# Patient Record
Sex: FEMALE | URBAN - METROPOLITAN AREA
[De-identification: names, ages, dates, MRNs, and addresses within clinical notes are randomized per-mention and may not be internally consistent; named-entity substitution may affect disease eponyms.]

---

## 2022-11-29 ENCOUNTER — OFFICE (OUTPATIENT)
Dept: URBAN - METROPOLITAN AREA CLINIC 94 | Facility: CLINIC | Age: 62
Setting detail: OPHTHALMOLOGY
End: 2022-11-29
Payer: COMMERCIAL

## 2022-11-29 DIAGNOSIS — E11.3293: ICD-10-CM

## 2022-11-29 DIAGNOSIS — H35.412: ICD-10-CM

## 2022-11-29 DIAGNOSIS — H35.033: ICD-10-CM

## 2022-11-29 DIAGNOSIS — H43.393: ICD-10-CM

## 2022-11-29 PROCEDURE — 92014 COMPRE OPH EXAM EST PT 1/>: CPT | Performed by: OPHTHALMOLOGY

## 2022-11-29 PROCEDURE — 92235 FLUORESCEIN ANGRPH MLTIFRAME: CPT | Performed by: OPHTHALMOLOGY

## 2022-11-29 PROCEDURE — 92134 CPTRZ OPH DX IMG PST SGM RTA: CPT | Performed by: OPHTHALMOLOGY

## 2022-11-29 ASSESSMENT — REFRACTION_MANIFEST
OD_CYLINDER: -0.25
OD_SPHERE: -1.25
OD_VA1: 20/20
OS_ADD: +1.50
OS_AXIS: 155
OD_AXIS: 176
OD_ADD: +1.50
OS_SPHERE: -1.75
OS_VA1: 20/20
OS_CYLINDER: -1.00

## 2022-11-29 ASSESSMENT — REFRACTION_AUTOREFRACTION
OS_SPHERE: -1.75
OS_CYLINDER: -1.00
OD_AXIS: 176
OD_CYLINDER: -0.25
OD_SPHERE: -1.75
OS_AXIS: 166

## 2022-11-29 ASSESSMENT — LID EXAM ASSESSMENTS
OD_BLEPHARITIS: RLL 1+
OS_BLEPHARITIS: LLL 1+

## 2022-11-29 ASSESSMENT — REFRACTION_CURRENTRX
OS_CYLINDER: -1.25
OS_VPRISM_DIRECTION: PROGS
OD_OVR_VA: 20/
OD_VPRISM_DIRECTION: PROGS
OS_ADD: +1.50
OS_OVR_VA: 20/
OD_CYLINDER: -1.25
OD_AXIS: 090
OS_AXIS: 104
OD_SPHERE: -1.00
OD_ADD: +1.50
OS_SPHERE: -0.75

## 2022-11-29 ASSESSMENT — SUPERFICIAL PUNCTATE KERATITIS (SPK)
OD_SPK: T
OS_SPK: T

## 2022-11-29 ASSESSMENT — SPHEQUIV_DERIVED
OS_SPHEQUIV: -2.25
OD_SPHEQUIV: -1.375
OS_SPHEQUIV: -2.25
OD_SPHEQUIV: -1.875

## 2022-11-29 ASSESSMENT — KERATOMETRY
OD_AXISANGLE_DEGREES: 177
OS_K1POWER_DIOPTERS: 45.75
METHOD_AUTO_MANUAL: AUTO
OD_K2POWER_DIOPTERS: 46.00
OS_AXISANGLE_DEGREES: 178
OD_K1POWER_DIOPTERS: 45.75
OS_K2POWER_DIOPTERS: 46.50

## 2022-11-29 ASSESSMENT — TONOMETRY
OS_IOP_MMHG: 17
OD_IOP_MMHG: 19

## 2022-11-29 ASSESSMENT — AXIALLENGTH_DERIVED
OD_AL: 23.26
OS_AL: 23.5047
OD_AL: 23.4509
OS_AL: 23.5047

## 2022-11-29 ASSESSMENT — VISUAL ACUITY
OD_BCVA: 20/20-1
OS_BCVA: 20/25-1

## 2022-11-29 ASSESSMENT — CONFRONTATIONAL VISUAL FIELD TEST (CVF)
OD_FINDINGS: FULL
OS_FINDINGS: FULL

## 2023-10-11 ENCOUNTER — OFFICE (OUTPATIENT)
Dept: URBAN - METROPOLITAN AREA CLINIC 94 | Facility: CLINIC | Age: 63
Setting detail: OPHTHALMOLOGY
End: 2023-10-11
Payer: COMMERCIAL

## 2023-10-11 DIAGNOSIS — H01.005: ICD-10-CM

## 2023-10-11 DIAGNOSIS — H01.002: ICD-10-CM

## 2023-10-11 DIAGNOSIS — H35.033: ICD-10-CM

## 2023-10-11 DIAGNOSIS — H04.123: ICD-10-CM

## 2023-10-11 PROCEDURE — 99213 OFFICE O/P EST LOW 20 MIN: CPT | Performed by: OPHTHALMOLOGY

## 2023-10-11 ASSESSMENT — SUPERFICIAL PUNCTATE KERATITIS (SPK)
OS_SPK: T
OD_SPK: T

## 2023-10-11 ASSESSMENT — REFRACTION_MANIFEST
OS_SPHERE: -1.75
OD_VA1: 20/20
OS_ADD: +1.50
OD_AXIS: 176
OD_CYLINDER: -0.25
OD_ADD: +1.50
OS_AXIS: 155
OS_CYLINDER: -1.00
OS_VA1: 20/20
OD_SPHERE: -1.25

## 2023-10-11 ASSESSMENT — REFRACTION_AUTOREFRACTION
OS_AXIS: 095
OD_CYLINDER: -0.75
OD_SPHERE: -0.50
OS_SPHERE: +0.25
OD_AXIS: 088
OS_CYLINDER: -1.50

## 2023-10-11 ASSESSMENT — LID EXAM ASSESSMENTS
OD_COMMENTS: INSPISSATED GLANDS
OD_BLEPHARITIS: RLL 1+
OS_BLEPHARITIS: LLL 1+
OS_COMMENTS: INSPISSATED GLANDS

## 2023-10-11 ASSESSMENT — REFRACTION_CURRENTRX
OS_ADD: +1.50
OD_VPRISM_DIRECTION: PROGS
OD_CYLINDER: -1.25
OD_AXIS: 090
OD_SPHERE: -1.00
OS_SPHERE: -0.75
OS_VPRISM_DIRECTION: PROGS
OD_ADD: +1.50
OD_OVR_VA: 20/
OS_CYLINDER: -1.25
OS_OVR_VA: 20/
OS_AXIS: 104

## 2023-10-11 ASSESSMENT — VISUAL ACUITY
OS_BCVA: 20/20-1
OD_BCVA: 20/20

## 2023-10-11 ASSESSMENT — KERATOMETRY
OD_K1POWER_DIOPTERS: 46.25
OS_AXISANGLE_DEGREES: 005
OD_AXISANGLE_DEGREES: 011
METHOD_AUTO_MANUAL: AUTO
OD_K2POWER_DIOPTERS: 46.75
OS_K2POWER_DIOPTERS: 46.50
OS_K1POWER_DIOPTERS: 46.25

## 2023-10-11 ASSESSMENT — CONFRONTATIONAL VISUAL FIELD TEST (CVF)
OD_FINDINGS: FULL
OS_FINDINGS: FULL

## 2023-10-11 ASSESSMENT — TONOMETRY
OD_IOP_MMHG: 20
OS_IOP_MMHG: 18

## 2023-10-11 ASSESSMENT — AXIALLENGTH_DERIVED
OD_AL: 23.039
OD_AL: 22.8547
OS_AL: 23.4139
OS_AL: 22.761

## 2023-10-11 ASSESSMENT — SPHEQUIV_DERIVED
OS_SPHEQUIV: -0.5
OD_SPHEQUIV: -0.875
OD_SPHEQUIV: -1.375
OS_SPHEQUIV: -2.25

## 2023-12-13 ENCOUNTER — OFFICE (OUTPATIENT)
Dept: URBAN - METROPOLITAN AREA CLINIC 94 | Facility: CLINIC | Age: 63
Setting detail: OPHTHALMOLOGY
End: 2023-12-13
Payer: COMMERCIAL

## 2023-12-13 DIAGNOSIS — H35.033: ICD-10-CM

## 2023-12-13 DIAGNOSIS — H04.123: ICD-10-CM

## 2023-12-13 DIAGNOSIS — H01.002: ICD-10-CM

## 2023-12-13 DIAGNOSIS — H01.005: ICD-10-CM

## 2023-12-13 PROCEDURE — 99213 OFFICE O/P EST LOW 20 MIN: CPT | Performed by: OPHTHALMOLOGY

## 2023-12-13 PROCEDURE — 92250 FUNDUS PHOTOGRAPHY W/I&R: CPT | Performed by: OPHTHALMOLOGY

## 2023-12-13 ASSESSMENT — REFRACTION_MANIFEST
OS_SPHERE: -1.75
OS_VA1: 20/20
OS_ADD: +1.50
OD_AXIS: 176
OS_CYLINDER: -1.00
OD_CYLINDER: -0.25
OD_SPHERE: -1.25
OD_ADD: +1.50
OD_VA1: 20/20
OS_AXIS: 155

## 2023-12-13 ASSESSMENT — REFRACTION_AUTOREFRACTION
OD_AXIS: 159
OD_SPHERE: -1.50
OS_AXIS: 092
OD_CYLINDER: -0.25
OS_CYLINDER: -1.25
OS_SPHERE: 0.00

## 2023-12-13 ASSESSMENT — REFRACTION_CURRENTRX
OD_CYLINDER: -1.25
OS_SPHERE: -0.75
OD_OVR_VA: 20/
OD_VPRISM_DIRECTION: PROGS
OS_AXIS: 104
OS_OVR_VA: 20/
OS_CYLINDER: -1.25
OS_VPRISM_DIRECTION: PROGS
OD_AXIS: 090
OS_ADD: +1.50
OD_ADD: +1.50
OD_SPHERE: -1.00

## 2023-12-13 ASSESSMENT — SPHEQUIV_DERIVED
OS_SPHEQUIV: -0.625
OS_SPHEQUIV: -2.25
OD_SPHEQUIV: -1.625
OD_SPHEQUIV: -1.375

## 2023-12-13 ASSESSMENT — LID EXAM ASSESSMENTS
OS_BLEPHARITIS: LLL 1+
OD_BLEPHARITIS: RLL 1+
OD_COMMENTS: INSPISSATED GLANDS
OS_COMMENTS: INSPISSATED GLANDS

## 2023-12-13 ASSESSMENT — CONFRONTATIONAL VISUAL FIELD TEST (CVF)
OD_FINDINGS: FULL
OS_FINDINGS: FULL

## 2023-12-13 ASSESSMENT — SUPERFICIAL PUNCTATE KERATITIS (SPK)
OS_SPK: T
OD_SPK: T

## 2024-03-26 ENCOUNTER — OFFICE (OUTPATIENT)
Dept: URBAN - METROPOLITAN AREA CLINIC 94 | Facility: CLINIC | Age: 64
Setting detail: OPHTHALMOLOGY
End: 2024-03-26
Payer: COMMERCIAL

## 2024-03-26 DIAGNOSIS — E11.3293: ICD-10-CM

## 2024-03-26 DIAGNOSIS — H35.412: ICD-10-CM

## 2024-03-26 DIAGNOSIS — H43.393: ICD-10-CM

## 2024-03-26 PROCEDURE — 92134 CPTRZ OPH DX IMG PST SGM RTA: CPT | Performed by: OPHTHALMOLOGY

## 2024-03-26 PROCEDURE — 92012 INTRM OPH EXAM EST PATIENT: CPT | Performed by: OPHTHALMOLOGY

## 2024-03-26 ASSESSMENT — LID EXAM ASSESSMENTS
OD_COMMENTS: INSPISSATED GLANDS
OS_COMMENTS: INSPISSATED GLANDS
OD_BLEPHARITIS: RLL 1+
OS_BLEPHARITIS: LLL 1+

## 2024-08-26 ENCOUNTER — INPATIENT (INPATIENT)
Facility: HOSPITAL | Age: 64
LOS: 16 days | Discharge: HOME | DRG: 373 | End: 2024-09-12
Attending: HOSPITALIST | Admitting: STUDENT IN AN ORGANIZED HEALTH CARE EDUCATION/TRAINING PROGRAM
Payer: COMMERCIAL

## 2024-08-26 VITALS
OXYGEN SATURATION: 96 % | DIASTOLIC BLOOD PRESSURE: 115 MMHG | SYSTOLIC BLOOD PRESSURE: 207 MMHG | TEMPERATURE: 98 F | HEART RATE: 104 BPM | RESPIRATION RATE: 18 BRPM

## 2024-08-26 DIAGNOSIS — Z98.891 HISTORY OF UTERINE SCAR FROM PREVIOUS SURGERY: Chronic | ICD-10-CM

## 2024-08-26 PROCEDURE — 49083 ABD PARACENTESIS W/IMAGING: CPT

## 2024-08-26 PROCEDURE — 99285 EMERGENCY DEPT VISIT HI MDM: CPT | Mod: 25

## 2024-08-26 PROCEDURE — 93010 ELECTROCARDIOGRAM REPORT: CPT

## 2024-08-26 NOTE — ED ADULT TRIAGE NOTE - CHIEF COMPLAINT QUOTE
pt BIBA for abd pain x1 week pt uses PD dialysis at home 5x wk, states machine at home isn't working properly and fluid is backing up into belly and legs complains of constipation, pt noted hypertensive in triage denies chest pain sob MD Spring called to bedside for eval

## 2024-08-27 DIAGNOSIS — K65.2 SPONTANEOUS BACTERIAL PERITONITIS: ICD-10-CM

## 2024-08-27 LAB
ALBUMIN SERPL ELPH-MCNC: 2.1 G/DL — LOW (ref 3.3–5.2)
ALBUMIN SERPL ELPH-MCNC: 2.5 G/DL — LOW (ref 3.3–5.2)
ALP SERPL-CCNC: 60 U/L — SIGNIFICANT CHANGE UP (ref 40–120)
ALP SERPL-CCNC: 69 U/L — SIGNIFICANT CHANGE UP (ref 40–120)
ALT FLD-CCNC: 10 U/L — SIGNIFICANT CHANGE UP
ALT FLD-CCNC: 8 U/L — SIGNIFICANT CHANGE UP
ANION GAP SERPL CALC-SCNC: 16 MMOL/L — SIGNIFICANT CHANGE UP (ref 5–17)
ANION GAP SERPL CALC-SCNC: 16 MMOL/L — SIGNIFICANT CHANGE UP (ref 5–17)
ANION GAP SERPL CALC-SCNC: 17 MMOL/L — SIGNIFICANT CHANGE UP (ref 5–17)
ANION GAP SERPL CALC-SCNC: 18 MMOL/L — HIGH (ref 5–17)
ANISOCYTOSIS BLD QL: SLIGHT — SIGNIFICANT CHANGE UP
APTT BLD: 25.8 SEC — SIGNIFICANT CHANGE UP (ref 24.5–35.6)
AST SERPL-CCNC: 13 U/L — SIGNIFICANT CHANGE UP
AST SERPL-CCNC: 15 U/L — SIGNIFICANT CHANGE UP
B PERT IGG+IGM PNL SER: ABNORMAL
BASOPHILS # BLD AUTO: 0.05 K/UL — SIGNIFICANT CHANGE UP (ref 0–0.2)
BASOPHILS NFR BLD AUTO: 0.9 % — SIGNIFICANT CHANGE UP (ref 0–2)
BILIRUB SERPL-MCNC: 0.3 MG/DL — LOW (ref 0.4–2)
BILIRUB SERPL-MCNC: 0.3 MG/DL — LOW (ref 0.4–2)
BLD GP AB SCN SERPL QL: SIGNIFICANT CHANGE UP
BUN SERPL-MCNC: 100.4 MG/DL — HIGH (ref 8–20)
BUN SERPL-MCNC: 93.1 MG/DL — HIGH (ref 8–20)
BUN SERPL-MCNC: 94.2 MG/DL — HIGH (ref 8–20)
BUN SERPL-MCNC: 98.2 MG/DL — HIGH (ref 8–20)
BURR CELLS BLD QL SMEAR: PRESENT — SIGNIFICANT CHANGE UP
CALCIUM SERPL-MCNC: 7.8 MG/DL — LOW (ref 8.4–10.5)
CALCIUM SERPL-MCNC: 8.2 MG/DL — LOW (ref 8.4–10.5)
CALCIUM SERPL-MCNC: 8.6 MG/DL — SIGNIFICANT CHANGE UP (ref 8.4–10.5)
CALCIUM SERPL-MCNC: 8.9 MG/DL — SIGNIFICANT CHANGE UP (ref 8.4–10.5)
CHLORIDE SERPL-SCNC: 95 MMOL/L — LOW (ref 96–108)
CHLORIDE SERPL-SCNC: 98 MMOL/L — SIGNIFICANT CHANGE UP (ref 96–108)
CHLORIDE SERPL-SCNC: 98 MMOL/L — SIGNIFICANT CHANGE UP (ref 96–108)
CHLORIDE SERPL-SCNC: 99 MMOL/L — SIGNIFICANT CHANGE UP (ref 96–108)
CO2 SERPL-SCNC: 25 MMOL/L — SIGNIFICANT CHANGE UP (ref 22–29)
CO2 SERPL-SCNC: 25 MMOL/L — SIGNIFICANT CHANGE UP (ref 22–29)
CO2 SERPL-SCNC: 27 MMOL/L — SIGNIFICANT CHANGE UP (ref 22–29)
CO2 SERPL-SCNC: 27 MMOL/L — SIGNIFICANT CHANGE UP (ref 22–29)
COLOR FLD: ABNORMAL
CREAT SERPL-MCNC: 14.51 MG/DL — HIGH (ref 0.5–1.3)
CREAT SERPL-MCNC: 15.27 MG/DL — HIGH (ref 0.5–1.3)
CREAT SERPL-MCNC: 15.28 MG/DL — HIGH (ref 0.5–1.3)
CREAT SERPL-MCNC: 15.82 MG/DL — HIGH (ref 0.5–1.3)
EGFR: 2 ML/MIN/1.73M2 — LOW
EGFR: 3 ML/MIN/1.73M2 — LOW
ELLIPTOCYTES BLD QL SMEAR: SIGNIFICANT CHANGE UP
EOSINOPHIL # BLD AUTO: 0 K/UL — SIGNIFICANT CHANGE UP (ref 0–0.5)
EOSINOPHIL NFR BLD AUTO: 0 % — SIGNIFICANT CHANGE UP (ref 0–6)
FLUID INTAKE SUBSTANCE CLASS: SIGNIFICANT CHANGE UP
GIANT PLATELETS BLD QL SMEAR: PRESENT — SIGNIFICANT CHANGE UP
GLUCOSE BLDC GLUCOMTR-MCNC: 105 MG/DL — HIGH (ref 70–99)
GLUCOSE BLDC GLUCOMTR-MCNC: 110 MG/DL — HIGH (ref 70–99)
GLUCOSE BLDC GLUCOMTR-MCNC: 113 MG/DL — HIGH (ref 70–99)
GLUCOSE BLDC GLUCOMTR-MCNC: 159 MG/DL — HIGH (ref 70–99)
GLUCOSE SERPL-MCNC: 106 MG/DL — HIGH (ref 70–99)
GLUCOSE SERPL-MCNC: 149 MG/DL — HIGH (ref 70–99)
GLUCOSE SERPL-MCNC: 193 MG/DL — HIGH (ref 70–99)
GLUCOSE SERPL-MCNC: 210 MG/DL — HIGH (ref 70–99)
GRAM STN FLD: SIGNIFICANT CHANGE UP
GRAM STN FLD: SIGNIFICANT CHANGE UP
HCT VFR BLD CALC: 36.9 % — SIGNIFICANT CHANGE UP (ref 34.5–45)
HCT VFR BLD CALC: 38.6 % — SIGNIFICANT CHANGE UP (ref 34.5–45)
HGB BLD-MCNC: 11.5 G/DL — SIGNIFICANT CHANGE UP (ref 11.5–15.5)
HGB BLD-MCNC: 11.8 G/DL — SIGNIFICANT CHANGE UP (ref 11.5–15.5)
INR BLD: 0.94 RATIO — SIGNIFICANT CHANGE UP (ref 0.85–1.18)
LACTATE BLDV-MCNC: 1.3 MMOL/L — SIGNIFICANT CHANGE UP (ref 0.5–2)
LIDOCAIN IGE QN: 71 U/L — HIGH (ref 22–51)
LYMPHOCYTES # BLD AUTO: 0.2 K/UL — LOW (ref 1–3.3)
LYMPHOCYTES # BLD AUTO: 3.5 % — LOW (ref 13–44)
LYMPHOCYTES # FLD: 10 % — SIGNIFICANT CHANGE UP
MAGNESIUM SERPL-MCNC: 3.1 MG/DL — HIGH (ref 1.6–2.6)
MANUAL SMEAR VERIFICATION: SIGNIFICANT CHANGE UP
MCHC RBC-ENTMCNC: 23.6 PG — LOW (ref 27–34)
MCHC RBC-ENTMCNC: 24 PG — LOW (ref 27–34)
MCHC RBC-ENTMCNC: 30.6 GM/DL — LOW (ref 32–36)
MCHC RBC-ENTMCNC: 31.2 GM/DL — LOW (ref 32–36)
MCV RBC AUTO: 76.9 FL — LOW (ref 80–100)
MCV RBC AUTO: 77 FL — LOW (ref 80–100)
MONOCYTES # BLD AUTO: 0.1 K/UL — SIGNIFICANT CHANGE UP (ref 0–0.9)
MONOCYTES NFR BLD AUTO: 1.8 % — LOW (ref 2–14)
MONOS+MACROS # FLD: 2 % — SIGNIFICANT CHANGE UP
NEUTROPHILS # BLD AUTO: 5.25 K/UL — SIGNIFICANT CHANGE UP (ref 1.8–7.4)
NEUTROPHILS NFR BLD AUTO: 83.3 % — HIGH (ref 43–77)
NEUTROPHILS-BODY FLUID: 88 % — SIGNIFICANT CHANGE UP
NEUTS BAND # BLD: 9.6 % — HIGH (ref 0–8)
OVALOCYTES BLD QL SMEAR: SLIGHT — SIGNIFICANT CHANGE UP
PLAT MORPH BLD: NORMAL — SIGNIFICANT CHANGE UP
PLATELET # BLD AUTO: 343 K/UL — SIGNIFICANT CHANGE UP (ref 150–400)
PLATELET # BLD AUTO: 370 K/UL — SIGNIFICANT CHANGE UP (ref 150–400)
POIKILOCYTOSIS BLD QL AUTO: SIGNIFICANT CHANGE UP
POLYCHROMASIA BLD QL SMEAR: SIGNIFICANT CHANGE UP
POTASSIUM SERPL-MCNC: 5.2 MMOL/L — SIGNIFICANT CHANGE UP (ref 3.5–5.3)
POTASSIUM SERPL-MCNC: 5.3 MMOL/L — SIGNIFICANT CHANGE UP (ref 3.5–5.3)
POTASSIUM SERPL-MCNC: 5.4 MMOL/L — HIGH (ref 3.5–5.3)
POTASSIUM SERPL-MCNC: 5.5 MMOL/L — HIGH (ref 3.5–5.3)
POTASSIUM SERPL-SCNC: 5.2 MMOL/L — SIGNIFICANT CHANGE UP (ref 3.5–5.3)
POTASSIUM SERPL-SCNC: 5.3 MMOL/L — SIGNIFICANT CHANGE UP (ref 3.5–5.3)
POTASSIUM SERPL-SCNC: 5.4 MMOL/L — HIGH (ref 3.5–5.3)
POTASSIUM SERPL-SCNC: 5.5 MMOL/L — HIGH (ref 3.5–5.3)
PROMYELOCYTES # FLD: 0.9 % — HIGH (ref 0–0)
PROT SERPL-MCNC: 5.6 G/DL — LOW (ref 6.6–8.7)
PROT SERPL-MCNC: 6.3 G/DL — LOW (ref 6.6–8.7)
PROTHROM AB SERPL-ACNC: 10.4 SEC — SIGNIFICANT CHANGE UP (ref 9.5–13)
RBC # BLD: 4.8 M/UL — SIGNIFICANT CHANGE UP (ref 3.8–5.2)
RBC # BLD: 5.01 M/UL — SIGNIFICANT CHANGE UP (ref 3.8–5.2)
RBC # FLD: 15.9 % — HIGH (ref 10.3–14.5)
RBC # FLD: 16 % — HIGH (ref 10.3–14.5)
RBC BLD AUTO: ABNORMAL
RCV VOL RI: 2000 /UL — HIGH (ref 0–0)
SODIUM SERPL-SCNC: 138 MMOL/L — SIGNIFICANT CHANGE UP (ref 135–145)
SODIUM SERPL-SCNC: 139 MMOL/L — SIGNIFICANT CHANGE UP (ref 135–145)
SODIUM SERPL-SCNC: 140 MMOL/L — SIGNIFICANT CHANGE UP (ref 135–145)
SODIUM SERPL-SCNC: 142 MMOL/L — SIGNIFICANT CHANGE UP (ref 135–145)
SPECIMEN SOURCE: SIGNIFICANT CHANGE UP
TOTAL NUCLEATED CELL COUNT, BODY FLUID: SIGNIFICANT CHANGE UP /UL
TUBE TYPE: SIGNIFICANT CHANGE UP
WBC # BLD: 5.65 K/UL — SIGNIFICANT CHANGE UP (ref 3.8–10.5)
WBC # BLD: 8.86 K/UL — SIGNIFICANT CHANGE UP (ref 3.8–10.5)
WBC # FLD AUTO: 5.65 K/UL — SIGNIFICANT CHANGE UP (ref 3.8–10.5)
WBC # FLD AUTO: 8.86 K/UL — SIGNIFICANT CHANGE UP (ref 3.8–10.5)

## 2024-08-27 PROCEDURE — 93010 ELECTROCARDIOGRAM REPORT: CPT

## 2024-08-27 PROCEDURE — 74018 RADEX ABDOMEN 1 VIEW: CPT | Mod: 26

## 2024-08-27 PROCEDURE — 99223 1ST HOSP IP/OBS HIGH 75: CPT

## 2024-08-27 RX ORDER — DEXTROSE 15 G/33 G
15 GEL IN PACKET (GRAM) ORAL ONCE
Refills: 0 | Status: DISCONTINUED | OUTPATIENT
Start: 2024-08-27 | End: 2024-09-12

## 2024-08-27 RX ORDER — ACETAMINOPHEN 325 MG/1
650 TABLET ORAL EVERY 6 HOURS
Refills: 0 | Status: DISCONTINUED | OUTPATIENT
Start: 2024-08-27 | End: 2024-08-30

## 2024-08-27 RX ORDER — FLUDROCORTISONE ACETATE 0.1 MG/1
0.1 TABLET ORAL ONCE
Refills: 0 | Status: DISCONTINUED | OUTPATIENT
Start: 2024-08-27 | End: 2024-08-30

## 2024-08-27 RX ORDER — TORSEMIDE 20 MG/1
20 TABLET ORAL DAILY
Refills: 0 | Status: DISCONTINUED | OUTPATIENT
Start: 2024-08-27 | End: 2024-08-31

## 2024-08-27 RX ORDER — FUROSEMIDE 40 MG
60 TABLET ORAL ONCE
Refills: 0 | Status: COMPLETED | OUTPATIENT
Start: 2024-08-27 | End: 2024-08-27

## 2024-08-27 RX ORDER — MAGNESIUM, ALUMINUM HYDROXIDE 200-225/5
30 SUSPENSION, ORAL (FINAL DOSE FORM) ORAL EVERY 4 HOURS
Refills: 0 | Status: DISCONTINUED | OUTPATIENT
Start: 2024-08-27 | End: 2024-08-29

## 2024-08-27 RX ORDER — GLUCAGON INJECTION, SOLUTION 1 MG/.2ML
1 INJECTION, SOLUTION SUBCUTANEOUS ONCE
Refills: 0 | Status: DISCONTINUED | OUTPATIENT
Start: 2024-08-27 | End: 2024-08-30

## 2024-08-27 RX ORDER — ONDANSETRON 2 MG/ML
4 INJECTION, SOLUTION INTRAMUSCULAR; INTRAVENOUS EVERY 8 HOURS
Refills: 0 | Status: DISCONTINUED | OUTPATIENT
Start: 2024-08-27 | End: 2024-08-30

## 2024-08-27 RX ORDER — PIPERACILLIN SODIUM AND TAZOBACTAM SODIUM 3; .375 G/15ML; G/15ML
3.38 INJECTION, POWDER, FOR SOLUTION INTRAVENOUS ONCE
Refills: 0 | Status: COMPLETED | OUTPATIENT
Start: 2024-08-27 | End: 2024-08-27

## 2024-08-27 RX ORDER — DEXTROSE 15 G/33 G
12.5 GEL IN PACKET (GRAM) ORAL ONCE
Refills: 0 | Status: DISCONTINUED | OUTPATIENT
Start: 2024-08-27 | End: 2024-09-12

## 2024-08-27 RX ORDER — DEXTROSE 15 G/33 G
25 GEL IN PACKET (GRAM) ORAL ONCE
Refills: 0 | Status: DISCONTINUED | OUTPATIENT
Start: 2024-08-27 | End: 2024-09-12

## 2024-08-27 RX ORDER — CLONIDINE HCL 0.2 MG
0.1 TABLET ORAL
Refills: 0 | Status: DISCONTINUED | OUTPATIENT
Start: 2024-08-27 | End: 2024-08-31

## 2024-08-27 RX ORDER — VANCOMYCIN/0.9 % SOD CHLORIDE 1.75G/25
1000 PLASTIC BAG, INJECTION (ML) INTRAVENOUS ONCE
Refills: 0 | Status: COMPLETED | OUTPATIENT
Start: 2024-08-27 | End: 2024-08-27

## 2024-08-27 RX ORDER — DILTIAZEM HYDROCHLORIDE 5 MG/ML
240 INJECTION INTRAVENOUS DAILY
Refills: 0 | Status: DISCONTINUED | OUTPATIENT
Start: 2024-08-27 | End: 2024-08-31

## 2024-08-27 RX ORDER — OXYCODONE HYDROCHLORIDE 5 MG/1
10 TABLET ORAL EVERY 6 HOURS
Refills: 0 | Status: DISCONTINUED | OUTPATIENT
Start: 2024-08-27 | End: 2024-08-31

## 2024-08-27 RX ORDER — METOPROLOL TARTRATE 100 MG/1
100 TABLET ORAL DAILY
Refills: 0 | Status: DISCONTINUED | OUTPATIENT
Start: 2024-08-27 | End: 2024-08-31

## 2024-08-27 RX ORDER — HYDRALAZINE HCL 50 MG
100 TABLET ORAL
Refills: 0 | Status: DISCONTINUED | OUTPATIENT
Start: 2024-08-27 | End: 2024-08-31

## 2024-08-27 RX ORDER — CALCIUM ACETATE 667 MG/1
667 CAPSULE ORAL
Refills: 0 | Status: DISCONTINUED | OUTPATIENT
Start: 2024-08-27 | End: 2024-08-30

## 2024-08-27 RX ORDER — ROSUVASTATIN CALCIUM 10 MG/1
20 TABLET ORAL AT BEDTIME
Refills: 0 | Status: DISCONTINUED | OUTPATIENT
Start: 2024-08-27 | End: 2024-08-30

## 2024-08-27 RX ORDER — HYDRALAZINE HCL 50 MG
10 TABLET ORAL ONCE
Refills: 0 | Status: COMPLETED | OUTPATIENT
Start: 2024-08-27 | End: 2024-08-27

## 2024-08-27 RX ADMIN — Medication 0.1 MILLIGRAM(S): at 06:17

## 2024-08-27 RX ADMIN — CALCIUM ACETATE 667 MILLIGRAM(S): 667 CAPSULE ORAL at 09:59

## 2024-08-27 RX ADMIN — Medication 1: at 08:48

## 2024-08-27 RX ADMIN — ONDANSETRON 4 MILLIGRAM(S): 2 INJECTION, SOLUTION INTRAMUSCULAR; INTRAVENOUS at 20:08

## 2024-08-27 RX ADMIN — DILTIAZEM HYDROCHLORIDE 240 MILLIGRAM(S): 5 INJECTION INTRAVENOUS at 06:18

## 2024-08-27 RX ADMIN — Medication 2000 MILLIGRAM(S): at 05:10

## 2024-08-27 RX ADMIN — Medication 0.1 MILLIGRAM(S): at 17:59

## 2024-08-27 RX ADMIN — CALCIUM ACETATE 667 MILLIGRAM(S): 667 CAPSULE ORAL at 17:59

## 2024-08-27 RX ADMIN — Medication 10 MILLIGRAM(S): at 03:22

## 2024-08-27 RX ADMIN — PIPERACILLIN SODIUM AND TAZOBACTAM SODIUM 200 GRAM(S): 3; .375 INJECTION, POWDER, FOR SOLUTION INTRAVENOUS at 01:20

## 2024-08-27 RX ADMIN — Medication 60 MILLIGRAM(S): at 22:45

## 2024-08-27 RX ADMIN — Medication 250 MILLIGRAM(S): at 01:42

## 2024-08-27 RX ADMIN — Medication 100 MILLIGRAM(S): at 06:17

## 2024-08-27 RX ADMIN — OXYCODONE HYDROCHLORIDE 10 MILLIGRAM(S): 5 TABLET ORAL at 17:59

## 2024-08-27 RX ADMIN — ROSUVASTATIN CALCIUM 20 MILLIGRAM(S): 10 TABLET ORAL at 22:26

## 2024-08-27 RX ADMIN — Medication 2 MILLIGRAM(S): at 05:08

## 2024-08-27 RX ADMIN — TORSEMIDE 20 MILLIGRAM(S): 20 TABLET ORAL at 06:17

## 2024-08-27 RX ADMIN — Medication 60 MILLIGRAM(S): at 05:12

## 2024-08-27 RX ADMIN — CALCIUM ACETATE 667 MILLIGRAM(S): 667 CAPSULE ORAL at 13:21

## 2024-08-27 RX ADMIN — METOPROLOL TARTRATE 100 MILLIGRAM(S): 100 TABLET ORAL at 06:18

## 2024-08-27 RX ADMIN — Medication 100 MILLIGRAM(S): at 17:59

## 2024-08-27 RX ADMIN — Medication 1 TABLET(S): at 13:20

## 2024-08-27 NOTE — H&P ADULT - NSICDXPASTMEDICALHX_GEN_ALL_CORE_FT
PAST MEDICAL HISTORY:  DM (diabetes mellitus)     HLD (hyperlipidemia)     HTN (hypertension)     Overactive thyroid gland

## 2024-08-27 NOTE — ED PROVIDER NOTE - ATTENDING CONTRIBUTION TO CARE
I performed a face to face bedside interview with patient regarding history of present illness, review of symptoms and past medical history. I completed an independent physical exam.  I have discussed patient's plan of care with resident.   I agree with note as stated above including HISTORY OF PRESENT ILLNESS, HIV, PAST MEDICAL/SURGICAL/FAMILY/SOCIAL HISTORY, ALLERGIES AND HOME MEDICATIONS, REVIEW OF SYSTEMS, PHYSICAL EXAM, MEDICAL DECISION MAKING and any PROGRESS NOTES during the time I functioned as the attending physician for this patient unless otherwise noted. My brief assessment is as follows:   Gen: in NAD  Head: NC/AT  Neck: trachea midline  Card: regular rate and rhythm  Resp:  CTAB  Abd: soft, +distension, vague diffuse ttp  Ext: no deformities  Neuro: A&O, appears nonfocal  Skin:  Warm and dry as visualized  Psych:  Normal affect and mood

## 2024-08-27 NOTE — CONSULT NOTE ADULT - TIME BILLING
Chart review, interpretation of laboratory and imaging results,  patient evaluation, medication review, documentation, coordination with medical team. Interpretation  and review of microbiologic data. Overseeing antibiotic therapy.

## 2024-08-27 NOTE — CHART NOTE - NSCHARTNOTEFT_GEN_A_CORE
Notified by RN that peritoneal dialysis catheter is not functioning appropriately. Fluids are taking very long to go in and not coming back out. Pt was admitted with peritonitis and peritoneal dialysis catheter dysfunction.    At bedside, pt denies dyspnea, orthopnea, pain, lower extremity swelling. Mentating well, no evidence of fluid overload on exam. Hypertensive on vitals.    GENERAL: No acute distress, comfortably in bed  HEENT: Atraumatic, normocephalic, non-icteric, no JVD  NEURO: A&Ox3, no focal deficits, moving all extremities spontaneously, no dysarthria, CN II-XII grossly intact  PSYCH: Normal affect, calm  LUNGS: CTAB, no wrr, non-labored breathing  HEART: RRR, no murmur appreciated  ABD: TENDER TO PALPATION  EXTREMITIES: Nontender, no clubbing, cyanosis, or edema     Vital Signs Last 24 Hrs  T(C): 36.9 (27 Aug 2024 22:23), Max: 37.2 (27 Aug 2024 16:56)  T(F): 98.4 (27 Aug 2024 22:23), Max: 98.9 (27 Aug 2024 16:56)  HR: 92 (27 Aug 2024 22:23) (89 - 104)  BP: 177/82 (27 Aug 2024 22:23) (146/77 - 207/115)  BP(mean): 133 (27 Aug 2024 00:03) (133 - 139)  RR: 18 (27 Aug 2024 22:23) (18 - 20)  SpO2: 94% (27 Aug 2024 22:23) (91% - 97%)    Parameters below as of 27 Aug 2024 22:23  Patient On (Oxygen Delivery Method): room air    Plan:  -Pt admitted w/ dysfunction of dialysis catheter  -Labs reviewed, mild hyperkalemia noted throughout admission  -Already ordered for albuterol  -Will give STAT dose Lasix  -STAT BMP  -EKG  -No current indication for temporary access for emergent dialysis  -Need to establish access in AM  -Discussed above plan with Dr. Sweet and Dr. Smith (nephrology)

## 2024-08-27 NOTE — ED ADULT NURSE NOTE - NSFALLHARMRISKINTERV_ED_ALL_ED

## 2024-08-27 NOTE — PROVIDER CONTACT NOTE (OTHER) - ACTION/TREATMENT ORDERED:
MD Schmitt made aware. EKG and BMP w Mag MD Schmitt made aware. EKG and BMP w Mag performed.  Vascular team notifed as per MD and will f/u in AM

## 2024-08-27 NOTE — CONSULT NOTE ADULT - ASSESSMENT
62yo F pt w/ pmhx of ESRD on peritoneal dialysis 5x per week, HTN, HLD, DM presented to ED c/o worsening diffused abdominal pain with associated distention over the past week. Pt reports dialysis cath has not been draining for the last week. She attempts peritoneal dialysis but her unable to get any fluid return. She denies fever, chills, N/V/D, urinary sx, chest pain, SOB, or any other complaints.  (27 Aug 2024 03:04)  Has been constipated in the past    Has high WBC count in PD fluid but gm stain -ve  Likely has Peritonitis Needs -C/S pd FLUID  Already Recd V/Ctx iiv   Needs 2L 1.5% in and out and then 4 exch of CAPD /day  Abx can be given IP  Needs to be admitted on 5th floor    Axr pending;  CT Abd as needed    Shall follow

## 2024-08-27 NOTE — CONSULT NOTE ADULT - SUBJECTIVE AND OBJECTIVE BOX
[facility]  INFECTIOUS DISEASES at [place] and [place]  =====================================================       George Tobias MD                                                        Diplomates American Board of Internal Medicine & Infectious Diseases                * [place] Office - Appt - Tel  [phone] Fax [phone]                * [place] Office - Appt - Tel [phone] Fax [phone]                                  Hospital Consult line:  [phone]  =====================================================      [facility]-[id number]  ADALID CASE        CC: Patient is a 63y old  Female who presents with a chief complaint of ESRD needing HD (27 Aug 2024 14:33)      63y  Female     HPI:  62yo F pt w/ pmhx of ESRD on peritoneal dialysis 5x per week, HTN, HLD, DM presented to ED c/o worsening diffused abdominal pain with associated distention over the past week. Pt reports dialysis cath has not been draining for the last week. She attempts peritoneal dialysis but her unable to get any fluid return. She denies fever, chills, N/V/D, urinary sx, chest pain, SOB, or any other complaints.  (27 Aug 2024 03:04)    ______________________________________________________  PAST MEDICAL & SURGICAL HISTORY:  HTN (hypertension)      DM (diabetes mellitus)      HLD (hyperlipidemia)      Overactive thyroid gland      S/P  section          Social History:    Occupation:  Travel:  Pets:  Drugs:  Alcohol:     FAMILY HISTORY:  Family history of breast cancer in female (Aunt)    Family history of prostate cancer (Uncle)    Family history of hypertension (Father, Mother)        ______________________________________________________  Allergies    No Known Allergies    Intolerances        ______________________________________________________  MEDICATIONS:  Antibiotics:  cefTRIAXone Injectable. 2000 milliGRAM(s) IV Push every 24 hours    Other medications:  albuterol    0.083%. 2.5 milliGRAM(s) Nebulizer once  calcium acetate 667 milliGRAM(s) Oral three times a day with meals  cloNIDine 0.1 milliGRAM(s) Oral two times a day  dextrose 5%. 1000 milliLiter(s) IV Continuous <Continuous>  dextrose 5%. 1000 milliLiter(s) IV Continuous <Continuous>  dextrose 50% Injectable 25 Gram(s) IV Push once  dextrose 50% Injectable 25 Gram(s) IV Push once  dextrose 50% Injectable 12.5 Gram(s) IV Push once  diltiazem    milliGRAM(s) Oral daily  fludroCORTISONE 0.1 milliGRAM(s) Oral once  glucagon  Injectable 1 milliGRAM(s) IntraMuscular once  hydrALAZINE 100 milliGRAM(s) Oral two times a day  insulin lispro (ADMELOG) corrective regimen sliding scale   SubCutaneous three times a day before meals  metoprolol tartrate 100 milliGRAM(s) Oral daily  Nephro-yasmany 1 Tablet(s) Oral daily  rosuvastatin 20 milliGRAM(s) Oral at bedtime  torsemide 20 milliGRAM(s) Oral daily    ______________________________________________________  REVIEW OF SYSTEMS:  CONSTITUTIONAL:  No fever or chills  HEENT:  No diplopia or blurred vision.  No earache, sore throat or runny nose.  CARDIOVASCULAR:  No chest pain  RESPIRATORY:  No cough, shortness of breath  GASTROINTESTINAL:  No nausea, vomiting, abdominal pain or diarrhea.  GENITOURINARY:  No dysuria, frequency or urgency. No blood in urine  MUSCULOSKELETAL:  no joint aches, no muscle pain  SKIN:  No change in skin, hair or nails.  NEUROLOGIC:  No headaches, seizures  PSYCHIATRIC:  No disorder of thought or mood.  ENDOCRINE:  No heat or cold intolerance  HEMATOLOGICAL:  No easy bruising or bleeding.     _____________________________________________________  PHYSICAL EXAM:  GEN: AAOx4. Lying comfortable in bed, in no acute distress   HEENT: normocephalic and atraumatic. EOMI. PERRL.  Anicteric sclerae. Moist mucous membranes. No mucosal lesions. No nasal discharge.   NECK: Supple. No palpable neck masses or LN  LUNGS: eupneic, CTA B/L, no adventitious sounds  HEART: RRR, no m/r/g  ABDOMEN: Soft, NT, ND, no hepatosplenomegally, no palpable masses.  +BS.    : No CVA tenderness, no Paredes catheter  EXTREMITIES: well perfused, without  edema.  MSK: No joint deformity or swelling  LYMPH: no palpable cervical, supraclavicular, axillary or inguinal lymph nodes  NEUROLOGIC: Grossly no motor focal deficits   PSYCHIATRIC: Appropriate affect and mood.  SKIN: No rash, wounds or jaundice  LINES: PIV, no phlebitis     ______________________________________________________      Vitals:  T(F): 98.4 (27 Aug 2024 15:16), Max: 98.8 (27 Aug 2024 04:39)  HR: 92 (27 Aug 2024 15:16)  BP: 164/87 (27 Aug 2024 15:16)  RR: 18 (27 Aug 2024 15:16)  SpO2: 92% (27 Aug 2024 15:16) (92% - 97%)  temp max in last 48H T(F): , Max: 98.8 (24 @ 04:39)    Current Antibiotics:  cefTRIAXone Injectable. 2000 milliGRAM(s) IV Push every 24 hours    Other medications:  albuterol    0.083%. 2.5 milliGRAM(s) Nebulizer once  calcium acetate 667 milliGRAM(s) Oral three times a day with meals  cloNIDine 0.1 milliGRAM(s) Oral two times a day  dextrose 5%. 1000 milliLiter(s) IV Continuous <Continuous>  dextrose 5%. 1000 milliLiter(s) IV Continuous <Continuous>  dextrose 50% Injectable 12.5 Gram(s) IV Push once  dextrose 50% Injectable 25 Gram(s) IV Push once  dextrose 50% Injectable 25 Gram(s) IV Push once  diltiazem    milliGRAM(s) Oral daily  fludroCORTISONE 0.1 milliGRAM(s) Oral once  glucagon  Injectable 1 milliGRAM(s) IntraMuscular once  hydrALAZINE 100 milliGRAM(s) Oral two times a day  insulin lispro (ADMELOG) corrective regimen sliding scale   SubCutaneous three times a day before meals  metoprolol tartrate 100 milliGRAM(s) Oral daily  Nephro-yasmany 1 Tablet(s) Oral daily  rosuvastatin 20 milliGRAM(s) Oral at bedtime  torsemide 20 milliGRAM(s) Oral daily                            11.5   8.86  )-----------( 370      ( 27 Aug 2024 11:45 )             36.9         142  |  99  |  100.4<H>  ----------------------------<  106<H>  5.5<H>   |  27.0  |  14.51<H>    Ca    8.6      27 Aug 2024 11:45    TPro  5.6<L>  /  Alb  2.1<L>  /  TBili  0.3<L>  /  DBili  x   /  AST  13  /  ALT  8   /  AlkPhos  60      RECENT CULTURES:   @ 00:00 Peritoneal Peritoneal Fluid       No polymorphonuclear leukocytes  No organisms seen  by cytocentrifuge          WBC Count: 8.86 K/uL (24 @ 11:45)  WBC Count: 5.65 K/uL (24 @ 00:24)    Creatinine: 14.51 mg/dL (24 @ 11:45)  Creatinine: 15.27 mg/dL (24 @ 01:30)  Creatinine: 15.82 mg/dL (24 @ 00:24)                 ______________________________________________________  CARDIOLOGY    ______________________________________________________  RADIOLOGY [facility]  INFECTIOUS DISEASES at [place] and [place]  =====================================================       George Tobias MD                                                        Diplomates American Board of Internal Medicine & Infectious Diseases                * [place] Office - Appt - Tel  [phone] Fax [phone]                * [place] Office - Appt - Tel [phone] Fax [phone]                                  Hospital Consult line:  [phone]  =====================================================      N-[id number]  ADALID CASE        CC: Patient is a 63y old  Female who presents with a chief complaint of ESRD needing HD (27 Aug 2024 14:33)      HPI: 64yo F pt w/ pmhx of ESRD on peritoneal dialysis 5x per week, HTN, HLD, DM presented to ED c/o worsening diffused abdominal pain with associated distention over the past week. Pt reports dialysis cath has not been draining for the last week. She attempts peritoneal dialysis but her unable to get any fluid return. She denies fever, chills, N/V/D, urinary sx, chest pain, SOB, or any other complaints.  (27 Aug 2024 03:04)    On my exam -   Feeling better - abdomen less painful and distended  feverish, weak  has cats at home but they are kept away from her dialysis setting   never had peritonitis before   PD catheter - no redness or purulence from site.   ______________________________________________________  PAST MEDICAL & SURGICAL HISTORY:  HTN (hypertension)    DM (diabetes mellitus)    HLD (hyperlipidemia)    Overactive thyroid gland    S/P  section    ESRD on PD     Social History:  no tobacco, alcohol or drugs     FAMILY HISTORY:  Family history of breast cancer in female (Aunt)    Family history of prostate cancer (Uncle)    Family history of hypertension (Father, Mother)        ______________________________________________________  Allergies    No Known Allergies    Intolerances      ______________________________________________________  REVIEW OF SYSTEMS:  CONSTITUTIONAL: as per HPI   HEENT:  No diplopia or blurred vision.  No earache, sore throat or runny nose.  CARDIOVASCULAR:  No chest pain  RESPIRATORY:  No cough, shortness of breath  GASTROINTESTINAL:  as per HPI   GENITOURINARY:  No dysuria, frequency or urgency. No blood in urine  MUSCULOSKELETAL:  no joint aches, no muscle pain  SKIN:  No change in skin, hair or nails.  NEUROLOGIC:  No headaches, seizures  PSYCHIATRIC:  No disorder of thought or mood.  ENDOCRINE:  No heat or cold intolerance  HEMATOLOGICAL:  No easy bruising or bleeding.   OTHER: as per HPI - PD malfunction     _____________________________________________________  PHYSICAL EXAM:  GEN: AAOx4. Chronically ill appearing, in NAD. Thin  HEENT: normocephalic and atraumatic.  Anicteric sclerae. Moist mucous membranes. No mucosal lesions.   NECK: Supple.   LUNGS: eupneic, CTA B/L, no adventitious sounds  HEART: RRR, no m/r/g  ABDOMEN: distended, diffusely TTP, PD catheter  - site clean, no purulence    :  no Paredes catheter  EXTREMITIES: without  edema.  NEUROLOGIC: weak but grossly no motor focal deficits   PSYCHIATRIC: Appropriate affect and mood.  SKIN: No rash, wounds or jaundice  LINES: PIV    ______________________________________________________      Vitals:  T(F): 98.4 (27 Aug 2024 15:16), Max: 98.8 (27 Aug 2024 04:39)  HR: 92 (27 Aug 2024 15:16)  BP: 164/87 (27 Aug 2024 15:16)  RR: 18 (27 Aug 2024 15:16)  SpO2: 92% (27 Aug 2024 15:16) (92% - 97%)  temp max in last 48H T(F): , Max: 98.8 (24 @ 04:39)    Current Antibiotics:  cefTRIAXone Injectable. 2000 milliGRAM(s) IV Push every 24 hours    Other medications:  albuterol    0.083%. 2.5 milliGRAM(s) Nebulizer once  calcium acetate 667 milliGRAM(s) Oral three times a day with meals  cloNIDine 0.1 milliGRAM(s) Oral two times a day  dextrose 5%. 1000 milliLiter(s) IV Continuous <Continuous>  dextrose 5%. 1000 milliLiter(s) IV Continuous <Continuous>  dextrose 50% Injectable 12.5 Gram(s) IV Push once  dextrose 50% Injectable 25 Gram(s) IV Push once  dextrose 50% Injectable 25 Gram(s) IV Push once  diltiazem    milliGRAM(s) Oral daily  fludroCORTISONE 0.1 milliGRAM(s) Oral once  glucagon  Injectable 1 milliGRAM(s) IntraMuscular once  hydrALAZINE 100 milliGRAM(s) Oral two times a day  insulin lispro (ADMELOG) corrective regimen sliding scale   SubCutaneous three times a day before meals  metoprolol tartrate 100 milliGRAM(s) Oral daily  Nephro-yasmany 1 Tablet(s) Oral daily  rosuvastatin 20 milliGRAM(s) Oral at bedtime  torsemide 20 milliGRAM(s) Oral daily                            11.5   8.86  )-----------( 370      ( 27 Aug 2024 11:45 )             36.9         142  |  99  |  100.4<H>  ----------------------------<  106<H>  5.5<H>   |  27.0  |  14.51<H>    Ca    8.6      27 Aug 2024 11:45    TPro  5.6<L>  /  Alb  2.1<L>  /  TBili  0.3<L>  /  DBili  x   /  AST  13  /  ALT  8   /  AlkPhos  60      RECENT CULTURES:   @ 00:00 Peritoneal Peritoneal Fluid       No polymorphonuclear leukocytes  No organisms seen  by cytocentrifuge      WBC Count: 8.86 K/uL (24 @ 11:45)  WBC Count: 5.65 K/uL (24 @ 00:24)    Creatinine: 14.51 mg/dL (24 @ 11:45)  Creatinine: 15.27 mg/dL (24 @ 01:30)  Creatinine: 15.82 mg/dL (24 @ 00:24)    ______________________________________________________  CARDIOLOGY  no imaging   ______________________________________________________  RADIOLOGY  no imaging

## 2024-08-27 NOTE — PROVIDER CONTACT NOTE (OTHER) - ASSESSMENT
pt asymptomatic just has stomach discomfort pt asymptomatic just has stomach discomfort, vomited and Zofran was given. pt resting comfortably now.

## 2024-08-27 NOTE — ED PROVIDER NOTE - PHYSICAL EXAMINATION
Gen: Well appearing in NAD  Head: NC/AT  Neck: trachea midline  Card: regular rate and rhythm  Resp:  CTAB  Abd: soft, +distension, vague diffuse ttp  Ext: no deformities  Neuro: A&O, appears nonfocal  Skin:  Warm and dry as visualized  Psych:  Normal affect and mood Gen: in NAD  Head: NC/AT  Neck: trachea midline  Card: regular rate and rhythm  Resp:  CTAB  Abd: soft, +distension, vague diffuse ttp  Ext: no deformities  Neuro: A&O, appears nonfocal  Skin:  Warm and dry as visualized  Psych:  Normal affect and mood

## 2024-08-27 NOTE — H&P ADULT - HISTORY OF PRESENT ILLNESS
64yo F pt w/ pmhx of ESRD on peritoneal dialysis 5x per week, HTN, HLD, DM  presenting for abd pain worsening for the last week. Her dialysis cath has not been draining for the last week. She attempts peritoneal dialysis but her machine alarms because it does not get fluid return. Increasing abdominal girth for the last week. She deneis fever, chills, N/V/D, urinary sx, chest pain, SOB, or any other complaints. 62yo F pt w/ pmhx of ESRD on peritoneal dialysis 5x per week, HTN, HLD, DM presented to ED c/o worsening diffused abdominal pain with associated distention over the past week. Pt reports dialysis cath has not been draining for the last week. She attempts peritoneal dialysis but her unable to get any fluid return. She denies fever, chills, N/V/D, urinary sx, chest pain, SOB, or any other complaints.

## 2024-08-27 NOTE — H&P ADULT - NSICDXFAMILYHX_GEN_ALL_CORE_FT
FAMILY HISTORY:  Father  Still living? Unknown  Family history of hypertension, Age at diagnosis: Age Unknown    Mother  Still living? Unknown  Family history of hypertension, Age at diagnosis: Age Unknown    Aunt  Still living? Unknown  Family history of breast cancer in female, Age at diagnosis: Age Unknown    Uncle  Still living? Unknown  Family history of prostate cancer, Age at diagnosis: Age Unknown

## 2024-08-27 NOTE — ED ADULT NURSE NOTE - NSFALLRISKASMTTYPE_ED_ALL_ED
Pt is [facility] he did not bring any information with him regarding his C patient is fully duty no restrictions Initial (On Arrival)

## 2024-08-27 NOTE — CONSULT NOTE ADULT - SUBJECTIVE AND OBJECTIVE BOX
Patient is a 63y old  Female who presents with a chief complaint of abd pain for 3-4 days   Has ESRD on PD since January at [facility], now noting poor drainage at [facility] (27 Aug 2024 03:04)      HPI:  62yo F pt w/ pmhx of ESRD on peritoneal dialysis 5x per week, HTN, HLD, DM presented to ED c/o worsening diffused abdominal pain with associated distention over the past week. Pt reports dialysis cath has not been draining for the last week. She attempts peritoneal dialysis but her unable to get any fluid return. She denies fever, chills, N/V/D, urinary sx, chest pain, SOB, or any other complaints.  (27 Aug 2024 03:04)  Has been constipated in the past      PAST MEDICAL & SURGICAL HISTORY:  HTN (hypertension)      DM (diabetes mellitus)      HLD (hyperlipidemia)      Overactive thyroid gland      S/P  section          FAMILY HISTORY:  Family history of breast cancer in female (Aunt)    Family history of prostate cancer (Uncle)    Family history of hypertension (Father, Mother)        REVIEW OF SYSTEMS:  CONSTITUTIONAL: No fever, weight loss, or fatigue  EYES: No eye pain, No visual disturbances,   RESPIRATORY: No cough, hemoptysis; - SOB  CARDIOVASCULAR: No chest pain, No palpitations,   -leg swelling  GASTROINTESTINAL: Mod diffuse abdominal ,  -NVD or GIB but ate poorly  GENITOURINARY: No UTI sx/hematuria  NEUROLOGICAL: No HA/wk/numbness  MUSCULOSKELETAL: No joint pain, No swelling      Vital Signs Last 24 Hrs  T(C): 36.7 (27 Aug 2024 12:50), Max: 37.1 (27 Aug 2024 04:39)  T(F): 98.1 (27 Aug 2024 12:50), Max: 98.8 (27 Aug 2024 04:39)  HR: 89 (27 Aug 2024 12:50) (89 - 104)  BP: 146/77 (27 Aug 2024 12:50) (146/77 - 207/115)  BP(mean): 133 (27 Aug 2024 00:03) (133 - 139)  RR: 18 (27 Aug 2024 12:50) (18 - 20)  SpO2: 94% (27 Aug 2024 12:50) (93% - 97%)    Parameters below as of 27 Aug 2024 12:50  Patient On (Oxygen Delivery Method): room air        PHYSICAL EXAM:    GENERAL: NAD,   EYES:  conjunctiva and sclera clear  NECK: Supple, No JVD/Carotid Bruit -ve   NERVOUS SYSTEM:  A/O x3,   Lungs : No rales, No rhonchi,   HEART:  No murmur,  No rub, No gallops  ABDOMEN: Soft, NT/ND BS+  EXTREMITIES:  + Peripheral Pulses, - edema  SKIN: No rashes or lesions      LABS:                        11.5   8.86  )-----------( 370      ( 27 Aug 2024 11:45 )             36.9         142  |  99  |  100.4<H>  ----------------------------<  106<H>  5.5<H>   |  27.0  |  14.51<H>    Ca    8.6      27 Aug 2024 11:45    TPro  5.6<L>  /  Alb  2.1<L>  /  TBili  0.3<L>  /  DBili  x   /  AST  13  /  ALT  8   /  AlkPhos  60      PT/INR - ( 27 Aug 2024 00:24 )   PT: 10.4 sec;   INR: 0.94 ratio         PTT - ( 27 Aug 2024 00:24 )  PTT:25.8 sec  Urinalysis Basic - ( 27 Aug 2024 11:45 )    Color: x / Appearance: x / SG: x / pH: x  Gluc: 106 mg/dL / Ketone: x  / Bili: x / Urobili: x   Blood: x / Protein: x / Nitrite: x   Leuk Esterase: x / RBC: x / WBC x   Sq Epi: x / Non Sq Epi: x / Bacteria: x    PD fluid - cloudy with 22330 polynuclears and 2000 RBC          RADIOLOGY & ADDITIONAL TESTS:    MEDICATIONS  (STANDING):  acetaminophen     Tablet .. PRN  aluminum hydroxide/magnesium hydroxide/simethicone Suspension PRN  calcium acetate  cefTRIAXone Injectable.  cloNIDine  dextrose 5%.  dextrose 5%.  dextrose 50% Injectable  dextrose 50% Injectable  dextrose 50% Injectable  dextrose Oral Gel PRN  diltiazem   CD  glucagon  Injectable  hydrALAZINE  insulin lispro (ADMELOG) corrective regimen sliding scale  melatonin PRN  metoprolol tartrate  Nephro-yasmany  ondansetron Injectable PRN  oxyCODONE    IR PRN  rosuvastatin  torsemide     Patient is a 63y old  Female who presents with a chief complaint of abd pain for 3-4 days   Has ESRD on PD since January at [facility], now noting poor drainage at [facility] (27 Aug 2024 03:04)      HPI:  64yo F pt w/ pmhx of ESRD on peritoneal dialysis 5x per week, HTN, HLD, DM presented to ED c/o worsening diffused abdominal pain with associated distention over the past week. Pt reports dialysis cath has not been draining for the last week. She attempts peritoneal dialysis but her unable to get any fluid return. She denies fever, chills, N/V/D, urinary sx, chest pain, SOB, or any other complaints.  (27 Aug 2024 03:04)  Has been constipated in the past      PAST MEDICAL & SURGICAL HISTORY:  HTN (hypertension)      DM (diabetes mellitus)      HLD (hyperlipidemia)      Overactive thyroid gland      S/P  section          FAMILY HISTORY:  Family history of breast cancer in female (Aunt)    Family history of prostate cancer (Uncle)    Family history of hypertension (Father, Mother)        REVIEW OF SYSTEMS:  CONSTITUTIONAL: No fever, weight loss, or fatigue  EYES: No eye pain, No visual disturbances,   RESPIRATORY: No cough, hemoptysis; - SOB  CARDIOVASCULAR: No chest pain, No palpitations,   -leg swelling  GASTROINTESTINAL: Mod diffuse abdominal ,  -NVD or GIB but ate poorly  GENITOURINARY: No UTI sx/hematuria  NEUROLOGICAL: No HA/wk/numbness  MUSCULOSKELETAL: No joint pain, No swelling      Vital Signs Last 24 Hrs  T(C): 36.7 (27 Aug 2024 12:50), Max: 37.1 (27 Aug 2024 04:39)  T(F): 98.1 (27 Aug 2024 12:50), Max: 98.8 (27 Aug 2024 04:39)  HR: 89 (27 Aug 2024 12:50) (89 - 104)  BP: 146/77 (27 Aug 2024 12:50) (146/77 - 207/115)  BP(mean): 133 (27 Aug 2024 00:03) (133 - 139)  RR: 18 (27 Aug 2024 12:50) (18 - 20)  SpO2: 94% (27 Aug 2024 12:50) (93% - 97%)    Parameters below as of 27 Aug 2024 12:50  Patient On (Oxygen Delivery Method): room air        PHYSICAL EXAM:    GENERAL: NAD,   EYES:  conjunctiva and sclera clear  NECK: Supple, No JVD/Carotid Bruit -ve   NERVOUS SYSTEM:  A/O x3,   Lungs : No rales, No rhonchi,   HEART:  No murmur,  No rub, No gallops  ABDOMEN: Soft, diffusely tender, distended, full; rebound +; PD cath area NT, BS+  EXTREMITIES:  + Peripheral Pulses, - edema  SKIN: No rashes or lesions      LABS:                        11.5   8.86  )-----------( 370      ( 27 Aug 2024 11:45 )             36.9         142  |  99  |  100.4<H>  ----------------------------<  106<H>  5.5<H>   |  27.0  |  14.51<H>    Ca    8.6      27 Aug 2024 11:45    TPro  5.6<L>  /  Alb  2.1<L>  /  TBili  0.3<L>  /  DBili  x   /  AST  13  /  ALT  8   /  AlkPhos  60      PT/INR - ( 27 Aug 2024 00:24 )   PT: 10.4 sec;   INR: 0.94 ratio         PTT - ( 27 Aug 2024 00:24 )  PTT:25.8 sec  Urinalysis Basic - ( 27 Aug 2024 11:45 )    Color: x / Appearance: x / SG: x / pH: x  Gluc: 106 mg/dL / Ketone: x  / Bili: x / Urobili: x   Blood: x / Protein: x / Nitrite: x   Leuk Esterase: x / RBC: x / WBC x   Sq Epi: x / Non Sq Epi: x / Bacteria: x    PD fluid - cloudy with 16580 polynuclears and 2000 RBC          RADIOLOGY & ADDITIONAL TESTS:    MEDICATIONS  (STANDING):  acetaminophen     Tablet .. PRN  aluminum hydroxide/magnesium hydroxide/simethicone Suspension PRN  calcium acetate  cefTRIAXone Injectable.  cloNIDine  dextrose 5%.  dextrose 5%.  dextrose 50% Injectable  dextrose 50% Injectable  dextrose 50% Injectable  dextrose Oral Gel PRN  diltiazem   CD  glucagon  Injectable  hydrALAZINE  insulin lispro (ADMELOG) corrective regimen sliding scale  melatonin PRN  metoprolol tartrate  Nephro-yasmany  ondansetron Injectable PRN  oxyCODONE    IR PRN  rosuvastatin  torsemide

## 2024-08-27 NOTE — ED PROVIDER NOTE - OBJECTIVE STATEMENT
64 y/o F pt w/ pmhx of ESRD on peritoneal dialysis 5x per week presenting for abd pain worsening for the last week. Her dialysis cath has not been draining for the last week. She attempts peritoneal dialysis but her machine alarms because it does not get fluid return. Increasing abdominal girth for the last week. She deneis fever, chills, N/V/D, urinary sx, chest pain, SOB, or any other complaints.

## 2024-08-27 NOTE — ED PROVIDER NOTE - CLINICAL SUMMARY MEDICAL DECISION MAKING FREE TEXT BOX
Patient with past medical history of end-stage renal disease on peritoneal dialysis pending to ED with abdominal pain and distention in the setting of presumably clogged presumably clogged peritoneal dialysis catheter.  Patient unable to withdraw fluid at home.  For last week she has been able unable to do dialysis.  Diagnostic paracentesis with cloudy fluid.  Exudate on initial labs.  Covered broadly for SBP.  Admitted to medicine.  Vascular consulted for dialysis catheter.  Nephrology consulted to arrange dialysis.

## 2024-08-27 NOTE — H&P ADULT - NSHPPHYSICALEXAM_GEN_ALL_CORE
T(C): 36.9 (08-27-24 @ 02:59), Max: 36.9 (08-27-24 @ 02:59)  HR: 98 (08-27-24 @ 02:59) (98 - 104)  BP: 197/104 (08-27-24 @ 02:59) (192/107 - 207/115)  RR: 18 (08-27-24 @ 02:59) (18 - 20)  SpO2: 95% (08-27-24 @ 02:59) (95% - 97%)    GENERAL: patient appears well, no acute distress, appropriate, pleasant  EYES: sclera clear, no exudates  ENMT: oropharynx clear without erythema, no exudates, moist mucous membranes  NECK: supple, soft, no thyromegaly noted  LUNGS: good air entry bilaterally, clear to auscultation, symmetric breath sounds, no wheezing or rhonchi appreciated  HEART: soft S1/S2, regular rate and rhythm, no murmurs noted, no lower extremity edema  GASTROINTESTINAL: abdomen is soft, nontender, nondistended, normoactive bowel sounds, no palpable masses  INTEGUMENT: good skin turgor, warm skin, appears well perfused  MUSCULOSKELETAL: no clubbing or cyanosis, no obvious deformity  NEUROLOGIC: awake, alert, oriented x3, good muscle tone in 4 extremities, no obvious sensory deficits  PSYCHIATRIC: mood is good, affect is congruent, linear and logical thought process  HEME/LYMPH: no palpable supraclavicular nodules, no obvious ecchymosis or petechiae

## 2024-08-27 NOTE — PATIENT PROFILE ADULT - NSPROHMDIABETMGMTSTRAT_GEN_A_NUR
83 y.o. F with h/o HLD, breast ca, s/p lumpectomy and Chemo, c/w right hip pain X 2 weeks, worsening over last 4 days. CT of pelvic negative for fx. Pain likely due to sciatica. R/O femoral head pathology such as occult fx or bone metastasis.  MRI hip w/ sub gluteal bursitis- pt relieved that she does not have mets- blood glucose testing

## 2024-08-27 NOTE — CONSULT NOTE ADULT - ASSESSMENT
Patient seen and examined    FULL CONSULT TO FOLLOW     d/w ASP/clinical pharmacist and Nephrology  63F with ESRD on PD (5 days/week), HTN, DM admitted for abdominal pain, distention, subjective fevers, and PD catheter malfunction.     ID consulted for peritonitis associated with PD catheter     - Abdomen distended and diffusely tender. Catheter site OK   - Peritoneal fluid with WBC 16,800 (88% neutrophils) c/w infectious process  - Peritoneal fluid culture pending. Gram w/o organisms  - Follow BCX  - No leukocytosis; +neutrophilia   - here afebrile and hemodynamically stable     - Received vancomycin + piperacillin-tazobactam x 1 overnight   - now on ceftriaxone 2g IV daily   - will continue IV antibiotics for now, but plan to transition to PD antibiotics as soon as able to coordinate logistics with pharmacy and nephrology   - follow cultures     d/w ASP/clinical pharmacist and Dr. Smith

## 2024-08-27 NOTE — ED ADULT NURSE NOTE - IN THE PAST 12 MONTHS HAVE YOU USED DRUGS OTHER THAN THOSE REQUIRED FOR MEDICAL REASON?
Heather is a very pleasant 41 year old female was seen today for f/u anxiety on sertraline and also heavy menses.    A while ago, I increased sertraline dose to 100 mg daily and she has found this helpful.  Is able to work through situations that might cause anxiety with more ease.    She had a tubal ligation back in April with removal of IUD; she has three young children and desired more permanent birth control.  However, since then, her periods have been extremely heavy.  Will saturate a tampon together with a pad in about 30-40 minutes. No real abdominal pain or cramping noted.  She denies bleeding elsewhere, such as blood in the stool or nosebleeds.      It's been awhile since she has had an in person visit so she came fasting to check basic labs.     On exam  /67 (BP Location: Right arm, Patient Position: Sitting, Cuff Size: Adult Regular)   Pulse 65   Wt 78.9 kg (174 lb)   SpO2 100%   BMI 27.25 kg/m     Abd +BS, S, NT, ND    A/P  41 year old female with significant menorrhagia.  Has an upcoming appt with Gyn but I'd like to order labs and pelvic U/S ahead of that time to expedite the workup.    Menorrhagia with regular cycle  -     TSH with free T4 reflex; Future  -     CBC with platelets differential; Future  -     US Pelvic w/ Transvaginal; Future    Screening for lipid disorders  -     Comprehensive metabolic panel; Future  -     Lipid Profile FASTING; Future    Encounter for vitamin deficiency screening  -     Vitamin D Deficiency; Future    I spent 20 minutes with Heather today, and another 10 minutes on chart review and documentation, same day    Brandy Lui MD      
No

## 2024-08-27 NOTE — ED PROCEDURE NOTE - ATTENDING CONTRIBUTION TO CARE
I performed a face to face bedside interview with patient regarding history of present illness, review of symptoms and past medical history. I completed an independent physical exam.  I have discussed patient's plan of care with resident.   I agree with note as stated above including HISTORY OF PRESENT ILLNESS, HIV, PAST MEDICAL/SURGICAL/FAMILY/SOCIAL HISTORY, ALLERGIES AND HOME MEDICATIONS, REVIEW OF SYSTEMS, PHYSICAL EXAM, MEDICAL DECISION MAKING and any PROGRESS NOTES during the time I functioned as the attending physician for this patient unless otherwise noted. My brief assessment is as follows:   agree with procedure as documented

## 2024-08-27 NOTE — ED PROVIDER NOTE - IV ALTEPLASE DOOR HIDDEN
Abdomen, soft, nontender, nondistended, no guarding or rigidity, no masses palpable, normal bowel sounds, Liver and Spleen, no hepatomegaly present, no hepatosplenomegaly, liver nontender, spleen not palpable
show

## 2024-08-27 NOTE — H&P ADULT - ASSESSMENT
62yo F pt w/ pmhx of ESRD on peritoneal dialysis 5x per week, HTN, HLD, DM presented to ED c/o worsening diffused abdominal pain with associated distention over the past week.     ESRD needing HD/Hypertensive urgency   -Due to missed peritoneal dialysis/cathter malfunction   -no significant volume overload needing urgent HD at this time   -Electrolytes wnl   -Cr/BUN elevated   -Hypertensive /115   -given stat hydralazine 10mg IVP, Lasix 60mg IV x 1, pt reports to making some urine  -cont torsemide 20mg, clonidine 0.1mg BID, Cardizem 240mg daily   -Hydralazine 100mg BID, Metoprolol 100mg daily     -Nephrology consulted by ED   -Vascular called by ED     Cather-related  peritonitis  -Ascitic fluid analysis noted   -received zosyn and vanco in the ED   -cont Ceftriaxone 2g IVP daily   -f/u cultures   -oxycodone prn for pain     HLD  -Rosuvastatin 20mg daily   -ASA 81mg daily     DM  -hold home po meds   -ISS, hypoglycemic protocol   -check A1c     DVT ppx  -SCDs   Dispo: clinically active, pending HD       Time-based billing (NON-critical care).     80 minutes spent on total encounter. The necessity of the time spent during the encounter on this date of service was due to:   chart review, patient evaluation, independent history taking, documentation, coordination of care and discussion with patient, nurse, and patient's family at bedside.

## 2024-08-27 NOTE — PATIENT PROFILE ADULT - FALL HARM RISK - RISK INTERVENTIONS

## 2024-08-28 LAB
A1C WITH ESTIMATED AVERAGE GLUCOSE RESULT: 7 % — HIGH (ref 4–5.6)
ALBUMIN SERPL ELPH-MCNC: 2 G/DL — LOW (ref 3.3–5.2)
ALP SERPL-CCNC: 58 U/L — SIGNIFICANT CHANGE UP (ref 40–120)
ALT FLD-CCNC: 6 U/L — SIGNIFICANT CHANGE UP
ANION GAP SERPL CALC-SCNC: 18 MMOL/L — HIGH (ref 5–17)
APTT BLD: 33 SEC — SIGNIFICANT CHANGE UP (ref 24.5–35.6)
AST SERPL-CCNC: 7 U/L — SIGNIFICANT CHANGE UP
BILIRUB SERPL-MCNC: <0.2 MG/DL — LOW (ref 0.4–2)
BLD GP AB SCN SERPL QL: SIGNIFICANT CHANGE UP
BUN SERPL-MCNC: 98.4 MG/DL — HIGH (ref 8–20)
CALCIUM SERPL-MCNC: 8.6 MG/DL — SIGNIFICANT CHANGE UP (ref 8.4–10.5)
CHLORIDE SERPL-SCNC: 95 MMOL/L — LOW (ref 96–108)
CO2 SERPL-SCNC: 25 MMOL/L — SIGNIFICANT CHANGE UP (ref 22–29)
CREAT SERPL-MCNC: 15.42 MG/DL — HIGH (ref 0.5–1.3)
EGFR: 2 ML/MIN/1.73M2 — LOW
ESTIMATED AVERAGE GLUCOSE: 154 MG/DL — HIGH (ref 68–114)
GLUCOSE BLDC GLUCOMTR-MCNC: 125 MG/DL — HIGH (ref 70–99)
GLUCOSE BLDC GLUCOMTR-MCNC: 136 MG/DL — HIGH (ref 70–99)
GLUCOSE BLDC GLUCOMTR-MCNC: 145 MG/DL — HIGH (ref 70–99)
GLUCOSE BLDC GLUCOMTR-MCNC: 157 MG/DL — HIGH (ref 70–99)
GLUCOSE SERPL-MCNC: 124 MG/DL — HIGH (ref 70–99)
HCT VFR BLD CALC: 30.5 % — LOW (ref 34.5–45)
HGB BLD-MCNC: 9.3 G/DL — LOW (ref 11.5–15.5)
INR BLD: 1.06 RATIO — SIGNIFICANT CHANGE UP (ref 0.85–1.18)
LACTATE SERPL-SCNC: 0.8 MMOL/L — SIGNIFICANT CHANGE UP (ref 0.5–2)
MAGNESIUM SERPL-MCNC: 3.1 MG/DL — HIGH (ref 1.8–2.6)
MCHC RBC-ENTMCNC: 23.9 PG — LOW (ref 27–34)
MCHC RBC-ENTMCNC: 30.5 GM/DL — LOW (ref 32–36)
MCV RBC AUTO: 78.4 FL — LOW (ref 80–100)
PHOSPHATE SERPL-MCNC: 9.9 MG/DL — HIGH (ref 2.4–4.7)
PLATELET # BLD AUTO: 337 K/UL — SIGNIFICANT CHANGE UP (ref 150–400)
POTASSIUM SERPL-MCNC: 5.5 MMOL/L — HIGH (ref 3.5–5.3)
POTASSIUM SERPL-SCNC: 5.5 MMOL/L — HIGH (ref 3.5–5.3)
PROT SERPL-MCNC: 5.5 G/DL — LOW (ref 6.6–8.7)
PROTHROM AB SERPL-ACNC: 11.7 SEC — SIGNIFICANT CHANGE UP (ref 9.5–13)
RBC # BLD: 3.89 M/UL — SIGNIFICANT CHANGE UP (ref 3.8–5.2)
RBC # FLD: 16.1 % — HIGH (ref 10.3–14.5)
SODIUM SERPL-SCNC: 138 MMOL/L — SIGNIFICANT CHANGE UP (ref 135–145)
VANCOMYCIN TROUGH SERPL-MCNC: 14.7 UG/ML — SIGNIFICANT CHANGE UP (ref 10–20)
WBC # BLD: 8.36 K/UL — SIGNIFICANT CHANGE UP (ref 3.8–10.5)
WBC # FLD AUTO: 8.36 K/UL — SIGNIFICANT CHANGE UP (ref 3.8–10.5)

## 2024-08-28 PROCEDURE — 71045 X-RAY EXAM CHEST 1 VIEW: CPT | Mod: 26,76

## 2024-08-28 PROCEDURE — 99232 SBSQ HOSP IP/OBS MODERATE 35: CPT

## 2024-08-28 PROCEDURE — 99233 SBSQ HOSP IP/OBS HIGH 50: CPT | Mod: GC

## 2024-08-28 PROCEDURE — 74176 CT ABD & PELVIS W/O CONTRAST: CPT | Mod: 26

## 2024-08-28 PROCEDURE — 36556 INSERT NON-TUNNEL CV CATH: CPT | Mod: RT

## 2024-08-28 RX ORDER — CHLORHEXIDINE GLUCONATE 40 MG/ML
1 SOLUTION TOPICAL
Refills: 0 | Status: DISCONTINUED | OUTPATIENT
Start: 2024-08-28 | End: 2024-08-30

## 2024-08-28 RX ORDER — SODIUM CHLORIDE 9 MG/ML
10 INJECTION INTRAMUSCULAR; INTRAVENOUS; SUBCUTANEOUS
Refills: 0 | Status: DISCONTINUED | OUTPATIENT
Start: 2024-08-28 | End: 2024-09-12

## 2024-08-28 RX ORDER — CALCIUM ACETATE 667 MG/1
667 CAPSULE ORAL
Refills: 0 | DISCHARGE

## 2024-08-28 RX ORDER — POLYETHYLENE GLYCOL 3350 17 G/17G
17 POWDER, FOR SOLUTION ORAL DAILY
Refills: 0 | Status: DISCONTINUED | OUTPATIENT
Start: 2024-08-28 | End: 2024-08-28

## 2024-08-28 RX ORDER — SODIUM ZIRCONIUM CYCLOSILICATE 5 G/5G
10 POWDER, FOR SUSPENSION ORAL
Refills: 0 | Status: DISCONTINUED | OUTPATIENT
Start: 2024-08-28 | End: 2024-08-30

## 2024-08-28 RX ORDER — ONDANSETRON 2 MG/ML
4 INJECTION, SOLUTION INTRAMUSCULAR; INTRAVENOUS EVERY 6 HOURS
Refills: 0 | Status: DISCONTINUED | OUTPATIENT
Start: 2024-08-28 | End: 2024-09-12

## 2024-08-28 RX ORDER — SITAGLIPTIN 100 MG/1
1 TABLET, FILM COATED ORAL
Refills: 0 | DISCHARGE

## 2024-08-28 RX ORDER — ROSUVASTATIN CALCIUM 10 MG/1
1 TABLET ORAL
Refills: 0 | DISCHARGE

## 2024-08-28 RX ORDER — TORSEMIDE 20 MG/1
1 TABLET ORAL
Refills: 0 | DISCHARGE

## 2024-08-28 RX ADMIN — POLYETHYLENE GLYCOL 3350 17 GRAM(S): 17 POWDER, FOR SOLUTION ORAL at 17:40

## 2024-08-28 RX ADMIN — Medication 2000 MILLIGRAM(S): at 05:21

## 2024-08-28 RX ADMIN — Medication 100 MILLIGRAM(S): at 17:40

## 2024-08-28 RX ADMIN — Medication 0.1 MILLIGRAM(S): at 17:40

## 2024-08-28 RX ADMIN — CALCIUM ACETATE 667 MILLIGRAM(S): 667 CAPSULE ORAL at 11:17

## 2024-08-28 RX ADMIN — CALCIUM ACETATE 667 MILLIGRAM(S): 667 CAPSULE ORAL at 17:50

## 2024-08-28 RX ADMIN — SODIUM ZIRCONIUM CYCLOSILICATE 10 GRAM(S): 5 POWDER, FOR SUSPENSION ORAL at 17:40

## 2024-08-28 RX ADMIN — OXYCODONE HYDROCHLORIDE 10 MILLIGRAM(S): 5 TABLET ORAL at 17:37

## 2024-08-28 RX ADMIN — CHLORHEXIDINE GLUCONATE 1 APPLICATION(S): 40 SOLUTION TOPICAL at 17:41

## 2024-08-28 RX ADMIN — METOPROLOL TARTRATE 100 MILLIGRAM(S): 100 TABLET ORAL at 05:22

## 2024-08-28 RX ADMIN — TORSEMIDE 20 MILLIGRAM(S): 20 TABLET ORAL at 05:21

## 2024-08-28 RX ADMIN — DILTIAZEM HYDROCHLORIDE 240 MILLIGRAM(S): 5 INJECTION INTRAVENOUS at 05:21

## 2024-08-28 RX ADMIN — Medication 100 MILLIGRAM(S): at 05:21

## 2024-08-28 RX ADMIN — OXYCODONE HYDROCHLORIDE 10 MILLIGRAM(S): 5 TABLET ORAL at 16:37

## 2024-08-28 RX ADMIN — CALCIUM ACETATE 667 MILLIGRAM(S): 667 CAPSULE ORAL at 08:40

## 2024-08-28 RX ADMIN — Medication 1 TABLET(S): at 11:16

## 2024-08-28 RX ADMIN — Medication 1: at 17:41

## 2024-08-28 RX ADMIN — ONDANSETRON 4 MILLIGRAM(S): 2 INJECTION, SOLUTION INTRAMUSCULAR; INTRAVENOUS at 21:43

## 2024-08-28 RX ADMIN — Medication 0.1 MILLIGRAM(S): at 05:21

## 2024-08-28 NOTE — PROGRESS NOTE ADULT - ASSESSMENT
63y Female w/ PMH of - admitted for -. Now, -.    Plan  #    DVT PPx:  Dispo: 63y Female w/ PMH of  ESRD on peritoneal dialysis 5x per week, HTN, HLD, and DM admitted for peritonitis and peritoneal dialysis catheter dysfunction, needing HD.      Plan  #Peritonitis  - c/w IV ceftriaxone for 14 days    #ESRD  - consult IR to check dialysis catheter  - consult nephro to arrange dialysis (pt f/u with Dr. Melendez)  - monitor BUN and Cr    #DM  - c/w insulin lispro    #HLD  - c/w statin, metoprolol, and diltiazem  -  DASH diet    DVT PPx:  Dispo: 63y Female w/ PMH of  ESRD on peritoneal dialysis 5x per week, HTN, HLD, and DM admitted for peritonitis and peritoneal dialysis catheter dysfunction, needing HD.      Plan  #Peritonitis  - c/w IV ceftriaxone for 14 days    #ESRD  - consult IR to check dialysis catheter dysfunction  - monitor BUN and Cr    #DM  - c/w insulin lispro    #HLD  - c/w statin, metoprolol, and diltiazem  -  DASH diet    DVT PPx:  Dispo:

## 2024-08-28 NOTE — PROGRESS NOTE ADULT - ASSESSMENT
63F with ESRD on PD (5 days/week), HTN, DM admitted for abdominal pain, distention, subjective fevers, and PD catheter malfunction.     ID consulted for peritonitis associated with PD catheter     - Abdomen distended and diffusely tender. Catheter site OK   - Peritoneal fluid with WBC 16,800 (88% neutrophils) c/w infectious process  - Peritoneal fluid culture ngtd. Gram w/o organisms  - Agree with CT AP   - PD catheter not working. Plan to place [place]   - Follow BCX  - No leukocytosis; +neutrophilia   - here afebrile and hemodynamically stable     - Received vancomycin + piperacillin-tazobactam x 1 overnight   - continue ceftriaxone 2g IV daily; if no clinical improvement will switch to cefepime   - continue vancomycin by level     d/w ASP/clinical pharmacist

## 2024-08-28 NOTE — PROGRESS NOTE ADULT - SUBJECTIVE AND OBJECTIVE BOX
[facility]  INFECTIOUS DISEASES at [place] and [place]  ===============================================================                  George Tobias MD               Diplomates American Board of Internal Medicine & Infectious Diseases                * [place] Office - Appt - Tel  [phone] Fax [phone]                * [place] Office - Appt - Tel [phone] Fax [phone]                                  Hospital Consult line:  [phone]  ==============================================================    ADALID CASE [id number]    Follow up: peritonitis associated with PD     Seen in AM   PD catheter not working   Afebrile     I have personally reviewed the labs and data; pertinent labs and data are listed in this note; please see below.     _______________________________________________________________  REVIEW OF SYSTEMS  Feeling unwell. Abdominal pain unchanged. No fever or chills.   ________________________________________________________________  Allergies:  No Known Allergies        ________________________________________________________________  PHYSICAL EXAM  GEN: chronically ill appearing but in NAD, lying in bed.   HEENT: Anicteric sclerae. Moist mucous membranes.   LUNGS: eupneic. CTA B/L.  HEART: RRR, no m/r/g  ABDOMEN: mildly distended, TTP, PD catheter site OK.    : No Paredes catheter  EXTREMITIES: without  edema.  NEUROLOGIC: Grossly no motor focal deficits d  SKIN: No rash  LINES: PIV   ________________________________________________________________  Vitals:  T(F): 98.5 (28 Aug 2024 08:37), Max: 98.9 (27 Aug 2024 20:01)  HR: 84 (28 Aug 2024 08:37)  BP: 131/76 (28 Aug 2024 08:37)  RR: 19 (28 Aug 2024 08:37)  SpO2: 90% (28 Aug 2024 08:37) (90% - 94%)  temp max in last 48H T(F): , Max: 98.9 (08-27-24 @ 16:56)    Current Antibiotics:  cefTRIAXone Injectable. 2000 milliGRAM(s) IV Push every 24 hours    Other medications:  albuterol    0.083%. 2.5 milliGRAM(s) Nebulizer once  calcium acetate 667 milliGRAM(s) Oral three times a day with meals  cefepime 1000 milliGRAM(s) 1000 milliGRAM(s) Irrigation daily  chlorhexidine 2% Cloths 1 Application(s) Topical <User Schedule>  cloNIDine 0.1 milliGRAM(s) Oral two times a day  dextrose 5%. 1000 milliLiter(s) IV Continuous <Continuous>  dextrose 5%. 1000 milliLiter(s) IV Continuous <Continuous>  dextrose 50% Injectable 25 Gram(s) IV Push once  dextrose 50% Injectable 25 Gram(s) IV Push once  dextrose 50% Injectable 12.5 Gram(s) IV Push once  diltiazem    milliGRAM(s) Oral daily  fludroCORTISONE 0.1 milliGRAM(s) Oral once  glucagon  Injectable 1 milliGRAM(s) IntraMuscular once  hydrALAZINE 100 milliGRAM(s) Oral two times a day  insulin lispro (ADMELOG) corrective regimen sliding scale   SubCutaneous three times a day before meals  metoprolol tartrate 100 milliGRAM(s) Oral daily  Nephro-yasmany 1 Tablet(s) Oral daily  polyethylene glycol 3350 17 Gram(s) Oral daily  rosuvastatin 20 milliGRAM(s) Oral at bedtime  sodium zirconium cyclosilicate 10 Gram(s) Oral two times a day  torsemide 20 milliGRAM(s) Oral daily                            9.3    8.36  )-----------( 337      ( 28 Aug 2024 04:38 )             30.5     08-28    138  |  95<L>  |  98.4<H>  ----------------------------<  124<H>  5.5<H>   |  25.0  |  15.42<H>    Ca    8.6      28 Aug 2024 04:38  Phos  9.9     08-28  Mg     3.1     08-28    TPro  5.5<L>  /  Alb  2.0<L>  /  TBili  <0.2<L>  /  DBili  x   /  AST  7   /  ALT  6   /  AlkPhos  58  08-28    RECENT CULTURES:  08-27 @ 00:00 Peritoneal Peritoneal Fluid     No growth to date.    No polymorphonuclear leukocytes  No organisms seen  by cytocentrifuge    WBC Count: 8.36 K/uL (08-28-24 @ 04:38)  WBC Count: 8.86 K/uL (08-27-24 @ 11:45)  WBC Count: 5.65 K/uL (08-27-24 @ 00:24)    Creatinine: 15.42 mg/dL (08-28-24 @ 04:38)  Creatinine: 15.28 mg/dL (08-27-24 @ 22:44)  Creatinine: 14.51 mg/dL (08-27-24 @ 11:45)  Creatinine: 15.27 mg/dL (08-27-24 @ 01:30)  Creatinine: 15.82 mg/dL (08-27-24 @ 00:24)    Vancomycin Level, Trough: 14.7 ug/mL (08-28-24 @ 04:38)    ________________________________________________________________  CARDIOLOGY   ________________________________________________________________  RADIOLOGY

## 2024-08-28 NOTE — CONSULT NOTE ADULT - ASSESSMENT
63F, SBP, Constipation and non-functioning PD catheter, ?Infected PD catheter    Recommendations:   CT Abdomen and Pelvis to rule out other cause of catheter dysfunction  May need urgent HD, consider consult to vascular surgery if needs urgent HD and a temporary access  Agree with IV abx   Bowel regimen, catheter function may improve once constipation relieved     D/w Dr. Covarrubias

## 2024-08-28 NOTE — CONSULT NOTE ADULT - SUBJECTIVE AND OBJECTIVE BOX
SURGERY CONSULT    HPI:  62yo F pt w/ pmhx of ESRD on peritoneal dialysis 5x per week, HTN, HLD, DM presented to ED c/o worsening diffused abdominal pain with associated distention over the past week. Pt reports dialysis cath has not been draining for the last week. She has also been constipated for a week.  She attempts peritoneal dialysis but her unable to get any fluid return. She denies fever, chills, N/V/D, urinary sx, chest pain, SOB, or any other complaints.  Surgery was contacted due to c/f a infected PD catheter.        PAST MEDICAL HISTORY:  HTN (hypertension)    DM (diabetes mellitus)    HLD (hyperlipidemia)    Overactive thyroid gland        PAST SURGICAL HISTORY:  S/P  section        ALLERGIES:  No Known Allergies      FAMILY HISTORY: Noncontributory    SOCIAL HISTORY: Denies tobacco, EtOH, illicit substance use.     HOME MEDICATIONS:    MEDICATIONS  (STANDING):  albuterol    0.083%. 2.5 milliGRAM(s) Nebulizer once  calcium acetate 667 milliGRAM(s) Oral three times a day with meals  cefepime 1000 milliGRAM(s) 1000 milliGRAM(s) Irrigation daily  cefTRIAXone Injectable. 2000 milliGRAM(s) IV Push every 24 hours  cloNIDine 0.1 milliGRAM(s) Oral two times a day  dextrose 5%. 1000 milliLiter(s) (100 mL/Hr) IV Continuous <Continuous>  dextrose 5%. 1000 milliLiter(s) (50 mL/Hr) IV Continuous <Continuous>  dextrose 50% Injectable 25 Gram(s) IV Push once  dextrose 50% Injectable 25 Gram(s) IV Push once  dextrose 50% Injectable 12.5 Gram(s) IV Push once  diltiazem    milliGRAM(s) Oral daily  fludroCORTISONE 0.1 milliGRAM(s) Oral once  glucagon  Injectable 1 milliGRAM(s) IntraMuscular once  hydrALAZINE 100 milliGRAM(s) Oral two times a day  insulin lispro (ADMELOG) corrective regimen sliding scale   SubCutaneous three times a day before meals  metoprolol tartrate 100 milliGRAM(s) Oral daily  Nephro-yasmany 1 Tablet(s) Oral daily  rosuvastatin 20 milliGRAM(s) Oral at bedtime  torsemide 20 milliGRAM(s) Oral daily    MEDICATIONS  (PRN):  acetaminophen     Tablet .. 650 milliGRAM(s) Oral every 6 hours PRN Temp greater or equal to 38C (100.4F), Mild Pain (1 - 3)  aluminum hydroxide/magnesium hydroxide/simethicone Suspension 30 milliLiter(s) Oral every 4 hours PRN Dyspepsia  dextrose Oral Gel 15 Gram(s) Oral once PRN Blood Glucose LESS THAN 70 milliGRAM(s)/deciliter  melatonin 3 milliGRAM(s) Oral at bedtime PRN Insomnia  ondansetron Injectable 4 milliGRAM(s) IV Push every 8 hours PRN Nausea and/or Vomiting  oxyCODONE    IR 10 milliGRAM(s) Oral every 6 hours PRN Severe Pain (7 - 10)      VITALS & I/Os:  Vital Signs Last 24 Hrs  T(C): 36.9 (28 Aug 2024 08:37), Max: 37.2 (27 Aug 2024 16:56)  T(F): 98.5 (28 Aug 2024 08:37), Max: 98.9 (27 Aug 2024 16:56)  HR: 84 (28 Aug 2024 08:37) (84 - 98)  BP: 131/76 (28 Aug 2024 08:37) (131/76 - 178/88)  BP(mean): --  RR: 19 (28 Aug 2024 08:37) (18 - 19)  SpO2: 90% (28 Aug 2024 08:37) (90% - 94%)    Parameters below as of 28 Aug 2024 08:37  Patient On (Oxygen Delivery Method): room air      CAPILLARY BLOOD GLUCOSE      POCT Blood Glucose.: 136 mg/dL (28 Aug 2024 11:16)  POCT Blood Glucose.: 125 mg/dL (28 Aug 2024 08:39)  POCT Blood Glucose.: 110 mg/dL (27 Aug 2024 21:38)  POCT Blood Glucose.: 113 mg/dL (27 Aug 2024 17:24)      I&O's Summary      GENERAL: Alert.  RESPIRATORY: Nonlabored  CARDIOVASCULAR: Normotensive   GASTROINTESTINAL: Abdomen soft, tender diffusely, no rebound, voluntary guarding, PD catheter without evidence of purulent drainage    LABS:                 9.3    8.36  )-----------( 337      ( 28 Aug 2024 04:38 )             30.5     08-28    138  |  95<L>  |  98.4<H>  ----------------------------<  124<H>  5.5<H>   |  25.0  |  15.42<H>    Ca    8.6      28 Aug 2024 04:38  Phos  9.9       Mg     3.1         TPro  5.5<L>  /  Alb  2.0<L>  /  TBili  <0.2<L>  /  DBili  x   /  AST  7   /  ALT  6   /  AlkPhos  58      Lactate: acetaminophen     Tablet .. 650 milliGRAM(s) Oral every 6 hours PRN  albuterol    0.083%. 2.5 milliGRAM(s) Nebulizer once  aluminum hydroxide/magnesium hydroxide/simethicone Suspension 30 milliLiter(s) Oral every 4 hours PRN  calcium acetate 667 milliGRAM(s) Oral three times a day with meals  cefepime 1000 milliGRAM(s) 1000 milliGRAM(s) Irrigation daily  cefTRIAXone Injectable. 2000 milliGRAM(s) IV Push every 24 hours  cloNIDine 0.1 milliGRAM(s) Oral two times a day  dextrose 5%. 1000 milliLiter(s) IV Continuous <Continuous>  dextrose 5%. 1000 milliLiter(s) IV Continuous <Continuous>  dextrose 50% Injectable 12.5 Gram(s) IV Push once  dextrose 50% Injectable 25 Gram(s) IV Push once  dextrose 50% Injectable 25 Gram(s) IV Push once  dextrose Oral Gel 15 Gram(s) Oral once PRN  diltiazem    milliGRAM(s) Oral daily  fludroCORTISONE 0.1 milliGRAM(s) Oral once  glucagon  Injectable 1 milliGRAM(s) IntraMuscular once  hydrALAZINE 100 milliGRAM(s) Oral two times a day  insulin lispro (ADMELOG) corrective regimen sliding scale   SubCutaneous three times a day before meals  melatonin 3 milliGRAM(s) Oral at bedtime PRN  metoprolol tartrate 100 milliGRAM(s) Oral daily  Nephro-yasmany 1 Tablet(s) Oral daily  ondansetron Injectable 4 milliGRAM(s) IV Push every 8 hours PRN  oxyCODONE    IR 10 milliGRAM(s) Oral every 6 hours PRN  rosuvastatin 20 milliGRAM(s) Oral at bedtime  torsemide 20 milliGRAM(s) Oral daily     PT/INR - ( 27 Aug 2024 00:24 )   PT: 10.4 sec;   INR: 0.94 ratio         PTT - ( 27 Aug 2024 00:24 )  PTT:25.8 sec          Urinalysis Basic - ( 28 Aug 2024 04:38 )    Color: x / Appearance: x / SG: x / pH: x  Gluc: 124 mg/dL / Ketone: x  / Bili: x / Urobili: x   Blood: x / Protein: x / Nitrite: x   Leuk Esterase: x / RBC: x / WBC x   Sq Epi: x / Non Sq Epi: x / Bacteria: x        IMAGING:

## 2024-08-28 NOTE — PROGRESS NOTE ADULT - SUBJECTIVE AND OBJECTIVE BOX
SUBJECTIVE:    Chief Complaint: Patient is a 63y old  Female who presents with a chief complaint of ESRD needing HD (27 Aug 2024 16:10)      BRIEF HOSPITAL COURSE: 64yo F pt w/ pmhx of ESRD on peritoneal dialysis 5x per week, HTN, HLD, DM presented to ED c/o worsening diffused abdominal pain with associated distention over the past week. Pt reports dialysis cath has not been draining for the last week. She attempts peritoneal dialysis but her unable to get any fluid return. She denies fever, chills, N/V/D, urinary sx, chest pain, SOB, or any other complaints.       INTERVAL HPI/LAST 24HRS EVENTS: Patient seen and examined at bedside at AM. No clinically acute event overnight. Denies any chest pain, palpitations, SOB, PND, orthopnea, leg edema, N/V/D, or any other complaints.       TODAY: Patient lying in bed. Reports decreased appetite and abdominal discomfort.  Able to pass flatus. Has not had BM since prior to admission.    MEDICATIONS  (STANDING):  albuterol    0.083%. 2.5 milliGRAM(s) Nebulizer once  calcium acetate 667 milliGRAM(s) Oral three times a day with meals  cefepime 1000 milliGRAM(s) 1000 milliGRAM(s) Irrigation daily  cefTRIAXone Injectable. 2000 milliGRAM(s) IV Push every 24 hours  cloNIDine 0.1 milliGRAM(s) Oral two times a day  dextrose 5%. 1000 milliLiter(s) (100 mL/Hr) IV Continuous <Continuous>  dextrose 5%. 1000 milliLiter(s) (50 mL/Hr) IV Continuous <Continuous>  dextrose 50% Injectable 25 Gram(s) IV Push once  dextrose 50% Injectable 25 Gram(s) IV Push once  dextrose 50% Injectable 12.5 Gram(s) IV Push once  diltiazem    milliGRAM(s) Oral daily  fludroCORTISONE 0.1 milliGRAM(s) Oral once  glucagon  Injectable 1 milliGRAM(s) IntraMuscular once  hydrALAZINE 100 milliGRAM(s) Oral two times a day  insulin lispro (ADMELOG) corrective regimen sliding scale   SubCutaneous three times a day before meals  metoprolol tartrate 100 milliGRAM(s) Oral daily  Nephro-yasmany 1 Tablet(s) Oral daily  rosuvastatin 20 milliGRAM(s) Oral at bedtime  torsemide 20 milliGRAM(s) Oral daily    MEDICATIONS  (PRN):  acetaminophen     Tablet .. 650 milliGRAM(s) Oral every 6 hours PRN Temp greater or equal to 38C (100.4F), Mild Pain (1 - 3)  aluminum hydroxide/magnesium hydroxide/simethicone Suspension 30 milliLiter(s) Oral every 4 hours PRN Dyspepsia  dextrose Oral Gel 15 Gram(s) Oral once PRN Blood Glucose LESS THAN 70 milliGRAM(s)/deciliter  melatonin 3 milliGRAM(s) Oral at bedtime PRN Insomnia  ondansetron Injectable 4 milliGRAM(s) IV Push every 8 hours PRN Nausea and/or Vomiting  oxyCODONE    IR 10 milliGRAM(s) Oral every 6 hours PRN Severe Pain (7 - 10)      Allergies    No Known Allergies    Intolerances        OBJECTIVE:    Vital Signs Last 24 Hrs  T(C): 36.9 (28 Aug 2024 08:37), Max: 37.2 (27 Aug 2024 16:56)  T(F): 98.5 (28 Aug 2024 08:37), Max: 98.9 (27 Aug 2024 16:56)  HR: 84 (28 Aug 2024 08:37) (84 - 98)  BP: 131/76 (28 Aug 2024 08:37) (131/76 - 178/88)  BP(mean): --  RR: 19 (28 Aug 2024 08:37) (18 - 19)  SpO2: 90% (28 Aug 2024 08:37) (90% - 94%)    Parameters below as of 28 Aug 2024 08:37  Patient On (Oxygen Delivery Method): room air      ~~~~~~~~~~  PHYSICAL EXAM:  Constitutional: Alert, interactive, comfortable, NAD  Head and Face: Atraumatic, head and face were normal in appearance  Eyes: Sclera and conjunctiva were normal, pupils were equal in size, round, eyelids normal  ENT: Ears and nose were normal in appearance  Neck: Appearance was normal, neck was supple, No JVD  Pulmonary: Clear to auscultation bilaterally, no rales/crackles, or wheezing  Heart: Regular rate and rhythm, no murmurs, gallops, or pericardial rubs  Abdominal: Soft, nontender, nondistended. No appreciable hepatosplenomegaly. Bowel sounds normal  Skin: Normal color and intact without appreciable rash or abnormal skin lesion  Extremities: Warm without edema. No clubbing.  Pulse: 2+ radial pulse  Neuro: Oriented to person, place, and time    Lab/ Imaging:    LABS:                        9.3    8.36  )-----------( 337      ( 28 Aug 2024 04:38 )             30.5     08-28    138  |  95<L>  |  98.4<H>  ----------------------------<  124<H>  5.5<H>   |  25.0  |  15.42<H>    Ca    8.6      28 Aug 2024 04:38  Phos  9.9     08-28  Mg     3.1     08-28    TPro  5.5<L>  /  Alb  2.0<L>  /  TBili  <0.2<L>  /  DBili  x   /  AST  7   /  ALT  6   /  AlkPhos  58  08-28    PT/INR - ( 27 Aug 2024 00:24 )   PT: 10.4 sec;   INR: 0.94 ratio         PTT - ( 27 Aug 2024 00:24 )  PTT:25.8 sec  Urinalysis Basic - ( 28 Aug 2024 04:38 )    Color: x / Appearance: x / SG: x / pH: x  Gluc: 124 mg/dL / Ketone: x  / Bili: x / Urobili: x   Blood: x / Protein: x / Nitrite: x   Leuk Esterase: x / RBC: x / WBC x   Sq Epi: x / Non Sq Epi: x / Bacteria: x      CAPILLARY BLOOD GLUCOSE      POCT Blood Glucose.: 125 mg/dL (28 Aug 2024 08:39)  POCT Blood Glucose.: 110 mg/dL (27 Aug 2024 21:38)  POCT Blood Glucose.: 113 mg/dL (27 Aug 2024 17:24)  POCT Blood Glucose.: 105 mg/dL (27 Aug 2024 12:01)          Imaging Personally Reviewed:  [ ] YES  [ ] NO    Consultant(s) Notes Reviewed:  [ ] YES  [ ] NO    Care Discussed with Consultants/Other Providers [ ] YES  [ ] NO    Plan of Care discussed with Housestaff [ ]YES [ ] NO SUBJECTIVE:    Chief Complaint: Patient is a 63y old  Female who presents with a chief complaint of ESRD needing HD (27 Aug 2024 16:10)      BRIEF HOSPITAL COURSE: 62yo F pt w/ pmhx of ESRD on peritoneal dialysis 5x per week, HTN, HLD, DM presented to ED c/o worsening diffused abdominal pain with associated distention over the past week. Pt reports dialysis cath has not been draining for the last week. She attempts peritoneal dialysis but her unable to get any fluid return. She denies fever, chills, N/V/D, urinary sx, chest pain, SOB, or any other complaints.       INTERVAL HPI/LAST 24HRS EVENTS: Patient seen and examined at bedside at AM. No clinically acute event overnight. Denies any chest pain, palpitations, SOB, PND, orthopnea, leg edema, N/V/D, or any other complaints.       TODAY: Patient lying in bed. Reports decreased appetite, decreased urine output, and abdominal discomfort.  Able to pass flatus. Has not had BM since prior to admission.    MEDICATIONS  (STANDING):  albuterol    0.083%. 2.5 milliGRAM(s) Nebulizer once  calcium acetate 667 milliGRAM(s) Oral three times a day with meals  cefepime 1000 milliGRAM(s) 1000 milliGRAM(s) Irrigation daily  cefTRIAXone Injectable. 2000 milliGRAM(s) IV Push every 24 hours  cloNIDine 0.1 milliGRAM(s) Oral two times a day  dextrose 5%. 1000 milliLiter(s) (100 mL/Hr) IV Continuous <Continuous>  dextrose 5%. 1000 milliLiter(s) (50 mL/Hr) IV Continuous <Continuous>  dextrose 50% Injectable 25 Gram(s) IV Push once  dextrose 50% Injectable 25 Gram(s) IV Push once  dextrose 50% Injectable 12.5 Gram(s) IV Push once  diltiazem    milliGRAM(s) Oral daily  fludroCORTISONE 0.1 milliGRAM(s) Oral once  glucagon  Injectable 1 milliGRAM(s) IntraMuscular once  hydrALAZINE 100 milliGRAM(s) Oral two times a day  insulin lispro (ADMELOG) corrective regimen sliding scale   SubCutaneous three times a day before meals  metoprolol tartrate 100 milliGRAM(s) Oral daily  Nephro-yasmany 1 Tablet(s) Oral daily  rosuvastatin 20 milliGRAM(s) Oral at bedtime  torsemide 20 milliGRAM(s) Oral daily    MEDICATIONS  (PRN):  acetaminophen     Tablet .. 650 milliGRAM(s) Oral every 6 hours PRN Temp greater or equal to 38C (100.4F), Mild Pain (1 - 3)  aluminum hydroxide/magnesium hydroxide/simethicone Suspension 30 milliLiter(s) Oral every 4 hours PRN Dyspepsia  dextrose Oral Gel 15 Gram(s) Oral once PRN Blood Glucose LESS THAN 70 milliGRAM(s)/deciliter  melatonin 3 milliGRAM(s) Oral at bedtime PRN Insomnia  ondansetron Injectable 4 milliGRAM(s) IV Push every 8 hours PRN Nausea and/or Vomiting  oxyCODONE    IR 10 milliGRAM(s) Oral every 6 hours PRN Severe Pain (7 - 10)      Allergies    No Known Allergies    Intolerances        OBJECTIVE:    Vital Signs Last 24 Hrs  T(C): 36.9 (28 Aug 2024 08:37), Max: 37.2 (27 Aug 2024 16:56)  T(F): 98.5 (28 Aug 2024 08:37), Max: 98.9 (27 Aug 2024 16:56)  HR: 84 (28 Aug 2024 08:37) (84 - 98)  BP: 131/76 (28 Aug 2024 08:37) (131/76 - 178/88)  BP(mean): --  RR: 19 (28 Aug 2024 08:37) (18 - 19)  SpO2: 90% (28 Aug 2024 08:37) (90% - 94%)    Parameters below as of 28 Aug 2024 08:37  Patient On (Oxygen Delivery Method): room air      PHYSICAL EXAM:  Constitutional: Alert, awake, lying in bed. NAD.  Head and Face: Atraumatic, head and face were normal in appearance  Eyes: PERRLA  ENT: Ears and nose were normal in appearance  Neck: Supple, No JVD  Pulmonary: CTAB  Heart: RRR. S1/S2. No MRG  Abdominal: Diffuse tenderness to light palpation in all quadrants. Distention present, hypoactive BS. No rigidity, rebound, or guarding  Skin: Normal color and intact without appreciable rash or abnormal skin lesion  Extremities: Warm without edema. No clubbing.  Pulse: 2+ radial pulse  Neuro: AAOx3, no gross focal deficits    Lab/ Imaging:    LABS:                        9.3    8.36  )-----------( 337      ( 28 Aug 2024 04:38 )             30.5     08-28    138  |  95<L>  |  98.4<H>  ----------------------------<  124<H>  5.5<H>   |  25.0  |  15.42<H>    Ca    8.6      28 Aug 2024 04:38  Phos  9.9     08-28  Mg     3.1     08-28    TPro  5.5<L>  /  Alb  2.0<L>  /  TBili  <0.2<L>  /  DBili  x   /  AST  7   /  ALT  6   /  AlkPhos  58  08-28    PT/INR - ( 27 Aug 2024 00:24 )   PT: 10.4 sec;   INR: 0.94 ratio         PTT - ( 27 Aug 2024 00:24 )  PTT:25.8 sec  Urinalysis Basic - ( 28 Aug 2024 04:38 )    Color: x / Appearance: x / SG: x / pH: x  Gluc: 124 mg/dL / Ketone: x  / Bili: x / Urobili: x   Blood: x / Protein: x / Nitrite: x   Leuk Esterase: x / RBC: x / WBC x   Sq Epi: x / Non Sq Epi: x / Bacteria: x      CAPILLARY BLOOD GLUCOSE      POCT Blood Glucose.: 125 mg/dL (28 Aug 2024 08:39)  POCT Blood Glucose.: 110 mg/dL (27 Aug 2024 21:38)  POCT Blood Glucose.: 113 mg/dL (27 Aug 2024 17:24)  POCT Blood Glucose.: 105 mg/dL (27 Aug 2024 12:01)          Imaging Personally Reviewed:  [ ] YES  [ ] NO    Consultant(s) Notes Reviewed:  [ ] YES  [ ] NO    Care Discussed with Consultants/Other Providers [ ] YES  [ ] NO    Plan of Care discussed with Housestaff [ ]YES [ ] NO

## 2024-08-28 NOTE — PROCEDURE NOTE - NSICDXPROCEDURE_GEN_ALL_CORE_FT
PROCEDURES:  Insertion, catheter, hemodialysis, non-tunneled, with US guidance 28-Aug-2024 16:16:32  Vasyl Salvador

## 2024-08-28 NOTE — PROGRESS NOTE ADULT - ASSESSMENT
62yo F pt w/ pmhx of ESRD on peritoneal dialysis 5x per week, HTN, HLD, DM presented to ED c/o worsening diffused abdominal pain with associated distention over the past week. Pt reports dialysis cath has not been draining for the last week. She attempts peritoneal dialysis but her unable to get any fluid return. She denies fever, chills, N/V/D, urinary sx, chest pain, SOB, or any other complaints.  (27 Aug 2024 03:04)  Has been constipated in the past    ESRD needs dialysis is a PD pt  Now w PD catheter complication  Possibly form constipation awaiting CT scan abd    + Peritonitis on abx Ceftriaxone   + Abd pain cloudy peritoneal fluid  Has high WBC count in PD fluid but gm stain -ve and no growth on Cx  Likely has Peritonitis  Would cont IV abx on Ceftriaxone  PD catheter now not filling or draining, Surgery called Dr Covarrubias may need PD catheter removed    Will call Vascular to arrange temp dialysis via shiley catheter dialysis tomorrow    Shall follow   62yo F pt w/ pmhx of ESRD on peritoneal dialysis 5x per week, HTN, HLD, DM presented to ED c/o worsening diffused abdominal pain with associated distention over the past week. Pt reports dialysis cath has not been draining for the last week. She attempts peritoneal dialysis but her unable to get any fluid return. She denies fever, chills, N/V/D, urinary sx, chest pain, SOB, or any other complaints.  (27 Aug 2024 03:04)  Has been constipated in the past    ESRD needs dialysis is a PD pt  Now w PD catheter complication  Possibly form constipation awaiting CT scan abd    + Peritonitis on abx Ceftriaxone   + Abd pain cloudy peritoneal fluid  Has high Neutrophils 88% in PD fluid - should be treated as peritonitis   Noted Gm stain -ve and no growth on Cx  Likely has Peritonitis  Would cont IV abx on Ceftriaxone  PD catheter now not filling or draining, Surgery called Dr Covarrubias may need PD catheter removed   will r/o other causes ie constipation pt on bowel regimen    Call Vascular to arrange temp dialysis via shiley catheter - dialysis tomorrow    HyperKalemia treated w [facility]  Hyperphosphatemia cont Phoslo w meals diet adjusted to renal    Monitor labs will follow

## 2024-08-28 NOTE — PROGRESS NOTE ADULT - SUBJECTIVE AND OBJECTIVE BOX
NEPHROLOGY INTERVAL HPI/OVERNIGHT EVENTS:    Examined earlier  c/o abdominal discomfort  + constipation  Denies HA CP no SOB    MEDICATIONS  (STANDING):  albuterol    0.083%. 2.5 milliGRAM(s) Nebulizer once  calcium acetate 667 milliGRAM(s) Oral three times a day with meals  cefepime 1000 milliGRAM(s) 1000 milliGRAM(s) Irrigation daily  cefTRIAXone Injectable. 2000 milliGRAM(s) IV Push every 24 hours  cloNIDine 0.1 milliGRAM(s) Oral two times a day  dextrose 5%. 1000 milliLiter(s) (100 mL/Hr) IV Continuous <Continuous>  dextrose 5%. 1000 milliLiter(s) (50 mL/Hr) IV Continuous <Continuous>  dextrose 50% Injectable 25 Gram(s) IV Push once  dextrose 50% Injectable 25 Gram(s) IV Push once  dextrose 50% Injectable 12.5 Gram(s) IV Push once  diltiazem    milliGRAM(s) Oral daily  fludroCORTISONE 0.1 milliGRAM(s) Oral once  glucagon  Injectable 1 milliGRAM(s) IntraMuscular once  hydrALAZINE 100 milliGRAM(s) Oral two times a day  insulin lispro (ADMELOG) corrective regimen sliding scale   SubCutaneous three times a day before meals  metoprolol tartrate 100 milliGRAM(s) Oral daily  Nephro-yasmany 1 Tablet(s) Oral daily  polyethylene glycol 3350 17 Gram(s) Oral daily  rosuvastatin 20 milliGRAM(s) Oral at bedtime  sodium zirconium cyclosilicate 10 Gram(s) Oral two times a day  torsemide 20 milliGRAM(s) Oral daily    MEDICATIONS  (PRN):  acetaminophen     Tablet .. 650 milliGRAM(s) Oral every 6 hours PRN Temp greater or equal to 38C (100.4F), Mild Pain (1 - 3)  aluminum hydroxide/magnesium hydroxide/simethicone Suspension 30 milliLiter(s) Oral every 4 hours PRN Dyspepsia  dextrose Oral Gel 15 Gram(s) Oral once PRN Blood Glucose LESS THAN 70 milliGRAM(s)/deciliter  melatonin 3 milliGRAM(s) Oral at bedtime PRN Insomnia  ondansetron Injectable 4 milliGRAM(s) IV Push every 8 hours PRN Nausea and/or Vomiting  oxyCODONE    IR 10 milliGRAM(s) Oral every 6 hours PRN Severe Pain (7 - 10)      Allergies    No Known Allergies    Intolerances        Vital Signs Last 24 Hrs  T(C): 36.9 (28 Aug 2024 08:37), Max: 37.2 (27 Aug 2024 16:56)  T(F): 98.5 (28 Aug 2024 08:37), Max: 98.9 (27 Aug 2024 16:56)  HR: 84 (28 Aug 2024 08:37) (84 - 98)  BP: 131/76 (28 Aug 2024 08:37) (131/76 - 178/88)  BP(mean): --  RR: 19 (28 Aug 2024 08:37) (18 - 19)  SpO2: 90% (28 Aug 2024 08:37) (90% - 94%)    Parameters below as of 28 Aug 2024 08:37  Patient On (Oxygen Delivery Method): room air      PHYSICAL EXAM:  GENERAL: NAD  EYES: EOMI  NECK: Supple, No JVD  NERVOUS SYSTEM:  A/O x3  Lungs : No rales, No rhonchi,   HEART:  No murmur,  No rub, No gallops  ABDOMEN: Soft, diffusely tender, distended, full; rebound +; PD cath area NT, BS+  EXTREMITIES:  + Peripheral Pulses, - edema    LABS:                        9.3    8.36  )-----------( 337      ( 28 Aug 2024 04:38 )             30.5     08-28    138  |  95<L>  |  98.4<H>  ----------------------------<  124<H>  5.5<H>   |  25.0  |  15.42<H>    Ca    8.6      28 Aug 2024 04:38  Phos  9.9     08-28  Mg     3.1     08-28    TPro  5.5<L>  /  Alb  2.0<L>  /  TBili  <0.2<L>  /  DBili  x   /  AST  7   /  ALT  6   /  AlkPhos  58  08-28    PT/INR - ( 27 Aug 2024 00:24 )   PT: 10.4 sec;   INR: 0.94 ratio         PTT - ( 27 Aug 2024 00:24 )  PTT:25.8 sec  Urinalysis Basic - ( 28 Aug 2024 04:38 )    Color: x / Appearance: x / SG: x / pH: x  Gluc: 124 mg/dL / Ketone: x  / Bili: x / Urobili: x   Blood: x / Protein: x / Nitrite: x   Leuk Esterase: x / RBC: x / WBC x   Sq Epi: x / Non Sq Epi: x / Bacteria: x      Magnesium: 3.1 mg/dL (08-28 @ 04:38)  Phosphorus: 9.9 mg/dL (08-28 @ 04:38)  Magnesium: 3.1 mg/dL (08-27 @ 22:44)          RADIOLOGY & ADDITIONAL TESTS:

## 2024-08-28 NOTE — CONSULT NOTE ADULT - SUBJECTIVE AND OBJECTIVE BOX
Vascular Attending:  Alpa BRYAN     Vascular Surgery HPI:  Admission HPI reviewed below  ESRD, HD via Peritoneal Dialysis catheter  Catheter reportedly not functional   Patient with progressive abdominal pain and distention  Increased BUN/creatine   Request for temporary HD access       HPI:  62yo F pt w/ pmhx of ESRD on peritoneal dialysis 5x per week, HTN, HLD, DM presented to ED c/o worsening diffused abdominal pain with associated distention over the past week. Pt reports dialysis cath has not been draining for the last week. She attempts peritoneal dialysis but her unable to get any fluid return. She denies fever, chills, N/V/D, urinary sx, chest pain, SOB, or any other complaints.  (27 Aug 2024 03:04)      PAST MEDICAL & SURGICAL HISTORY:  HTN (hypertension)      DM (diabetes mellitus)      HLD (hyperlipidemia)      Overactive thyroid gland      S/P  section          REVIEW OF SYSTEMS      General:	    Skin/Breast:  	  Ophthalmologic:  	  ENMT:	    Respiratory and Thorax:  	  Cardiovascular:	    Gastrointestinal:	    Genitourinary:	    Musculoskeletal:	    Neurological:	    Psychiatric:	    Hematology/Lymphatics:	    Endocrine:	    Allergic/Immunologic:	    MEDICATIONS  (STANDING):  albuterol    0.083%. 2.5 milliGRAM(s) Nebulizer once  calcium acetate 667 milliGRAM(s) Oral three times a day with meals  cefepime 1000 milliGRAM(s) 1000 milliGRAM(s) Irrigation daily  cefTRIAXone Injectable. 2000 milliGRAM(s) IV Push every 24 hours  cloNIDine 0.1 milliGRAM(s) Oral two times a day  dextrose 5%. 1000 milliLiter(s) (100 mL/Hr) IV Continuous <Continuous>  dextrose 5%. 1000 milliLiter(s) (50 mL/Hr) IV Continuous <Continuous>  dextrose 50% Injectable 25 Gram(s) IV Push once  dextrose 50% Injectable 25 Gram(s) IV Push once  dextrose 50% Injectable 12.5 Gram(s) IV Push once  diltiazem    milliGRAM(s) Oral daily  fludroCORTISONE 0.1 milliGRAM(s) Oral once  glucagon  Injectable 1 milliGRAM(s) IntraMuscular once  hydrALAZINE 100 milliGRAM(s) Oral two times a day  insulin lispro (ADMELOG) corrective regimen sliding scale   SubCutaneous three times a day before meals  metoprolol tartrate 100 milliGRAM(s) Oral daily  Nephro-yasmany 1 Tablet(s) Oral daily  polyethylene glycol 3350 17 Gram(s) Oral daily  rosuvastatin 20 milliGRAM(s) Oral at bedtime  sodium zirconium cyclosilicate 10 Gram(s) Oral two times a day  torsemide 20 milliGRAM(s) Oral daily    MEDICATIONS  (PRN):  acetaminophen     Tablet .. 650 milliGRAM(s) Oral every 6 hours PRN Temp greater or equal to 38C (100.4F), Mild Pain (1 - 3)  aluminum hydroxide/magnesium hydroxide/simethicone Suspension 30 milliLiter(s) Oral every 4 hours PRN Dyspepsia  dextrose Oral Gel 15 Gram(s) Oral once PRN Blood Glucose LESS THAN 70 milliGRAM(s)/deciliter  melatonin 3 milliGRAM(s) Oral at bedtime PRN Insomnia  ondansetron Injectable 4 milliGRAM(s) IV Push every 8 hours PRN Nausea and/or Vomiting  oxyCODONE    IR 10 milliGRAM(s) Oral every 6 hours PRN Severe Pain (7 - 10)      Allergies    No Known Allergies    Intolerances        SOCIAL HISTORY: non contributory       Vital Signs Last 24 Hrs  T(C): 36.9 (28 Aug 2024 08:37), Max: 37.2 (27 Aug 2024 16:56)  T(F): 98.5 (28 Aug 2024 08:37), Max: 98.9 (27 Aug 2024 16:56)  HR: 84 (28 Aug 2024 08:37) (84 - 98)  BP: 131/76 (28 Aug 2024 08:37) (131/76 - 178/88)  BP(mean): --  RR: 19 (28 Aug 2024 08:37) (18 - 19)  SpO2: 90% (28 Aug 2024 08:37) (90% - 94%)    Parameters below as of 28 Aug 2024 08:37  Patient On (Oxygen Delivery Method): room air        PHYSICAL EXAM:      Constitutional:    Eyes:    ENMT:    Neck:    Breasts:    Back:    Respiratory:    Cardiovascular:    Gastrointestinal:    Genitourinary:    Rectal:    Extremities:    Vascular:    Neurological:    Skin:    Lymph Nodes:    Musculoskeletal:    Psychiatric:      Pulses:   Right:                                                                          Left:  FEM [ ]2+ [ ]1+ [ ]doppler                                             FEM [ ]2+ [ ]1+ [ ]doppler    POP [ ]2+ [ ]1+ [ ]doppler                                             POP [ ]2+ [ ]1+ [ ]doppler    DP [ ]2+ [ ]1+ [ ]doppler                                                DP [ ]2+ [ ]1+ [ ]doppler  PT[ ]2+ [ ]1+ [ ]doppler                                                  PT [ ]2+ [ ]1+ [ ]doppler      LABS:                        9.3    8.36  )-----------( 337      ( 28 Aug 2024 04:38 )             30.5         138  |  95<L>  |  98.4<H>  ----------------------------<  124<H>  5.5<H>   |  25.0  |  15.42<H>    Ca    8.6      28 Aug 2024 04:38  Phos  9.9       Mg     3.1         TPro  5.5<L>  /  Alb  2.0<L>  /  TBili  <0.2<L>  /  DBili  x   /  AST  7   /  ALT  6   /  AlkPhos  58      PT/INR - ( 27 Aug 2024 00:24 )   PT: 10.4 sec;   INR: 0.94 ratio         PTT - ( 27 Aug 2024 00:24 )  PTT:25.8 sec  Urinalysis Basic - ( 28 Aug 2024 04:38 )    Color: x / Appearance: x / SG: x / pH: x  Gluc: 124 mg/dL / Ketone: x  / Bili: x / Urobili: x   Blood: x / Protein: x / Nitrite: x   Leuk Esterase: x / RBC: x / WBC x   Sq Epi: x / Non Sq Epi: x / Bacteria: x        RADIOLOGY & ADDITIONAL STUDIES    Impression and Plan: Vascular Attending:  Alpa BRYAN     Vascular Surgery HPI:  Admission HPI reviewed below  ESRD, HD via Peritoneal Dialysis catheter  Catheter reportedly not functional   Patient with progressive abdominal pain and distention  Increased BUN/creatine   Request for temporary HD access   currently no sob, cp  Right hand dominant       HPI:  62yo F pt w/ pmhx of ESRD on peritoneal dialysis 5x per week, HTN, HLD, DM presented to ED c/o worsening diffused abdominal pain with associated distention over the past week. Pt reports dialysis cath has not been draining for the last week. She attempts peritoneal dialysis but her unable to get any fluid return. She denies fever, chills, N/V/D, urinary sx, chest pain, SOB, or any other complaints.  (27 Aug 2024 03:04)      PAST MEDICAL & SURGICAL HISTORY:  HTN (hypertension)      DM (diabetes mellitus)      HLD (hyperlipidemia)      Overactive thyroid gland      S/P  section          REVIEW OF SYSTEMS:  seee HPI       General:	    Skin/Breast:  	  Ophthalmologic:  	  ENMT:	    Respiratory and Thorax:  	  Cardiovascular:	    Gastrointestinal:	    Genitourinary:	    Musculoskeletal:	    Neurological:	    Psychiatric:	    Hematology/Lymphatics:	    Endocrine:	    Allergic/Immunologic:	    MEDICATIONS  (STANDING):  albuterol    0.083%. 2.5 milliGRAM(s) Nebulizer once  calcium acetate 667 milliGRAM(s) Oral three times a day with meals  cefepime 1000 milliGRAM(s) 1000 milliGRAM(s) Irrigation daily  cefTRIAXone Injectable. 2000 milliGRAM(s) IV Push every 24 hours  cloNIDine 0.1 milliGRAM(s) Oral two times a day  dextrose 5%. 1000 milliLiter(s) (100 mL/Hr) IV Continuous <Continuous>  dextrose 5%. 1000 milliLiter(s) (50 mL/Hr) IV Continuous <Continuous>  dextrose 50% Injectable 25 Gram(s) IV Push once  dextrose 50% Injectable 25 Gram(s) IV Push once  dextrose 50% Injectable 12.5 Gram(s) IV Push once  diltiazem    milliGRAM(s) Oral daily  fludroCORTISONE 0.1 milliGRAM(s) Oral once  glucagon  Injectable 1 milliGRAM(s) IntraMuscular once  hydrALAZINE 100 milliGRAM(s) Oral two times a day  insulin lispro (ADMELOG) corrective regimen sliding scale   SubCutaneous three times a day before meals  metoprolol tartrate 100 milliGRAM(s) Oral daily  Nephro-yasmany 1 Tablet(s) Oral daily  polyethylene glycol 3350 17 Gram(s) Oral daily  rosuvastatin 20 milliGRAM(s) Oral at bedtime  sodium zirconium cyclosilicate 10 Gram(s) Oral two times a day  torsemide 20 milliGRAM(s) Oral daily    MEDICATIONS  (PRN):  acetaminophen     Tablet .. 650 milliGRAM(s) Oral every 6 hours PRN Temp greater or equal to 38C (100.4F), Mild Pain (1 - 3)  aluminum hydroxide/magnesium hydroxide/simethicone Suspension 30 milliLiter(s) Oral every 4 hours PRN Dyspepsia  dextrose Oral Gel 15 Gram(s) Oral once PRN Blood Glucose LESS THAN 70 milliGRAM(s)/deciliter  melatonin 3 milliGRAM(s) Oral at bedtime PRN Insomnia  ondansetron Injectable 4 milliGRAM(s) IV Push every 8 hours PRN Nausea and/or Vomiting  oxyCODONE    IR 10 milliGRAM(s) Oral every 6 hours PRN Severe Pain (7 - 10)      Allergies    No Known Allergies    Intolerances        SOCIAL HISTORY: non contributory       Vital Signs Last 24 Hrs  T(C): 36.9 (28 Aug 2024 08:37), Max: 37.2 (27 Aug 2024 16:56)  T(F): 98.5 (28 Aug 2024 08:37), Max: 98.9 (27 Aug 2024 16:56)  HR: 84 (28 Aug 2024 08:37) (84 - 98)  BP: 131/76 (28 Aug 2024 08:37) (131/76 - 178/88)  BP(mean): --  RR: 19 (28 Aug 2024 08:37) (18 - 19)  SpO2: 90% (28 Aug 2024 08:37) (90% - 94%)    Parameters below as of 28 Aug 2024 08:37  Patient On (Oxygen Delivery Method): room air        PHYSICAL EXAM:      Constitutional: no distress, alert and oriented     Eyes: no scleral icterus     ENMT: moist membranes     Neck: Right IJ patent on U/S      Respiratory: non labored     Cardiovascular: no implantable devices     Gastrointestinal: distended , generalized tenderness, no guarding     Genitourinary: not examined     Rectal: not examined     Extremities: warm, 1+ edema lower ext     Vascular: patent right IJ     Neurological: non noted gross deficits     Psychiatric: appropriate affect             LABS:                        9.3    8.36  )-----------( 337      ( 28 Aug 2024 04:38 )             30.5         138  |  95<L>  |  98.4<H>  ----------------------------<  124<H>  5.5<H>   |  25.0  |  15.42<H>    Ca    8.6      28 Aug 2024 04:38  Phos  9.9       Mg     3.1         TPro  5.5<L>  /  Alb  2.0<L>  /  TBili  <0.2<L>  /  DBili  x   /  AST  7   /  ALT  6   /  AlkPhos  58      PT/INR - ( 27 Aug 2024 00:24 )   PT: 10.4 sec;   INR: 0.94 ratio         PTT - ( 27 Aug 2024 00:24 )  PTT:25.8 sec  Urinalysis Basic - ( 28 Aug 2024 04:38 )    Color: x / Appearance: x / SG: x / pH: x  Gluc: 124 mg/dL / Ketone: x  / Bili: x / Urobili: x   Blood: x / Protein: x / Nitrite: x   Leuk Esterase: x / RBC: x / WBC x   Sq Epi: x / Non Sq Epi: x / Bacteria: x        RADIOLOGY & ADDITIONAL STUDIES    Impression and Plan:    ESRD   Non functional PD catheter  abdominal distention   Need for immediate HD      ----- Right IJ shiley placed  -- f/u cxr  - preserve the left uppe ext for possible avf

## 2024-08-28 NOTE — PROGRESS NOTE ADULT - ATTENDING COMMENTS
Agree with above   Patient seen and examined together with medical residents. c/o very tender abdomen. Still makes urine, but not much. Denies any SOB or LE swelling   Vital signs,  labs and imaging  reviewed   Assessment and plan discussed with Dr. Santamaria -nephro.   Dr. Santamaria spoke to surgery team, they will evaluate the catheter.   No HD for now, to continue with diuretics and Lokelma for hyperkalemia   Seen by surgery team, recommending CT abdomen and pelvis, ordered   Also, ordered Miralax for constipation Agree with above   Patient seen and examined together with medical residents. c/o very tender abdomen. Still makes urine, but not much. Denies any SOB or LE swelling   Vital signs,  labs and imaging  reviewed. Noted to have metabolic acidosis   Assessment and plan discussed with Dr. Santamaria -nephro.   Seen by surgery team, recommending CT abdomen and pelvis, ordered. Spoke with Dr. Fletcher. Per Dr. Fletcher for catheter removal would have to go to OR. Needs to be optimized prior to OR.   Dr. Santamaria will set HD for tomorrow morning. Central line will be placed today   c/w Ceftriaxone for now   Miralax for constipation   Lokelma for hyperkalemia

## 2024-08-29 LAB
ANION GAP SERPL CALC-SCNC: 17 MMOL/L — SIGNIFICANT CHANGE UP (ref 5–17)
BUN SERPL-MCNC: 102.8 MG/DL — HIGH (ref 8–20)
CALCIUM SERPL-MCNC: 8.8 MG/DL — SIGNIFICANT CHANGE UP (ref 8.4–10.5)
CHLORIDE SERPL-SCNC: 92 MMOL/L — LOW (ref 96–108)
CO2 SERPL-SCNC: 26 MMOL/L — SIGNIFICANT CHANGE UP (ref 22–29)
CREAT SERPL-MCNC: 17.19 MG/DL — HIGH (ref 0.5–1.3)
EGFR: 2 ML/MIN/1.73M2 — LOW
GLUCOSE BLDC GLUCOMTR-MCNC: 100 MG/DL — HIGH (ref 70–99)
GLUCOSE BLDC GLUCOMTR-MCNC: 108 MG/DL — HIGH (ref 70–99)
GLUCOSE BLDC GLUCOMTR-MCNC: 93 MG/DL — SIGNIFICANT CHANGE UP (ref 70–99)
GLUCOSE SERPL-MCNC: 128 MG/DL — HIGH (ref 70–99)
GRAM STN FLD: ABNORMAL
HCT VFR BLD CALC: 29.5 % — LOW (ref 34.5–45)
HGB BLD-MCNC: 8.8 G/DL — LOW (ref 11.5–15.5)
MAGNESIUM SERPL-MCNC: 3.2 MG/DL — HIGH (ref 1.6–2.6)
MCHC RBC-ENTMCNC: 23.5 PG — LOW (ref 27–34)
MCHC RBC-ENTMCNC: 29.8 GM/DL — LOW (ref 32–36)
MCV RBC AUTO: 78.7 FL — LOW (ref 80–100)
PHOSPHATE SERPL-MCNC: 11.2 MG/DL — HIGH (ref 2.4–4.7)
PLATELET # BLD AUTO: 386 K/UL — SIGNIFICANT CHANGE UP (ref 150–400)
POTASSIUM SERPL-MCNC: 5.6 MMOL/L — HIGH (ref 3.5–5.3)
POTASSIUM SERPL-SCNC: 5.6 MMOL/L — HIGH (ref 3.5–5.3)
RBC # BLD: 3.75 M/UL — LOW (ref 3.8–5.2)
RBC # FLD: 15.9 % — HIGH (ref 10.3–14.5)
SODIUM SERPL-SCNC: 135 MMOL/L — SIGNIFICANT CHANGE UP (ref 135–145)
VANCOMYCIN TROUGH SERPL-MCNC: 14.2 UG/ML — SIGNIFICANT CHANGE UP (ref 10–20)
WBC # BLD: 11.35 K/UL — HIGH (ref 3.8–10.5)
WBC # FLD AUTO: 11.35 K/UL — HIGH (ref 3.8–10.5)

## 2024-08-29 PROCEDURE — 99233 SBSQ HOSP IP/OBS HIGH 50: CPT

## 2024-08-29 PROCEDURE — 99222 1ST HOSP IP/OBS MODERATE 55: CPT

## 2024-08-29 PROCEDURE — 99233 SBSQ HOSP IP/OBS HIGH 50: CPT | Mod: GC

## 2024-08-29 RX ORDER — CEFEPIME 2 G/1
1000 INJECTION, POWDER, FOR SOLUTION INTRAVENOUS
Refills: 0 | Status: DISCONTINUED | OUTPATIENT
Start: 2024-08-29 | End: 2024-08-29

## 2024-08-29 RX ORDER — HYDROMORPHONE HYDROCHLORIDE 2 MG/1
1 TABLET ORAL EVERY 4 HOURS
Refills: 0 | Status: DISCONTINUED | OUTPATIENT
Start: 2024-08-29 | End: 2024-09-01

## 2024-08-29 RX ORDER — METOPROLOL TARTRATE 100 MG/1
5 TABLET ORAL EVERY 6 HOURS
Refills: 0 | Status: DISCONTINUED | OUTPATIENT
Start: 2024-08-29 | End: 2024-09-04

## 2024-08-29 RX ORDER — HYDRALAZINE HCL 50 MG
5 TABLET ORAL EVERY 6 HOURS
Refills: 0 | Status: DISCONTINUED | OUTPATIENT
Start: 2024-08-29 | End: 2024-08-29

## 2024-08-29 RX ORDER — FUROSEMIDE 40 MG
20 TABLET ORAL DAILY
Refills: 0 | Status: DISCONTINUED | OUTPATIENT
Start: 2024-08-29 | End: 2024-08-30

## 2024-08-29 RX ORDER — CEFEPIME 2 G/1
1000 INJECTION, POWDER, FOR SOLUTION INTRAVENOUS
Refills: 0 | Status: DISCONTINUED | OUTPATIENT
Start: 2024-08-29 | End: 2024-08-30

## 2024-08-29 RX ADMIN — Medication 10 MILLIGRAM(S): at 18:39

## 2024-08-29 RX ADMIN — CHLORHEXIDINE GLUCONATE 1 APPLICATION(S): 40 SOLUTION TOPICAL at 05:05

## 2024-08-29 RX ADMIN — Medication 2000 MILLIGRAM(S): at 05:04

## 2024-08-29 RX ADMIN — CEFEPIME 1000 MILLIGRAM(S): 2 INJECTION, POWDER, FOR SOLUTION INTRAVENOUS at 17:48

## 2024-08-29 RX ADMIN — METOPROLOL TARTRATE 5 MILLIGRAM(S): 100 TABLET ORAL at 14:00

## 2024-08-29 RX ADMIN — Medication 20 MILLIGRAM(S): at 05:04

## 2024-08-29 RX ADMIN — Medication 75 MILLILITER(S): at 17:48

## 2024-08-29 NOTE — PROGRESS NOTE ADULT - ATTENDING COMMENTS
Agree with above   Patient seen and examined together with medical residents.  at bedside. She is ill looking and nor providing any ROS. Per chart, had episodes of vomiting last night and NG tube was placed, which is draining   CT abdomen  done, showing SBO   Vital signs,  labs and imaging  reviewed   Assessment and plan discussed with the team   Will had HD now and Ex - lap after.   Keep NPO   Metoprolol switched to IV   Will add gentle IV hydration   Discussed with  in detail

## 2024-08-29 NOTE — CHART NOTE - NSCHARTNOTEFT_GEN_A_CORE
64 y/o F w/ hx of ESRD on PD, HTN, HLD, and DM2 who presented to the ED w/ abdominal pain and PD catheter dysfunction. Pt was admitted and diagnosed with SBP. Started on Rocephin. PD dysfunction persists, so patient had CVC catheter placed for temporary HD access. CT A/P done earlier today to evaluate cause other than SBP for PD catheter dysfunction, which revealed small bowel obstruction.     Plan:  -Pt seen by surgery, to place NG tube for decompression  -Surgery temporarily scheduled for tomorrow  -STRICT NPO -- any important PO medications that could be converted to IV were  -For BP control PRN IV hydralazine ordered to temporize blood pressure while patient is unable to take PO medication  -For pain, PO oxycodone suspended and IV Dilaudid ordered  -For diuresis and management of hyperkalemia, PO torsemide suspended and IV Lasix ordered  -Spoke w/ nephrology, patient to have HD first thing in the morning to optimize before surgery  -Consulted [facility] cardiology for pre-op evaluation per surgery request

## 2024-08-29 NOTE — PROGRESS NOTE ADULT - ASSESSMENT
63y Female w/ PMH of - admitted for -. Now, -.    Plan  #    DVT PPx:  Dispo: 63y Female w/ PMH of ESRD on peritoneal dialysis 5x per week, HTN, HLD, and DM admitted for peritonitis and peritoneal dialysis catheter dysfunction, needing HD. Now found to have SBO.    Plan  #ESRD  - HD today via RT DOLORES Ruiz    #Peritonitis  - c/w cefepime IV    #SBO  - NG tube in place  - OR today for ex-lap, possible removal of PD catheter  - Diet: NPO    #Constipation  - miralax    #DM  - c/w insulin lispro    #HLD  - c/w statin, metoprolol, and diltiazem    #Hyperkalemia   - c/w Lokemla  - monitor BMP daily    DVT PPx:  Dispo: remain on medicine service until p

## 2024-08-29 NOTE — PROGRESS NOTE ADULT - ASSESSMENT
Assessment: 63F, SBP, Constipation and non-functioning PD catheter, ?Infected PD catheter    Recommendations:   -Patient s/p RIJ shiley and NGT placement  -Plan for HD today and OR tomorrow for ex-lap, FAREED, possible SBR and PD cath removal  -Please preop accordingly   -Continue IV abx   -Remainder of care per primary team

## 2024-08-29 NOTE — PROGRESS NOTE ADULT - ASSESSMENT
62yo F pt w/ pmhx of ESRD on peritoneal dialysis 5x per week, HTN, HLD, DM presented to ED c/o worsening diffused abdominal pain with associated distention over the past week. Pt reports dialysis cath has not been draining for the last week. She attempts peritoneal dialysis but her unable to get any fluid return. She denies fever, chills, N/V/D, urinary sx, chest pain, SOB, or any other complaints.  (27 Aug 2024 03:04)  Has been constipated in the past    ESRD needs dialysis is a PD pt  Now w PD catheter complication  CT abd noted --> + SBO now has NG tube for decompression  Going top OR tomorrow -->  Ex-lap, FAREED, possible SBR and PD cath removal    + Peritonitis on abx Ceftriaxone   + Abd pain cloudy peritoneal fluid  Has high Neutrophils 88% in PD fluid - should be treated as peritonitis   Noted Gm stain -ve and no growth on peritoneal fluid Cx  Likely has Peritonitis  Would cont IV abx on Ceftriaxone  PD catheter now not filling or draining, Surgery called Dr Covarrubias --> needs PD catheter removed   will r/o other causes ie constipation pt on bowel regimen    Call Vascular to arrange temp dialysis via shiley catheter - dialysis today will repeat tomorrow     HyperKalemia HD on 2 K bath repeat jameel  Hyperphosphatemia restart Phoslo w meals once back on po diet    Monitor labs will follow

## 2024-08-29 NOTE — PROGRESS NOTE ADULT - ASSESSMENT
- 63F with ESRD on PD (5 days/week), HTN, DM admitted for abdominal pain, distention, subjective fevers, and PD catheter malfunction.     ID consulted for peritonitis associated with PD catheter     - Abdomen distended and diffusely tender. Catheter site OK   - Peritoneal fluid with WBC 16,800 (88% neutrophils) c/w infectious process  - Peritoneal fluid culture growing GNR - follow identification and susceptibilities   - BCX ngtd   - CT AP with findings c/w SBO   - Plan for ex-lap for SBO + PD catheter removal   - s/p Joseph on 8/27   - new leukocytosis   - mild tachy but BP stable     - d/c vancomycin   - d/c ceftriaxone  - start cefepime 1g IV daily     d/w ASP/clinical pharmacist and Dr. Hendricks

## 2024-08-29 NOTE — CHART NOTE - NSCHARTNOTEFT_GEN_A_CORE
called to see patient for cardiology consult  -pt follows with DR Sujey Ro heart covered by [facility]  - spoke with RN Resident/ PA notified to call [facility] for consult

## 2024-08-29 NOTE — PROGRESS NOTE ADULT - SUBJECTIVE AND OBJECTIVE BOX
Patient seen and examined at bedside. Continues to have abdominal pain that is stable. No other acute complaints.    Vital Signs Last 24 Hrs  T(C): 36.6 (29 Aug 2024 04:35), Max: 37.2 (28 Aug 2024 17:40)  T(F): 97.9 (29 Aug 2024 04:35), Max: 98.9 (28 Aug 2024 17:40)  HR: 103 (29 Aug 2024 04:35) (84 - 103)  BP: 161/78 (29 Aug 2024 04:35) (131/76 - 161/78)  BP(mean): --  RR: 16 (29 Aug 2024 04:35) (16 - 19)  SpO2: 92% (29 Aug 2024 04:35) (90% - 95%)    Parameters below as of 29 Aug 2024 04:35  Patient On (Oxygen Delivery Method): room air    Vital Signs Last 24 Hrs  T(C): 36.6 (08-29-24 @ 04:35), Max: 37.2 (08-28-24 @ 17:40)  T(F): 97.9 (08-29-24 @ 04:35), Max: 98.9 (08-28-24 @ 17:40)  HR: 103 (08-29-24 @ 04:35) (84 - 103)  BP: 161/78 (08-29-24 @ 04:35) (131/76 - 161/78)  BP(mean): --  RR: 16 (08-29-24 @ 04:35) (16 - 19)  SpO2: 92% (08-29-24 @ 04:35) (90% - 95%)                          8.8    11.35 )-----------( 386      ( 29 Aug 2024 04:07 )             29.5   08-29    135  |  92<L>  |  102.8<H>  ----------------------------<  128<H>  5.6<H>   |  26.0  |  17.19<H>    Ca    8.8      29 Aug 2024 04:07  Phos  11.2     08-29  Mg     3.2     08-29    TPro  5.5<L>  /  Alb  2.0<L>  /  TBili  <0.2<L>  /  DBili  x   /  AST  7   /  ALT  6   /  AlkPhos  58  08-28      Exam:  Gen: pt lying in bed, alert, in NAD  Resp: unlabored  CVS: RRR  Abd: soft, periumbilical ttp, PD cath in place without obvious evidence of infection  Ext: moving all extremities spontaneously, sensation intact, pulses 2+

## 2024-08-29 NOTE — PROGRESS NOTE ADULT - SUBJECTIVE AND OBJECTIVE BOX
[facility]  INFECTIOUS DISEASES at [place] and [place]  ===============================================================                  George Tobias MD               Diplomates American Board of Internal Medicine & Infectious Diseases                * [place] Office - Appt - Tel  [phone] Fax [phone]                * [place] Office - Appt - Tel [phone] Fax [phone]                                  Hospital Consult line:  [phone]  ==============================================================    ADALID CASE [id number]    Follow up: peritonitis assocated with PD catheter    CT AP with SOB   NGT placed   Shiley placed yesterday   for HD today   Afebrile     I have personally reviewed the labs and data; pertinent labs and data are listed in this note; please see below.     _______________________________________________________________  REVIEW OF SYSTEMS  ROS limited - feeling unwell. Diffuse severe abdominal pain   ________________________________________________________________  Allergies:  No Known Allergies      ________________________________________________________________  PHYSICAL EXAM  GEN: ill appearing    HEENT: NGT in place   LUNGS: unlabored breathing, poor inspiratory effort. CTA anteriorly   HEART: regular tachycardia   ABDOMEN: diffusely TTP, mild distention, PD catheter in place   : No Paredes catheter  EXTREMITIES: without  edema.  LINES: PIV, RIJ Shiley   ________________________________________________________________  Vitals:  T(F): 98.2 (29 Aug 2024 15:12), Max: 99 (29 Aug 2024 11:48)  HR: 112 (29 Aug 2024 15:12)  BP: 178/104 (29 Aug 2024 15:12)  RR: 18 (29 Aug 2024 15:12)  SpO2: 100% (29 Aug 2024 15:12) (91% - 100%)  temp max in last 48H T(F): , Max: 99 (08-29-24 @ 11:48)    Current Antibiotics:  cefepime  Injectable. 1000 milliGRAM(s) IV Push <User Schedule>    Other medications:  albuterol    0.083%. 2.5 milliGRAM(s) Nebulizer once  calcium acetate 667 milliGRAM(s) Oral three times a day with meals  chlorhexidine 2% Cloths 1 Application(s) Topical <User Schedule>  cloNIDine 0.1 milliGRAM(s) Oral two times a day  dextrose 5% + sodium chloride 0.9%. 1000 milliLiter(s) IV Continuous <Continuous>  dextrose 5%. 1000 milliLiter(s) IV Continuous <Continuous>  dextrose 5%. 1000 milliLiter(s) IV Continuous <Continuous>  dextrose 50% Injectable 25 Gram(s) IV Push once  dextrose 50% Injectable 25 Gram(s) IV Push once  dextrose 50% Injectable 12.5 Gram(s) IV Push once  diltiazem    milliGRAM(s) Oral daily  fludroCORTISONE 0.1 milliGRAM(s) Oral once  furosemide   Injectable 20 milliGRAM(s) IV Push daily  glucagon  Injectable 1 milliGRAM(s) IntraMuscular once  hydrALAZINE 100 milliGRAM(s) Oral two times a day  insulin lispro (ADMELOG) corrective regimen sliding scale   SubCutaneous three times a day before meals  metoprolol tartrate 100 milliGRAM(s) Oral daily  metoprolol tartrate Injectable 5 milliGRAM(s) IV Push every 6 hours  Nephro-yasmany 1 Tablet(s) Oral daily  rosuvastatin 20 milliGRAM(s) Oral at bedtime  sodium zirconium cyclosilicate 10 Gram(s) Oral two times a day  torsemide 20 milliGRAM(s) Oral daily                            8.8    11.35 )-----------( 386      ( 29 Aug 2024 04:07 )             29.5     08-29    135  |  92<L>  |  102.8<H>  ----------------------------<  128<H>  5.6<H>   |  26.0  |  17.19<H>    Ca    8.8      29 Aug 2024 04:07  Phos  11.2     08-29  Mg     3.2     08-29    TPro  5.5<L>  /  Alb  2.0<L>  /  TBili  <0.2<L>  /  DBili  x   /  AST  7   /  ALT  6   /  AlkPhos  58  08-28    RECENT CULTURES:  08-27 @ 11:52 .Blood Blood-Peripheral     No growth at 24 hours        08-27 @ 11:50 .Blood Blood-Peripheral     No growth at 24 hours        08-27 @ 00:00 Peritoneal Peritoneal Fluid     Few Gram Negative Rods    No polymorphonuclear leukocytes  No organisms seen  by cytocentrifuge        WBC Count: 11.35 K/uL (08-29-24 @ 04:07)  WBC Count: 8.36 K/uL (08-28-24 @ 04:38)  WBC Count: 8.86 K/uL (08-27-24 @ 11:45)  WBC Count: 5.65 K/uL (08-27-24 @ 00:24)    Creatinine: 17.19 mg/dL (08-29-24 @ 04:07)  Creatinine: 15.42 mg/dL (08-28-24 @ 04:38)  Creatinine: 15.28 mg/dL (08-27-24 @ 22:44)  Creatinine: 14.51 mg/dL (08-27-24 @ 11:45)  Creatinine: 15.27 mg/dL (08-27-24 @ 01:30)  Creatinine: 15.82 mg/dL (08-27-24 @ 00:24)        Vancomycin Level, Trough: 14.2 ug/mL (08-29-24 @ 04:07)  Vancomycin Level, Trough: 14.7 ug/mL (08-28-24 @ 04:38)      ________________________________________________________________  CARDIOLOGY   ________________________________________________________________  RADIOLOGY  < from: CT Abdomen and Pelvis No Cont (08.28.24 @ 17:19) >  FINDINGS:  LOWER CHEST: Moderate to severe cardiomegaly. Partially visualized   central venous catheter. Small-to-moderate pericardial effusion. Small   bilateral pleural effusions and bibasilar subsegmental atelectasis.    LIVER: Within normal limits.  BILE DUCTS: Normal caliber.  GALLBLADDER: Within normal limits.  SPLEEN: Within normal limits.  PANCREAS: Within normal limits.  ADRENALS: Within normal limits.  KIDNEYS/URETERS: Within normal limits.    BLADDER: Within normal limits.  REPRODUCTIVE ORGANS: Enlarged uterus.    BOWEL: Dilated stomach and small bowel with relatively collapsed colon   and distal ileum suggestive of small bowel obstruction with possible   transition in the mid pelvis (3: 1:15) PERITONEUM/RETROPERITONEUM: Large   amount intraperitoneal fluid. Dialysis catheter inserted via left flank   coiled in left pelvis. Several droplets free air which may have been   introduced from catheter.  VESSELS: Within normal limits.  LYMPH NODES: No lymphadenopathy.  ABDOMINAL WALL: Anasarca.  BONES: Degenerative change.    IMPRESSION:  Large volume intraperitoneal fluid with several droplets free air   possibly introduced from catheter.    Dilated stomach and small bowel with relatively collapsed colon and   distal small bowel consistent with small bowel obstruction    Anasarca, small bilateral pleural effusions, and small-to-moderate   pericardial effusion    < end of copied text >

## 2024-08-29 NOTE — PROGRESS NOTE ADULT - SUBJECTIVE AND OBJECTIVE BOX
NEPHROLOGY INTERVAL HPI/OVERNIGHT EVENTS:    Examined earlier on HD tachycardic adjusted UF goals    MEDICATIONS  (STANDING):  albuterol    0.083%. 2.5 milliGRAM(s) Nebulizer once  calcium acetate 667 milliGRAM(s) Oral three times a day with meals  cefepime  Injectable. 1000 milliGRAM(s) IV Push <User Schedule>  chlorhexidine 2% Cloths 1 Application(s) Topical <User Schedule>  cloNIDine 0.1 milliGRAM(s) Oral two times a day  dextrose 5% + sodium chloride 0.9%. 1000 milliLiter(s) (75 mL/Hr) IV Continuous <Continuous>  dextrose 5%. 1000 milliLiter(s) (100 mL/Hr) IV Continuous <Continuous>  dextrose 5%. 1000 milliLiter(s) (50 mL/Hr) IV Continuous <Continuous>  dextrose 50% Injectable 25 Gram(s) IV Push once  dextrose 50% Injectable 25 Gram(s) IV Push once  dextrose 50% Injectable 12.5 Gram(s) IV Push once  diltiazem    milliGRAM(s) Oral daily  fludroCORTISONE 0.1 milliGRAM(s) Oral once  furosemide   Injectable 20 milliGRAM(s) IV Push daily  glucagon  Injectable 1 milliGRAM(s) IntraMuscular once  hydrALAZINE 100 milliGRAM(s) Oral two times a day  insulin lispro (ADMELOG) corrective regimen sliding scale   SubCutaneous three times a day before meals  metoprolol tartrate 100 milliGRAM(s) Oral daily  metoprolol tartrate Injectable 5 milliGRAM(s) IV Push every 6 hours  Nephro-yasmany 1 Tablet(s) Oral daily  rosuvastatin 20 milliGRAM(s) Oral at bedtime  sodium zirconium cyclosilicate 10 Gram(s) Oral two times a day  torsemide 20 milliGRAM(s) Oral daily    MEDICATIONS  (PRN):  acetaminophen     Tablet .. 650 milliGRAM(s) Oral every 6 hours PRN Temp greater or equal to 38C (100.4F), Mild Pain (1 - 3)  dextrose Oral Gel 15 Gram(s) Oral once PRN Blood Glucose LESS THAN 70 milliGRAM(s)/deciliter  hydrALAZINE Injectable 5 milliGRAM(s) IV Push every 6 hours PRN SBP > 160  HYDROmorphone  Injectable 1 milliGRAM(s) IV Push every 4 hours PRN Severe Pain (7 - 10)  melatonin 3 milliGRAM(s) Oral at bedtime PRN Insomnia  ondansetron Injectable 4 milliGRAM(s) IV Push every 8 hours PRN Nausea and/or Vomiting  ondansetron Injectable 4 milliGRAM(s) IV Push every 6 hours PRN Nausea and/or Vomiting  oxyCODONE    IR 10 milliGRAM(s) Oral every 6 hours PRN Severe Pain (7 - 10)  sodium chloride 0.9% lock flush 10 milliLiter(s) IV Push every 1 hour PRN Pre/post blood products, medications, blood draw, and to maintain line patency      Allergies    No Known Allergies    Intolerances        Vital Signs Last 24 Hrs  T(C): 36.8 (29 Aug 2024 15:12), Max: 37.2 (28 Aug 2024 17:40)  T(F): 98.2 (29 Aug 2024 15:12), Max: 99 (29 Aug 2024 11:48)  HR: 112 (29 Aug 2024 15:12) (93 - 115)  BP: 178/104 (29 Aug 2024 15:12) (152/87 - 197/94)  BP(mean): --  RR: 18 (29 Aug 2024 15:12) (16 - 18)  SpO2: 100% (29 Aug 2024 15:12) (91% - 100%)    Parameters below as of 29 Aug 2024 11:48  Patient On (Oxygen Delivery Method): nasal cannula      Daily     Daily Weight in k.5 (29 Aug 2024 15:12)    PHYSICAL EXAM:  GENERAL: NAD  EYES: EOMI  NECK: Supple, No JVD  NERVOUS SYSTEM:  A/O x3  Lungs : No rales, No rhonchi,   HEART:  No murmur,  No rub, No gallops  ABDOMEN: Soft, diffusely tender, distended, full; rebound +; PD cath area NT, BS+  EXTREMITIES:  + Peripheral Pulses, - edema    LABS:                        8.8    11.35 )-----------( 386      ( 29 Aug 2024 04:07 )             29.5     08-    135  |  92<L>  |  102.8<H>  ----------------------------<  128<H>  5.6<H>   |  26.0  |  17.19<H>    Ca    8.8      29 Aug 2024 04:07  Phos  11.2       Mg     3.2         TPro  5.5<L>  /  Alb  2.0<L>  /  TBili  <0.2<L>  /  DBili  x   /  AST  7   /  ALT  6   /  AlkPhos  58      PT/INR - ( 28 Aug 2024 20:57 )   PT: 11.7 sec;   INR: 1.06 ratio         PTT - ( 28 Aug 2024 20:57 )  PTT:33.0 sec  Urinalysis Basic - ( 29 Aug 2024 04:07 )    Color: x / Appearance: x / SG: x / pH: x  Gluc: 128 mg/dL / Ketone: x  / Bili: x / Urobili: x   Blood: x / Protein: x / Nitrite: x   Leuk Esterase: x / RBC: x / WBC x   Sq Epi: x / Non Sq Epi: x / Bacteria: x      Magnesium: 3.2 mg/dL ( @ 04:07)  Phosphorus: 11.2 mg/dL ( @ 04:07)          RADIOLOGY & ADDITIONAL TESTS:

## 2024-08-29 NOTE — PROGRESS NOTE ADULT - SUBJECTIVE AND OBJECTIVE BOX
SUBJECTIVE:    Chief Complaint: Patient is a 63y old  Female who presents with a chief complaint of ESRD needing HD (29 Aug 2024 07:10)      BRIEF HOSPITAL COURSE:    INTERVAL HPI/LAST 24HRS EVENTS: Patient seen and examined at bedside at AM. No clinically acute event overnight.   Denies any chest pain, palpitations, SOB, PND, orthopnea, leg edema, N/V/D, or any other complaints.     MEDICATIONS  (STANDING):  albuterol    0.083%. 2.5 milliGRAM(s) Nebulizer once  calcium acetate 667 milliGRAM(s) Oral three times a day with meals  cefepime 1000 milliGRAM(s) 1000 milliGRAM(s) Irrigation daily  cefTRIAXone Injectable. 2000 milliGRAM(s) IV Push every 24 hours  chlorhexidine 2% Cloths 1 Application(s) Topical <User Schedule>  cloNIDine 0.1 milliGRAM(s) Oral two times a day  dextrose 5%. 1000 milliLiter(s) (100 mL/Hr) IV Continuous <Continuous>  dextrose 5%. 1000 milliLiter(s) (50 mL/Hr) IV Continuous <Continuous>  dextrose 50% Injectable 25 Gram(s) IV Push once  dextrose 50% Injectable 25 Gram(s) IV Push once  dextrose 50% Injectable 12.5 Gram(s) IV Push once  diltiazem    milliGRAM(s) Oral daily  fludroCORTISONE 0.1 milliGRAM(s) Oral once  furosemide   Injectable 20 milliGRAM(s) IV Push daily  glucagon  Injectable 1 milliGRAM(s) IntraMuscular once  hydrALAZINE 100 milliGRAM(s) Oral two times a day  insulin lispro (ADMELOG) corrective regimen sliding scale   SubCutaneous three times a day before meals  metoprolol tartrate 100 milliGRAM(s) Oral daily  Nephro-yasmany 1 Tablet(s) Oral daily  rosuvastatin 20 milliGRAM(s) Oral at bedtime  sodium zirconium cyclosilicate 10 Gram(s) Oral two times a day  torsemide 20 milliGRAM(s) Oral daily    MEDICATIONS  (PRN):  acetaminophen     Tablet .. 650 milliGRAM(s) Oral every 6 hours PRN Temp greater or equal to 38C (100.4F), Mild Pain (1 - 3)  dextrose Oral Gel 15 Gram(s) Oral once PRN Blood Glucose LESS THAN 70 milliGRAM(s)/deciliter  hydrALAZINE Injectable 5 milliGRAM(s) IV Push every 6 hours PRN SBP > 160  HYDROmorphone  Injectable 1 milliGRAM(s) IV Push every 4 hours PRN Severe Pain (7 - 10)  melatonin 3 milliGRAM(s) Oral at bedtime PRN Insomnia  ondansetron Injectable 4 milliGRAM(s) IV Push every 6 hours PRN Nausea and/or Vomiting  ondansetron Injectable 4 milliGRAM(s) IV Push every 8 hours PRN Nausea and/or Vomiting  oxyCODONE    IR 10 milliGRAM(s) Oral every 6 hours PRN Severe Pain (7 - 10)  sodium chloride 0.9% lock flush 10 milliLiter(s) IV Push every 1 hour PRN Pre/post blood products, medications, blood draw, and to maintain line patency      Allergies    No Known Allergies    Intolerances        REVIEW OF SYSTEMS:  CONSTITUTIONAL: No fever, weight loss, or fatigue  RESPIRATORY: No cough, wheezing, chills or hemoptysis; No shortness of breath  CARDIOVASCULAR: No chest pain, palpitations, dizziness, or leg swelling  GASTROINTESTINAL: No abdominal or epigastric pain. No nausea, vomiting, or hematemesis; No diarrhea or constipation. No melena or hematochezia.  NEUROLOGICAL: No headaches, loss of strength, numbness, or tremors  MUSCULOSKELETAL: No joint pain or swelling; No muscle, back, or extremity pain    OBJECTIVE:    Vital Signs Last 24 Hrs  T(C): 36.6 (29 Aug 2024 04:35), Max: 37.2 (28 Aug 2024 17:40)  T(F): 97.9 (29 Aug 2024 04:35), Max: 98.9 (28 Aug 2024 17:40)  HR: 103 (29 Aug 2024 04:35) (84 - 103)  BP: 161/78 (29 Aug 2024 04:35) (131/76 - 161/78)  BP(mean): --  RR: 16 (29 Aug 2024 04:35) (16 - 19)  SpO2: 92% (29 Aug 2024 04:35) (90% - 95%)    Parameters below as of 29 Aug 2024 04:35  Patient On (Oxygen Delivery Method): room air        PHYSICAL EXAM:  Constitutional: Alert, interactive, comfortable, NAD  Head and Face: Atraumatic, head and face were normal in appearance  Eyes: Sclera and conjunctiva were normal, pupils were equal in size, round, eyelids normal  ENT: Ears and nose were normal in appearance  Neck: Appearance was normal, neck was supple, No JVD  Pulmonary: Clear to auscultation bilaterally, no rales/crackles, or wheezing  Heart: Regular rate and rhythm, no murmurs, gallops, or pericardial rubs  Abdominal: Soft, nontender, nondistended. No appreciable hepatosplenomegaly. Bowel sounds normal  Skin: Normal color and intact without appreciable rash or abnormal skin lesion  Extremities: Warm without edema. No clubbing.  Pulse: 2+ radial pulse  Neuro: Oriented to person, place, and time    Lab/ Imaging:    LABS:                        8.8    11.35 )-----------( 386      ( 29 Aug 2024 04:07 )             29.5     08-29    135  |  92<L>  |  102.8<H>  ----------------------------<  128<H>  5.6<H>   |  26.0  |  17.19<H>    Ca    8.8      29 Aug 2024 04:07  Phos  11.2     08-29  Mg     3.2     08-29    TPro  5.5<L>  /  Alb  2.0<L>  /  TBili  <0.2<L>  /  DBili  x   /  AST  7   /  ALT  6   /  AlkPhos  58  08-28    PT/INR - ( 28 Aug 2024 20:57 )   PT: 11.7 sec;   INR: 1.06 ratio         PTT - ( 28 Aug 2024 20:57 )  PTT:33.0 sec  Urinalysis Basic - ( 29 Aug 2024 04:07 )    Color: x / Appearance: x / SG: x / pH: x  Gluc: 128 mg/dL / Ketone: x  / Bili: x / Urobili: x   Blood: x / Protein: x / Nitrite: x   Leuk Esterase: x / RBC: x / WBC x   Sq Epi: x / Non Sq Epi: x / Bacteria: x      CAPILLARY BLOOD GLUCOSE      POCT Blood Glucose.: 145 mg/dL (28 Aug 2024 21:42)  POCT Blood Glucose.: 157 mg/dL (28 Aug 2024 17:39)  POCT Blood Glucose.: 136 mg/dL (28 Aug 2024 11:16)  POCT Blood Glucose.: 125 mg/dL (28 Aug 2024 08:39)          Imaging Personally Reviewed:  [ ] YES  [ ] NO    Consultant(s) Notes Reviewed:  [ ] YES  [ ] NO    Care Discussed with Consultants/Other Providers [ ] YES  [ ] NO    Plan of Care discussed with Housestaff [ ]YES [ ] NO SUBJECTIVE:    Chief Complaint: Patient is a 63y old  Female who presents with a chief complaint of ESRD needing HD (29 Aug 2024 07:10)      BRIEF HOSPITAL COURSE: 62yo F pt w/ pmhx of ESRD on peritoneal dialysis 5x per week, HTN, HLD, DM presented to ED c/o worsening diffused abdominal pain with associated distention over the past week. Pt reports dialysis cath has not been draining for the last week. She attempts peritoneal dialysis but her unable to get any fluid return. RT IJ Shiley placed 8/28. Scheduled to receive HD this morning. CT A/P shows SBO. NG tube in place. Ex-lap scheduled for later today (8/29). Denies CP, fever, SOB, N/V/D, or any other complaints.    INTERVAL HPI/LAST 24HRS EVENTS: Patient seen and examined at bedside at AM. NG tube placed last night. Currently on strict NPO. RT IJ Joseph placed for HD scheduled for this morning. Endorses diffuse abdominal tenderness. Denies any chest pain, palpitations, SOB, PND, orthopnea, leg edema, N/V/D, or any other complaints.     MEDICATIONS  (STANDING):  albuterol    0.083%. 2.5 milliGRAM(s) Nebulizer once  calcium acetate 667 milliGRAM(s) Oral three times a day with meals  cefepime 1000 milliGRAM(s) 1000 milliGRAM(s) Irrigation daily  cefTRIAXone Injectable. 2000 milliGRAM(s) IV Push every 24 hours  chlorhexidine 2% Cloths 1 Application(s) Topical <User Schedule>  cloNIDine 0.1 milliGRAM(s) Oral two times a day  dextrose 5%. 1000 milliLiter(s) (100 mL/Hr) IV Continuous <Continuous>  dextrose 5%. 1000 milliLiter(s) (50 mL/Hr) IV Continuous <Continuous>  dextrose 50% Injectable 25 Gram(s) IV Push once  dextrose 50% Injectable 25 Gram(s) IV Push once  dextrose 50% Injectable 12.5 Gram(s) IV Push once  diltiazem    milliGRAM(s) Oral daily  fludroCORTISONE 0.1 milliGRAM(s) Oral once  furosemide   Injectable 20 milliGRAM(s) IV Push daily  glucagon  Injectable 1 milliGRAM(s) IntraMuscular once  hydrALAZINE 100 milliGRAM(s) Oral two times a day  insulin lispro (ADMELOG) corrective regimen sliding scale   SubCutaneous three times a day before meals  metoprolol tartrate 100 milliGRAM(s) Oral daily  Nephro-yasmany 1 Tablet(s) Oral daily  rosuvastatin 20 milliGRAM(s) Oral at bedtime  sodium zirconium cyclosilicate 10 Gram(s) Oral two times a day  torsemide 20 milliGRAM(s) Oral daily    MEDICATIONS  (PRN):  acetaminophen     Tablet .. 650 milliGRAM(s) Oral every 6 hours PRN Temp greater or equal to 38C (100.4F), Mild Pain (1 - 3)  dextrose Oral Gel 15 Gram(s) Oral once PRN Blood Glucose LESS THAN 70 milliGRAM(s)/deciliter  hydrALAZINE Injectable 5 milliGRAM(s) IV Push every 6 hours PRN SBP > 160  HYDROmorphone  Injectable 1 milliGRAM(s) IV Push every 4 hours PRN Severe Pain (7 - 10)  melatonin 3 milliGRAM(s) Oral at bedtime PRN Insomnia  ondansetron Injectable 4 milliGRAM(s) IV Push every 6 hours PRN Nausea and/or Vomiting  ondansetron Injectable 4 milliGRAM(s) IV Push every 8 hours PRN Nausea and/or Vomiting  oxyCODONE    IR 10 milliGRAM(s) Oral every 6 hours PRN Severe Pain (7 - 10)  sodium chloride 0.9% lock flush 10 milliLiter(s) IV Push every 1 hour PRN Pre/post blood products, medications, blood draw, and to maintain line patency      Allergies    No Known Allergies    Intolerances        OBJECTIVE:    Vital Signs Last 24 Hrs  T(C): 36.6 (29 Aug 2024 04:35), Max: 37.2 (28 Aug 2024 17:40)  T(F): 97.9 (29 Aug 2024 04:35), Max: 98.9 (28 Aug 2024 17:40)  HR: 103 (29 Aug 2024 04:35) (84 - 103)  BP: 161/78 (29 Aug 2024 04:35) (131/76 - 161/78)  BP(mean): --  RR: 16 (29 Aug 2024 04:35) (16 - 19)  SpO2: 92% (29 Aug 2024 04:35) (90% - 95%)    Parameters below as of 29 Aug 2024 04:35  Patient On (Oxygen Delivery Method): room air        PHYSICAL EXAM:  Constitutional: Lying in bed, NAD.  Head and Face: Atraumatic, head and face were normal in appearance  Eyes: Sclera and conjunctiva were normal, pupils were equal in size, round, eyelids normal  ENT: Ears and nose were normal in appearance  Neck: Appearance was normal, neck was supple, No JVD  Pulmonary: Clear to auscultation bilaterally, no rales/crackles, or wheezing  Heart: Regular rate and rhythm, no murmurs, gallops, or pericardial rubs  Abdominal: Diffuse tenderness to light palpation in all four quadrants. (+) Distention (+) hypoactive BS. No rigidity, rebound, or guarding  Skin: Normal color and intact without appreciable rash or abnormal skin lesion  Extremities: Warm without edema. No clubbing.  Pulse: 2+ radial pulse  Neuro: AAOx3, no gross focal deficits    Lab/ Imaging:    LABS:                        8.8    11.35 )-----------( 386      ( 29 Aug 2024 04:07 )             29.5     08-29    135  |  92<L>  |  102.8<H>  ----------------------------<  128<H>  5.6<H>   |  26.0  |  17.19<H>    Ca    8.8      29 Aug 2024 04:07  Phos  11.2     08-29  Mg     3.2     08-29    TPro  5.5<L>  /  Alb  2.0<L>  /  TBili  <0.2<L>  /  DBili  x   /  AST  7   /  ALT  6   /  AlkPhos  58  08-28    PT/INR - ( 28 Aug 2024 20:57 )   PT: 11.7 sec;   INR: 1.06 ratio         PTT - ( 28 Aug 2024 20:57 )  PTT:33.0 sec  Urinalysis Basic - ( 29 Aug 2024 04:07 )    Color: x / Appearance: x / SG: x / pH: x  Gluc: 128 mg/dL / Ketone: x  / Bili: x / Urobili: x   Blood: x / Protein: x / Nitrite: x   Leuk Esterase: x / RBC: x / WBC x   Sq Epi: x / Non Sq Epi: x / Bacteria: x      CAPILLARY BLOOD GLUCOSE      POCT Blood Glucose.: 145 mg/dL (28 Aug 2024 21:42)  POCT Blood Glucose.: 157 mg/dL (28 Aug 2024 17:39)  POCT Blood Glucose.: 136 mg/dL (28 Aug 2024 11:16)  POCT Blood Glucose.: 125 mg/dL (28 Aug 2024 08:39)          Imaging Personally Reviewed:  [ ] YES  [ ] NO    Consultant(s) Notes Reviewed:  [ ] YES  [ ] NO    Care Discussed with Consultants/Other Providers [ ] YES  [ ] NO    Plan of Care discussed with Housestaff [ ]YES [ ] NO SUBJECTIVE:    Chief Complaint: Patient is a 63y old  Female who presents with a chief complaint of ESRD needing HD (29 Aug 2024 07:10)      BRIEF HOSPITAL COURSE: 62yo F pt w/ pmhx of ESRD on peritoneal dialysis 5x per week, HTN, HLD, DM presented to ED c/o worsening diffused abdominal pain with associated distention over the past week. Pt reports dialysis cath has not been draining for the last week. She attempts peritoneal dialysis but her unable to get any fluid return. RT IJ Shimatti placed 8/28. Scheduled to receive HD this morning. CT A/P shows SBO. NG tube in place. Ex-lap scheduled for later today (8/29). Denies CP, fever, SOB, N/V/D, or any other complaints.    INTERVAL HPI/LAST 24HRS EVENTS: Patient seen and examined at bedside at AM. NG tube placed last night.  RT IJ Autumnmatti placed for HD scheduled for this morning. Endorses diffuse abdominal tenderness. Reports decreased urinary output and a few episodes of vomiting overnight. Unable to have a BM since prior to admission. Denies any chest pain, palpitations, SOB, PND, orthopnea, leg edema, diarrhea, or any other complaints.    TODAY: Patient NPO, scheduled to receive HD this morning and ex-lap afterwards.     MEDICATIONS  (STANDING):  albuterol    0.083%. 2.5 milliGRAM(s) Nebulizer once  calcium acetate 667 milliGRAM(s) Oral three times a day with meals  cefepime 1000 milliGRAM(s) 1000 milliGRAM(s) Irrigation daily  cefTRIAXone Injectable. 2000 milliGRAM(s) IV Push every 24 hours  chlorhexidine 2% Cloths 1 Application(s) Topical <User Schedule>  cloNIDine 0.1 milliGRAM(s) Oral two times a day  dextrose 5%. 1000 milliLiter(s) (100 mL/Hr) IV Continuous <Continuous>  dextrose 5%. 1000 milliLiter(s) (50 mL/Hr) IV Continuous <Continuous>  dextrose 50% Injectable 25 Gram(s) IV Push once  dextrose 50% Injectable 25 Gram(s) IV Push once  dextrose 50% Injectable 12.5 Gram(s) IV Push once  diltiazem    milliGRAM(s) Oral daily  fludroCORTISONE 0.1 milliGRAM(s) Oral once  furosemide   Injectable 20 milliGRAM(s) IV Push daily  glucagon  Injectable 1 milliGRAM(s) IntraMuscular once  hydrALAZINE 100 milliGRAM(s) Oral two times a day  insulin lispro (ADMELOG) corrective regimen sliding scale   SubCutaneous three times a day before meals  metoprolol tartrate 100 milliGRAM(s) Oral daily  Nephro-yasmany 1 Tablet(s) Oral daily  rosuvastatin 20 milliGRAM(s) Oral at bedtime  sodium zirconium cyclosilicate 10 Gram(s) Oral two times a day  torsemide 20 milliGRAM(s) Oral daily    MEDICATIONS  (PRN):  acetaminophen     Tablet .. 650 milliGRAM(s) Oral every 6 hours PRN Temp greater or equal to 38C (100.4F), Mild Pain (1 - 3)  dextrose Oral Gel 15 Gram(s) Oral once PRN Blood Glucose LESS THAN 70 milliGRAM(s)/deciliter  hydrALAZINE Injectable 5 milliGRAM(s) IV Push every 6 hours PRN SBP > 160  HYDROmorphone  Injectable 1 milliGRAM(s) IV Push every 4 hours PRN Severe Pain (7 - 10)  melatonin 3 milliGRAM(s) Oral at bedtime PRN Insomnia  ondansetron Injectable 4 milliGRAM(s) IV Push every 6 hours PRN Nausea and/or Vomiting  ondansetron Injectable 4 milliGRAM(s) IV Push every 8 hours PRN Nausea and/or Vomiting  oxyCODONE    IR 10 milliGRAM(s) Oral every 6 hours PRN Severe Pain (7 - 10)  sodium chloride 0.9% lock flush 10 milliLiter(s) IV Push every 1 hour PRN Pre/post blood products, medications, blood draw, and to maintain line patency      Allergies    No Known Allergies    Intolerances        OBJECTIVE:    Vital Signs Last 24 Hrs  T(C): 36.6 (29 Aug 2024 04:35), Max: 37.2 (28 Aug 2024 17:40)  T(F): 97.9 (29 Aug 2024 04:35), Max: 98.9 (28 Aug 2024 17:40)  HR: 103 (29 Aug 2024 04:35) (84 - 103)  BP: 161/78 (29 Aug 2024 04:35) (131/76 - 161/78)  BP(mean): --  RR: 16 (29 Aug 2024 04:35) (16 - 19)  SpO2: 92% (29 Aug 2024 04:35) (90% - 95%)    Parameters below as of 29 Aug 2024 04:35  Patient On (Oxygen Delivery Method): room air        PHYSICAL EXAM:  Constitutional: Ill appearing female, eyes closed. Lying in bed, NAD.  Head and Face: Atraumatic, normocephalic  ENT: NG tube in LT nostril  Neck: Supple, No JVD  Pulmonary: CTAB  Heart: RRR. S1/S2. No MRG  Abdominal: Diffuse tenderness to light palpation in all four quadrants. (+) Distention (+) hypoactive BS. No rigidity, rebound, or guarding  Extremities: Warm without edema. No clubbing.  Pulse: 2+ radial pulse  Neuro: AAOx3, no gross focal deficits    Lab/ Imaging:    LABS:                        8.8    11.35 )-----------( 386      ( 29 Aug 2024 04:07 )             29.5     08-29    135  |  92<L>  |  102.8<H>  ----------------------------<  128<H>  5.6<H>   |  26.0  |  17.19<H>    Ca    8.8      29 Aug 2024 04:07  Phos  11.2     08-29  Mg     3.2     08-29    TPro  5.5<L>  /  Alb  2.0<L>  /  TBili  <0.2<L>  /  DBili  x   /  AST  7   /  ALT  6   /  AlkPhos  58  08-28    PT/INR - ( 28 Aug 2024 20:57 )   PT: 11.7 sec;   INR: 1.06 ratio         PTT - ( 28 Aug 2024 20:57 )  PTT:33.0 sec  Urinalysis Basic - ( 29 Aug 2024 04:07 )    Color: x / Appearance: x / SG: x / pH: x  Gluc: 128 mg/dL / Ketone: x  / Bili: x / Urobili: x   Blood: x / Protein: x / Nitrite: x   Leuk Esterase: x / RBC: x / WBC x   Sq Epi: x / Non Sq Epi: x / Bacteria: x      CAPILLARY BLOOD GLUCOSE      POCT Blood Glucose.: 145 mg/dL (28 Aug 2024 21:42)  POCT Blood Glucose.: 157 mg/dL (28 Aug 2024 17:39)  POCT Blood Glucose.: 136 mg/dL (28 Aug 2024 11:16)  POCT Blood Glucose.: 125 mg/dL (28 Aug 2024 08:39)          Imaging Personally Reviewed:  [ ] YES  [ ] NO    Consultant(s) Notes Reviewed:  [ ] YES  [ ] NO    Care Discussed with Consultants/Other Providers [ ] YES  [ ] NO    Plan of Care discussed with Housestaff [ ]YES [ ] NO Jerica

## 2024-08-29 NOTE — PHARMACOTHERAPY INTERVENTION NOTE - COMMENTS
vancomycin level added to AM labs in order to dose by level pending hemodialysis today   will continue to follow and dose as appropriate  vancomycin level added to AM labs in order to dose by level pending hemodialysis today   will continue to follow and dose as appropriate     Per ID: Vanco no longer needed, culture growing gram- negative rods, will continue with cefepime

## 2024-08-29 NOTE — CONSULT NOTE ADULT - SUBJECTIVE AND OBJECTIVE BOX
CHIEF COMPLAINT: Abdominal pain     HPI: Patient is a 62yo F pt w/ pmhx of ESRD on peritoneal dialysis 5x per week, HTN, HLD, DM presented to ED c/o worsening diffused abdominal pain with associated distention over the past week. Pt reports dialysis cath has not been draining for the last week. Underwent insertion of RIJ Shiley and started HD here. Also with SBO and NGT placed. Planned for ex-lap / FAREED and possible SBR and PD catheter removal per surgery.       PAST MEDICAL & SURGICAL HISTORY:  HTN (hypertension)      DM (diabetes mellitus)      HLD (hyperlipidemia)      Overactive thyroid gland      S/P  section          MEDICATIONS:  MEDICATIONS  (STANDING):  albuterol    0.083%. 2.5 milliGRAM(s) Nebulizer once  calcium acetate 667 milliGRAM(s) Oral three times a day with meals  cefepime 1000 milliGRAM(s) 1000 milliGRAM(s) Irrigation daily  cefTRIAXone Injectable. 2000 milliGRAM(s) IV Push every 24 hours  chlorhexidine 2% Cloths 1 Application(s) Topical <User Schedule>  cloNIDine 0.1 milliGRAM(s) Oral two times a day  dextrose 5%. 1000 milliLiter(s) (100 mL/Hr) IV Continuous <Continuous>  dextrose 5%. 1000 milliLiter(s) (50 mL/Hr) IV Continuous <Continuous>  dextrose 50% Injectable 25 Gram(s) IV Push once  dextrose 50% Injectable 25 Gram(s) IV Push once  dextrose 50% Injectable 12.5 Gram(s) IV Push once  diltiazem    milliGRAM(s) Oral daily  fludroCORTISONE 0.1 milliGRAM(s) Oral once  furosemide   Injectable 20 milliGRAM(s) IV Push daily  glucagon  Injectable 1 milliGRAM(s) IntraMuscular once  hydrALAZINE 100 milliGRAM(s) Oral two times a day  insulin lispro (ADMELOG) corrective regimen sliding scale   SubCutaneous three times a day before meals  metoprolol tartrate 100 milliGRAM(s) Oral daily  Nephro-yasmany 1 Tablet(s) Oral daily  rosuvastatin 20 milliGRAM(s) Oral at bedtime  sodium zirconium cyclosilicate 10 Gram(s) Oral two times a day  torsemide 20 milliGRAM(s) Oral daily    MEDICATIONS  (PRN):  acetaminophen     Tablet .. 650 milliGRAM(s) Oral every 6 hours PRN Temp greater or equal to 38C (100.4F), Mild Pain (1 - 3)  dextrose Oral Gel 15 Gram(s) Oral once PRN Blood Glucose LESS THAN 70 milliGRAM(s)/deciliter  hydrALAZINE Injectable 5 milliGRAM(s) IV Push every 6 hours PRN SBP > 160  HYDROmorphone  Injectable 1 milliGRAM(s) IV Push every 4 hours PRN Severe Pain (7 - 10)  melatonin 3 milliGRAM(s) Oral at bedtime PRN Insomnia  ondansetron Injectable 4 milliGRAM(s) IV Push every 8 hours PRN Nausea and/or Vomiting  ondansetron Injectable 4 milliGRAM(s) IV Push every 6 hours PRN Nausea and/or Vomiting  oxyCODONE    IR 10 milliGRAM(s) Oral every 6 hours PRN Severe Pain (7 - 10)  sodium chloride 0.9% lock flush 10 milliLiter(s) IV Push every 1 hour PRN Pre/post blood products, medications, blood draw, and to maintain line patency    acetaminophen     Tablet .. 650 milliGRAM(s) Oral every 6 hours PRN  albuterol    0.083%. 2.5 milliGRAM(s) Nebulizer once  calcium acetate 667 milliGRAM(s) Oral three times a day with meals  cefepime 1000 milliGRAM(s) 1000 milliGRAM(s) Irrigation daily  cefTRIAXone Injectable. 2000 milliGRAM(s) IV Push every 24 hours  chlorhexidine 2% Cloths 1 Application(s) Topical <User Schedule>  cloNIDine 0.1 milliGRAM(s) Oral two times a day  dextrose 5%. 1000 milliLiter(s) IV Continuous <Continuous>  dextrose 5%. 1000 milliLiter(s) IV Continuous <Continuous>  dextrose 50% Injectable 25 Gram(s) IV Push once  dextrose 50% Injectable 25 Gram(s) IV Push once  dextrose 50% Injectable 12.5 Gram(s) IV Push once  dextrose Oral Gel 15 Gram(s) Oral once PRN  diltiazem    milliGRAM(s) Oral daily  fludroCORTISONE 0.1 milliGRAM(s) Oral once  furosemide   Injectable 20 milliGRAM(s) IV Push daily  glucagon  Injectable 1 milliGRAM(s) IntraMuscular once  hydrALAZINE 100 milliGRAM(s) Oral two times a day  hydrALAZINE Injectable 5 milliGRAM(s) IV Push every 6 hours PRN  HYDROmorphone  Injectable 1 milliGRAM(s) IV Push every 4 hours PRN  insulin lispro (ADMELOG) corrective regimen sliding scale   SubCutaneous three times a day before meals  melatonin 3 milliGRAM(s) Oral at bedtime PRN  metoprolol tartrate 100 milliGRAM(s) Oral daily  Nephro-yasmany 1 Tablet(s) Oral daily  ondansetron Injectable 4 milliGRAM(s) IV Push every 6 hours PRN  ondansetron Injectable 4 milliGRAM(s) IV Push every 8 hours PRN  oxyCODONE    IR 10 milliGRAM(s) Oral every 6 hours PRN  rosuvastatin 20 milliGRAM(s) Oral at bedtime  sodium chloride 0.9% lock flush 10 milliLiter(s) IV Push every 1 hour PRN  sodium zirconium cyclosilicate 10 Gram(s) Oral two times a day  torsemide 20 milliGRAM(s) Oral daily      FAMILY HISTORY:  FAMILY HISTORY:  Family history of breast cancer in female (Aunt)    Family history of prostate cancer (Uncle)    Family history of hypertension (Father, Mother)        SOCIAL HISTORY: no EtOH, drugs or tobacco    ROS: All others negative    PHYSCIAL EXAM:  Vital Signs Last 24 Hrs  T(C): 37 (29 Aug 2024 09:02), Max: 37.2 (28 Aug 2024 17:40)  T(F): 98.6 (29 Aug 2024 09:02), Max: 98.9 (28 Aug 2024 17:40)  HR: 106 (29 Aug 2024 09:02) (93 - 106)  BP: 165/90 (29 Aug 2024 09:02) (152/87 - 165/90)  BP(mean): --  RR: 18 (29 Aug 2024 09:02) (16 - 18)  SpO2: 91% (29 Aug 2024 09:02) (91% - 95%)    Parameters below as of 29 Aug 2024 09:02  Patient On (Oxygen Delivery Method): room air      I&O's Summary    GEN: NAD  HEENT: MMM, sclera anicteric  RESP: CTA bilaterally  CVS: S1S2, RRR, no JVD, no M/R/G  GI: Soft, NT, ND, BS+  EXT: no C/C/E  NEURO: AAOX3  PSYCH: Normal affect      LABS:                        8.8    11.35 )-----------( 386      ( 29 Aug 2024 04:07 )             29.5     08-    135  |  92<L>  |  102.8<H>  ----------------------------<  128<H>  5.6<H>   |  26.0  |  17.19<H>    Ca    8.8      29 Aug 2024 04:07  Phos  11.2       Mg     3.2         TPro  5.5<L>  /  Alb  2.0<L>  /  TBili  <0.2<L>  /  DBili  x   /  AST  7   /  ALT  6   /  AlkPhos  58          PT/INR - ( 28 Aug 2024 20:57 )   PT: 11.7 sec;   INR: 1.06 ratio         PTT - ( 28 Aug 2024 20:57 )  PTT:33.0 sec      ECHO 2024: Normal BIV function, EF 65-70%, mild MR/TR  PHARM NUCLEAR STRESS 10/2023: No ischemic/infarct, EF 77%    ECG 2024: SR, LAFB, PRWP, consider prior ASMI  ECG 2024: SR, LAFB, PRWP, consider prior ASMI     Assessment:    Patient is a 62yo F pt w/ pmhx of ESRD on peritoneal dialysis 5x per week, HTN, HLD, DM presented to ED c/o worsening diffused abdominal pain with associated distention over the past week. Pt reports dialysis cath has not been draining for the last week. Underwent insertion of RIJ Shiley and started HD here. Also with SBO and NGT placed. Planned for ex-lap / FAREED and possible SBR and PD catheter removal per surgery.   -Echo from earlier this year  with normal BiV function and no clinically significant valve diase  -Nuclear stress from 10/2023 without ischemia/infarct  -No active cardiac issues at this time    Plan: (TO BE SEEN)  1. CV stable at low CV risk for surgery  2. Care per primary team and surgery  3.       Ciaran Santana MD CHIEF COMPLAINT: Abdominal pain     HPI: Patient is a 62yo F pt w/ pmhx of ESRD on peritoneal dialysis 5x per week, HTN, HLD, DM presented to ED c/o worsening diffused abdominal pain with associated distention over the past week. Pt reports dialysis cath has not been draining for the last week. Underwent insertion of RIJ Shiley and started HD here. Also with SBO and NGT placed. Planned for ex-lap / FAREED and possible SBR and PD catheter removal per surgery.     Still in moderate discomfort. Prior to current illness states no CP/SOB, ablt to do stairs without cardiac symptoms. No PNR/orthopnea/palps/syncope/edema      PAST MEDICAL & SURGICAL HISTORY:  HTN (hypertension)      DM (diabetes mellitus)      HLD (hyperlipidemia)      Overactive thyroid gland      S/P  section          MEDICATIONS:  MEDICATIONS  (STANDING):  albuterol    0.083%. 2.5 milliGRAM(s) Nebulizer once  calcium acetate 667 milliGRAM(s) Oral three times a day with meals  cefepime 1000 milliGRAM(s) 1000 milliGRAM(s) Irrigation daily  cefTRIAXone Injectable. 2000 milliGRAM(s) IV Push every 24 hours  chlorhexidine 2% Cloths 1 Application(s) Topical <User Schedule>  cloNIDine 0.1 milliGRAM(s) Oral two times a day  dextrose 5%. 1000 milliLiter(s) (100 mL/Hr) IV Continuous <Continuous>  dextrose 5%. 1000 milliLiter(s) (50 mL/Hr) IV Continuous <Continuous>  dextrose 50% Injectable 25 Gram(s) IV Push once  dextrose 50% Injectable 25 Gram(s) IV Push once  dextrose 50% Injectable 12.5 Gram(s) IV Push once  diltiazem    milliGRAM(s) Oral daily  fludroCORTISONE 0.1 milliGRAM(s) Oral once  furosemide   Injectable 20 milliGRAM(s) IV Push daily  glucagon  Injectable 1 milliGRAM(s) IntraMuscular once  hydrALAZINE 100 milliGRAM(s) Oral two times a day  insulin lispro (ADMELOG) corrective regimen sliding scale   SubCutaneous three times a day before meals  metoprolol tartrate 100 milliGRAM(s) Oral daily  Nephro-yasmany 1 Tablet(s) Oral daily  rosuvastatin 20 milliGRAM(s) Oral at bedtime  sodium zirconium cyclosilicate 10 Gram(s) Oral two times a day  torsemide 20 milliGRAM(s) Oral daily    MEDICATIONS  (PRN):  acetaminophen     Tablet .. 650 milliGRAM(s) Oral every 6 hours PRN Temp greater or equal to 38C (100.4F), Mild Pain (1 - 3)  dextrose Oral Gel 15 Gram(s) Oral once PRN Blood Glucose LESS THAN 70 milliGRAM(s)/deciliter  hydrALAZINE Injectable 5 milliGRAM(s) IV Push every 6 hours PRN SBP > 160  HYDROmorphone  Injectable 1 milliGRAM(s) IV Push every 4 hours PRN Severe Pain (7 - 10)  melatonin 3 milliGRAM(s) Oral at bedtime PRN Insomnia  ondansetron Injectable 4 milliGRAM(s) IV Push every 8 hours PRN Nausea and/or Vomiting  ondansetron Injectable 4 milliGRAM(s) IV Push every 6 hours PRN Nausea and/or Vomiting  oxyCODONE    IR 10 milliGRAM(s) Oral every 6 hours PRN Severe Pain (7 - 10)  sodium chloride 0.9% lock flush 10 milliLiter(s) IV Push every 1 hour PRN Pre/post blood products, medications, blood draw, and to maintain line patency    acetaminophen     Tablet .. 650 milliGRAM(s) Oral every 6 hours PRN  albuterol    0.083%. 2.5 milliGRAM(s) Nebulizer once  calcium acetate 667 milliGRAM(s) Oral three times a day with meals  cefepime 1000 milliGRAM(s) 1000 milliGRAM(s) Irrigation daily  cefTRIAXone Injectable. 2000 milliGRAM(s) IV Push every 24 hours  chlorhexidine 2% Cloths 1 Application(s) Topical <User Schedule>  cloNIDine 0.1 milliGRAM(s) Oral two times a day  dextrose 5%. 1000 milliLiter(s) IV Continuous <Continuous>  dextrose 5%. 1000 milliLiter(s) IV Continuous <Continuous>  dextrose 50% Injectable 25 Gram(s) IV Push once  dextrose 50% Injectable 25 Gram(s) IV Push once  dextrose 50% Injectable 12.5 Gram(s) IV Push once  dextrose Oral Gel 15 Gram(s) Oral once PRN  diltiazem    milliGRAM(s) Oral daily  fludroCORTISONE 0.1 milliGRAM(s) Oral once  furosemide   Injectable 20 milliGRAM(s) IV Push daily  glucagon  Injectable 1 milliGRAM(s) IntraMuscular once  hydrALAZINE 100 milliGRAM(s) Oral two times a day  hydrALAZINE Injectable 5 milliGRAM(s) IV Push every 6 hours PRN  HYDROmorphone  Injectable 1 milliGRAM(s) IV Push every 4 hours PRN  insulin lispro (ADMELOG) corrective regimen sliding scale   SubCutaneous three times a day before meals  melatonin 3 milliGRAM(s) Oral at bedtime PRN  metoprolol tartrate 100 milliGRAM(s) Oral daily  Nephro-yasmany 1 Tablet(s) Oral daily  ondansetron Injectable 4 milliGRAM(s) IV Push every 6 hours PRN  ondansetron Injectable 4 milliGRAM(s) IV Push every 8 hours PRN  oxyCODONE    IR 10 milliGRAM(s) Oral every 6 hours PRN  rosuvastatin 20 milliGRAM(s) Oral at bedtime  sodium chloride 0.9% lock flush 10 milliLiter(s) IV Push every 1 hour PRN  sodium zirconium cyclosilicate 10 Gram(s) Oral two times a day  torsemide 20 milliGRAM(s) Oral daily      FAMILY HISTORY:  FAMILY HISTORY:  Family history of breast cancer in female (Aunt)    Family history of prostate cancer (Uncle)    Family history of hypertension (Father, Mother)        SOCIAL HISTORY: no EtOH, drugs or tobacco    ROS: All others negative    PHYSCIAL EXAM:  Vital Signs Last 24 Hrs  T(C): 37 (29 Aug 2024 09:02), Max: 37.2 (28 Aug 2024 17:40)  T(F): 98.6 (29 Aug 2024 09:02), Max: 98.9 (28 Aug 2024 17:40)  HR: 106 (29 Aug 2024 09:02) (93 - 106)  BP: 165/90 (29 Aug 2024 09:02) (152/87 - 165/90)  BP(mean): --  RR: 18 (29 Aug 2024 09:02) (16 - 18)  SpO2: 91% (29 Aug 2024 09:02) (91% - 95%)    Parameters below as of 29 Aug 2024 09:02  Patient On (Oxygen Delivery Method): room air      I&O's Summary    GEN: NAD, appears uncomfortable  HEENT: MMM, sclera anicteric, NGT in place  RESP: CTA bilaterally  CVS: S1S2, RRR, no JVD, no M/R/G  GI: Soft, NT, ND  EXT: no C/C/E  NEURO: AAOX3  PSYCH: flat affect      LABS:                        8.8    11.35 )-----------( 386      ( 29 Aug 2024 04:07 )             29.5     08-29    135  |  92<L>  |  102.8<H>  ----------------------------<  128<H>  5.6<H>   |  26.0  |  17.19<H>    Ca    8.8      29 Aug 2024 04:07  Phos  11.2     08-  Mg     3.2     -    TPro  5.5<L>  /  Alb  2.0<L>  /  TBili  <0.2<L>  /  DBili  x   /  AST  7   /  ALT  6   /  AlkPhos  58  08-        PT/INR - ( 28 Aug 2024 20:57 )   PT: 11.7 sec;   INR: 1.06 ratio         PTT - ( 28 Aug 2024 20:57 )  PTT:33.0 sec      ECHO 2024: Normal BIV function, EF 65-70%, mild MR/TR  PHARM NUCLEAR STRESS 10/2023: No ischemic/infarct, EF 77%    ECG 2024: SR, LAFB, PRWP, consider prior ASMI  ECG 2024: SR, LAFB, PRWP, consider prior ASMI     Assessment:    Patient is a 62yo F pt w/ pmhx of ESRD on peritoneal dialysis 5x per week, HTN, HLD, DM presented to ED c/o worsening diffused abdominal pain with associated distention over the past week. Pt reports dialysis cath has not been draining for the last week. Underwent insertion of RIJ Shiley and started HD here. Also with SBO and NGT placed. Planned for ex-lap / FAREED and possible SBR and PD catheter removal per surgery.   -Echo from earlier this year  with normal BiV function and no clinically significant valve diase  -Nuclear stress from 10/2023 without ischemia/infarct  -No active cardiac issues at this time, prior to current illness able to achieve 4 METS    Plan:   1. CV stable at low CV risk for surgery  2. Care per primary team and surgery  3. No further inpatient cardiac work up      Ciaran Santana MD

## 2024-08-30 LAB
-  AMIKACIN: SIGNIFICANT CHANGE UP
-  AZTREONAM: SIGNIFICANT CHANGE UP
-  CEFEPIME: SIGNIFICANT CHANGE UP
-  CEFTRIAXONE: SIGNIFICANT CHANGE UP
-  CIPROFLOXACIN: SIGNIFICANT CHANGE UP
-  GENTAMICIN: SIGNIFICANT CHANGE UP
-  LEVOFLOXACIN: SIGNIFICANT CHANGE UP
-  MEROPENEM: SIGNIFICANT CHANGE UP
-  PIPERACILLIN/TAZOBACTAM: SIGNIFICANT CHANGE UP
-  TOBRAMYCIN: SIGNIFICANT CHANGE UP
-  TRIMETHOPRIM/SULFAMETHOXAZOLE: SIGNIFICANT CHANGE UP
ANION GAP SERPL CALC-SCNC: 21 MMOL/L — HIGH (ref 5–17)
ANISOCYTOSIS BLD QL: SLIGHT — SIGNIFICANT CHANGE UP
BASOPHILS # BLD AUTO: 0 K/UL — SIGNIFICANT CHANGE UP (ref 0–0.2)
BASOPHILS NFR BLD AUTO: 0 % — SIGNIFICANT CHANGE UP (ref 0–2)
BLD GP AB SCN SERPL QL: SIGNIFICANT CHANGE UP
BUN SERPL-MCNC: 64.5 MG/DL — HIGH (ref 8–20)
CALCIUM SERPL-MCNC: 8 MG/DL — LOW (ref 8.4–10.5)
CHLORIDE SERPL-SCNC: 95 MMOL/L — LOW (ref 96–108)
CO2 SERPL-SCNC: 22 MMOL/L — SIGNIFICANT CHANGE UP (ref 22–29)
CREAT SERPL-MCNC: 11.51 MG/DL — HIGH (ref 0.5–1.3)
DACRYOCYTES BLD QL SMEAR: SLIGHT — SIGNIFICANT CHANGE UP
EGFR: 3 ML/MIN/1.73M2 — LOW
ELLIPTOCYTES BLD QL SMEAR: SLIGHT — SIGNIFICANT CHANGE UP
EOSINOPHIL # BLD AUTO: 0.08 K/UL — SIGNIFICANT CHANGE UP (ref 0–0.5)
EOSINOPHIL NFR BLD AUTO: 0.9 % — SIGNIFICANT CHANGE UP (ref 0–6)
GIANT PLATELETS BLD QL SMEAR: PRESENT — SIGNIFICANT CHANGE UP
GLUCOSE BLDC GLUCOMTR-MCNC: 107 MG/DL — HIGH (ref 70–99)
GLUCOSE BLDC GLUCOMTR-MCNC: 144 MG/DL — HIGH (ref 70–99)
GLUCOSE BLDC GLUCOMTR-MCNC: 81 MG/DL — SIGNIFICANT CHANGE UP (ref 70–99)
GLUCOSE BLDC GLUCOMTR-MCNC: 85 MG/DL — SIGNIFICANT CHANGE UP (ref 70–99)
GLUCOSE BLDC GLUCOMTR-MCNC: 90 MG/DL — SIGNIFICANT CHANGE UP (ref 70–99)
GLUCOSE BLDC GLUCOMTR-MCNC: 96 MG/DL — SIGNIFICANT CHANGE UP (ref 70–99)
GLUCOSE SERPL-MCNC: 105 MG/DL — HIGH (ref 70–99)
HBV CORE AB SER-ACNC: SIGNIFICANT CHANGE UP
HBV CORE IGM SER-ACNC: SIGNIFICANT CHANGE UP
HBV SURFACE AB SER-ACNC: 126.7 MIU/ML — SIGNIFICANT CHANGE UP
HBV SURFACE AG SER-ACNC: SIGNIFICANT CHANGE UP
HCT VFR BLD CALC: 29.8 % — LOW (ref 34.5–45)
HCV AB S/CO SERPL IA: 0.06 S/CO — SIGNIFICANT CHANGE UP (ref 0–0.99)
HCV AB SERPL-IMP: SIGNIFICANT CHANGE UP
HGB BLD-MCNC: 8.8 G/DL — LOW (ref 11.5–15.5)
LYMPHOCYTES # BLD AUTO: 0.23 K/UL — LOW (ref 1–3.3)
LYMPHOCYTES # BLD AUTO: 2.6 % — LOW (ref 13–44)
MAGNESIUM SERPL-MCNC: 2.8 MG/DL — HIGH (ref 1.8–2.6)
MANUAL SMEAR VERIFICATION: SIGNIFICANT CHANGE UP
MCHC RBC-ENTMCNC: 23.8 PG — LOW (ref 27–34)
MCHC RBC-ENTMCNC: 29.5 GM/DL — LOW (ref 32–36)
MCV RBC AUTO: 80.5 FL — SIGNIFICANT CHANGE UP (ref 80–100)
METHOD TYPE: SIGNIFICANT CHANGE UP
MICROCYTES BLD QL: SLIGHT — SIGNIFICANT CHANGE UP
MONOCYTES # BLD AUTO: 0.3 K/UL — SIGNIFICANT CHANGE UP (ref 0–0.9)
MONOCYTES NFR BLD AUTO: 3.5 % — SIGNIFICANT CHANGE UP (ref 2–14)
NEUTROPHILS # BLD AUTO: 7.77 K/UL — HIGH (ref 1.8–7.4)
NEUTROPHILS NFR BLD AUTO: 88.6 % — HIGH (ref 43–77)
NEUTS BAND # BLD: 0.9 % — SIGNIFICANT CHANGE UP (ref 0–8)
OVALOCYTES BLD QL SMEAR: SLIGHT — SIGNIFICANT CHANGE UP
PHOSPHATE SERPL-MCNC: 9.1 MG/DL — HIGH (ref 2.4–4.7)
PLAT MORPH BLD: NORMAL — SIGNIFICANT CHANGE UP
PLATELET # BLD AUTO: 268 K/UL — SIGNIFICANT CHANGE UP (ref 150–400)
POIKILOCYTOSIS BLD QL AUTO: SLIGHT — SIGNIFICANT CHANGE UP
POLYCHROMASIA BLD QL SMEAR: SLIGHT — SIGNIFICANT CHANGE UP
POTASSIUM SERPL-MCNC: 4.7 MMOL/L — SIGNIFICANT CHANGE UP (ref 3.5–5.3)
POTASSIUM SERPL-SCNC: 4.7 MMOL/L — SIGNIFICANT CHANGE UP (ref 3.5–5.3)
RBC # BLD: 3.7 M/UL — LOW (ref 3.8–5.2)
RBC # FLD: 15.7 % — HIGH (ref 10.3–14.5)
RBC BLD AUTO: ABNORMAL
SODIUM SERPL-SCNC: 138 MMOL/L — SIGNIFICANT CHANGE UP (ref 135–145)
VARIANT LYMPHS # BLD: 3.5 % — SIGNIFICANT CHANGE UP (ref 0–6)
WBC # BLD: 8.68 K/UL — SIGNIFICANT CHANGE UP (ref 3.8–10.5)
WBC # FLD AUTO: 8.68 K/UL — SIGNIFICANT CHANGE UP (ref 3.8–10.5)

## 2024-08-30 PROCEDURE — 88300 SURGICAL PATH GROSS: CPT | Mod: 26,59

## 2024-08-30 PROCEDURE — 99233 SBSQ HOSP IP/OBS HIGH 50: CPT | Mod: GC

## 2024-08-30 PROCEDURE — 99233 SBSQ HOSP IP/OBS HIGH 50: CPT

## 2024-08-30 PROCEDURE — 88307 TISSUE EXAM BY PATHOLOGIST: CPT | Mod: 26

## 2024-08-30 DEVICE — STAPLER COVIDIEN TA 60 BLUE: Type: IMPLANTABLE DEVICE | Status: FUNCTIONAL

## 2024-08-30 DEVICE — STAPLER COVIDIEN ENDO GIA 60-3.8MM BLUE: Type: IMPLANTABLE DEVICE | Status: FUNCTIONAL

## 2024-08-30 DEVICE — STAPLER COVIDIEN ENDO GIA 60-3.8MM BLUE RELOAD: Type: IMPLANTABLE DEVICE | Status: FUNCTIONAL

## 2024-08-30 RX ORDER — EPOETIN ALFA 3000 [IU]/ML
10000 SOLUTION INTRAVENOUS; SUBCUTANEOUS
Refills: 0 | Status: DISCONTINUED | OUTPATIENT
Start: 2024-08-30 | End: 2024-08-31

## 2024-08-30 RX ORDER — PIPERACILLIN SODIUM AND TAZOBACTAM SODIUM 3; .375 G/15ML; G/15ML
3.38 INJECTION, POWDER, FOR SOLUTION INTRAVENOUS ONCE
Refills: 0 | Status: COMPLETED | OUTPATIENT
Start: 2024-08-30 | End: 2024-08-30

## 2024-08-30 RX ORDER — HYDRALAZINE HCL 50 MG
10 TABLET ORAL ONCE
Refills: 0 | Status: COMPLETED | OUTPATIENT
Start: 2024-08-30 | End: 2024-08-30

## 2024-08-30 RX ORDER — FENTANYL CITRATE 50 UG/ML
30 INJECTION INTRAMUSCULAR; INTRAVENOUS
Refills: 0 | Status: DISCONTINUED | OUTPATIENT
Start: 2024-08-30 | End: 2024-09-01

## 2024-08-30 RX ORDER — FOLIC ACID 1 MG
1 TABLET ORAL DAILY
Refills: 0 | Status: DISCONTINUED | OUTPATIENT
Start: 2024-08-30 | End: 2024-09-12

## 2024-08-30 RX ORDER — ACETAMINOPHEN 325 MG/1
1000 TABLET ORAL EVERY 6 HOURS
Refills: 0 | Status: COMPLETED | OUTPATIENT
Start: 2024-08-30 | End: 2024-08-31

## 2024-08-30 RX ORDER — NALOXONE HCL 1 MG/ML
0.1 VIAL (ML) INJECTION
Refills: 0 | Status: DISCONTINUED | OUTPATIENT
Start: 2024-08-30 | End: 2024-09-12

## 2024-08-30 RX ORDER — NICARDIPINE HCL 20 MG
5 CAPSULE ORAL
Qty: 40 | Refills: 0 | Status: DISCONTINUED | OUTPATIENT
Start: 2024-08-30 | End: 2024-08-30

## 2024-08-30 RX ORDER — ONDANSETRON 2 MG/ML
4 INJECTION, SOLUTION INTRAMUSCULAR; INTRAVENOUS EVERY 6 HOURS
Refills: 0 | Status: DISCONTINUED | OUTPATIENT
Start: 2024-08-30 | End: 2024-09-11

## 2024-08-30 RX ORDER — CEFAZOLIN SODIUM 2 G/100ML
2000 INJECTION, SOLUTION INTRAVENOUS ONCE
Refills: 0 | Status: DISCONTINUED | OUTPATIENT
Start: 2024-08-30 | End: 2024-09-03

## 2024-08-30 RX ORDER — PIPERACILLIN SODIUM AND TAZOBACTAM SODIUM 3; .375 G/15ML; G/15ML
3.38 INJECTION, POWDER, FOR SOLUTION INTRAVENOUS EVERY 12 HOURS
Refills: 0 | Status: DISCONTINUED | OUTPATIENT
Start: 2024-08-31 | End: 2024-08-31

## 2024-08-30 RX ADMIN — Medication 20 MILLIGRAM(S): at 05:33

## 2024-08-30 RX ADMIN — METOPROLOL TARTRATE 5 MILLIGRAM(S): 100 TABLET ORAL at 05:33

## 2024-08-30 RX ADMIN — Medication 10 MILLIGRAM(S): at 21:39

## 2024-08-30 RX ADMIN — Medication 10 MILLIGRAM(S): at 17:24

## 2024-08-30 RX ADMIN — FENTANYL CITRATE 30 MILLILITER(S): 50 INJECTION INTRAMUSCULAR; INTRAVENOUS at 20:40

## 2024-08-30 RX ADMIN — HYDROMORPHONE HYDROCHLORIDE 1 MILLIGRAM(S): 2 TABLET ORAL at 06:15

## 2024-08-30 RX ADMIN — FENTANYL CITRATE 30 MILLILITER(S): 50 INJECTION INTRAMUSCULAR; INTRAVENOUS at 21:02

## 2024-08-30 RX ADMIN — CHLORHEXIDINE GLUCONATE 1 APPLICATION(S): 40 SOLUTION TOPICAL at 05:38

## 2024-08-30 RX ADMIN — ACETAMINOPHEN 1000 MILLIGRAM(S): 325 TABLET ORAL at 20:00

## 2024-08-30 RX ADMIN — METOPROLOL TARTRATE 5 MILLIGRAM(S): 100 TABLET ORAL at 19:27

## 2024-08-30 RX ADMIN — METOPROLOL TARTRATE 5 MILLIGRAM(S): 100 TABLET ORAL at 00:31

## 2024-08-30 RX ADMIN — Medication 75 MILLILITER(S): at 21:39

## 2024-08-30 RX ADMIN — HYDROMORPHONE HYDROCHLORIDE 1 MILLIGRAM(S): 2 TABLET ORAL at 05:33

## 2024-08-30 RX ADMIN — ACETAMINOPHEN 400 MILLIGRAM(S): 325 TABLET ORAL at 19:26

## 2024-08-30 RX ADMIN — Medication 5 MILLIGRAM(S): at 14:41

## 2024-08-30 RX ADMIN — METOPROLOL TARTRATE 5 MILLIGRAM(S): 100 TABLET ORAL at 11:38

## 2024-08-30 RX ADMIN — Medication 10 MILLIGRAM(S): at 05:33

## 2024-08-30 RX ADMIN — PIPERACILLIN SODIUM AND TAZOBACTAM SODIUM 25 GRAM(S): 3; .375 INJECTION, POWDER, FOR SOLUTION INTRAVENOUS at 19:28

## 2024-08-30 RX ADMIN — Medication 5 MILLIGRAM(S): at 14:21

## 2024-08-30 RX ADMIN — PIPERACILLIN SODIUM AND TAZOBACTAM SODIUM 200 GRAM(S): 3; .375 INJECTION, POWDER, FOR SOLUTION INTRAVENOUS at 11:38

## 2024-08-30 RX ADMIN — FENTANYL CITRATE 30 MILLILITER(S): 50 INJECTION INTRAMUSCULAR; INTRAVENOUS at 18:41

## 2024-08-30 NOTE — PROGRESS NOTE ADULT - SUBJECTIVE AND OBJECTIVE BOX
Patient seen and examined at bedside. NAEON, stable abdominal pain. NGT fell out yesterday during HD. No other acute complaints this morning.     MEDICATIONS  (STANDING):  albuterol    0.083%. 2.5 milliGRAM(s) Nebulizer once  calcium acetate 667 milliGRAM(s) Oral three times a day with meals  cefepime  Injectable. 1000 milliGRAM(s) IV Push <User Schedule>  chlorhexidine 2% Cloths 1 Application(s) Topical <User Schedule>  cloNIDine 0.1 milliGRAM(s) Oral two times a day  dextrose 5% + sodium chloride 0.9%. 1000 milliLiter(s) (75 mL/Hr) IV Continuous <Continuous>  dextrose 5%. 1000 milliLiter(s) (50 mL/Hr) IV Continuous <Continuous>  dextrose 5%. 1000 milliLiter(s) (100 mL/Hr) IV Continuous <Continuous>  dextrose 50% Injectable 12.5 Gram(s) IV Push once  dextrose 50% Injectable 25 Gram(s) IV Push once  dextrose 50% Injectable 25 Gram(s) IV Push once  diltiazem    milliGRAM(s) Oral daily  fludroCORTISONE 0.1 milliGRAM(s) Oral once  furosemide   Injectable 20 milliGRAM(s) IV Push daily  glucagon  Injectable 1 milliGRAM(s) IntraMuscular once  hydrALAZINE 100 milliGRAM(s) Oral two times a day  insulin lispro (ADMELOG) corrective regimen sliding scale   SubCutaneous three times a day before meals  labetalol Injectable 10 milliGRAM(s) IV Push every 8 hours  metoprolol tartrate 100 milliGRAM(s) Oral daily  metoprolol tartrate Injectable 5 milliGRAM(s) IV Push every 6 hours  Nephro-yasmany 1 Tablet(s) Oral daily  rosuvastatin 20 milliGRAM(s) Oral at bedtime  sodium zirconium cyclosilicate 10 Gram(s) Oral two times a day  torsemide 20 milliGRAM(s) Oral daily    MEDICATIONS  (PRN):  acetaminophen     Tablet .. 650 milliGRAM(s) Oral every 6 hours PRN Temp greater or equal to 38C (100.4F), Mild Pain (1 - 3)  dextrose Oral Gel 15 Gram(s) Oral once PRN Blood Glucose LESS THAN 70 milliGRAM(s)/deciliter  HYDROmorphone  Injectable 1 milliGRAM(s) IV Push every 4 hours PRN Severe Pain (7 - 10)  melatonin 3 milliGRAM(s) Oral at bedtime PRN Insomnia  ondansetron Injectable 4 milliGRAM(s) IV Push every 6 hours PRN Nausea and/or Vomiting  ondansetron Injectable 4 milliGRAM(s) IV Push every 8 hours PRN Nausea and/or Vomiting  oxyCODONE    IR 10 milliGRAM(s) Oral every 6 hours PRN Severe Pain (7 - 10)  sodium chloride 0.9% lock flush 10 milliLiter(s) IV Push every 1 hour PRN Pre/post blood products, medications, blood draw, and to maintain line patency    Vital Signs Last 24 Hrs  T(C): 37.4 (30 Aug 2024 04:47), Max: 37.4 (30 Aug 2024 04:47)  T(F): 99.4 (30 Aug 2024 04:47), Max: 99.4 (30 Aug 2024 04:47)  HR: 111 (30 Aug 2024 04:47) (106 - 118)  BP: 176/104 (30 Aug 2024 04:47) (165/90 - 197/94)  BP(mean): --  RR: 18 (30 Aug 2024 04:47) (18 - 18)  SpO2: 96% (30 Aug 2024 04:47) (91% - 100%)    Parameters below as of 30 Aug 2024 04:47  Patient On (Oxygen Delivery Method): room air                          8.8    11.35 )-----------( 386      ( 29 Aug 2024 04:07 )             29.5       135  |  92<L>  |  102.8<H>  ----------------------------<  128<H>  5.6<H>   |  26.0  |  17.19<H>    Ca    8.8      29 Aug 2024 04:07  Phos  11.2     08-29  Mg     3.2     08-29    Exam:  Gen: pt lying in bed, alert, in NAD  Resp: unlabored  CVS: RRR  Abd: soft, periumbilical ttp, nd and no rebound or guarding  Ext: moving all extremities spontaneously, sensation intact, pulses 2+

## 2024-08-30 NOTE — BRIEF OPERATIVE NOTE - OPERATION/FINDINGS
Exploratory laparotomy with lysis of adhesions. Fibrinous tissue seen in pelvic region where small bowel was densely adhered. Small bowel released with blunt dissection. Suspected area of small bowel perforation resected, approximately 15cm. Side-to-side small bowel anastomosis. PD catheter removed.

## 2024-08-30 NOTE — PROGRESS NOTE ADULT - ASSESSMENT
63y Female w/ PMH of - admitted for -. Now, -.    Plan  #    DVT PPx:  Dispo: 63y Female w/ PMH of ESRD on peritoneal dialysis 5x per week, HTN, HLD, and DM admitted for peritonitis and peritoneal dialysis catheter dysfunction, needing HD. Now found to have SBO, pending ex-lap    Plan  #ESRD  - Received HD yesterday (8/29) via [facility]  - Continue to monitor BMP daily    #Peritonitis  - c/w cefepime IV    #SBO  - OR today (8/30) for ex-lap, possible removal of PD catheter  - dilaudid and tylenol prn for pain  - Diet: NPO    #Constipation  - miralax    #DM  - c/w insulin lispro    #HLD  - c/w statin, metoprolol, and diltiazem    #Hyperkalemia   - c/w Lokemla  - monitor BMP daily    DVT ppx: SCDs  Dispo: clinically active, pending ex-lap and resolution of peritonitis   63y Female w/ PMH of ESRD on peritoneal dialysis 5x per week, HTN, HLD, and DM admitted for peritonitis and peritoneal dialysis catheter dysfunction, needing HD. Now found to have SBO, pending ex-lap    Plan    #SBO  - OR today (8/30) for ex-lap, possible removal of PD catheter  - dilaudid and tylenol prn for pain  - Diet: NPO    #ESRD  - Received HD yesterday (8/29) via [facility]  - Continue to monitor BMP daily    #Peritonitis  - c/w cefepime IV      #Constipation  - miralax    #DM  - c/w insulin lispro    #HLD  - c/w statin, metoprolol, and diltiazem    #Hyperkalemia   - c/w Lokemla  - monitor BMP daily    DVT ppx: SCDs  Dispo: clinically active, pending ex-lap and resolution of peritonitis   63y Female w/ PMH of ESRD on peritoneal dialysis 5x per week, HTN, HLD, and DM admitted for peritonitis and peritoneal dialysis catheter dysfunction, needing HD. Now found to have SBO, pending ex-lap    Plan    #SBO  - OR today (8/30) for ex-lap, possible removal of PD catheter  - dilaudid and tylenol prn for pain  - Diet: NPO    #ESRD  - Received HD yesterday (8/29) via [facility]  - Continue to monitor BMP daily    #Peritonitis  - Cefepime IV switched to Zosyn      #Constipation  - miralax    #DM  - c/w insulin lispro    #HLD  - c/w statin, metoprolol, and diltiazem    #Hyperkalemia   - c/w Lokemla  - monitor BMP daily    DVT ppx: SCDs  Dispo: clinically active, pending ex-lap and resolution of peritonitis

## 2024-08-30 NOTE — PROGRESS NOTE ADULT - SUBJECTIVE AND OBJECTIVE BOX
[facility]  INFECTIOUS DISEASES at [place] and [place]  ===============================================================                  George Tobias MD               Diplomates American Board of Internal Medicine & Infectious Diseases                * [place] Office - Appt - Tel  [phone] Fax [phone]                * [place] Office - Appt - Tel [phone] Fax [phone]                                  Hospital Consult line:  [phone]  ==============================================================    ADALID CASE [id number]    Follow up: peritonitis associated with PD catheter     for ex-lap today and PD catheter removal   NPO   afebrile  hemodynamically stable     I have personally reviewed the labs and data; pertinent labs and data are listed in this note; please see below.     _______________________________________________________________  REVIEW OF SYSTEMS  Lethargic, in pain. Abdomen mores distended and painful. No fever or chills.   ________________________________________________________________  Allergies:  No Known Allergies    ________________________________________________________________  PHYSICAL EXAM  GEN: ill appearing, lying in bed.   HEENT: Anicteric sclerae. Moist mucous membranes.   NECK: Supple.   LUNGS: unlabored breathing, poor inspiratory effort, CTA anteriorly   HEART: RRR, murmur  ABDOMEN: distended, diffusely TTP. PC catheter in place, site OK   : No CVA tenderness. No Paredes catheter  EXTREMITIES: well perfused, without  edema.  MSK: No joint swelling or deformity   NEUROLOGIC: lethargic, moving all extremities   SKIN: No rash, wounds or jaundice  LINES: PIV   ________________________________________________________________  Vitals:  T(F): 98.3 (30 Aug 2024 08:59), Max: 99.4 (30 Aug 2024 04:47)  HR: 101 (30 Aug 2024 08:59)  BP: 175/103 (30 Aug 2024 08:59)  RR: 18 (30 Aug 2024 08:59)  SpO2: 96% (30 Aug 2024 08:59) (95% - 100%)  temp max in last 48H T(F): , Max: 99.4 (08-30-24 @ 04:47)    Current Antibiotics:  cefepime  Injectable. 1000 milliGRAM(s) IV Push <User Schedule>    Other medications:  albuterol    0.083%. 2.5 milliGRAM(s) Nebulizer once  calcium acetate 667 milliGRAM(s) Oral three times a day with meals  chlorhexidine 2% Cloths 1 Application(s) Topical <User Schedule>  cloNIDine 0.1 milliGRAM(s) Oral two times a day  dextrose 5% + sodium chloride 0.9%. 1000 milliLiter(s) IV Continuous <Continuous>  dextrose 5%. 1000 milliLiter(s) IV Continuous <Continuous>  dextrose 5%. 1000 milliLiter(s) IV Continuous <Continuous>  dextrose 50% Injectable 12.5 Gram(s) IV Push once  dextrose 50% Injectable 25 Gram(s) IV Push once  dextrose 50% Injectable 25 Gram(s) IV Push once  diltiazem    milliGRAM(s) Oral daily  fludroCORTISONE 0.1 milliGRAM(s) Oral once  furosemide   Injectable 20 milliGRAM(s) IV Push daily  glucagon  Injectable 1 milliGRAM(s) IntraMuscular once  hydrALAZINE 100 milliGRAM(s) Oral two times a day  insulin lispro (ADMELOG) corrective regimen sliding scale   SubCutaneous three times a day before meals  labetalol Injectable 10 milliGRAM(s) IV Push every 8 hours  metoprolol tartrate 100 milliGRAM(s) Oral daily  metoprolol tartrate Injectable 5 milliGRAM(s) IV Push every 6 hours  Nephro-yasmany 1 Tablet(s) Oral daily  rosuvastatin 20 milliGRAM(s) Oral at bedtime  sodium zirconium cyclosilicate 10 Gram(s) Oral two times a day  torsemide 20 milliGRAM(s) Oral daily                            8.8    8.68  )-----------( 268      ( 30 Aug 2024 05:14 )             29.8     08-30    138  |  95<L>  |  64.5<H>  ----------------------------<  105<H>  4.7   |  22.0  |  11.51<H>    Ca    8.0<L>      30 Aug 2024 05:14  Phos  9.1     08-30  Mg     2.8     08-30      RECENT CULTURES:  08-27 @ 11:52 .Blood Blood-Peripheral     No growth at 48 Hours        08-27 @ 11:50 .Blood Blood-Peripheral     No growth at 48 Hours        08-27 @ 00:00 Peritoneal Peritoneal Fluid Achromobacter xylosoxidans    Few Gram Negative Rods  - Aztreonam: R >16  - Cefepime: R >16  - Ceftriaxone: R >32  - Trimethoprim/Sulfamethoxazole: S <=0.5/9.5  - Amikacin: R >32  - Ciprofloxacin: I 2  - Gentamicin: R >8  - Levofloxacin: S 2    No polymorphonuclear leukocytes  No organisms seen  by cytocentrifuge        WBC Count: 8.68 K/uL (08-30-24 @ 05:14)  WBC Count: 11.35 K/uL (08-29-24 @ 04:07)  WBC Count: 8.36 K/uL (08-28-24 @ 04:38)  WBC Count: 8.86 K/uL (08-27-24 @ 11:45)  WBC Count: 5.65 K/uL (08-27-24 @ 00:24)    Creatinine: 11.51 mg/dL (08-30-24 @ 05:14)  Creatinine: 17.19 mg/dL (08-29-24 @ 04:07)  Creatinine: 15.42 mg/dL (08-28-24 @ 04:38)  Creatinine: 15.28 mg/dL (08-27-24 @ 22:44)  Creatinine: 14.51 mg/dL (08-27-24 @ 11:45)  Creatinine: 15.27 mg/dL (08-27-24 @ 01:30)  Creatinine: 15.82 mg/dL (08-27-24 @ 00:24)      Vancomycin Level, Trough: 14.2 ug/mL (08-29-24 @ 04:07)  Vancomycin Level, Trough: 14.7 ug/mL (08-28-24 @ 04:38)      ________________________________________________________________  CARDIOLOGY   ________________________________________________________________  RADIOLOGY  < from: CT Abdomen and Pelvis No Cont (08.28.24 @ 17:19) >  FINDINGS:  LOWER CHEST: Moderate to severe cardiomegaly. Partially visualized   central venous catheter. Small-to-moderate pericardial effusion. Small   bilateral pleural effusions and bibasilar subsegmental atelectasis.    LIVER: Within normal limits.  BILE DUCTS: Normal caliber.  GALLBLADDER: Within normal limits.  SPLEEN: Within normal limits.  PANCREAS: Within normal limits.  ADRENALS: Within normal limits.  KIDNEYS/URETERS: Within normal limits.    BLADDER: Within normal limits.  REPRODUCTIVE ORGANS: Enlarged uterus.    BOWEL: Dilated stomach and small bowel with relatively collapsed colon   and distal ileum suggestive of small bowel obstruction with possible   transition in the mid pelvis (3: 1:15) PERITONEUM/RETROPERITONEUM: Large   amount intraperitoneal fluid. Dialysis catheter inserted via left flank   coiled in left pelvis. Several droplets free air which may have been   introduced from catheter.  VESSELS: Within normal limits.  LYMPH NODES: No lymphadenopathy.  ABDOMINAL WALL: Anasarca.  BONES: Degenerative change.    IMPRESSION:  Large volume intraperitoneal fluid with several droplets free air   possibly introduced from catheter.    Dilated stomach and small bowel with relatively collapsed colon and   distal small bowel consistent with small bowel obstruction    Anasarca, small bilateral pleural effusions, and small-to-moderate   pericardial effusion    < end of copied text >

## 2024-08-30 NOTE — PROGRESS NOTE ADULT - ASSESSMENT
62yo F pt w/ pmhx of ESRD on peritoneal dialysis 5x per week, HTN, HLD, DM presented to ED c/o worsening diffused abdominal pain with associated distention over the past week. Pt reports dialysis cath has not been draining for the last week. She attempts peritoneal dialysis but her unable to get any fluid return. She denies fever, chills, N/V/D, urinary sx, chest pain, SOB, or any other complaints.  (27 Aug 2024 03:04)  Has been constipated in the past    ESRD   Now w PD catheter complication - peritonitis   CT abd noted --> + SBO , refuses replacement of NG tube   Going top OR today -->  Ex-lap, FAREED, possible SBR and PD cath removal  Cleared from a renal perspective for OR  Will provide further HD later today   Abx as per ID     HyperKalemia   Better with initiation of HD   D/C Lokelma and IV Lasix    RO - Phoslo     HTN - Watch on meds        Monitor labs will follow   64yo F pt w/ pmhx of ESRD on peritoneal dialysis 5x per week, HTN, HLD, DM presented to ED c/o worsening diffused abdominal pain with associated distention over the past week. Pt reports dialysis cath has not been draining for the last week. She attempts peritoneal dialysis but her unable to get any fluid return. She denies fever, chills, N/V/D, urinary sx, chest pain, SOB, or any other complaints.  (27 Aug 2024 03:04)  Has been constipated in the past    ESRD   Now w PD catheter complication - peritonitis   CT abd noted --> + SBO , refuses replacement of NG tube   Going top OR today -->  Ex-lap, FAREED, possible SBR and PD cath removal  Cleared from a renal perspective for OR  Will provide further HD later today   Abx as per ID     HyperKalemia   Better with initiation of HD   D/C Lokelma and IV Lasix    RO - Phoslo     HTN - Watch on meds     Anemia - add Epogen with HD  ad folate  Check iron stores        Monitor labs will follow

## 2024-08-30 NOTE — PROGRESS NOTE ADULT - SUBJECTIVE AND OBJECTIVE BOX
NEPHROLOGY INTERVAL HPI/OVERNIGHT EVENTS:    Seen earlier  Plan by surgery for OR today (+) IVF   She removed NG tube and refused to have it replaced     MEDICATIONS  (STANDING):  albuterol    0.083%. 2.5 milliGRAM(s) Nebulizer once  calcium acetate 667 milliGRAM(s) Oral three times a day with meals  chlorhexidine 2% Cloths 1 Application(s) Topical <User Schedule>  cloNIDine 0.1 milliGRAM(s) Oral two times a day  dextrose 5% + sodium chloride 0.9%. 1000 milliLiter(s) (75 mL/Hr) IV Continuous <Continuous>  dextrose 5%. 1000 milliLiter(s) (50 mL/Hr) IV Continuous <Continuous>  dextrose 5%. 1000 milliLiter(s) (100 mL/Hr) IV Continuous <Continuous>  dextrose 50% Injectable 12.5 Gram(s) IV Push once  dextrose 50% Injectable 25 Gram(s) IV Push once  dextrose 50% Injectable 25 Gram(s) IV Push once  diltiazem    milliGRAM(s) Oral daily  fludroCORTISONE 0.1 milliGRAM(s) Oral once  furosemide   Injectable 20 milliGRAM(s) IV Push daily  glucagon  Injectable 1 milliGRAM(s) IntraMuscular once  hydrALAZINE 100 milliGRAM(s) Oral two times a day  insulin lispro (ADMELOG) corrective regimen sliding scale   SubCutaneous three times a day before meals  labetalol Injectable 10 milliGRAM(s) IV Push every 8 hours  metoprolol tartrate 100 milliGRAM(s) Oral daily  metoprolol tartrate Injectable 5 milliGRAM(s) IV Push every 6 hours  Nephro-yasmany 1 Tablet(s) Oral daily  piperacillin/tazobactam IVPB.- 3.375 Gram(s) IV Intermittent once  rosuvastatin 20 milliGRAM(s) Oral at bedtime  sodium zirconium cyclosilicate 10 Gram(s) Oral two times a day  torsemide 20 milliGRAM(s) Oral daily    MEDICATIONS  (PRN):  acetaminophen     Tablet .. 650 milliGRAM(s) Oral every 6 hours PRN Temp greater or equal to 38C (100.4F), Mild Pain (1 - 3)  dextrose Oral Gel 15 Gram(s) Oral once PRN Blood Glucose LESS THAN 70 milliGRAM(s)/deciliter  HYDROmorphone  Injectable 1 milliGRAM(s) IV Push every 4 hours PRN Severe Pain (7 - 10)  melatonin 3 milliGRAM(s) Oral at bedtime PRN Insomnia  ondansetron Injectable 4 milliGRAM(s) IV Push every 6 hours PRN Nausea and/or Vomiting  ondansetron Injectable 4 milliGRAM(s) IV Push every 8 hours PRN Nausea and/or Vomiting  oxyCODONE    IR 10 milliGRAM(s) Oral every 6 hours PRN Severe Pain (7 - 10)  sodium chloride 0.9% lock flush 10 milliLiter(s) IV Push every 1 hour PRN Pre/post blood products, medications, blood draw, and to maintain line patency      Allergies    No Known Allergies    Intolerances              Vital Signs Last 24 Hrs  T(C): 36.8 (30 Aug 2024 08:59), Max: 37.4 (30 Aug 2024 04:47)  T(F): 98.3 (30 Aug 2024 08:59), Max: 99.4 (30 Aug 2024 04:47)  HR: 101 (30 Aug 2024 08:59) (101 - 118)  BP: 175/103 (30 Aug 2024 08:59) (174/71 - 188/110)  BP(mean): --  RR: 18 (30 Aug 2024 08:59) (18 - 18)  SpO2: 96% (30 Aug 2024 08:59) (96% - 100%)    Parameters below as of 30 Aug 2024 08:59  Patient On (Oxygen Delivery Method): room air      Daily     Daily Weight in k.5 (30 Aug 2024 05:57)  I&O's Detail    29 Aug 2024 07:  -  30 Aug 2024 07:00  --------------------------------------------------------  IN:  Total IN: 0 mL    OUT:    Other (mL): 1000 mL  Total OUT: 1000 mL    Total NET: -1000 mL        I&O's Summary    29 Aug 2024 07:01  -  30 Aug 2024 07:00  --------------------------------------------------------  IN: 0 mL / OUT: 1000 mL / NET: -1000 mL        PHYSICAL EXAM:    PHYSICAL EXAM:  GENERAL: NAD  EYES: EOMI  NECK: Supple, No JVD + RIJ raul cath   NERVOUS SYSTEM:  A/O x3  Lungs : No rales, No rhonchi,   HEART:  No murmur,  No rub, No gallops  ABDOMEN: Soft, diffusely tender, distended, full; rebound +; PD cath area NT, BS+  EXTREMITIES:  + Peripheral Pulses, - edema  LABS:                        8.8    8.68  )-----------( 268      ( 30 Aug 2024 05:14 )             29.8         138  |  95<L>  |  64.5<H>  ----------------------------<  105<H>  4.7   |  22.0  |  11.51<H>    Ca    8.0<L>      30 Aug 2024 05:14  Phos  9.1       Mg     2.8           PT/INR - ( 28 Aug 2024 20:57 )   PT: 11.7 sec;   INR: 1.06 ratio         PTT - ( 28 Aug 2024 20:57 )  PTT:33.0 sec  Urinalysis Basic - ( 30 Aug 2024 05:14 )    Color: x / Appearance: x / SG: x / pH: x  Gluc: 105 mg/dL / Ketone: x  / Bili: x / Urobili: x   Blood: x / Protein: x / Nitrite: x   Leuk Esterase: x / RBC: x / WBC x   Sq Epi: x / Non Sq Epi: x / Bacteria: x      Magnesium: 2.8 mg/dL ( @ 05:14)  Phosphorus: 9.1 mg/dL ( @ 05:14)          RADIOLOGY & ADDITIONAL TESTS:

## 2024-08-30 NOTE — PROGRESS NOTE ADULT - ASSESSMENT
Assessment: 63F, SBP, Constipation and non-functioning PD catheter, ?Infected PD catheter    Recommendations:   -Patient s/p RIJ shiley and HD yesterday  -NGT fell out yesterday  -OR today for ex-lap, FAREED, possible SBR and PD cath removal  -Continue IV abx   -Remainder of care per primary team

## 2024-08-30 NOTE — PROGRESS NOTE ADULT - SUBJECTIVE AND OBJECTIVE BOX
SUBJECTIVE:    Chief Complaint: Patient is a 63y old  Female who presents with a chief complaint of ESRD needing HD (30 Aug 2024 06:28)      BRIEF HOSPITAL COURSE:    INTERVAL HPI/LAST 24HRS EVENTS: Patient seen and examined at bedside at AM. No clinically acute event overnight.   Denies any chest pain, palpitations, SOB, PND, orthopnea, leg edema, N/V/D, or any other complaints.     MEDICATIONS  (STANDING):  albuterol    0.083%. 2.5 milliGRAM(s) Nebulizer once  calcium acetate 667 milliGRAM(s) Oral three times a day with meals  cefepime  Injectable. 1000 milliGRAM(s) IV Push <User Schedule>  chlorhexidine 2% Cloths 1 Application(s) Topical <User Schedule>  cloNIDine 0.1 milliGRAM(s) Oral two times a day  dextrose 5% + sodium chloride 0.9%. 1000 milliLiter(s) (75 mL/Hr) IV Continuous <Continuous>  dextrose 5%. 1000 milliLiter(s) (50 mL/Hr) IV Continuous <Continuous>  dextrose 5%. 1000 milliLiter(s) (100 mL/Hr) IV Continuous <Continuous>  dextrose 50% Injectable 12.5 Gram(s) IV Push once  dextrose 50% Injectable 25 Gram(s) IV Push once  dextrose 50% Injectable 25 Gram(s) IV Push once  diltiazem    milliGRAM(s) Oral daily  fludroCORTISONE 0.1 milliGRAM(s) Oral once  furosemide   Injectable 20 milliGRAM(s) IV Push daily  glucagon  Injectable 1 milliGRAM(s) IntraMuscular once  hydrALAZINE 100 milliGRAM(s) Oral two times a day  insulin lispro (ADMELOG) corrective regimen sliding scale   SubCutaneous three times a day before meals  labetalol Injectable 10 milliGRAM(s) IV Push every 8 hours  metoprolol tartrate 100 milliGRAM(s) Oral daily  metoprolol tartrate Injectable 5 milliGRAM(s) IV Push every 6 hours  Nephro-yasmany 1 Tablet(s) Oral daily  rosuvastatin 20 milliGRAM(s) Oral at bedtime  sodium zirconium cyclosilicate 10 Gram(s) Oral two times a day  torsemide 20 milliGRAM(s) Oral daily    MEDICATIONS  (PRN):  acetaminophen     Tablet .. 650 milliGRAM(s) Oral every 6 hours PRN Temp greater or equal to 38C (100.4F), Mild Pain (1 - 3)  dextrose Oral Gel 15 Gram(s) Oral once PRN Blood Glucose LESS THAN 70 milliGRAM(s)/deciliter  HYDROmorphone  Injectable 1 milliGRAM(s) IV Push every 4 hours PRN Severe Pain (7 - 10)  melatonin 3 milliGRAM(s) Oral at bedtime PRN Insomnia  ondansetron Injectable 4 milliGRAM(s) IV Push every 8 hours PRN Nausea and/or Vomiting  ondansetron Injectable 4 milliGRAM(s) IV Push every 6 hours PRN Nausea and/or Vomiting  oxyCODONE    IR 10 milliGRAM(s) Oral every 6 hours PRN Severe Pain (7 - 10)  sodium chloride 0.9% lock flush 10 milliLiter(s) IV Push every 1 hour PRN Pre/post blood products, medications, blood draw, and to maintain line patency      Allergies    No Known Allergies    Intolerances        REVIEW OF SYSTEMS:  CONSTITUTIONAL: No fever, weight loss, or fatigue  RESPIRATORY: No cough, wheezing, chills or hemoptysis; No shortness of breath  CARDIOVASCULAR: No chest pain, palpitations, dizziness, or leg swelling  GASTROINTESTINAL: No abdominal or epigastric pain. No nausea, vomiting, or hematemesis; No diarrhea or constipation. No melena or hematochezia.  NEUROLOGICAL: No headaches, loss of strength, numbness, or tremors  MUSCULOSKELETAL: No joint pain or swelling; No muscle, back, or extremity pain    OBJECTIVE:    Vital Signs Last 24 Hrs  T(C): 37.4 (30 Aug 2024 04:47), Max: 37.4 (30 Aug 2024 04:47)  T(F): 99.4 (30 Aug 2024 04:47), Max: 99.4 (30 Aug 2024 04:47)  HR: 111 (30 Aug 2024 04:47) (106 - 118)  BP: 176/104 (30 Aug 2024 04:47) (165/90 - 197/94)  BP(mean): --  RR: 18 (30 Aug 2024 04:47) (18 - 18)  SpO2: 96% (30 Aug 2024 04:47) (91% - 100%)    Parameters below as of 30 Aug 2024 04:47  Patient On (Oxygen Delivery Method): room air        PHYSICAL EXAM:  Constitutional: Alert, interactive, comfortable, NAD  Head and Face: Atraumatic, head and face were normal in appearance  Eyes: Sclera and conjunctiva were normal, pupils were equal in size, round, eyelids normal  ENT: Ears and nose were normal in appearance  Neck: Appearance was normal, neck was supple, No JVD  Pulmonary: Clear to auscultation bilaterally, no rales/crackles, or wheezing  Heart: Regular rate and rhythm, no murmurs, gallops, or pericardial rubs  Abdominal: Soft, nontender, nondistended. No appreciable hepatosplenomegaly. Bowel sounds normal  Skin: Normal color and intact without appreciable rash or abnormal skin lesion  Extremities: Warm without edema. No clubbing.  Pulse: 2+ radial pulse  Neuro: Oriented to person, place, and time    Lab/ Imaging:    LABS:                        8.8    8.68  )-----------( 268      ( 30 Aug 2024 05:14 )             29.8     08-29    135  |  92<L>  |  102.8<H>  ----------------------------<  128<H>  5.6<H>   |  26.0  |  17.19<H>    Ca    8.8      29 Aug 2024 04:07  Phos  11.2     08-29  Mg     3.2     08-29      PT/INR - ( 28 Aug 2024 20:57 )   PT: 11.7 sec;   INR: 1.06 ratio         PTT - ( 28 Aug 2024 20:57 )  PTT:33.0 sec  Urinalysis Basic - ( 29 Aug 2024 04:07 )    Color: x / Appearance: x / SG: x / pH: x  Gluc: 128 mg/dL / Ketone: x  / Bili: x / Urobili: x   Blood: x / Protein: x / Nitrite: x   Leuk Esterase: x / RBC: x / WBC x   Sq Epi: x / Non Sq Epi: x / Bacteria: x      CAPILLARY BLOOD GLUCOSE      POCT Blood Glucose.: 107 mg/dL (30 Aug 2024 00:29)  POCT Blood Glucose.: 100 mg/dL (29 Aug 2024 18:43)  POCT Blood Glucose.: 93 mg/dL (29 Aug 2024 11:29)  POCT Blood Glucose.: 108 mg/dL (29 Aug 2024 08:20)          Imaging Personally Reviewed:  [ ] YES  [ ] NO    Consultant(s) Notes Reviewed:  [ ] YES  [ ] NO    Care Discussed with Consultants/Other Providers [ ] YES  [ ] NO    Plan of Care discussed with Housestaff [ ]YES [ ] NO SUBJECTIVE:    Chief Complaint: Patient is a 63y old  Female who presents with a chief complaint of ESRD needing HD (30 Aug 2024 06:28)      BRIEF HOSPITAL COURSE: 64yo F pt w/ pmhx of ESRD on peritoneal dialysis 5x per week, HTN, HLD, DM presented to ED c/o worsening diffused abdominal pain with associated distention over the past week. Pt reports dialysis cath has not been draining for the last week. She attempts peritoneal dialysis but her unable to get any fluid return. RT IJ Shiley placed 8/28. Scheduled to receive HD this morning. CT A/P shows SBO. NG tube fell out yesterday (8/29). Received HD 8/29. Scheduled for ex-lap 2/2 SBO today.    INTERVAL HPI/LAST 24HRS EVENTS: Patient seen and examined at bedside at AM. No clinically acute event overnight. Denies any chest pain, palpitations, SOB, PND, orthopnea, leg edema, N/V/D, or any other complaints.     TODAY: Patient lying in bed, reports increased abdominal pain. Has had no BM since prior to admission. Nausea and vomiting has resolved. No other acute complaints.    MEDICATIONS  (STANDING):  albuterol    0.083%. 2.5 milliGRAM(s) Nebulizer once  calcium acetate 667 milliGRAM(s) Oral three times a day with meals  cefepime  Injectable. 1000 milliGRAM(s) IV Push <User Schedule>  chlorhexidine 2% Cloths 1 Application(s) Topical <User Schedule>  cloNIDine 0.1 milliGRAM(s) Oral two times a day  dextrose 5% + sodium chloride 0.9%. 1000 milliLiter(s) (75 mL/Hr) IV Continuous <Continuous>  dextrose 5%. 1000 milliLiter(s) (50 mL/Hr) IV Continuous <Continuous>  dextrose 5%. 1000 milliLiter(s) (100 mL/Hr) IV Continuous <Continuous>  dextrose 50% Injectable 12.5 Gram(s) IV Push once  dextrose 50% Injectable 25 Gram(s) IV Push once  dextrose 50% Injectable 25 Gram(s) IV Push once  diltiazem    milliGRAM(s) Oral daily  fludroCORTISONE 0.1 milliGRAM(s) Oral once  furosemide   Injectable 20 milliGRAM(s) IV Push daily  glucagon  Injectable 1 milliGRAM(s) IntraMuscular once  hydrALAZINE 100 milliGRAM(s) Oral two times a day  insulin lispro (ADMELOG) corrective regimen sliding scale   SubCutaneous three times a day before meals  labetalol Injectable 10 milliGRAM(s) IV Push every 8 hours  metoprolol tartrate 100 milliGRAM(s) Oral daily  metoprolol tartrate Injectable 5 milliGRAM(s) IV Push every 6 hours  Nephro-yasmany 1 Tablet(s) Oral daily  rosuvastatin 20 milliGRAM(s) Oral at bedtime  sodium zirconium cyclosilicate 10 Gram(s) Oral two times a day  torsemide 20 milliGRAM(s) Oral daily    MEDICATIONS  (PRN):  acetaminophen     Tablet .. 650 milliGRAM(s) Oral every 6 hours PRN Temp greater or equal to 38C (100.4F), Mild Pain (1 - 3)  dextrose Oral Gel 15 Gram(s) Oral once PRN Blood Glucose LESS THAN 70 milliGRAM(s)/deciliter  HYDROmorphone  Injectable 1 milliGRAM(s) IV Push every 4 hours PRN Severe Pain (7 - 10)  melatonin 3 milliGRAM(s) Oral at bedtime PRN Insomnia  ondansetron Injectable 4 milliGRAM(s) IV Push every 8 hours PRN Nausea and/or Vomiting  ondansetron Injectable 4 milliGRAM(s) IV Push every 6 hours PRN Nausea and/or Vomiting  oxyCODONE    IR 10 milliGRAM(s) Oral every 6 hours PRN Severe Pain (7 - 10)  sodium chloride 0.9% lock flush 10 milliLiter(s) IV Push every 1 hour PRN Pre/post blood products, medications, blood draw, and to maintain line patency      Allergies    No Known Allergies    Intolerances        REVIEW OF SYSTEMS:  CONSTITUTIONAL: No fever, weight loss, or fatigue  RESPIRATORY: No cough, wheezing, chills or hemoptysis; No shortness of breath  CARDIOVASCULAR: No chest pain, palpitations, dizziness, or leg swelling  GASTROINTESTINAL: No abdominal or epigastric pain. No nausea, vomiting, or hematemesis; No diarrhea or constipation. No melena or hematochezia.  NEUROLOGICAL: No headaches, loss of strength, numbness, or tremors  MUSCULOSKELETAL: No joint pain or swelling; No muscle, back, or extremity pain    OBJECTIVE:    Vital Signs Last 24 Hrs  T(C): 37.4 (30 Aug 2024 04:47), Max: 37.4 (30 Aug 2024 04:47)  T(F): 99.4 (30 Aug 2024 04:47), Max: 99.4 (30 Aug 2024 04:47)  HR: 111 (30 Aug 2024 04:47) (106 - 118)  BP: 176/104 (30 Aug 2024 04:47) (165/90 - 197/94)  BP(mean): --  RR: 18 (30 Aug 2024 04:47) (18 - 18)  SpO2: 96% (30 Aug 2024 04:47) (91% - 100%)    Parameters below as of 30 Aug 2024 04:47  Patient On (Oxygen Delivery Method): room air        PHYSICAL EXAM:  Constitutional: Ill appearing female. Lying in bed, NAD. Normal respiratory efforts on NC  Head and Face: Atraumatic, normocephalic  Neck: Supple, No JVD  Pulmonary: CTAB  Heart: RRR. S1/S2. No MRG  Abdominal: Diffuse tenderness to light palpation in all four quadrants. (+) Distention (+) hypoactive BS. No rigidity, rebound, or guarding  Extremities: Warm without edema. No clubbing.  Pulse: 2+ radial pulse  Neuro: AAOx3, no gross focal deficits      Lab/ Imaging:    LABS:                        8.8    8.68  )-----------( 268      ( 30 Aug 2024 05:14 )             29.8     08-29    135  |  92<L>  |  102.8<H>  ----------------------------<  128<H>  5.6<H>   |  26.0  |  17.19<H>    Ca    8.8      29 Aug 2024 04:07  Phos  11.2     08-29  Mg     3.2     08-29      PT/INR - ( 28 Aug 2024 20:57 )   PT: 11.7 sec;   INR: 1.06 ratio         PTT - ( 28 Aug 2024 20:57 )  PTT:33.0 sec  Urinalysis Basic - ( 29 Aug 2024 04:07 )    Color: x / Appearance: x / SG: x / pH: x  Gluc: 128 mg/dL / Ketone: x  / Bili: x / Urobili: x   Blood: x / Protein: x / Nitrite: x   Leuk Esterase: x / RBC: x / WBC x   Sq Epi: x / Non Sq Epi: x / Bacteria: x      CAPILLARY BLOOD GLUCOSE      POCT Blood Glucose.: 107 mg/dL (30 Aug 2024 00:29)  POCT Blood Glucose.: 100 mg/dL (29 Aug 2024 18:43)  POCT Blood Glucose.: 93 mg/dL (29 Aug 2024 11:29)  POCT Blood Glucose.: 108 mg/dL (29 Aug 2024 08:20)          Imaging Personally Reviewed:  [ ] YES  [ ] NO    Consultant(s) Notes Reviewed:  [ ] YES  [ ] NO    Care Discussed with Consultants/Other Providers [ ] YES  [ ] NO    Plan of Care discussed with Housestaff [ ]YES [ ] NO SUBJECTIVE:    Chief Complaint: Patient is a 63y old  Female who presents with a chief complaint of ESRD needing HD (30 Aug 2024 06:28)      BRIEF HOSPITAL COURSE: 62yo F pt w/ pmhx of ESRD on peritoneal dialysis 5x per week, HTN, HLD, DM presented to ED c/o worsening diffused abdominal pain with associated distention over the past week. Pt reports dialysis cath has not been draining for the last week. She attempts peritoneal dialysis but her unable to get any fluid return. RT IJ Shiley placed 8/28. Scheduled to receive HD this morning. CT A/P shows SBO. pt removed NG tube yesterday (8/29). Received HD 8/29. Scheduled for ex-lap 2/2 SBO today.    INTERVAL HPI/LAST 24HRS EVENTS: Patient seen and examined at bedside at AM. No clinically acute event overnight. Denies any chest pain, palpitations, SOB, PND, orthopnea, leg edema, N/V/D, or any other complaints.     TODAY: Patient lying in bed, reports increased abdominal pain. Has had no BM since prior to admission. Nausea and vomiting has resolved. No other acute complaints.    MEDICATIONS  (STANDING):  albuterol    0.083%. 2.5 milliGRAM(s) Nebulizer once  calcium acetate 667 milliGRAM(s) Oral three times a day with meals  cefepime  Injectable. 1000 milliGRAM(s) IV Push <User Schedule>  chlorhexidine 2% Cloths 1 Application(s) Topical <User Schedule>  cloNIDine 0.1 milliGRAM(s) Oral two times a day  dextrose 5% + sodium chloride 0.9%. 1000 milliLiter(s) (75 mL/Hr) IV Continuous <Continuous>  dextrose 5%. 1000 milliLiter(s) (50 mL/Hr) IV Continuous <Continuous>  dextrose 5%. 1000 milliLiter(s) (100 mL/Hr) IV Continuous <Continuous>  dextrose 50% Injectable 12.5 Gram(s) IV Push once  dextrose 50% Injectable 25 Gram(s) IV Push once  dextrose 50% Injectable 25 Gram(s) IV Push once  diltiazem    milliGRAM(s) Oral daily  fludroCORTISONE 0.1 milliGRAM(s) Oral once  furosemide   Injectable 20 milliGRAM(s) IV Push daily  glucagon  Injectable 1 milliGRAM(s) IntraMuscular once  hydrALAZINE 100 milliGRAM(s) Oral two times a day  insulin lispro (ADMELOG) corrective regimen sliding scale   SubCutaneous three times a day before meals  labetalol Injectable 10 milliGRAM(s) IV Push every 8 hours  metoprolol tartrate 100 milliGRAM(s) Oral daily  metoprolol tartrate Injectable 5 milliGRAM(s) IV Push every 6 hours  Nephro-yasmany 1 Tablet(s) Oral daily  rosuvastatin 20 milliGRAM(s) Oral at bedtime  sodium zirconium cyclosilicate 10 Gram(s) Oral two times a day  torsemide 20 milliGRAM(s) Oral daily    MEDICATIONS  (PRN):  acetaminophen     Tablet .. 650 milliGRAM(s) Oral every 6 hours PRN Temp greater or equal to 38C (100.4F), Mild Pain (1 - 3)  dextrose Oral Gel 15 Gram(s) Oral once PRN Blood Glucose LESS THAN 70 milliGRAM(s)/deciliter  HYDROmorphone  Injectable 1 milliGRAM(s) IV Push every 4 hours PRN Severe Pain (7 - 10)  melatonin 3 milliGRAM(s) Oral at bedtime PRN Insomnia  ondansetron Injectable 4 milliGRAM(s) IV Push every 8 hours PRN Nausea and/or Vomiting  ondansetron Injectable 4 milliGRAM(s) IV Push every 6 hours PRN Nausea and/or Vomiting  oxyCODONE    IR 10 milliGRAM(s) Oral every 6 hours PRN Severe Pain (7 - 10)  sodium chloride 0.9% lock flush 10 milliLiter(s) IV Push every 1 hour PRN Pre/post blood products, medications, blood draw, and to maintain line patency      Allergies    No Known Allergies    Intolerances        REVIEW OF SYSTEMS:  CONSTITUTIONAL: No fever, weight loss, or fatigue  RESPIRATORY: No cough, wheezing, chills or hemoptysis; No shortness of breath  CARDIOVASCULAR: No chest pain, palpitations, dizziness, or leg swelling  GASTROINTESTINAL: No abdominal or epigastric pain. No nausea, vomiting, or hematemesis; No diarrhea or constipation. No melena or hematochezia.  NEUROLOGICAL: No headaches, loss of strength, numbness, or tremors  MUSCULOSKELETAL: No joint pain or swelling; No muscle, back, or extremity pain    OBJECTIVE:    Vital Signs Last 24 Hrs  T(C): 37.4 (30 Aug 2024 04:47), Max: 37.4 (30 Aug 2024 04:47)  T(F): 99.4 (30 Aug 2024 04:47), Max: 99.4 (30 Aug 2024 04:47)  HR: 111 (30 Aug 2024 04:47) (106 - 118)  BP: 176/104 (30 Aug 2024 04:47) (165/90 - 197/94)  BP(mean): --  RR: 18 (30 Aug 2024 04:47) (18 - 18)  SpO2: 96% (30 Aug 2024 04:47) (91% - 100%)    Parameters below as of 30 Aug 2024 04:47  Patient On (Oxygen Delivery Method): room air        PHYSICAL EXAM:  Constitutional: Ill appearing female. Lying in bed, NAD. Normal respiratory efforts on NC  Head and Face: Atraumatic, normocephalic  Neck: Supple, No JVD  Pulmonary: CTAB  Heart: RRR. S1/S2. No MRG  Abdominal: Diffuse tenderness to light palpation in all four quadrants. (+) Distention (+) hypoactive BS. No rigidity, rebound, or guarding  Extremities: Warm without edema. No clubbing.  Pulse: 2+ radial pulse  Neuro: AAOx3, no gross focal deficits      Lab/ Imaging:    LABS:                        8.8    8.68  )-----------( 268      ( 30 Aug 2024 05:14 )             29.8     08-29    135  |  92<L>  |  102.8<H>  ----------------------------<  128<H>  5.6<H>   |  26.0  |  17.19<H>    Ca    8.8      29 Aug 2024 04:07  Phos  11.2     08-29  Mg     3.2     08-29      PT/INR - ( 28 Aug 2024 20:57 )   PT: 11.7 sec;   INR: 1.06 ratio         PTT - ( 28 Aug 2024 20:57 )  PTT:33.0 sec  Urinalysis Basic - ( 29 Aug 2024 04:07 )    Color: x / Appearance: x / SG: x / pH: x  Gluc: 128 mg/dL / Ketone: x  / Bili: x / Urobili: x   Blood: x / Protein: x / Nitrite: x   Leuk Esterase: x / RBC: x / WBC x   Sq Epi: x / Non Sq Epi: x / Bacteria: x      CAPILLARY BLOOD GLUCOSE      POCT Blood Glucose.: 107 mg/dL (30 Aug 2024 00:29)  POCT Blood Glucose.: 100 mg/dL (29 Aug 2024 18:43)  POCT Blood Glucose.: 93 mg/dL (29 Aug 2024 11:29)  POCT Blood Glucose.: 108 mg/dL (29 Aug 2024 08:20)          Imaging Personally Reviewed:  [ ] YES  [ ] NO    Consultant(s) Notes Reviewed:  [ ] YES  [ ] NO    Care Discussed with Consultants/Other Providers [ ] YES  [ ] NO    Plan of Care discussed with Housestaff [ ]YES [ ] NO SUBJECTIVE:    Chief Complaint: Patient is a 63y old  Female who presents with a chief complaint of ESRD needing HD (30 Aug 2024 06:28)      BRIEF HOSPITAL COURSE: 62yo F pt w/ pmhx of ESRD on peritoneal dialysis 5x per week, HTN, HLD, DM presented to ED c/o worsening diffused abdominal pain with associated distention over the past week. Pt reports dialysis cath has not been draining for the last week. She attempts peritoneal dialysis but her unable to get any fluid return. RT IJ Shiley placed 8/28. Scheduled to receive HD this morning. CT A/P shows SBO. pt removed NG tube yesterday (8/29). Received HD 8/29. Scheduled for ex-lap 2/2 SBO today.    INTERVAL HPI/LAST 24HRS EVENTS: Patient seen and examined at bedside at AM. No clinically acute event overnight. Denies any chest pain, palpitations, SOB, PND, orthopnea, leg edema, N/V/D, or any other complaints.     TODAY: Patient lying in bed, reports increased abdominal pain. Has had no BM since prior to admission. Nausea and vomiting has resolved. No other acute complaints.    MEDICATIONS  (STANDING):  albuterol    0.083%. 2.5 milliGRAM(s) Nebulizer once  calcium acetate 667 milliGRAM(s) Oral three times a day with meals  cefepime  Injectable. 1000 milliGRAM(s) IV Push <User Schedule>  chlorhexidine 2% Cloths 1 Application(s) Topical <User Schedule>  cloNIDine 0.1 milliGRAM(s) Oral two times a day  dextrose 5% + sodium chloride 0.9%. 1000 milliLiter(s) (75 mL/Hr) IV Continuous <Continuous>  dextrose 5%. 1000 milliLiter(s) (50 mL/Hr) IV Continuous <Continuous>  dextrose 5%. 1000 milliLiter(s) (100 mL/Hr) IV Continuous <Continuous>  dextrose 50% Injectable 12.5 Gram(s) IV Push once  dextrose 50% Injectable 25 Gram(s) IV Push once  dextrose 50% Injectable 25 Gram(s) IV Push once  diltiazem    milliGRAM(s) Oral daily  fludroCORTISONE 0.1 milliGRAM(s) Oral once  furosemide   Injectable 20 milliGRAM(s) IV Push daily  glucagon  Injectable 1 milliGRAM(s) IntraMuscular once  hydrALAZINE 100 milliGRAM(s) Oral two times a day  insulin lispro (ADMELOG) corrective regimen sliding scale   SubCutaneous three times a day before meals  labetalol Injectable 10 milliGRAM(s) IV Push every 8 hours  metoprolol tartrate 100 milliGRAM(s) Oral daily  metoprolol tartrate Injectable 5 milliGRAM(s) IV Push every 6 hours  Nephro-yasmany 1 Tablet(s) Oral daily  rosuvastatin 20 milliGRAM(s) Oral at bedtime  sodium zirconium cyclosilicate 10 Gram(s) Oral two times a day  torsemide 20 milliGRAM(s) Oral daily    MEDICATIONS  (PRN):  acetaminophen     Tablet .. 650 milliGRAM(s) Oral every 6 hours PRN Temp greater or equal to 38C (100.4F), Mild Pain (1 - 3)  dextrose Oral Gel 15 Gram(s) Oral once PRN Blood Glucose LESS THAN 70 milliGRAM(s)/deciliter  HYDROmorphone  Injectable 1 milliGRAM(s) IV Push every 4 hours PRN Severe Pain (7 - 10)  melatonin 3 milliGRAM(s) Oral at bedtime PRN Insomnia  ondansetron Injectable 4 milliGRAM(s) IV Push every 8 hours PRN Nausea and/or Vomiting  ondansetron Injectable 4 milliGRAM(s) IV Push every 6 hours PRN Nausea and/or Vomiting  oxyCODONE    IR 10 milliGRAM(s) Oral every 6 hours PRN Severe Pain (7 - 10)  sodium chloride 0.9% lock flush 10 milliLiter(s) IV Push every 1 hour PRN Pre/post blood products, medications, blood draw, and to maintain line patency      Allergies    No Known Allergies    Intolerances          OBJECTIVE:    Vital Signs Last 24 Hrs  T(C): 37.4 (30 Aug 2024 04:47), Max: 37.4 (30 Aug 2024 04:47)  T(F): 99.4 (30 Aug 2024 04:47), Max: 99.4 (30 Aug 2024 04:47)  HR: 111 (30 Aug 2024 04:47) (106 - 118)  BP: 176/104 (30 Aug 2024 04:47) (165/90 - 197/94)  BP(mean): --  RR: 18 (30 Aug 2024 04:47) (18 - 18)  SpO2: 96% (30 Aug 2024 04:47) (91% - 100%)    Parameters below as of 30 Aug 2024 04:47  Patient On (Oxygen Delivery Method): room air        PHYSICAL EXAM:  Constitutional: Ill appearing female. Lying in bed, NAD. Normal respiratory efforts on NC  Head and Face: Atraumatic, normocephalic  Neck: Supple, No JVD. RT IJ Shiley in place  Pulmonary: CTAB  Heart: RRR. S1/S2. No MRG  Abdominal: Diffuse tenderness to light palpation in all four quadrants. (+) Distention (+) hypoactive BS. No rigidity, rebound, or guarding  Extremities: Warm without edema. No clubbing.  Pulse: 2+ radial pulse  Neuro: AAOx3, no gross focal deficits      Lab/ Imaging:    LABS:                        8.8    8.68  )-----------( 268      ( 30 Aug 2024 05:14 )             29.8     08-29    135  |  92<L>  |  102.8<H>  ----------------------------<  128<H>  5.6<H>   |  26.0  |  17.19<H>    Ca    8.8      29 Aug 2024 04:07  Phos  11.2     08-29  Mg     3.2     08-29      PT/INR - ( 28 Aug 2024 20:57 )   PT: 11.7 sec;   INR: 1.06 ratio         PTT - ( 28 Aug 2024 20:57 )  PTT:33.0 sec  Urinalysis Basic - ( 29 Aug 2024 04:07 )    Color: x / Appearance: x / SG: x / pH: x  Gluc: 128 mg/dL / Ketone: x  / Bili: x / Urobili: x   Blood: x / Protein: x / Nitrite: x   Leuk Esterase: x / RBC: x / WBC x   Sq Epi: x / Non Sq Epi: x / Bacteria: x      CAPILLARY BLOOD GLUCOSE      POCT Blood Glucose.: 107 mg/dL (30 Aug 2024 00:29)  POCT Blood Glucose.: 100 mg/dL (29 Aug 2024 18:43)  POCT Blood Glucose.: 93 mg/dL (29 Aug 2024 11:29)  POCT Blood Glucose.: 108 mg/dL (29 Aug 2024 08:20)          Imaging Personally Reviewed:  [ ] YES  [ ] NO    Consultant(s) Notes Reviewed:  [ ] YES  [ ] NO    Care Discussed with Consultants/Other Providers [ ] YES  [ ] NO    Plan of Care discussed with Housestaff [ ]YES [ ] NO

## 2024-08-30 NOTE — PROGRESS NOTE ADULT - SUBJECTIVE AND OBJECTIVE BOX
Pt admitted with PD [facility] with SPontaneos peritonitis  Pt treated with abx Developed SBO as well Pt to go for ex lap rtemoval of PD catheter and FAREED  Pt explained the procedure benfits riska and alternatoives as well

## 2024-08-30 NOTE — PROGRESS NOTE ADULT - ASSESSMENT
Assessment:  Yue Marin is a 63F with SBO, spontaneous bacterial peritonitis, and ?infected PD catheter. Booked for OR today 8/30 for ex-lap, FAREED, possible SBR, and PD catheter removal.     Plan:  -continue cefepime  -f/u cultures  -HD today before OR

## 2024-08-30 NOTE — PROGRESS NOTE ADULT - SUBJECTIVE AND OBJECTIVE BOX
SUBJECTIVE / OVERNIGHT EVENTS:  Patient examined at bedside this morning. Not in acute distress. No overnight events. Mildly tachycardic in 110s but BP stable in 170/100 range. Hemodialysis yesterday with 1L out. NGT came out during HD. Planned for another HD today before OR.  Pain well tolerated. Booked for OR today 8/30 for ex-lap FAREED, possible SBR and PD catheter removal. NPO since midnight.      ADDITIONAL REVIEW OF SYSTEMS:    MEDICATIONS  (STANDING):  albuterol    0.083%. 2.5 milliGRAM(s) Nebulizer once  calcium acetate 667 milliGRAM(s) Oral three times a day with meals  cefepime  Injectable. 1000 milliGRAM(s) IV Push <User Schedule>  chlorhexidine 2% Cloths 1 Application(s) Topical <User Schedule>  cloNIDine 0.1 milliGRAM(s) Oral two times a day  dextrose 5% + sodium chloride 0.9%. 1000 milliLiter(s) (75 mL/Hr) IV Continuous <Continuous>  dextrose 5%. 1000 milliLiter(s) (100 mL/Hr) IV Continuous <Continuous>  dextrose 5%. 1000 milliLiter(s) (50 mL/Hr) IV Continuous <Continuous>  dextrose 50% Injectable 25 Gram(s) IV Push once  dextrose 50% Injectable 25 Gram(s) IV Push once  dextrose 50% Injectable 12.5 Gram(s) IV Push once  diltiazem    milliGRAM(s) Oral daily  fludroCORTISONE 0.1 milliGRAM(s) Oral once  furosemide   Injectable 20 milliGRAM(s) IV Push daily  glucagon  Injectable 1 milliGRAM(s) IntraMuscular once  hydrALAZINE 100 milliGRAM(s) Oral two times a day  insulin lispro (ADMELOG) corrective regimen sliding scale   SubCutaneous three times a day before meals  labetalol Injectable 10 milliGRAM(s) IV Push every 8 hours  metoprolol tartrate 100 milliGRAM(s) Oral daily  metoprolol tartrate Injectable 5 milliGRAM(s) IV Push every 6 hours  Nephro-yasmany 1 Tablet(s) Oral daily  rosuvastatin 20 milliGRAM(s) Oral at bedtime  sodium zirconium cyclosilicate 10 Gram(s) Oral two times a day  torsemide 20 milliGRAM(s) Oral daily    MEDICATIONS  (PRN):  acetaminophen     Tablet .. 650 milliGRAM(s) Oral every 6 hours PRN Temp greater or equal to 38C (100.4F), Mild Pain (1 - 3)  dextrose Oral Gel 15 Gram(s) Oral once PRN Blood Glucose LESS THAN 70 milliGRAM(s)/deciliter  HYDROmorphone  Injectable 1 milliGRAM(s) IV Push every 4 hours PRN Severe Pain (7 - 10)  melatonin 3 milliGRAM(s) Oral at bedtime PRN Insomnia  ondansetron Injectable 4 milliGRAM(s) IV Push every 8 hours PRN Nausea and/or Vomiting  ondansetron Injectable 4 milliGRAM(s) IV Push every 6 hours PRN Nausea and/or Vomiting  oxyCODONE    IR 10 milliGRAM(s) Oral every 6 hours PRN Severe Pain (7 - 10)  sodium chloride 0.9% lock flush 10 milliLiter(s) IV Push every 1 hour PRN Pre/post blood products, medications, blood draw, and to maintain line patency    CAPILLARY BLOOD GLUCOSE      POCT Blood Glucose.: 107 mg/dL (30 Aug 2024 00:29)  POCT Blood Glucose.: 100 mg/dL (29 Aug 2024 18:43)  POCT Blood Glucose.: 93 mg/dL (29 Aug 2024 11:29)  POCT Blood Glucose.: 108 mg/dL (29 Aug 2024 08:20)    I&O's Summary    29 Aug 2024 07:01  -  30 Aug 2024 06:27  --------------------------------------------------------  IN: 0 mL / OUT: 1000 mL / NET: -1000 mL        PHYSICAL EXAM:  Vital Signs Last 24 Hrs  T(C): 37.4 (30 Aug 2024 04:47), Max: 37.4 (30 Aug 2024 04:47)  T(F): 99.4 (30 Aug 2024 04:47), Max: 99.4 (30 Aug 2024 04:47)  HR: 111 (30 Aug 2024 04:47) (106 - 118)  BP: 176/104 (30 Aug 2024 04:47) (165/90 - 197/94)  BP(mean): --  RR: 18 (30 Aug 2024 04:47) (18 - 18)  SpO2: 96% (30 Aug 2024 04:47) (91% - 100%)    Parameters below as of 30 Aug 2024 04:47  Patient On (Oxygen Delivery Method): room air      CONSTITUTIONAL: NAD, well-developed, well-groomed  RESPIRATORY: Normal respiratory effort; lungs are clear to auscultation bilaterally  CARDIOVASCULAR: Regular rate and rhythm  ABDOMEN: Nontender to palpation, normoactive bowel sounds, no rebound/guarding  PSYCH: A+O to person, place, and time; affect appropriate      LABS:                        8.8    11.35 )-----------( 386      ( 29 Aug 2024 04:07 )             29.5     08-29    135  |  92<L>  |  102.8<H>  ----------------------------<  128<H>  5.6<H>   |  26.0  |  17.19<H>    Ca    8.8      29 Aug 2024 04:07  Phos  11.2     08-29  Mg     3.2     08-29      PT/INR - ( 28 Aug 2024 20:57 )   PT: 11.7 sec;   INR: 1.06 ratio         PTT - ( 28 Aug 2024 20:57 )  PTT:33.0 sec      Urinalysis Basic - ( 29 Aug 2024 04:07 )    Color: x / Appearance: x / SG: x / pH: x  Gluc: 128 mg/dL / Ketone: x  / Bili: x / Urobili: x   Blood: x / Protein: x / Nitrite: x   Leuk Esterase: x / RBC: x / WBC x   Sq Epi: x / Non Sq Epi: x / Bacteria: x        Culture - Blood (collected 27 Aug 2024 11:52)  Source: .Blood Blood-Peripheral  Preliminary Report (29 Aug 2024 20:01):    No growth at 48 Hours    Culture - Blood (collected 27 Aug 2024 11:50)  Source: .Blood Blood-Peripheral  Preliminary Report (29 Aug 2024 20:01):    No growth at 48 Hours

## 2024-08-30 NOTE — PROGRESS NOTE ADULT - ATTENDING COMMENTS
Agree with above   Patient seen and examined together with medical residents. Reports abdominal pain, not passing any gas. Removed NG tube yesterday, and refused to put another one. No nausea or vomiting.   Had 1 session of HD yesterday.   Vital signs,  labs and imaging  reviewed   Assessment and plan discussed with patient, Dr. Lowe from nephrology, surgical team and residents     SBO - plan for OR  later on today   Keep NPO     SBP   Afebrile, WBC normal   Cultures results reviewed   Antibiotics changed to Zosyn today     ESRD on peritoneal HD    Concern for non functioning peritoneal catheter   Likely will be removed today   On HD now, next session after OR today     The rest as above

## 2024-08-30 NOTE — PHARMACOTHERAPY INTERVENTION NOTE - COMMENTS
Recommended discontinuing Lokelma 10g BID because patient has started hemodialysis and potassium binding agent no longer indicated   s/w Dr. Arpita Sales, intervention accepted

## 2024-08-30 NOTE — PROGRESS NOTE ADULT - ASSESSMENT
- 63F with ESRD on PD (5 days/week), HTN, DM admitted for abdominal pain, distention, subjective fevers, and PD catheter malfunction.     ID consulted for peritonitis associated with PD catheter     - Abdomen distended and diffusely tender. Catheter site OK   - Peritoneal fluid with WBC 16,800 (88% neutrophils) c/w infectious process  - Peritoneal fluid culture with Achromobacter xylosoxidans    - susceptibilities as above   - BCX ngtd   - CT AP with findings c/w SBO   - Plan for ex-lap for SBO + PD catheter removal   - s/p Joseph on 8/27   - new leukocytosis   - mild tachy but BP stable     - d/c cefepime       d/w ASP/clinical pharmacist and Dr. Hendricks  - 63F with ESRD on PD (5 days/week), HTN, DM admitted for abdominal pain, distention, subjective fevers, and PD catheter malfunction.     ID consulted for peritonitis associated with PD catheter     - Abdomen distended and diffusely tender. Catheter site OK   - Peritoneal fluid with WBC 16,800 (88% neutrophils) c/w infectious process  - Peritoneal fluid culture with Achromobacter xylosoxidans    - susceptibilities as above   - BCX ngtd   - CT AP with findings c/w SBO   - Plan for ex-lap for SBO + PD catheter removal today   - s/p Joseph on 8/27   - HD session yesterday   - mild tachycardia; hypertensive     - d/c cefepime   - start piperacillin-tazobactam (ordered)    d/w ASP/clinical pharmacist and Dr. Hendricks

## 2024-08-31 LAB
ANION GAP SERPL CALC-SCNC: 21 MMOL/L — HIGH (ref 5–17)
BUN SERPL-MCNC: 75.4 MG/DL — HIGH (ref 8–20)
CALCIUM SERPL-MCNC: 8.2 MG/DL — LOW (ref 8.4–10.5)
CHLORIDE SERPL-SCNC: 96 MMOL/L — SIGNIFICANT CHANGE UP (ref 96–108)
CO2 SERPL-SCNC: 21 MMOL/L — LOW (ref 22–29)
CREAT SERPL-MCNC: 13.15 MG/DL — HIGH (ref 0.5–1.3)
EGFR: 3 ML/MIN/1.73M2 — LOW
GLUCOSE BLDC GLUCOMTR-MCNC: 107 MG/DL — HIGH (ref 70–99)
GLUCOSE BLDC GLUCOMTR-MCNC: 113 MG/DL — HIGH (ref 70–99)
GLUCOSE BLDC GLUCOMTR-MCNC: 118 MG/DL — HIGH (ref 70–99)
GLUCOSE SERPL-MCNC: 118 MG/DL — HIGH (ref 70–99)
GRAM STN FLD: SIGNIFICANT CHANGE UP
HCT VFR BLD CALC: 29.9 % — LOW (ref 34.5–45)
HGB BLD-MCNC: 8.9 G/DL — LOW (ref 11.5–15.5)
IRON SATN MFR SERPL: 14 UG/DL — LOW (ref 37–145)
IRON SATN MFR SERPL: SIGNIFICANT CHANGE UP % (ref 14–50)
MAGNESIUM SERPL-MCNC: 2.9 MG/DL — HIGH (ref 1.6–2.6)
MCHC RBC-ENTMCNC: 24 PG — LOW (ref 27–34)
MCHC RBC-ENTMCNC: 29.8 GM/DL — LOW (ref 32–36)
MCV RBC AUTO: 80.6 FL — SIGNIFICANT CHANGE UP (ref 80–100)
PHOSPHATE SERPL-MCNC: 12.3 MG/DL — HIGH (ref 2.4–4.7)
PLATELET # BLD AUTO: 293 K/UL — SIGNIFICANT CHANGE UP (ref 150–400)
POTASSIUM SERPL-MCNC: 5.6 MMOL/L — HIGH (ref 3.5–5.3)
POTASSIUM SERPL-SCNC: 5.6 MMOL/L — HIGH (ref 3.5–5.3)
RBC # BLD: 3.71 M/UL — LOW (ref 3.8–5.2)
RBC # FLD: 15.7 % — HIGH (ref 10.3–14.5)
SODIUM SERPL-SCNC: 138 MMOL/L — SIGNIFICANT CHANGE UP (ref 135–145)
SPECIMEN SOURCE: SIGNIFICANT CHANGE UP
TIBC SERPL-MCNC: SIGNIFICANT CHANGE UP UG/DL (ref 220–430)
TRANSFERRIN SERPL-MCNC: <80 MG/DL — LOW (ref 192–382)
WBC # BLD: 10.25 K/UL — SIGNIFICANT CHANGE UP (ref 3.8–10.5)
WBC # FLD AUTO: 10.25 K/UL — SIGNIFICANT CHANGE UP (ref 3.8–10.5)

## 2024-08-31 PROCEDURE — 99233 SBSQ HOSP IP/OBS HIGH 50: CPT | Mod: GC

## 2024-08-31 RX ORDER — EPOETIN ALFA 3000 [IU]/ML
10000 SOLUTION INTRAVENOUS; SUBCUTANEOUS
Refills: 0 | Status: DISCONTINUED | OUTPATIENT
Start: 2024-08-31 | End: 2024-09-12

## 2024-08-31 RX ORDER — TUBERCULIN,PPD,MULTI-PUNCTURE
5 SKIN TEST INTRADERMAL ONCE
Refills: 0 | Status: COMPLETED | OUTPATIENT
Start: 2024-08-31 | End: 2024-09-04

## 2024-08-31 RX ORDER — HYDRALAZINE HCL 50 MG
20 TABLET ORAL ONCE
Refills: 0 | Status: COMPLETED | OUTPATIENT
Start: 2024-08-31 | End: 2024-08-31

## 2024-08-31 RX ORDER — CEFEPIME 2 G/1
1000 INJECTION, POWDER, FOR SOLUTION INTRAVENOUS DAILY
Refills: 0 | Status: DISCONTINUED | OUTPATIENT
Start: 2024-08-31 | End: 2024-08-31

## 2024-08-31 RX ORDER — CLONIDINE HCL 0.2 MG
1 TABLET ORAL ONCE
Refills: 0 | Status: COMPLETED | OUTPATIENT
Start: 2024-08-31 | End: 2024-08-31

## 2024-08-31 RX ORDER — METOCLOPRAMIDE HCL 5 MG
10 TABLET ORAL EVERY 12 HOURS
Refills: 0 | Status: DISCONTINUED | OUTPATIENT
Start: 2024-08-31 | End: 2024-09-12

## 2024-08-31 RX ORDER — DILTIAZEM HYDROCHLORIDE 5 MG/ML
20 INJECTION INTRAVENOUS EVERY 4 HOURS
Refills: 0 | Status: DISCONTINUED | OUTPATIENT
Start: 2024-08-31 | End: 2024-09-04

## 2024-08-31 RX ORDER — PIPERACILLIN SODIUM AND TAZOBACTAM SODIUM 3; .375 G/15ML; G/15ML
3.38 INJECTION, POWDER, FOR SOLUTION INTRAVENOUS EVERY 12 HOURS
Refills: 0 | Status: COMPLETED | OUTPATIENT
Start: 2024-08-31 | End: 2024-09-07

## 2024-08-31 RX ORDER — HYDRALAZINE HCL 50 MG
10 TABLET ORAL
Refills: 0 | Status: DISCONTINUED | OUTPATIENT
Start: 2024-08-31 | End: 2024-09-02

## 2024-08-31 RX ADMIN — ONDANSETRON 4 MILLIGRAM(S): 2 INJECTION, SOLUTION INTRAMUSCULAR; INTRAVENOUS at 03:21

## 2024-08-31 RX ADMIN — Medication 10 MILLIGRAM(S): at 15:06

## 2024-08-31 RX ADMIN — Medication 10 MILLIGRAM(S): at 18:54

## 2024-08-31 RX ADMIN — DILTIAZEM HYDROCHLORIDE 20 MILLIGRAM(S): 5 INJECTION INTRAVENOUS at 18:53

## 2024-08-31 RX ADMIN — Medication 75 MILLILITER(S): at 22:36

## 2024-08-31 RX ADMIN — Medication 10 MILLIGRAM(S): at 18:51

## 2024-08-31 RX ADMIN — METOPROLOL TARTRATE 5 MILLIGRAM(S): 100 TABLET ORAL at 13:34

## 2024-08-31 RX ADMIN — Medication 10 MILLIGRAM(S): at 16:57

## 2024-08-31 RX ADMIN — Medication 20 MILLIGRAM(S): at 09:05

## 2024-08-31 RX ADMIN — Medication 10 MILLIGRAM(S): at 15:04

## 2024-08-31 RX ADMIN — Medication 10 MILLIGRAM(S): at 18:50

## 2024-08-31 RX ADMIN — Medication 10 MILLIGRAM(S): at 05:11

## 2024-08-31 RX ADMIN — ACETAMINOPHEN 400 MILLIGRAM(S): 325 TABLET ORAL at 13:35

## 2024-08-31 RX ADMIN — Medication 10 MILLIGRAM(S): at 20:02

## 2024-08-31 RX ADMIN — DILTIAZEM HYDROCHLORIDE 20 MILLIGRAM(S): 5 INJECTION INTRAVENOUS at 22:04

## 2024-08-31 RX ADMIN — PIPERACILLIN SODIUM AND TAZOBACTAM SODIUM 25 GRAM(S): 3; .375 INJECTION, POWDER, FOR SOLUTION INTRAVENOUS at 22:09

## 2024-08-31 RX ADMIN — FENTANYL CITRATE 30 MILLILITER(S): 50 INJECTION INTRAMUSCULAR; INTRAVENOUS at 07:16

## 2024-08-31 RX ADMIN — Medication 10 MILLIGRAM(S): at 22:05

## 2024-08-31 RX ADMIN — METOPROLOL TARTRATE 5 MILLIGRAM(S): 100 TABLET ORAL at 05:11

## 2024-08-31 RX ADMIN — FENTANYL CITRATE 30 MILLILITER(S): 50 INJECTION INTRAMUSCULAR; INTRAVENOUS at 19:29

## 2024-08-31 RX ADMIN — DILTIAZEM HYDROCHLORIDE 20 MILLIGRAM(S): 5 INJECTION INTRAVENOUS at 15:05

## 2024-08-31 RX ADMIN — FENTANYL CITRATE 30 MILLILITER(S): 50 INJECTION INTRAMUSCULAR; INTRAVENOUS at 22:29

## 2024-08-31 RX ADMIN — METOPROLOL TARTRATE 5 MILLIGRAM(S): 100 TABLET ORAL at 01:03

## 2024-08-31 RX ADMIN — FENTANYL CITRATE 30 MILLILITER(S): 50 INJECTION INTRAMUSCULAR; INTRAVENOUS at 08:47

## 2024-08-31 RX ADMIN — METOPROLOL TARTRATE 5 MILLIGRAM(S): 100 TABLET ORAL at 18:55

## 2024-08-31 RX ADMIN — EPOETIN ALFA 10000 UNIT(S): 3000 SOLUTION INTRAVENOUS; SUBCUTANEOUS at 11:42

## 2024-08-31 RX ADMIN — Medication 10 MILLIGRAM(S): at 22:04

## 2024-08-31 RX ADMIN — Medication 1 PATCH: at 15:05

## 2024-08-31 RX ADMIN — CEFEPIME 1000 MILLIGRAM(S): 2 INJECTION, POWDER, FOR SOLUTION INTRAVENOUS at 15:06

## 2024-08-31 NOTE — PROGRESS NOTE ADULT - ASSESSMENT
63y Female w/ PMH of ESRD on peritoneal dialysis 5 x per week, HTN, HLD, and DM admitted for peritonitis and peritoneal dialysis catheter dysfunction, needing HD. Now found to have SBO, pending ex-lap    # PD catheter associated peritonitis  # PD catheter malfunction  # SBO POD #1  - 8/30/24- Exploratory laparotomy with lysis of adhesions. Suspected area of small bowel perforation resected, approximately 15cm. Side-to-side small bowel anastomosis. PD catheter removed.  - NPO, IVF  - PCA Dilaudid and Tylenol prn for pain  - Inc spirometry, Mobilize  - Peritoneal fluid culture with Achromobacter xylosoxidans  - On Zosyn.     # Accelerated HTN  - pt was on Clonidine that was abruptly stopped when NPO.  -possibly rebound HTN now  -changed meds to clonidine patch; hydralazine IVP, Labetalol IVP, Cardizem IVP  -assess for better control once clonidine patch effective. if not, then consider GGT    # ESRD s/p PD now on HD  - now started on HD (8/29) via Right IJ Shiley  - hep panel done. Ordered PPD  - Will likely need HD for some time    #Constipation  - Miralax when PO started    #DM2- controlled  - c/w insulin lispro  - Q 6 hr accuchecks when NPO    #HLD  - c/w statin, metoprolol, and diltiazem    #Hyperkalemia   - c/w Lokelma  - monitor BMP daily    DVT ppx: SCDs  Dispo: clinically active

## 2024-08-31 NOTE — PROGRESS NOTE ADULT - SUBJECTIVE AND OBJECTIVE BOX
HEALTH ISSUES - PROBLEM Dx:    CC- SBO s/p ex lap and resection with end to end anastomosis    BRIEF HOSPITAL COURSE: 64yo F pt w/ pmhx of ESRD on peritoneal dialysis 5x per week, HTN, HLD, DM presented to ED c/o worsening diffused abdominal pain with associated distention over the past week. Pt reports dialysis cath has not been draining for the last week. She attempts peritoneal dialysis but her unable to get any fluid return. RT IJ Shiley placed 8/28. Scheduled to receive HD this morning. CT A/P shows SBO. pt removed NG tube yesterday (8/29). Received HD 8/29. Ex-lap 2/2 SBO on 8/30    INTERVAL HPI/ OVERNIGHT EVENTS:    Since OR, pt is on PCA pump for pain control  Accelerated HTN as she is NPO and not optimized on IV BP meds  denies N, V, cannot recall passing gas  lying in bed and whispering and nodding head in responses  states she feels sore and feels like eating and has some hunger    REVIEW OF SYSTEMS:    as tolerated    Vital Signs Last 24 Hrs  T(C): 36.7 (31 Aug 2024 17:02), Max: 36.7 (31 Aug 2024 12:45)  T(F): 98 (31 Aug 2024 17:02), Max: 98.1 (31 Aug 2024 12:45)  HR: 119 (31 Aug 2024 17:02) (94 - 123)  BP: 198/71 (31 Aug 2024 17:02) (119/66 - 213/114)  BP(mean): 94 (30 Aug 2024 20:30) (80 - 94)  RR: 18 (31 Aug 2024 17:02) (15 - 19)  SpO2: 92% (31 Aug 2024 17:02) (92% - 100%)    Parameters below as of 31 Aug 2024 17:02  Patient On (Oxygen Delivery Method): nasal cannula  O2 Flow (L/min): 2    PHYSICAL EXAM-  GENERAL: frail looking lying in bed, on PCA pump, state her abd is sore, otherwise no new complaints  HEAD:  Atraumatic, Normocephalic  EYES: EOMI, PERRLA, conjunctiva and sclera clear  ENT: Moist mucous membranes, No lesions  NECK: Supple, No JVD, Normal thyroid  NERVOUS SYSTEM:  Alert & Oriented X 3, Moving all extremities well  CHEST/LUNG: Clear to auscultate bilaterally and basal breath sounds diminished; No rales, rhonchi, wheezing, or rubs  HEART: Regular rate and rhythm; No murmurs, rubs, or gallops  ABDOMEN: Soft, tender, distended; Bowel sounds present epigastric and RLQ  EXTREMITIES:  2+ Peripheral Pulses, No clubbing, cyanosis, or edema  SKIN: No rashes or lesions    MEDICATIONS  (STANDING):  albuterol    0.083%. 2.5 milliGRAM(s) Nebulizer once  ceFAZolin  Injectable. 2000 milliGRAM(s) IV Push once  cefepime  Injectable. 1000 milliGRAM(s) IV Push daily  dextrose 5% + sodium chloride 0.9%. 1000 milliLiter(s) (75 mL/Hr) IV Continuous <Continuous>  dextrose 5%. 1000 milliLiter(s) (100 mL/Hr) IV Continuous <Continuous>  dextrose 5%. 1000 milliLiter(s) (50 mL/Hr) IV Continuous <Continuous>  dextrose 50% Injectable 25 Gram(s) IV Push once  dextrose 50% Injectable 25 Gram(s) IV Push once  dextrose 50% Injectable 12.5 Gram(s) IV Push once  diltiazem Injectable 20 milliGRAM(s) IV Push every 4 hours  epoetin dustin-epbx (RETACRIT) Injectable 70334 Unit(s) IV Push <User Schedule>  fentaNYL PCA (50 MICROgram(s)/mL) 30 milliLiter(s) PCA Continuous PCA Continuous  folic acid 1 milliGRAM(s) Oral daily  hydrALAZINE Injectable 10 milliGRAM(s) IV Push every 2 hours  insulin lispro (ADMELOG) corrective regimen sliding scale   SubCutaneous three times a day before meals  labetalol Injectable 10 milliGRAM(s) IV Push every 4 hours  metoclopramide Injectable 10 milliGRAM(s) IV Push every 12 hours  metoprolol tartrate Injectable 5 milliGRAM(s) IV Push every 6 hours  PPD  5 Tuberculin Unit(s) Injectable 5 Unit(s) IntraDermal once    MEDICATIONS  (PRN):  dextrose Oral Gel 15 Gram(s) Oral once PRN Blood Glucose LESS THAN 70 milliGRAM(s)/deciliter  HYDROmorphone  Injectable 1 milliGRAM(s) IV Push every 4 hours PRN Severe Pain (7 - 10)  naloxone Injectable 0.1 milliGRAM(s) IV Push every 3 minutes PRN For ANY of the following changes in patient status:  A. RR LESS THAN 10 breaths per minute, B. Oxygen saturation LESS THAN 90%, C. Sedation score of 6  ondansetron Injectable 4 milliGRAM(s) IV Push every 6 hours PRN Nausea  ondansetron Injectable 4 milliGRAM(s) IV Push every 6 hours PRN Nausea and/or Vomiting  sodium chloride 0.9% lock flush 10 milliLiter(s) IV Push every 1 hour PRN Pre/post blood products, medications, blood draw, and to maintain line patency      LABS:                        8.9    10.25 )-----------( 293      ( 31 Aug 2024 05:55 )             29.9     08-31    138  |  96  |  75.4<H>  ----------------------------<  118<H>  5.6<H>   |  21.0<L>  |  13.15<H>    Ca    8.2<L>      31 Aug 2024 05:55  Phos  12.3     08-31  Mg     2.9     08-31        Urinalysis Basic - ( 31 Aug 2024 05:55 )    Color: x / Appearance: x / SG: x / pH: x  Gluc: 118 mg/dL / Ketone: x  / Bili: x / Urobili: x   Blood: x / Protein: x / Nitrite: x   Leuk Esterase: x / RBC: x / WBC x   Sq Epi: x / Non Sq Epi: x / Bacteria: x          Culture - Tissue with Gram Stain (collected 30 Aug 2024 15:19)  Source: Tissue Fibrinous Exudate for Culture  Gram Stain (31 Aug 2024 05:21):    Rare polymorphonuclear leukocytes seen per low power field    No organisms seen per oil power field    Culture - Blood (collected 27 Aug 2024 11:52)  Source: .Blood Blood-Peripheral  Preliminary Report (30 Aug 2024 20:00):    No growth at 72 Hours    Culture - Blood (collected 27 Aug 2024 11:50)  Source: .Blood Blood-Peripheral  Preliminary Report (30 Aug 2024 20:00):    No growth at 72 Hours    Culture - Body Fluid with Gram Stain (collected 27 Aug 2024 00:00)  Source: Peritoneal Peritoneal Fluid  Gram Stain (29 Aug 2024 09:40):    No polymorphonuclear leukocytes    No organisms seen    by cytocentrifuge  Preliminary Report (29 Aug 2024 09:40):    Few Gram Negative Rods  Organism: Achromobacter xylosoxidans (30 Aug 2024 08:35)  Organism: Achromobacter xylosoxidans (30 Aug 2024 08:35)      Method Type: KERRIE      -  Amikacin: R >32      -  Aztreonam: R >16      -  Cefepime: R >16      -  Ceftriaxone: R >32      -  Ciprofloxacin: I 2      -  Gentamicin: R >8      -  Levofloxacin: S 2      -  Meropenem: S <=1      -  Piperacillin/Tazobactam: S <=8      -  Tobramycin: R >8      -  Trimethoprim/Sulfamethoxazole: S <=0.5/9.5            Xray Chest 1 View-PORTABLE IMMEDIATE:   ACC: [id number] EXAM:  XR CHEST PORTABLE IMMED 1V   ORDERED BY: DEBBIE KYLE     PROCEDURE DATE:  08/28/2024          INTERPRETATION:  TECHNIQUE: Single portable view of the chest.    COMPARISON:  8/28/2024    CLINICAL HISTORY: NGT Placement    FINDINGS:    Single frontal view of the chest demonstrates left lower   lobe/retrocardiac opacity. Possible small bilateral pleural effusions. NG   tube tip courses below the hemidiaphragm in the region of the distal   stomach. Right-sided dialysis catheter is unchanged. The   cardiomediastinal silhouette is enlarged. No acute osseous abnormalities.   Overlying EKG leads and wires are noted    IMPRESSION: NG tube tip below the hemidiaphragm in the region of the   distal stomach.    --- End of Report---            AMARILYS FOSTER MD; Attending Radiologist  This document has been electronically signed. Aug 29 2024  9:27AM (08-28-24 @ 21:16)    CT scan  Radiology personally reviewed.

## 2024-08-31 NOTE — PROGRESS NOTE ADULT - ASSESSMENT
64yo F pt w/ pmhx of ESRD on peritoneal dialysis 5x per week, HTN, HLD, DM presented to ED c/o worsening diffused abdominal pain with associated distention over the past week. Pt reports dialysis cath has not been draining for the last week. She attempts peritoneal dialysis but her unable to get any fluid return. She denies fever, chills, N/V/D, urinary sx, chest pain, SOB, or any other complaints.  (27 Aug 2024 03:04)  Has been constipated in the past    ESRD   Now w PD catheter complication - peritonitis   CT abd noted --> + SBO , r   OOD # 1 -->  Ex-lap, FAREED, possible SBR and PD cath removal  Will provide HD today   Abx as per ID     RO - Phoslo     HTN - Watch on meds     Anemia - added Epogen with HD  added folate  Check iron stores   Transfuse as needed    64yo F pt w/ pmhx of ESRD on peritoneal dialysis 5x per week, HTN, HLD, DM presented to ED c/o worsening diffused abdominal pain with associated distention over the past week. Pt reports dialysis cath has not been draining for the last week. She attempts peritoneal dialysis but her unable to get any fluid return. She denies fever, chills, N/V/D, urinary sx, chest pain, SOB, or any other complaints.  (27 Aug 2024 03:04)  Has been constipated in the past    ESRD   Now w PD catheter complication - peritonitis   CT abd noted --> + SBO , r   OOD # 1 -->  Ex-lap, FAREED,  SBR and PD cath removal  Will provide HD today   Abx as per ID     ROBISNON - Mario     HTN - Watch on meds     Anemia - added Epogen with HD  added folate  Check iron stores   Transfuse as needed

## 2024-08-31 NOTE — PROGRESS NOTE ADULT - SUBJECTIVE AND OBJECTIVE BOX
NEPHROLOGY INTERVAL HPI/OVERNIGHT EVENTS:    S/P ex lap with FAREED and small bowel resection POD 1  PD cath removed  NGtube placed  Pain pump noted   Did not get HD last night         MEDICATIONS  (STANDING):  acetaminophen   IVPB .. 1000 milliGRAM(s) IV Intermittent every 6 hours  albuterol    0.083%. 2.5 milliGRAM(s) Nebulizer once  ceFAZolin  Injectable. 2000 milliGRAM(s) IV Push once  cloNIDine 0.1 milliGRAM(s) Oral two times a day  dextrose 5% + sodium chloride 0.9%. 1000 milliLiter(s) (75 mL/Hr) IV Continuous <Continuous>  dextrose 5%. 1000 milliLiter(s) (50 mL/Hr) IV Continuous <Continuous>  dextrose 5%. 1000 milliLiter(s) (100 mL/Hr) IV Continuous <Continuous>  dextrose 50% Injectable 12.5 Gram(s) IV Push once  dextrose 50% Injectable 25 Gram(s) IV Push once  dextrose 50% Injectable 25 Gram(s) IV Push once  diltiazem    milliGRAM(s) Oral daily  epoetin dustin-epbx (RETACRIT) Injectable 08318 Unit(s) IV Push <User Schedule>  fentaNYL PCA (50 MICROgram(s)/mL) 30 milliLiter(s) PCA Continuous PCA Continuous  folic acid 1 milliGRAM(s) Oral daily  hydrALAZINE 100 milliGRAM(s) Oral two times a day  insulin lispro (ADMELOG) corrective regimen sliding scale   SubCutaneous three times a day before meals  labetalol Injectable 10 milliGRAM(s) IV Push every 8 hours  metoprolol tartrate 100 milliGRAM(s) Oral daily  metoprolol tartrate Injectable 5 milliGRAM(s) IV Push every 6 hours  Nephro-yasmany 1 Tablet(s) Oral daily  piperacillin/tazobactam IVPB.. 3.375 Gram(s) IV Intermittent every 12 hours  torsemide 20 milliGRAM(s) Oral daily    MEDICATIONS  (PRN):  dextrose Oral Gel 15 Gram(s) Oral once PRN Blood Glucose LESS THAN 70 milliGRAM(s)/deciliter  HYDROmorphone  Injectable 1 milliGRAM(s) IV Push every 4 hours PRN Severe Pain (7 - 10)  naloxone Injectable 0.1 milliGRAM(s) IV Push every 3 minutes PRN For ANY of the following changes in patient status:  A. RR LESS THAN 10 breaths per minute, B. Oxygen saturation LESS THAN 90%, C. Sedation score of 6  ondansetron Injectable 4 milliGRAM(s) IV Push every 6 hours PRN Nausea  ondansetron Injectable 4 milliGRAM(s) IV Push every 6 hours PRN Nausea and/or Vomiting  oxyCODONE    IR 10 milliGRAM(s) Oral every 6 hours PRN Severe Pain (7 - 10)  sodium chloride 0.9% lock flush 10 milliLiter(s) IV Push every 1 hour PRN Pre/post blood products, medications, blood draw, and to maintain line patency      Allergies    No Known Allergies    Intolerances              Vital Signs Last 24 Hrs  T(C): 36.5 (31 Aug 2024 04:31), Max: 36.9 (30 Aug 2024 14:23)  T(F): 97.7 (31 Aug 2024 04:31), Max: 98.4 (30 Aug 2024 14:23)  HR: 111 (31 Aug 2024 04:31) (90 - 111)  BP: 184/106 (31 Aug 2024 04:31) (119/66 - 227/105)  BP(mean): 94 (30 Aug 2024 20:30) (80 - 137)  RR: 18 (31 Aug 2024 04:31) (1 - 19)  SpO2: 100% (31 Aug 2024 04:31) (95% - 100%)    Parameters below as of 31 Aug 2024 04:31  Patient On (Oxygen Delivery Method): nasal cannula  O2 Flow (L/min): 2    Daily Height in cm: 175.26 (30 Aug 2024 14:23)    Daily   I&O's Detail    30 Aug 2024 07:01  -  31 Aug 2024 07:00  --------------------------------------------------------  IN:    IV PiggyBack: 100 mL  Total IN: 100 mL    OUT:    Nasogastric/Oral tube (mL): 150 mL  Total OUT: 150 mL    Total NET: -50 mL        I&O's Summary    30 Aug 2024 07:01  -  31 Aug 2024 07:00  --------------------------------------------------------  IN: 100 mL / OUT: 150 mL / NET: -50 mL        PHYSICAL EXAM:  GENERAL: NAD  + NG tube , No JVD + RIJ raul cath   NERVOUS SYSTEM:  A/O x3  Lungs : No rales, No rhonchi,   HEART:  No murmur,  No rub, No gallops  ABDOMEN: dressed , dec BS , PD cath out   Ext dec edema   LABS:        LABS:                        8.9    10.25 )-----------( 293      ( 31 Aug 2024 05:55 )             29.9     08-31    138  |  96  |  75.4<H>  ----------------------------<  118<H>  5.6<H>   |  21.0<L>  |  13.15<H>    Ca    8.2<L>      31 Aug 2024 05:55  Phos  12.3     08-31  Mg     2.9     08-31        Urinalysis Basic - ( 31 Aug 2024 05:55 )    Color: x / Appearance: x / SG: x / pH: x  Gluc: 118 mg/dL / Ketone: x  / Bili: x / Urobili: x   Blood: x / Protein: x / Nitrite: x   Leuk Esterase: x / RBC: x / WBC x   Sq Epi: x / Non Sq Epi: x / Bacteria: x      Phosphorus: 12.3 mg/dL (08-31 @ 05:55)  Magnesium: 2.9 mg/dL (08-31 @ 05:55)          RADIOLOGY & ADDITIONAL TESTS:

## 2024-08-31 NOTE — PROGRESS NOTE ADULT - SUBJECTIVE AND OBJECTIVE BOX
Post-op Check    Subjective:  Pt reports some abdominal pain. PCA is at bedside, patient encouraged to use. Denies nausea, vomiting. Has not yet had BMs.     STATUS POST: Ex lap with lysis of adhesions and 15cm small bowel resection    POST OPERATIVE DAY #: 1    MEDICATIONS  (STANDING):  acetaminophen   IVPB .. 1000 milliGRAM(s) IV Intermittent every 6 hours  albuterol    0.083%. 2.5 milliGRAM(s) Nebulizer once  ceFAZolin  Injectable. 2000 milliGRAM(s) IV Push once  cloNIDine 0.1 milliGRAM(s) Oral two times a day  dextrose 5% + sodium chloride 0.9%. 1000 milliLiter(s) (75 mL/Hr) IV Continuous <Continuous>  dextrose 5%. 1000 milliLiter(s) (50 mL/Hr) IV Continuous <Continuous>  dextrose 5%. 1000 milliLiter(s) (100 mL/Hr) IV Continuous <Continuous>  dextrose 50% Injectable 12.5 Gram(s) IV Push once  dextrose 50% Injectable 25 Gram(s) IV Push once  dextrose 50% Injectable 25 Gram(s) IV Push once  diltiazem    milliGRAM(s) Oral daily  epoetin dustin-epbx (RETACRIT) Injectable 50688 Unit(s) IV Push <User Schedule>  fentaNYL PCA (50 MICROgram(s)/mL) 30 milliLiter(s) PCA Continuous PCA Continuous  folic acid 1 milliGRAM(s) Oral daily  hydrALAZINE 100 milliGRAM(s) Oral two times a day  insulin lispro (ADMELOG) corrective regimen sliding scale   SubCutaneous three times a day before meals  labetalol Injectable 10 milliGRAM(s) IV Push every 8 hours  metoprolol tartrate 100 milliGRAM(s) Oral daily  metoprolol tartrate Injectable 5 milliGRAM(s) IV Push every 6 hours  Nephro-yasmany 1 Tablet(s) Oral daily  piperacillin/tazobactam IVPB.. 3.375 Gram(s) IV Intermittent every 12 hours  torsemide 20 milliGRAM(s) Oral daily    MEDICATIONS  (PRN):  dextrose Oral Gel 15 Gram(s) Oral once PRN Blood Glucose LESS THAN 70 milliGRAM(s)/deciliter  HYDROmorphone  Injectable 1 milliGRAM(s) IV Push every 4 hours PRN Severe Pain (7 - 10)  naloxone Injectable 0.1 milliGRAM(s) IV Push every 3 minutes PRN For ANY of the following changes in patient status:  A. RR LESS THAN 10 breaths per minute, B. Oxygen saturation LESS THAN 90%, C. Sedation score of 6  ondansetron Injectable 4 milliGRAM(s) IV Push every 6 hours PRN Nausea  ondansetron Injectable 4 milliGRAM(s) IV Push every 6 hours PRN Nausea and/or Vomiting  oxyCODONE    IR 10 milliGRAM(s) Oral every 6 hours PRN Severe Pain (7 - 10)  sodium chloride 0.9% lock flush 10 milliLiter(s) IV Push every 1 hour PRN Pre/post blood products, medications, blood draw, and to maintain line patency      Vital Signs Last 24 Hrs  T(C): 36.4 (31 Aug 2024 00:03), Max: 37.4 (30 Aug 2024 04:47)  T(F): 97.6 (31 Aug 2024 00:03), Max: 99.4 (30 Aug 2024 04:47)  HR: 107 (31 Aug 2024 00:03) (90 - 111)  BP: 176/96 (31 Aug 2024 00:03) (119/66 - 227/105)  BP(mean): 94 (30 Aug 2024 20:30) (80 - 137)  RR: 18 (31 Aug 2024 00:03) (1 - 19)  SpO2: 96% (31 Aug 2024 00:03) (95% - 100%)    Parameters below as of 31 Aug 2024 00:03  Patient On (Oxygen Delivery Method): nasal cannula  O2 Flow (L/min): 2      Physical Exam:    General: Laying comfortably in bed, NAD  HEENT: PERRL, EOMI  Neck: No JVD, FROM without pain  Respiratory: no accessory muscle use, respirations non-labored  Abdomen: soft, mildly tender, nondistended with midline dressing clean dry intact, NGT in place with minimal output.  Neurological: A&O x 3; without gross deficit    A: Assessment: 63F, SBP, Constipation and non-functioning PD catheter now s/p ex lap with FAREED and small bowel resection POD 1    Recommendations:   -Patient s/p RIGOBERTO sheridan and HD   -monitor NG output  -ADAT, currently NPO  -pain control  -strict Is/Os  -continue home meds  -trend labs, replete electrolytes as needed  -encourage OOB  -incentive spirometry  -DVT ppx: SCDs, Lovenox 40 daily  -Continue IV abx   -Remainder of care per primary team

## 2024-09-01 LAB
ALBUMIN SERPL ELPH-MCNC: 1.9 G/DL — LOW (ref 3.3–5.2)
ALP SERPL-CCNC: 58 U/L — SIGNIFICANT CHANGE UP (ref 40–120)
ALT FLD-CCNC: <5 U/L — SIGNIFICANT CHANGE UP
ANION GAP SERPL CALC-SCNC: 15 MMOL/L — SIGNIFICANT CHANGE UP (ref 5–17)
AST SERPL-CCNC: 12 U/L — SIGNIFICANT CHANGE UP
BILIRUB SERPL-MCNC: <0.2 MG/DL — LOW (ref 0.4–2)
BUN SERPL-MCNC: 47.1 MG/DL — HIGH (ref 8–20)
CALCIUM SERPL-MCNC: 8.6 MG/DL — SIGNIFICANT CHANGE UP (ref 8.4–10.5)
CHLORIDE SERPL-SCNC: 96 MMOL/L — SIGNIFICANT CHANGE UP (ref 96–108)
CO2 SERPL-SCNC: 25 MMOL/L — SIGNIFICANT CHANGE UP (ref 22–29)
CREAT SERPL-MCNC: 8.46 MG/DL — HIGH (ref 0.5–1.3)
CULTURE RESULTS: ABNORMAL
CULTURE RESULTS: SIGNIFICANT CHANGE UP
CULTURE RESULTS: SIGNIFICANT CHANGE UP
EGFR: 5 ML/MIN/1.73M2 — LOW
GLUCOSE BLDC GLUCOMTR-MCNC: 143 MG/DL — HIGH (ref 70–99)
GLUCOSE BLDC GLUCOMTR-MCNC: 167 MG/DL — HIGH (ref 70–99)
GLUCOSE BLDC GLUCOMTR-MCNC: 181 MG/DL — HIGH (ref 70–99)
GLUCOSE SERPL-MCNC: 152 MG/DL — HIGH (ref 70–99)
HCT VFR BLD CALC: 27.5 % — LOW (ref 34.5–45)
HGB BLD-MCNC: 8.1 G/DL — LOW (ref 11.5–15.5)
MAGNESIUM SERPL-MCNC: 2.8 MG/DL — HIGH (ref 1.6–2.6)
MCHC RBC-ENTMCNC: 23.8 PG — LOW (ref 27–34)
MCHC RBC-ENTMCNC: 29.5 GM/DL — LOW (ref 32–36)
MCV RBC AUTO: 80.6 FL — SIGNIFICANT CHANGE UP (ref 80–100)
ORGANISM # SPEC MICROSCOPIC CNT: ABNORMAL
ORGANISM # SPEC MICROSCOPIC CNT: SIGNIFICANT CHANGE UP
PHOSPHATE SERPL-MCNC: 9.1 MG/DL — HIGH (ref 2.4–4.7)
PLATELET # BLD AUTO: 317 K/UL — SIGNIFICANT CHANGE UP (ref 150–400)
POTASSIUM SERPL-MCNC: 4.5 MMOL/L — SIGNIFICANT CHANGE UP (ref 3.5–5.3)
POTASSIUM SERPL-SCNC: 4.5 MMOL/L — SIGNIFICANT CHANGE UP (ref 3.5–5.3)
PROT SERPL-MCNC: 5.3 G/DL — LOW (ref 6.6–8.7)
RBC # BLD: 3.41 M/UL — LOW (ref 3.8–5.2)
RBC # FLD: 15.9 % — HIGH (ref 10.3–14.5)
SODIUM SERPL-SCNC: 135 MMOL/L — SIGNIFICANT CHANGE UP (ref 135–145)
SPECIMEN SOURCE: SIGNIFICANT CHANGE UP
WBC # BLD: 10.91 K/UL — HIGH (ref 3.8–10.5)
WBC # FLD AUTO: 10.91 K/UL — HIGH (ref 3.8–10.5)

## 2024-09-01 PROCEDURE — 99232 SBSQ HOSP IP/OBS MODERATE 35: CPT

## 2024-09-01 PROCEDURE — 99233 SBSQ HOSP IP/OBS HIGH 50: CPT

## 2024-09-01 RX ORDER — HYDROMORPHONE HYDROCHLORIDE 2 MG/1
0.5 TABLET ORAL EVERY 4 HOURS
Refills: 0 | Status: DISCONTINUED | OUTPATIENT
Start: 2024-09-01 | End: 2024-09-03

## 2024-09-01 RX ORDER — BENZOCAINE/MENTHOL 20 %-0.5 %
1 AEROSOL (GRAM) TOPICAL ONCE
Refills: 0 | Status: COMPLETED | OUTPATIENT
Start: 2024-09-01 | End: 2024-09-01

## 2024-09-01 RX ORDER — BENZOCAINE/MENTHOL 20 %-0.5 %
1 AEROSOL (GRAM) TOPICAL EVERY 4 HOURS
Refills: 0 | Status: DISCONTINUED | OUTPATIENT
Start: 2024-09-01 | End: 2024-09-02

## 2024-09-01 RX ORDER — HYDROMORPHONE HYDROCHLORIDE 2 MG/1
1 TABLET ORAL EVERY 4 HOURS
Refills: 0 | Status: DISCONTINUED | OUTPATIENT
Start: 2024-09-01 | End: 2024-09-03

## 2024-09-01 RX ORDER — BENZOCAINE/MENTHOL 20 %-0.5 %
AEROSOL (GRAM) TOPICAL
Refills: 0 | Status: DISCONTINUED | OUTPATIENT
Start: 2024-09-01 | End: 2024-09-02

## 2024-09-01 RX ADMIN — Medication 10 MILLIGRAM(S): at 10:18

## 2024-09-01 RX ADMIN — Medication 10 MILLIGRAM(S): at 20:28

## 2024-09-01 RX ADMIN — Medication 1: at 17:56

## 2024-09-01 RX ADMIN — Medication 1 LOZENGE: at 19:01

## 2024-09-01 RX ADMIN — Medication 10 MILLIGRAM(S): at 02:56

## 2024-09-01 RX ADMIN — DILTIAZEM HYDROCHLORIDE 20 MILLIGRAM(S): 5 INJECTION INTRAVENOUS at 10:19

## 2024-09-01 RX ADMIN — Medication 1: at 08:53

## 2024-09-01 RX ADMIN — METOPROLOL TARTRATE 5 MILLIGRAM(S): 100 TABLET ORAL at 00:04

## 2024-09-01 RX ADMIN — Medication 10 MILLIGRAM(S): at 08:52

## 2024-09-01 RX ADMIN — Medication 10 MILLIGRAM(S): at 17:55

## 2024-09-01 RX ADMIN — PIPERACILLIN SODIUM AND TAZOBACTAM SODIUM 25 GRAM(S): 3; .375 INJECTION, POWDER, FOR SOLUTION INTRAVENOUS at 06:11

## 2024-09-01 RX ADMIN — Medication 10 MILLIGRAM(S): at 17:56

## 2024-09-01 RX ADMIN — Medication 10 MILLIGRAM(S): at 22:23

## 2024-09-01 RX ADMIN — Medication 10 MILLIGRAM(S): at 22:25

## 2024-09-01 RX ADMIN — Medication 1 LOZENGE: at 22:25

## 2024-09-01 RX ADMIN — Medication 50 MILLILITER(S): at 16:11

## 2024-09-01 RX ADMIN — DILTIAZEM HYDROCHLORIDE 20 MILLIGRAM(S): 5 INJECTION INTRAVENOUS at 14:44

## 2024-09-01 RX ADMIN — METOPROLOL TARTRATE 5 MILLIGRAM(S): 100 TABLET ORAL at 13:03

## 2024-09-01 RX ADMIN — PIPERACILLIN SODIUM AND TAZOBACTAM SODIUM 25 GRAM(S): 3; .375 INJECTION, POWDER, FOR SOLUTION INTRAVENOUS at 17:57

## 2024-09-01 RX ADMIN — METOPROLOL TARTRATE 5 MILLIGRAM(S): 100 TABLET ORAL at 06:12

## 2024-09-01 RX ADMIN — HYDROMORPHONE HYDROCHLORIDE 1 MILLIGRAM(S): 2 TABLET ORAL at 21:19

## 2024-09-01 RX ADMIN — Medication 10 MILLIGRAM(S): at 16:11

## 2024-09-01 RX ADMIN — Medication 10 MILLIGRAM(S): at 02:58

## 2024-09-01 RX ADMIN — DILTIAZEM HYDROCHLORIDE 20 MILLIGRAM(S): 5 INJECTION INTRAVENOUS at 22:24

## 2024-09-01 RX ADMIN — DILTIAZEM HYDROCHLORIDE 20 MILLIGRAM(S): 5 INJECTION INTRAVENOUS at 02:57

## 2024-09-01 RX ADMIN — ONDANSETRON 4 MILLIGRAM(S): 2 INJECTION, SOLUTION INTRAMUSCULAR; INTRAVENOUS at 13:15

## 2024-09-01 RX ADMIN — FENTANYL CITRATE 30 MILLILITER(S): 50 INJECTION INTRAMUSCULAR; INTRAVENOUS at 07:22

## 2024-09-01 RX ADMIN — METOPROLOL TARTRATE 5 MILLIGRAM(S): 100 TABLET ORAL at 17:55

## 2024-09-01 RX ADMIN — FENTANYL CITRATE 30 MILLILITER(S): 50 INJECTION INTRAMUSCULAR; INTRAVENOUS at 04:23

## 2024-09-01 RX ADMIN — Medication 10 MILLIGRAM(S): at 13:04

## 2024-09-01 RX ADMIN — DILTIAZEM HYDROCHLORIDE 20 MILLIGRAM(S): 5 INJECTION INTRAVENOUS at 17:57

## 2024-09-01 RX ADMIN — Medication 1 PATCH: at 19:38

## 2024-09-01 RX ADMIN — Medication 10 MILLIGRAM(S): at 10:19

## 2024-09-01 RX ADMIN — ONDANSETRON 4 MILLIGRAM(S): 2 INJECTION, SOLUTION INTRAMUSCULAR; INTRAVENOUS at 22:24

## 2024-09-01 RX ADMIN — Medication 10 MILLIGRAM(S): at 06:16

## 2024-09-01 RX ADMIN — Medication 10 MILLIGRAM(S): at 04:25

## 2024-09-01 RX ADMIN — HYDROMORPHONE HYDROCHLORIDE 1 MILLIGRAM(S): 2 TABLET ORAL at 21:34

## 2024-09-01 RX ADMIN — Medication 50 MILLILITER(S): at 20:28

## 2024-09-01 RX ADMIN — Medication 10 MILLIGRAM(S): at 14:44

## 2024-09-01 RX ADMIN — Medication 1 PATCH: at 07:53

## 2024-09-01 RX ADMIN — Medication 10 MILLIGRAM(S): at 06:12

## 2024-09-01 RX ADMIN — Medication 10 MILLIGRAM(S): at 00:04

## 2024-09-01 RX ADMIN — DILTIAZEM HYDROCHLORIDE 20 MILLIGRAM(S): 5 INJECTION INTRAVENOUS at 06:15

## 2024-09-01 RX ADMIN — Medication 50 MILLILITER(S): at 10:18

## 2024-09-01 RX ADMIN — Medication 10 MILLIGRAM(S): at 06:15

## 2024-09-01 NOTE — PROGRESS NOTE ADULT - ASSESSMENT
- 63F with ESRD on PD (5 days/week), HTN, DM admitted for abdominal pain, distention, subjective fevers, and PD catheter malfunction.     ID consulted for peritonitis associated with PD catheter     - Abdomen distended and diffusely tender. Catheter site OK   - Peritoneal fluid with WBC 16,800 (88% neutrophils) c/w infectious process  - Peritoneal fluid culture with Achromobacter xylosoxidans    - susceptibilities as above   - BCX ngtd   - CT AP with findings c/w SBO   - s/p ex-lap for SBO + PD catheter removal today   - s/p Joseph on 8/27   - HD per nephrology d/w Dr Kearney   - mild tachycardia; hypertensive      - Continue piperacillin-tazobactam     d/w ASP/clinical pharmacist and Dr. Gonzalez

## 2024-09-01 NOTE — PROGRESS NOTE ADULT - ASSESSMENT
Assessment:    63y Female w/ PMH of ESRD on peritoneal dialysis 5 x per week, HTN, HLD, and DM admitted for peritonitis and peritoneal dialysis catheter dysfunction, needing HD. Now found to have SBO, pending ex-lap    # PD catheter associated peritonitis  # PD catheter malfunction  # SBO POD #2  - 8/30/24- Exploratory laparotomy with lysis of adhesions. Suspected area of small bowel perforation resected, approximately 15cm. Side-to-side small bowel anastomosis. PD catheter removed.  - NPO, IVF rate reduced to 50cc/hr   - Dilaudid IVP PRN pain scale  - Pain management consulted. PCA pump discontinued since pt was using out of propotion to pain. Appreciate recs from Dr Alejandrina Lazar  - IV tylenol 1g as needed for breakthrough, unable to place a standing q8h order   - Inc spirometry, Mobilize  - Peritoneal fluid culture with Achromobacter xylosoxidans  - On Zosyn.   -ID on board     # Accelerated HTN  - pt was on Clonidine that was abruptly stopped when NPO.  -possibly rebound HTN now  -changed meds to clonidine patch; hydralazine IVP, Labetalol IVP, Cardizem IVP  -assess for better control once clonidine patch effective. if not, then consider GGT    # ESRD s/p PD now on HD  - now started on HD (8/29) via Right IJ Shiley  - hep panel done. Ordered PPD  - Will likely need HD for some time    #Constipation  - Miralax when PO started    #DM2- controlled  - c/w insulin lispro  - Q 6 hr accuchecks when NPO    #HLD  - c/w statin, metoprolol, and diltiazem    #Hyperkalemia   - c/w Lokelma  - monitor BMP daily      DVT ppx: SCD      Disposition: Medically active. SBO s/p laprotomy. Now with perotinitis on IV abx.

## 2024-09-01 NOTE — PROGRESS NOTE ADULT - SUBJECTIVE AND OBJECTIVE BOX
[facility]  INFECTIOUS DISEASES at [place] / [place] / [place]  =======================================================                              George Sun MD                              Professor Emeritus:  Dr Steve Fowler MD            Diplomates American Board of Internal Medicine & Infectious Diseases                                   Tel  [phone] Fax [phone]                                  Hospital Consult line:  [phone]  =======================================================      ADALID CASE [id number]    Follow up: peritonitis associated with PD catheter       Allergies:  No Known Allergies      REVIEW OF SYSTEMS:  CONSTITUTIONAL:  No Fever or chills  HEENT:   No diplopia or blurred vision.  No earache, sore throat or runny nose.  CARDIOVASCULAR:  No Chest Pain  RESPIRATORY:  No cough, shortness of breath  GASTROINTESTINAL:  No nausea, vomiting or diarrhea.  GENITOURINARY:  No dysuria, frequency or urgency. No Blood in urine  MUSCULOSKELETAL:  no joint aches, no muscle pain  SKIN:  No change in skin, hair or nails.  NEUROLOGIC:  No Headaches      Physical Exam:  GEN: NAD  HEENT: normocephalic and atraumatic. EOMI. PERRL.    NECK: Supple.   LUNGS: CTA B/L.  HEART: RRR  ABDOMEN: Surgical bandage intact   : No CVA tenderness  EXTREMITIES: Without  edema.  MSK: No joint swelling  NEUROLOGIC: No Focal Deficits  SKIN: No rash      Vitals:  T(F): 98.6 (01 Sep 2024 07:42), Max: 98.9 (01 Sep 2024 00:28)  HR: 107 (01 Sep 2024 12:00)  BP: 176/79 (01 Sep 2024 12:00)  RR: 16 (01 Sep 2024 12:00)  SpO2: 97% (01 Sep 2024 12:00) (92% - 98%)  temp max in last 48H T(F): , Max: 98.9 (09-01-24 @ 00:28)      Current Antibiotics:  ceFAZolin  Injectable. 2000 milliGRAM(s) IV Push once  piperacillin/tazobactam IVPB.. 3.375 Gram(s) IV Intermittent every 12 hours    Other medications:  albuterol    0.083%. 2.5 milliGRAM(s) Nebulizer once  dextrose 5% + sodium chloride 0.9%. 1000 milliLiter(s) IV Continuous <Continuous>  dextrose 5%. 1000 milliLiter(s) IV Continuous <Continuous>  dextrose 5%. 1000 milliLiter(s) IV Continuous <Continuous>  dextrose 50% Injectable 25 Gram(s) IV Push once  dextrose 50% Injectable 25 Gram(s) IV Push once  dextrose 50% Injectable 12.5 Gram(s) IV Push once  diltiazem Injectable 20 milliGRAM(s) IV Push every 4 hours  epoetin dustin-epbx (RETACRIT) Injectable 23392 Unit(s) IV Push <User Schedule>  folic acid 1 milliGRAM(s) Oral daily  hydrALAZINE Injectable 10 milliGRAM(s) IV Push every 2 hours  insulin lispro (ADMELOG) corrective regimen sliding scale   SubCutaneous three times a day before meals  labetalol Injectable 10 milliGRAM(s) IV Push every 4 hours  metoclopramide Injectable 10 milliGRAM(s) IV Push every 12 hours  metoprolol tartrate Injectable 5 milliGRAM(s) IV Push every 6 hours  PPD  5 Tuberculin Unit(s) Injectable 5 Unit(s) IntraDermal once                 8.1    10.91 )-----------( 317      ( 01 Sep 2024 06:36 )             27.5     09-01    135  |  96  |  47.1<H>  ----------------------------<  152<H>  4.5   |  25.0  |  8.46<H>    Ca    8.6      01 Sep 2024 06:36  Phos  9.1     09-01  Mg     2.8     09-01    TPro  5.3<L>  /  Alb  1.9<L>  /  TBili  <0.2<L>  /  DBili  x   /  AST  12  /  ALT  <5  /  AlkPhos  58  09-01    RECENT CULTURES:  08-30 @ 15:19 Tissue Fibrinous Exudate for Culture     No growth to date  Rare polymorphonuclear leukocytes seen per low power field  No organisms seen per oil power field    08-30 @ 15:15 Surgical Swab Peritoneal Fluid     No growth to date    08-27 @ 11:52 .Blood Blood-Peripheral     No growth at 4 days    08-27 @ 11:50 .Blood Blood-Peripheral     No growth at 4 days    08-27 @ 00:00 Peritoneal Peritoneal Fluid Achromobacter xylosoxidans    Few Achromobacter xylosoxidans  No polymorphonuclear leukocytes  No organisms seen  by cytocentrifuge   -  Amikacin: R >32   -  Aztreonam: R >16   -  Cefepime: R >16   -  Ceftriaxone: R >32   -  Ciprofloxacin: I 2   -  Gentamicin: R >8   -  Levofloxacin: S 2   -  Meropenem: S <=1   -  Piperacillin/Tazobactam: S <=8   -  Tobramycin: R >8   -  Trimethoprim/Sulfamethoxazole: S <=0.5/9.5          WBC Count: 10.91 K/uL (09-01-24 @ 06:36)  WBC Count: 10.25 K/uL (08-31-24 @ 05:55)  WBC Count: 8.68 K/uL (08-30-24 @ 05:14)  WBC Count: 11.35 K/uL (08-29-24 @ 04:07)  WBC Count: 8.36 K/uL (08-28-24 @ 04:38)    Creatinine: 8.46 mg/dL (09-01-24 @ 06:36)  Creatinine: 13.15 mg/dL (08-31-24 @ 05:55)  Creatinine: 11.51 mg/dL (08-30-24 @ 05:14)  Creatinine: 17.19 mg/dL (08-29-24 @ 04:07)  Creatinine: 15.42 mg/dL (08-28-24 @ 04:38)  Creatinine: 15.28 mg/dL (08-27-24 @ 22:44)

## 2024-09-01 NOTE — PROGRESS NOTE ADULT - SUBJECTIVE AND OBJECTIVE BOX
feels omk some pain no flatus   afebrile VSS  abd slight globus INC c/d soft nontender    wbc 9 hct 27  surgically stable   supportive care

## 2024-09-01 NOTE — PROGRESS NOTE ADULT - ASSESSMENT
62yo F pt w/ pmhx of ESRD on peritoneal dialysis 5x per week, HTN, HLD, DM presented to ED c/o worsening diffused abdominal pain with associated distention over the past week. Pt reports dialysis cath has not been draining for the last week. She attempts peritoneal dialysis but her unable to get any fluid return. She denies fever, chills, N/V/D, urinary sx, chest pain, SOB, or any other complaints.  (27 Aug 2024 03:04)  Has been constipated in the past    ESRD   Now w PD catheter complication - peritonitis   CT abd noted --> + SBO   POD # 1 -->  Ex-lap, FAREED,  SBR and PD cath removal  No pressing need for acute HD today , will set up for tomorrow   Abx as per ID     RO - Phoslo     HTN - Watch on meds     Anemia - added Epogen with HD  added folate  Check iron stores   Transfuse as needed

## 2024-09-01 NOTE — PROGRESS NOTE ADULT - SUBJECTIVE AND OBJECTIVE BOX
HPI/Overnight Events:   63 year old female s/p FAREED, SBR and pd catheter removal . pod 2. pt is not yet passing bm or flatus. Pt is still complaining of pain. Pt is encouraged to use pca pump.      Vital Signs Last 24 Hrs  T(C): 37.2 (01 Sep 2024 00:28), Max: 37.2 (01 Sep 2024 00:28)  T(F): 98.9 (01 Sep 2024 00:28), Max: 98.9 (01 Sep 2024 00:28)  HR: 107 (01 Sep 2024 02:00) (102 - 123)  BP: 178/85 (01 Sep 2024 02:00) (161/83 - 219/91)  BP(mean): 109 (01 Sep 2024 02:00) (106 - 112)  RR: 17 (01 Sep 2024 02:00) (10 - 18)  SpO2: 98% (01 Sep 2024 02:00) (92% - 100%)    Parameters below as of 01 Sep 2024 02:00  Patient On (Oxygen Delivery Method): nasal cannula  O2 Flow (L/min): 2      I&O's Detail    30 Aug 2024 07:01  -  31 Aug 2024 07:00  --------------------------------------------------------  IN:    IV PiggyBack: 100 mL  Total IN: 100 mL    OUT:    Nasogastric/Oral tube (mL): 150 mL  Total OUT: 150 mL    Total NET: -50 mL      31 Aug 2024 07:01  -  01 Sep 2024 03:02  --------------------------------------------------------  IN:    dextrose 5% + sodium chloride 0.9%: 225 mL    IV PiggyBack: 30 mL    IV PiggyBack: 75 mL  Total IN: 330 mL    OUT:    Nasogastric/Oral tube (mL): 200 mL  Total OUT: 200 mL    Total NET: 130 mL          LABS:                        8.9    10.25 )-----------( 293      ( 31 Aug 2024 05:55 )             29.9     08-31    138  |  96  |  75.4<H>  ----------------------------<  118<H>  5.6<H>   |  21.0<L>  |  13.15<H>    Ca    8.2<L>      31 Aug 2024 05:55  Phos  12.3     08-31  Mg     2.9     08-31        Urinalysis Basic - ( 31 Aug 2024 05:55 )    Color: x / Appearance: x / SG: x / pH: x  Gluc: 118 mg/dL / Ketone: x  / Bili: x / Urobili: x   Blood: x / Protein: x / Nitrite: x   Leuk Esterase: x / RBC: x / WBC x   Sq Epi: x / Non Sq Epi: x / Bacteria: x          Physical Exam  General: Laying comfortably in bed, NAD NGT in place with minimal output.  HEENT: PERRL, EOMI  Neck: No JVD, FROM without pain  Respiratory: no accessory muscle use, respirations non-labored  Abdomen: soft, mildl midline soreness  , nondistended with midline dressing clean dry intact       MEDICATIONS  (STANDING):  albuterol    0.083%. 2.5 milliGRAM(s) Nebulizer once  ceFAZolin  Injectable. 2000 milliGRAM(s) IV Push once  dextrose 5% + sodium chloride 0.9%. 1000 milliLiter(s) (75 mL/Hr) IV Continuous <Continuous>  dextrose 5%. 1000 milliLiter(s) (100 mL/Hr) IV Continuous <Continuous>  dextrose 5%. 1000 milliLiter(s) (50 mL/Hr) IV Continuous <Continuous>  dextrose 50% Injectable 25 Gram(s) IV Push once  dextrose 50% Injectable 25 Gram(s) IV Push once  dextrose 50% Injectable 12.5 Gram(s) IV Push once  diltiazem Injectable 20 milliGRAM(s) IV Push every 4 hours  epoetin dustin-epbx (RETACRIT) Injectable 64923 Unit(s) IV Push <User Schedule>  fentaNYL PCA (50 MICROgram(s)/mL) 30 milliLiter(s) PCA Continuous PCA Continuous  folic acid 1 milliGRAM(s) Oral daily  hydrALAZINE Injectable 10 milliGRAM(s) IV Push every 2 hours  insulin lispro (ADMELOG) corrective regimen sliding scale   SubCutaneous three times a day before meals  labetalol Injectable 10 milliGRAM(s) IV Push every 4 hours  metoclopramide Injectable 10 milliGRAM(s) IV Push every 12 hours  metoprolol tartrate Injectable 5 milliGRAM(s) IV Push every 6 hours  piperacillin/tazobactam IVPB.. 3.375 Gram(s) IV Intermittent every 12 hours  PPD  5 Tuberculin Unit(s) Injectable 5 Unit(s) IntraDermal once    MEDICATIONS  (PRN):  dextrose Oral Gel 15 Gram(s) Oral once PRN Blood Glucose LESS THAN 70 milliGRAM(s)/deciliter  HYDROmorphone  Injectable 1 milliGRAM(s) IV Push every 4 hours PRN Severe Pain (7 - 10)  naloxone Injectable 0.1 milliGRAM(s) IV Push every 3 minutes PRN For ANY of the following changes in patient status:  A. RR LESS THAN 10 breaths per minute, B. Oxygen saturation LESS THAN 90%, C. Sedation score of 6  ondansetron Injectable 4 milliGRAM(s) IV Push every 6 hours PRN Nausea  ondansetron Injectable 4 milliGRAM(s) IV Push every 6 hours PRN Nausea and/or Vomiting  sodium chloride 0.9% lock flush 10 milliLiter(s) IV Push every 1 hour PRN Pre/post blood products, medications, blood draw, and to maintain line patency        STUDIES:   EKG, CXR, U/S, CT, MRI     MICRO:   Cultures

## 2024-09-01 NOTE — PROGRESS NOTE ADULT - SUBJECTIVE AND OBJECTIVE BOX
NEPHROLOGY INTERVAL HPI/OVERNIGHT EVENTS:    Interim noted   Surgery follow up appreciated   HD w/o fluid removal yesterday     MEDICATIONS  (STANDING):  albuterol    0.083%. 2.5 milliGRAM(s) Nebulizer once  ceFAZolin  Injectable. 2000 milliGRAM(s) IV Push once  dextrose 5% + sodium chloride 0.9%. 1000 milliLiter(s) (50 mL/Hr) IV Continuous <Continuous>  dextrose 5%. 1000 milliLiter(s) (50 mL/Hr) IV Continuous <Continuous>  dextrose 5%. 1000 milliLiter(s) (100 mL/Hr) IV Continuous <Continuous>  dextrose 50% Injectable 12.5 Gram(s) IV Push once  dextrose 50% Injectable 25 Gram(s) IV Push once  dextrose 50% Injectable 25 Gram(s) IV Push once  diltiazem Injectable 20 milliGRAM(s) IV Push every 4 hours  epoetin dustin-epbx (RETACRIT) Injectable 39387 Unit(s) IV Push <User Schedule>  folic acid 1 milliGRAM(s) Oral daily  hydrALAZINE Injectable 10 milliGRAM(s) IV Push every 2 hours  insulin lispro (ADMELOG) corrective regimen sliding scale   SubCutaneous three times a day before meals  labetalol Injectable 10 milliGRAM(s) IV Push every 4 hours  metoclopramide Injectable 10 milliGRAM(s) IV Push every 12 hours  metoprolol tartrate Injectable 5 milliGRAM(s) IV Push every 6 hours  piperacillin/tazobactam IVPB.. 3.375 Gram(s) IV Intermittent every 12 hours  PPD  5 Tuberculin Unit(s) Injectable 5 Unit(s) IntraDermal once    MEDICATIONS  (PRN):  dextrose Oral Gel 15 Gram(s) Oral once PRN Blood Glucose LESS THAN 70 milliGRAM(s)/deciliter  HYDROmorphone  Injectable 1 milliGRAM(s) IV Push every 4 hours PRN Severe Pain (7 - 10)  HYDROmorphone  Injectable 0.5 milliGRAM(s) IV Push every 4 hours PRN Moderate Pain (4 - 6)  naloxone Injectable 0.1 milliGRAM(s) IV Push every 3 minutes PRN For ANY of the following changes in patient status:  A. RR LESS THAN 10 breaths per minute, B. Oxygen saturation LESS THAN 90%, C. Sedation score of 6  ondansetron Injectable 4 milliGRAM(s) IV Push every 6 hours PRN Nausea  ondansetron Injectable 4 milliGRAM(s) IV Push every 6 hours PRN Nausea and/or Vomiting  sodium chloride 0.9% lock flush 10 milliLiter(s) IV Push every 1 hour PRN Pre/post blood products, medications, blood draw, and to maintain line patency      Allergies    No Known Allergies    Intolerances      Vital Signs Last 24 Hrs  T(C): 37 (01 Sep 2024 07:42), Max: 37.2 (01 Sep 2024 00:28)  T(F): 98.6 (01 Sep 2024 07:42), Max: 98.9 (01 Sep 2024 00:28)  HR: 107 (01 Sep 2024 12:00) (98 - 120)  BP: 176/79 (01 Sep 2024 12:00) (171/66 - 219/91)  BP(mean): 115 (01 Sep 2024 06:00) (106 - 115)  RR: 16 (01 Sep 2024 12:00) (10 - 20)  SpO2: 97% (01 Sep 2024 12:00) (92% - 98%)    Parameters below as of 01 Sep 2024 12:00  Patient On (Oxygen Delivery Method): nasal cannula  O2 Flow (L/min): 2    Daily     Daily Weight in k.2 (31 Aug 2024 17:24)  I&O's Detail    31 Aug 2024 07:01  -  01 Sep 2024 07:00  --------------------------------------------------------  IN:    dextrose 5% + sodium chloride 0.9%: 750 mL    IV PiggyBack: 100 mL    IV PiggyBack: 175 mL  Total IN: 1025 mL    OUT:    Nasogastric/Oral tube (mL): 500 mL  Total OUT: 500 mL    Total NET: 525 mL        I&O's Summary    31 Aug 2024 07:01  -  01 Sep 2024 07:00  --------------------------------------------------------  IN: 1025 mL / OUT: 500 mL / NET: 525 mL        PHYSICAL EXAM:      PHYSICAL EXAM:  GENERAL: NAD  + NG tube , No JVD + RIJ raul cath   NERVOUS SYSTEM:  A/O x3  Lungs : No rales, No rhonchi,   HEART:  No murmur,  No rub, No gallops  ABDOMEN: dressed , dec BS , PD cath out   Ext dec edema         LABS:                        8.1    10.91 )-----------( 317      ( 01 Sep 2024 06:36 )             27.5         135  |  96  |  47.1<H>  ----------------------------<  152<H>  4.5   |  25.0  |  8.46<H>    Ca    8.6      01 Sep 2024 06:36  Phos  9.1       Mg     2.8         TPro  5.3<L>  /  Alb  1.9<L>  /  TBili  <0.2<L>  /  DBili  x   /  AST  12  /  ALT  <5  /  AlkPhos  58        Urinalysis Basic - ( 01 Sep 2024 06:36 )    Color: x / Appearance: x / SG: x / pH: x  Gluc: 152 mg/dL / Ketone: x  / Bili: x / Urobili: x   Blood: x / Protein: x / Nitrite: x   Leuk Esterase: x / RBC: x / WBC x   Sq Epi: x / Non Sq Epi: x / Bacteria: x      Phosphorus: 9.1 mg/dL ( @ 06:36)  Magnesium: 2.8 mg/dL ( @ 06:36)          RADIOLOGY & ADDITIONAL TESTS:

## 2024-09-01 NOTE — PROGRESS NOTE ADULT - SUBJECTIVE AND OBJECTIVE BOX
Yanely Gonzalez MD (Available on GVISP 1)  [facility] Division of Hospital Medicine    Chief Complaint:  sbo    SUBJECTIVE / OVERNIGHT EVENTS:  Pt seen and examined at bedside. Endorses some nausea and abdominal pain 5/10 upon asking. Denies passing flatus or having bm.     Patient denies chest pain, SOB, abd pain, N/V, fever, chills, dysuria or any other complaints. All remainder ROS negative.     Interval Events:  -Pt hypertensive this Am, IVF rate reduced. She is on IV anti-htn meds, clonidine patch hoping some improvement in bp once clonidine starts working. Bp improved in 170s now.   -Pain management consulted for PCA pump, Spoke to Dr Alejandrina Lazar. Fentanyl PCA discontinued. Dilaudid IVP prn for moderate to severe pain scale   -On Zosyn for peritonitis.  -Peritoneal cx- Achromobacter xylosoxidans sensitive to zosyn    MEDICATIONS  (STANDING):  albuterol    0.083%. 2.5 milliGRAM(s) Nebulizer once  ceFAZolin  Injectable. 2000 milliGRAM(s) IV Push once  dextrose 5% + sodium chloride 0.9%. 1000 milliLiter(s) (50 mL/Hr) IV Continuous <Continuous>  dextrose 5%. 1000 milliLiter(s) (100 mL/Hr) IV Continuous <Continuous>  dextrose 5%. 1000 milliLiter(s) (50 mL/Hr) IV Continuous <Continuous>  dextrose 50% Injectable 25 Gram(s) IV Push once  dextrose 50% Injectable 25 Gram(s) IV Push once  dextrose 50% Injectable 12.5 Gram(s) IV Push once  diltiazem Injectable 20 milliGRAM(s) IV Push every 4 hours  epoetin dustin-epbx (RETACRIT) Injectable 12571 Unit(s) IV Push <User Schedule>  folic acid 1 milliGRAM(s) Oral daily  hydrALAZINE Injectable 10 milliGRAM(s) IV Push every 2 hours  insulin lispro (ADMELOG) corrective regimen sliding scale   SubCutaneous three times a day before meals  labetalol Injectable 10 milliGRAM(s) IV Push every 4 hours  metoclopramide Injectable 10 milliGRAM(s) IV Push every 12 hours  metoprolol tartrate Injectable 5 milliGRAM(s) IV Push every 6 hours  piperacillin/tazobactam IVPB.. 3.375 Gram(s) IV Intermittent every 12 hours  PPD  5 Tuberculin Unit(s) Injectable 5 Unit(s) IntraDermal once    MEDICATIONS  (PRN):  dextrose Oral Gel 15 Gram(s) Oral once PRN Blood Glucose LESS THAN 70 milliGRAM(s)/deciliter  HYDROmorphone  Injectable 1 milliGRAM(s) IV Push every 4 hours PRN Severe Pain (7 - 10)  HYDROmorphone  Injectable 0.5 milliGRAM(s) IV Push every 4 hours PRN Moderate Pain (4 - 6)  naloxone Injectable 0.1 milliGRAM(s) IV Push every 3 minutes PRN For ANY of the following changes in patient status:  A. RR LESS THAN 10 breaths per minute, B. Oxygen saturation LESS THAN 90%, C. Sedation score of 6  ondansetron Injectable 4 milliGRAM(s) IV Push every 6 hours PRN Nausea  ondansetron Injectable 4 milliGRAM(s) IV Push every 6 hours PRN Nausea and/or Vomiting  sodium chloride 0.9% lock flush 10 milliLiter(s) IV Push every 1 hour PRN Pre/post blood products, medications, blood draw, and to maintain line patency        I&O's Summary    31 Aug 2024 07:01  -  01 Sep 2024 07:00  --------------------------------------------------------  IN: 1025 mL / OUT: 500 mL / NET: 525 mL    01 Sep 2024 07:01  -  01 Sep 2024 14:56  --------------------------------------------------------  IN: 450 mL / OUT: 400 mL / NET: 50 mL        PHYSICAL EXAM:  Vital Signs Last 24 Hrs  T(C): 37.1 (01 Sep 2024 08:00), Max: 37.2 (01 Sep 2024 00:28)  T(F): 98.8 (01 Sep 2024 08:00), Max: 98.9 (01 Sep 2024 00:28)  HR: 108 (01 Sep 2024 14:00) (98 - 119)  BP: 174/73 (01 Sep 2024 14:00) (171/66 - 219/91)  BP(mean): 115 (01 Sep 2024 06:00) (106 - 115)  RR: 18 (01 Sep 2024 14:00) (10 - 20)  SpO2: 99% (01 Sep 2024 14:00) (92% - 99%)    Parameters below as of 01 Sep 2024 14:00  Patient On (Oxygen Delivery Method): nasal cannula  O2 Flow (L/min): 2          CONSTITUTIONAL: NAD  ENMT: Moist oral mucosa  RESPIRATORY: Normal respiratory effort; lungs are clear to auscultation bilaterally  CARDIOVASCULAR: Regular rate and rhythm, normal S1 and S2, no murmur/rub/gallop; No lower extremity edema; Peripheral pulses are 2+ bilaterally  ABDOMEN: Nontender to palpation, normoactive bowel sounds, no rebound/guarding; No hepatosplenomegaly  MUSCLOSKELETAL:  Normal gait; no clubbing or cyanosis of digits; no joint swelling or tenderness to palpation  PSYCH: A+O to person, place, and time; affect appropriate  NEUROLOGY: CN 2-12 are intact and symmetric; no gross sensory deficits;   SKIN: No rashes; no palpable lesions    LABS:                        8.1    10.91 )-----------( 317      ( 01 Sep 2024 06:36 )             27.5     09-01    135  |  96  |  47.1<H>  ----------------------------<  152<H>  4.5   |  25.0  |  8.46<H>    Ca    8.6      01 Sep 2024 06:36  Phos  9.1     09-01  Mg     2.8     09-01    TPro  5.3<L>  /  Alb  1.9<L>  /  TBili  <0.2<L>  /  DBili  x   /  AST  12  /  ALT  <5  /  AlkPhos  58  09-01          Urinalysis Basic - ( 01 Sep 2024 06:36 )    Color: x / Appearance: x / SG: x / pH: x  Gluc: 152 mg/dL / Ketone: x  / Bili: x / Urobili: x   Blood: x / Protein: x / Nitrite: x   Leuk Esterase: x / RBC: x / WBC x   Sq Epi: x / Non Sq Epi: x / Bacteria: x        Culture - Tissue with Gram Stain (collected 30 Aug 2024 15:19)  Source: Tissue Fibrinous Exudate for Culture  Gram Stain (31 Aug 2024 05:21):    Rare polymorphonuclear leukocytes seen per low power field    No organisms seen per oil power field  Preliminary Report (01 Sep 2024 08:55):    No growth to date    Culture - Surgical Swab (collected 30 Aug 2024 15:15)  Source: Surgical Swab Peritoneal Fluid  Preliminary Report (01 Sep 2024 07:12):    No growth to date      CAPILLARY BLOOD GLUCOSE      POCT Blood Glucose.: 143 mg/dL (01 Sep 2024 13:01)  POCT Blood Glucose.: 181 mg/dL (01 Sep 2024 08:45)  POCT Blood Glucose.: 113 mg/dL (31 Aug 2024 16:55)        RADIOLOGY & ADDITIONAL TESTS:  Results Reviewed:   Imaging Personally Reviewed:  Electrocardiogram Personally Reviewed:                                          \

## 2024-09-01 NOTE — PROGRESS NOTE ADULT - ASSESSMENT
A/P    Assessment:   63 year old female s/p FAREED, SBR and pd catheter removal, POD 2.     Plan:  - Reglan ordered 10mg BID   - Monitor NG output  - NPO/F;uids  - Serial abdominal exams  - Monitor bowel function  - Multimodal pain control  - DVT prophylaxis  - Encourage OOB to chair  - Continue incentive spirometer and pulmonary toilet  - local wound care  - Remainder of care per primary team       Mark Hooper MD  General Surgery PGY-1

## 2024-09-02 LAB
ALBUMIN SERPL ELPH-MCNC: 1.8 G/DL — LOW (ref 3.3–5.2)
ALP SERPL-CCNC: 66 U/L — SIGNIFICANT CHANGE UP (ref 40–120)
ALT FLD-CCNC: <5 U/L — SIGNIFICANT CHANGE UP
ANION GAP SERPL CALC-SCNC: 17 MMOL/L — SIGNIFICANT CHANGE UP (ref 5–17)
AST SERPL-CCNC: 14 U/L — SIGNIFICANT CHANGE UP
BILIRUB SERPL-MCNC: <0.2 MG/DL — LOW (ref 0.4–2)
BUN SERPL-MCNC: 57 MG/DL — HIGH (ref 8–20)
CALCIUM SERPL-MCNC: 8.1 MG/DL — LOW (ref 8.4–10.5)
CHLORIDE SERPL-SCNC: 96 MMOL/L — SIGNIFICANT CHANGE UP (ref 96–108)
CO2 SERPL-SCNC: 28 MMOL/L — SIGNIFICANT CHANGE UP (ref 22–29)
CREAT SERPL-MCNC: 10.58 MG/DL — HIGH (ref 0.5–1.3)
D DIMER BLD IA.RAPID-MCNC: HIGH NG/ML DDU
EGFR: 4 ML/MIN/1.73M2 — LOW
FERRITIN SERPL-MCNC: 1130 NG/ML — HIGH (ref 13–330)
GLUCOSE BLDC GLUCOMTR-MCNC: 126 MG/DL — HIGH (ref 70–99)
GLUCOSE BLDC GLUCOMTR-MCNC: 143 MG/DL — HIGH (ref 70–99)
GLUCOSE BLDC GLUCOMTR-MCNC: 160 MG/DL — HIGH (ref 70–99)
GLUCOSE BLDC GLUCOMTR-MCNC: 165 MG/DL — HIGH (ref 70–99)
GLUCOSE BLDC GLUCOMTR-MCNC: 180 MG/DL — HIGH (ref 70–99)
GLUCOSE SERPL-MCNC: 177 MG/DL — HIGH (ref 70–99)
GRAM STN FLD: ABNORMAL
HCT VFR BLD CALC: 26.9 % — LOW (ref 34.5–45)
HGB BLD-MCNC: 8.2 G/DL — LOW (ref 11.5–15.5)
IRON SATN MFR SERPL: 35 UG/DL — LOW (ref 37–145)
IRON SATN MFR SERPL: SIGNIFICANT CHANGE UP % (ref 14–50)
MCHC RBC-ENTMCNC: 24 PG — LOW (ref 27–34)
MCHC RBC-ENTMCNC: 30.5 GM/DL — LOW (ref 32–36)
MCV RBC AUTO: 78.7 FL — LOW (ref 80–100)
PHOSPHATE SERPL-MCNC: 9.3 MG/DL — HIGH (ref 2.4–4.7)
PLATELET # BLD AUTO: 303 K/UL — SIGNIFICANT CHANGE UP (ref 150–400)
POTASSIUM SERPL-MCNC: 3.7 MMOL/L — SIGNIFICANT CHANGE UP (ref 3.5–5.3)
POTASSIUM SERPL-SCNC: 3.7 MMOL/L — SIGNIFICANT CHANGE UP (ref 3.5–5.3)
PROT SERPL-MCNC: 5.5 G/DL — LOW (ref 6.6–8.7)
RBC # BLD: 3.42 M/UL — LOW (ref 3.8–5.2)
RBC # FLD: 16.2 % — HIGH (ref 10.3–14.5)
SODIUM SERPL-SCNC: 140 MMOL/L — SIGNIFICANT CHANGE UP (ref 135–145)
TIBC SERPL-MCNC: SIGNIFICANT CHANGE UP UG/DL (ref 220–430)
TRANSFERRIN SERPL-MCNC: <80 MG/DL — LOW (ref 192–382)
WBC # BLD: 12.66 K/UL — HIGH (ref 3.8–10.5)
WBC # FLD AUTO: 12.66 K/UL — HIGH (ref 3.8–10.5)

## 2024-09-02 PROCEDURE — 71275 CT ANGIOGRAPHY CHEST: CPT | Mod: 26

## 2024-09-02 PROCEDURE — 99232 SBSQ HOSP IP/OBS MODERATE 35: CPT

## 2024-09-02 PROCEDURE — 93010 ELECTROCARDIOGRAM REPORT: CPT

## 2024-09-02 PROCEDURE — 99233 SBSQ HOSP IP/OBS HIGH 50: CPT

## 2024-09-02 RX ORDER — BENZOCAINE/MENTHOL 20 %-0.5 %
1 AEROSOL (GRAM) TOPICAL EVERY 4 HOURS
Refills: 0 | Status: DISCONTINUED | OUTPATIENT
Start: 2024-09-02 | End: 2024-09-12

## 2024-09-02 RX ORDER — HYDRALAZINE HCL 50 MG
20 TABLET ORAL EVERY 6 HOURS
Refills: 0 | Status: DISCONTINUED | OUTPATIENT
Start: 2024-09-02 | End: 2024-09-08

## 2024-09-02 RX ORDER — HYDRALAZINE HCL 50 MG
10 TABLET ORAL EVERY 4 HOURS
Refills: 0 | Status: DISCONTINUED | OUTPATIENT
Start: 2024-09-02 | End: 2024-09-02

## 2024-09-02 RX ORDER — HYDRALAZINE HCL 50 MG
10 TABLET ORAL
Refills: 0 | Status: DISCONTINUED | OUTPATIENT
Start: 2024-09-02 | End: 2024-09-02

## 2024-09-02 RX ORDER — ALTEPLASE 50 MG
2 KIT INTRAVENOUS ONCE
Refills: 0 | Status: COMPLETED | OUTPATIENT
Start: 2024-09-02 | End: 2024-09-02

## 2024-09-02 RX ORDER — CLONIDINE HCL 0.2 MG
1 TABLET ORAL
Refills: 0 | Status: DISCONTINUED | OUTPATIENT
Start: 2024-09-02 | End: 2024-09-03

## 2024-09-02 RX ORDER — MENTHOL/CETYLPYRD CL 3 MG
1 LOZENGE MUCOUS MEMBRANE
Refills: 0 | Status: DISCONTINUED | OUTPATIENT
Start: 2024-09-02 | End: 2024-09-11

## 2024-09-02 RX ADMIN — ALTEPLASE 2 MILLIGRAM(S): KIT at 12:48

## 2024-09-02 RX ADMIN — ONDANSETRON 4 MILLIGRAM(S): 2 INJECTION, SOLUTION INTRAMUSCULAR; INTRAVENOUS at 11:30

## 2024-09-02 RX ADMIN — Medication 10 MILLIGRAM(S): at 11:20

## 2024-09-02 RX ADMIN — DILTIAZEM HYDROCHLORIDE 20 MILLIGRAM(S): 5 INJECTION INTRAVENOUS at 02:16

## 2024-09-02 RX ADMIN — Medication 10 MILLIGRAM(S): at 18:01

## 2024-09-02 RX ADMIN — Medication 10 MILLIGRAM(S): at 04:13

## 2024-09-02 RX ADMIN — Medication 50 MILLILITER(S): at 20:36

## 2024-09-02 RX ADMIN — Medication 1 PATCH: at 20:17

## 2024-09-02 RX ADMIN — DILTIAZEM HYDROCHLORIDE 20 MILLIGRAM(S): 5 INJECTION INTRAVENOUS at 22:15

## 2024-09-02 RX ADMIN — Medication 1: at 08:29

## 2024-09-02 RX ADMIN — Medication 1 SPRAY(S): at 18:16

## 2024-09-02 RX ADMIN — Medication 10 MILLIGRAM(S): at 02:16

## 2024-09-02 RX ADMIN — Medication 10 MILLIGRAM(S): at 06:07

## 2024-09-02 RX ADMIN — METOPROLOL TARTRATE 5 MILLIGRAM(S): 100 TABLET ORAL at 06:07

## 2024-09-02 RX ADMIN — DILTIAZEM HYDROCHLORIDE 20 MILLIGRAM(S): 5 INJECTION INTRAVENOUS at 06:06

## 2024-09-02 RX ADMIN — HYDROMORPHONE HYDROCHLORIDE 0.5 MILLIGRAM(S): 2 TABLET ORAL at 14:00

## 2024-09-02 RX ADMIN — HYDROMORPHONE HYDROCHLORIDE 1 MILLIGRAM(S): 2 TABLET ORAL at 22:31

## 2024-09-02 RX ADMIN — METOPROLOL TARTRATE 5 MILLIGRAM(S): 100 TABLET ORAL at 11:56

## 2024-09-02 RX ADMIN — PIPERACILLIN SODIUM AND TAZOBACTAM SODIUM 25 GRAM(S): 3; .375 INJECTION, POWDER, FOR SOLUTION INTRAVENOUS at 18:02

## 2024-09-02 RX ADMIN — Medication 1 SPRAY(S): at 05:36

## 2024-09-02 RX ADMIN — Medication 1 PATCH: at 07:46

## 2024-09-02 RX ADMIN — METOPROLOL TARTRATE 5 MILLIGRAM(S): 100 TABLET ORAL at 18:02

## 2024-09-02 RX ADMIN — DILTIAZEM HYDROCHLORIDE 20 MILLIGRAM(S): 5 INJECTION INTRAVENOUS at 09:51

## 2024-09-02 RX ADMIN — METOPROLOL TARTRATE 5 MILLIGRAM(S): 100 TABLET ORAL at 00:12

## 2024-09-02 RX ADMIN — DILTIAZEM HYDROCHLORIDE 20 MILLIGRAM(S): 5 INJECTION INTRAVENOUS at 13:43

## 2024-09-02 RX ADMIN — HYDROMORPHONE HYDROCHLORIDE 1 MILLIGRAM(S): 2 TABLET ORAL at 22:16

## 2024-09-02 RX ADMIN — Medication 10 MILLIGRAM(S): at 13:43

## 2024-09-02 RX ADMIN — Medication 20 MILLIGRAM(S): at 20:36

## 2024-09-02 RX ADMIN — HYDROMORPHONE HYDROCHLORIDE 0.5 MILLIGRAM(S): 2 TABLET ORAL at 13:43

## 2024-09-02 RX ADMIN — Medication 10 MILLIGRAM(S): at 00:12

## 2024-09-02 RX ADMIN — Medication 10 MILLIGRAM(S): at 17:15

## 2024-09-02 RX ADMIN — Medication 10 MILLIGRAM(S): at 18:02

## 2024-09-02 RX ADMIN — Medication 1 SPRAY(S): at 11:20

## 2024-09-02 RX ADMIN — Medication 10 MILLIGRAM(S): at 08:30

## 2024-09-02 RX ADMIN — PIPERACILLIN SODIUM AND TAZOBACTAM SODIUM 25 GRAM(S): 3; .375 INJECTION, POWDER, FOR SOLUTION INTRAVENOUS at 05:36

## 2024-09-02 RX ADMIN — Medication 50 MILLILITER(S): at 22:16

## 2024-09-02 RX ADMIN — Medication 10 MILLIGRAM(S): at 15:59

## 2024-09-02 RX ADMIN — DILTIAZEM HYDROCHLORIDE 20 MILLIGRAM(S): 5 INJECTION INTRAVENOUS at 17:10

## 2024-09-02 NOTE — PROGRESS NOTE ADULT - SUBJECTIVE AND OBJECTIVE BOX
[facility]  INFECTIOUS DISEASES at [place] [facility] [place]  ===============================================================                  George Tobias MD               Diplomates American Board of Internal Medicine & Infectious Diseases                * [place] Office - Appt - Tel  [phone] Fax [phone]                * [place] Office - Appt - Tel [phone] Fax [phone]                                  Hospital Consult line:  [phone]  ==============================================================    ADALID CASE [id number]    Follow up:    I have personally reviewed the labs and data; pertinent labs and data are listed in this note; please see below.     _______________________________________________________________  REVIEW OF SYSTEMS    ________________________________________________________________  Allergies:  No Known Allergies        ________________________________________________________________  PHYSICAL EXAM  GEN: NAD, lying in bed.   HEENT: Anicteric sclerae. Moist mucous membranes. No mucosal lesions.   NECK: Supple. Mid-line trachea. No palpable masses or LN  LUNGS: eupneic. CTA B/L.  HEART: RRR, no m/r/g  ABDOMEN: Soft, NT, ND,no HSM.  +BS.    : No CVA tenderness. No Paredes catheter  EXTREMITIES: well perfused, without  edema.  MSK: No joint swelling or deformity   NEUROLOGIC: Grossly no focal deficits   PSYCHIATRIC: Appropriate affect and mood  SKIN: No rash, wounds or jaundice  LINES: PIV   ________________________________________________________________  Vitals:  T(F): 98.1 (02 Sep 2024 08:46), Max: 99.5 (01 Sep 2024 20:00)  HR: 120 (02 Sep 2024 10:00)  BP: 188/96 (02 Sep 2024 10:00)  RR: 20 (02 Sep 2024 10:00)  SpO2: 96% (02 Sep 2024 10:00) (94% - 99%)  temp max in last 48H T(F): , Max: 99.5 (09-01-24 @ 20:00)    Current Antibiotics:  ceFAZolin  Injectable. 2000 milliGRAM(s) IV Push once  piperacillin/tazobactam IVPB.. 3.375 Gram(s) IV Intermittent every 12 hours    Other medications:  albuterol    0.083%. 2.5 milliGRAM(s) Nebulizer once  benzocaine 20% Spray 1 Spray(s) Topical four times a day  dextrose 5% + sodium chloride 0.9%. 1000 milliLiter(s) IV Continuous <Continuous>  dextrose 5%. 1000 milliLiter(s) IV Continuous <Continuous>  dextrose 5%. 1000 milliLiter(s) IV Continuous <Continuous>  dextrose 50% Injectable 25 Gram(s) IV Push once  dextrose 50% Injectable 25 Gram(s) IV Push once  dextrose 50% Injectable 12.5 Gram(s) IV Push once  diltiazem Injectable 20 milliGRAM(s) IV Push every 4 hours  epoetin dustin-epbx (RETACRIT) Injectable 74500 Unit(s) IV Push <User Schedule>  folic acid 1 milliGRAM(s) Oral daily  hydrALAZINE Injectable 10 milliGRAM(s) IV Push every 4 hours  insulin lispro (ADMELOG) corrective regimen sliding scale   SubCutaneous three times a day before meals  labetalol Injectable 10 milliGRAM(s) IV Push every 4 hours  metoclopramide Injectable 10 milliGRAM(s) IV Push every 12 hours  metoprolol tartrate Injectable 5 milliGRAM(s) IV Push every 6 hours  PPD  5 Tuberculin Unit(s) Injectable 5 Unit(s) IntraDermal once                            8.2    12.66 )-----------( 303      ( 02 Sep 2024 07:49 )             26.9     09-02    140  |  96  |  57.0<H>  ----------------------------<  177<H>  3.7   |  28.0  |  10.58<H>    Ca    8.1<L>      02 Sep 2024 07:49  Phos  9.3     09-02  Mg     2.8     09-01    TPro  5.5<L>  /  Alb  1.8<L>  /  TBili  <0.2<L>  /  DBili  x   /  AST  14  /  ALT  <5  /  AlkPhos  66  09-02    RECENT CULTURES:  08-30 @ 15:19 Tissue Fibrinous Exudate for Culture     No growth to date    Rare polymorphonuclear leukocytes seen per low power field  No organisms seen per oil power field      08-30 @ 15:15 Surgical Swab Peritoneal Fluid     No growth to date        08-27 @ 11:52 .Blood Blood-Peripheral     No growth at 5 days        08-27 @ 11:50 .Blood Blood-Peripheral     No growth at 5 days        08-27 @ 00:00 Peritoneal Peritoneal Fluid Achromobacter xylosoxidans    Few Achromobacter xylosoxidans  - Amikacin: R >32  - Aztreonam: R >16  - Cefepime: R >16  - Ceftriaxone: R >32  - Ciprofloxacin: I 2  - Gentamicin: R >8  - Levofloxacin: S 2  - Meropenem: S <=1  - Piperacillin/Tazobactam: S <=8  - Tobramycin: R >8  - Trimethoprim/Sulfamethoxazole: S <=0.5/9.5    No polymorphonuclear leukocytes  No organisms seen  by cytocentrifuge    WBC Count: 12.66 K/uL (09-02-24 @ 07:49)  WBC Count: 10.91 K/uL (09-01-24 @ 06:36)  WBC Count: 10.25 K/uL (08-31-24 @ 05:55)  WBC Count: 8.68 K/uL (08-30-24 @ 05:14)  WBC Count: 11.35 K/uL (08-29-24 @ 04:07)    Creatinine: 10.58 mg/dL (09-02-24 @ 07:49)  Creatinine: 8.46 mg/dL (09-01-24 @ 06:36)  Creatinine: 13.15 mg/dL (08-31-24 @ 05:55)  Creatinine: 11.51 mg/dL (08-30-24 @ 05:14)  Creatinine: 17.19 mg/dL (08-29-24 @ 04:07)      ________________________________________________________________  CARDIOLOGY     ________________________________________________________________  RADIOLOGY  < from: CT Abdomen and Pelvis No Cont (08.28.24 @ 17:19) >  FINDINGS:  LOWER CHEST: Moderate to severe cardiomegaly. Partially visualized   central venous catheter. Small-to-moderate pericardial effusion. Small   bilateral pleural effusions and bibasilar subsegmental atelectasis.    LIVER: Within normal limits.  BILE DUCTS: Normal caliber.  GALLBLADDER: Within normal limits.  SPLEEN: Within normal limits.  PANCREAS: Within normal limits.  ADRENALS: Within normal limits.  KIDNEYS/URETERS: Within normal limits.    BLADDER: Within normal limits.  REPRODUCTIVE ORGANS: Enlarged uterus.    BOWEL: Dilated stomach and small bowel with relatively collapsed colon   and distal ileum suggestive of small bowel obstruction with possible   transition in the mid pelvis (3: 1:15) PERITONEUM/RETROPERITONEUM: Large   amount intraperitoneal fluid. Dialysis catheter inserted via left flank   coiled in left pelvis. Several droplets free air which may have been   introduced from catheter.  VESSELS: Within normal limits.  LYMPH NODES: No lymphadenopathy.  ABDOMINAL WALL: Anasarca.  BONES: Degenerative change.    IMPRESSION:  Large volume intraperitoneal fluid with several droplets free air   possibly introduced from catheter.    Dilated stomach and small bowel with relatively collapsed colon and   distal small bowel consistent with small bowel obstruction    Anasarca, small bilateral pleural effusions, and small-to-moderate   pericardial effusion    < end of copied text >

## 2024-09-02 NOTE — PROGRESS NOTE ADULT - ASSESSMENT
Assessment:    63y Female w/ PMH of ESRD on peritoneal dialysis 5 x per week, HTN, HLD, and DM admitted for peritonitis and peritoneal dialysis catheter dysfunction, needing HD. Now found to have SBO s/p ex-lap. Strict NPO with NGT to LIS.     # PD catheter associated peritonitis  # PD catheter malfunction  # SBO POD #3  - 8/30/24- Exploratory laparotomy with lysis of adhesions. Suspected area of small bowel perforation resected, approximately 15cm. Side-to-side small bowel anastomosis. PD catheter removed.  - NPO, IVF at 50cc/hr   - Dilaudid IVP PRN pain scale  - Pain management consulted. PCA pump discontinued since pt was using out of propotion to pain. Appreciate recs from Dr Alejandrina Lazar  - IV tylenol 1g as needed for breakthrough, unable to place a standing q8h order   - Inc spirometry, Mobilize  - Peritoneal fluid culture with Achromobacter xylosoxidans  - On Zosyn.   -ID on board     #Sinus Tachycardia:  -EKG with sinus tachycardia  -Dimer elevated, will do CTA chest to r/o PE    # Accelerated HTN  - pt was on Clonidine that was abruptly stopped when NPO. CLonidine patch   -possibly rebound HTN now  -changed meds to clonidine patch; hydralazine IVP, Labetalol IVP, Cardizem IVP, Metop IVP    # ESRD s/p PD now on HD  - now started on HD (8/29) via Right IJ [place]  - hep panel done. Ordered PPD  - Will likely need HD for some time    #Constipation  - Miralax when PO started    #DM2- controlled  - c/w insulin lispro  - Q 6 hr accuchecks when NPO    #HLD  - c/w statin, metoprolol, and diltiazem    #Hyperkalemia   - c/w Lokelma  - monitor BMP daily      DVT ppx: SCD      Disposition: Medically active. SBO s/p laprotomy. Now with perotinitis on IV abx.

## 2024-09-02 NOTE — PROGRESS NOTE ADULT - SUBJECTIVE AND OBJECTIVE BOX
Subjective: No acute overnight events. Doing well from surgical perspective       MEDICATIONS  (STANDING):  albuterol    0.083%. 2.5 milliGRAM(s) Nebulizer once  benzocaine/menthol Lozenge 1 Lozenge Oral every 4 hours  benzocaine/menthol Lozenge      ceFAZolin  Injectable. 2000 milliGRAM(s) IV Push once  dextrose 5% + sodium chloride 0.9%. 1000 milliLiter(s) (50 mL/Hr) IV Continuous <Continuous>  dextrose 5%. 1000 milliLiter(s) (100 mL/Hr) IV Continuous <Continuous>  dextrose 5%. 1000 milliLiter(s) (50 mL/Hr) IV Continuous <Continuous>  dextrose 50% Injectable 25 Gram(s) IV Push once  dextrose 50% Injectable 25 Gram(s) IV Push once  dextrose 50% Injectable 12.5 Gram(s) IV Push once  diltiazem Injectable 20 milliGRAM(s) IV Push every 4 hours  epoetin dustin-epbx (RETACRIT) Injectable 46015 Unit(s) IV Push <User Schedule>  folic acid 1 milliGRAM(s) Oral daily  hydrALAZINE Injectable 10 milliGRAM(s) IV Push every 2 hours  insulin lispro (ADMELOG) corrective regimen sliding scale   SubCutaneous three times a day before meals  labetalol Injectable 10 milliGRAM(s) IV Push every 4 hours  metoclopramide Injectable 10 milliGRAM(s) IV Push every 12 hours  metoprolol tartrate Injectable 5 milliGRAM(s) IV Push every 6 hours  piperacillin/tazobactam IVPB.. 3.375 Gram(s) IV Intermittent every 12 hours  PPD  5 Tuberculin Unit(s) Injectable 5 Unit(s) IntraDermal once    MEDICATIONS  (PRN):  dextrose Oral Gel 15 Gram(s) Oral once PRN Blood Glucose LESS THAN 70 milliGRAM(s)/deciliter  HYDROmorphone  Injectable 0.5 milliGRAM(s) IV Push every 4 hours PRN Moderate Pain (4 - 6)  HYDROmorphone  Injectable 1 milliGRAM(s) IV Push every 4 hours PRN Severe Pain (7 - 10)  naloxone Injectable 0.1 milliGRAM(s) IV Push every 3 minutes PRN For ANY of the following changes in patient status:  A. RR LESS THAN 10 breaths per minute, B. Oxygen saturation LESS THAN 90%, C. Sedation score of 6  ondansetron Injectable 4 milliGRAM(s) IV Push every 6 hours PRN Nausea  ondansetron Injectable 4 milliGRAM(s) IV Push every 6 hours PRN Nausea and/or Vomiting  sodium chloride 0.9% lock flush 10 milliLiter(s) IV Push every 1 hour PRN Pre/post blood products, medications, blood draw, and to maintain line patency      Vital Signs Last 24 Hrs  T(C): 37.2 (01 Sep 2024 22:00), Max: 37.5 (01 Sep 2024 20:00)  T(F): 99 (01 Sep 2024 22:00), Max: 99.5 (01 Sep 2024 20:00)  HR: 117 (02 Sep 2024 02:00) (98 - 123)  BP: 179/77 (02 Sep 2024 02:00) (156/68 - 216/85)  BP(mean): 105 (02 Sep 2024 02:00) (92 - 115)  RR: 17 (02 Sep 2024 02:00) (12 - 20)  SpO2: 96% (02 Sep 2024 02:00) (94% - 99%)    Parameters below as of 02 Sep 2024 02:00  Patient On (Oxygen Delivery Method): nasal cannula  O2 Flow (L/min): 2      Physical Exam:    Constitutional: NAD  HEENT: PERRL, EOMI  Respiratory: Respirations non-labored, no accessory muscle use  Gastrointestinal: Soft, non-tender, non-distended  Neurological: A&O x 3      LABS:                        8.1    10.91 )-----------( 317      ( 01 Sep 2024 06:36 )             27.5     09-01    135  |  96  |  47.1<H>  ----------------------------<  152<H>  4.5   |  25.0  |  8.46<H>    Ca    8.6      01 Sep 2024 06:36  Phos  9.1     09-01  Mg     2.8     09-01    TPro  5.3<L>  /  Alb  1.9<L>  /  TBili  <0.2<L>  /  DBili  x   /  AST  12  /  ALT  <5  /  AlkPhos  58  09-01      Urinalysis Basic - ( 01 Sep 2024 06:36 )    Color: x / Appearance: x / SG: x / pH: x  Gluc: 152 mg/dL / Ketone: x  / Bili: x / Urobili: x   Blood: x / Protein: x / Nitrite: x   Leuk Esterase: x / RBC: x / WBC x   Sq Epi: x / Non Sq Epi: x / Bacteria: x        A:   Surgically stable  Supportive care   H/H stable    Subjective: No acute overnight events. Doing well from surgical perspective       MEDICATIONS  (STANDING):  albuterol    0.083%. 2.5 milliGRAM(s) Nebulizer once  benzocaine/menthol Lozenge 1 Lozenge Oral every 4 hours  benzocaine/menthol Lozenge      ceFAZolin  Injectable. 2000 milliGRAM(s) IV Push once  dextrose 5% + sodium chloride 0.9%. 1000 milliLiter(s) (50 mL/Hr) IV Continuous <Continuous>  dextrose 5%. 1000 milliLiter(s) (100 mL/Hr) IV Continuous <Continuous>  dextrose 5%. 1000 milliLiter(s) (50 mL/Hr) IV Continuous <Continuous>  dextrose 50% Injectable 25 Gram(s) IV Push once  dextrose 50% Injectable 25 Gram(s) IV Push once  dextrose 50% Injectable 12.5 Gram(s) IV Push once  diltiazem Injectable 20 milliGRAM(s) IV Push every 4 hours  epoetin dustin-epbx (RETACRIT) Injectable 74416 Unit(s) IV Push <User Schedule>  folic acid 1 milliGRAM(s) Oral daily  hydrALAZINE Injectable 10 milliGRAM(s) IV Push every 2 hours  insulin lispro (ADMELOG) corrective regimen sliding scale   SubCutaneous three times a day before meals  labetalol Injectable 10 milliGRAM(s) IV Push every 4 hours  metoclopramide Injectable 10 milliGRAM(s) IV Push every 12 hours  metoprolol tartrate Injectable 5 milliGRAM(s) IV Push every 6 hours  piperacillin/tazobactam IVPB.. 3.375 Gram(s) IV Intermittent every 12 hours  PPD  5 Tuberculin Unit(s) Injectable 5 Unit(s) IntraDermal once    MEDICATIONS  (PRN):  dextrose Oral Gel 15 Gram(s) Oral once PRN Blood Glucose LESS THAN 70 milliGRAM(s)/deciliter  HYDROmorphone  Injectable 0.5 milliGRAM(s) IV Push every 4 hours PRN Moderate Pain (4 - 6)  HYDROmorphone  Injectable 1 milliGRAM(s) IV Push every 4 hours PRN Severe Pain (7 - 10)  naloxone Injectable 0.1 milliGRAM(s) IV Push every 3 minutes PRN For ANY of the following changes in patient status:  A. RR LESS THAN 10 breaths per minute, B. Oxygen saturation LESS THAN 90%, C. Sedation score of 6  ondansetron Injectable 4 milliGRAM(s) IV Push every 6 hours PRN Nausea  ondansetron Injectable 4 milliGRAM(s) IV Push every 6 hours PRN Nausea and/or Vomiting  sodium chloride 0.9% lock flush 10 milliLiter(s) IV Push every 1 hour PRN Pre/post blood products, medications, blood draw, and to maintain line patency      Vital Signs Last 24 Hrs  T(C): 37.2 (01 Sep 2024 22:00), Max: 37.5 (01 Sep 2024 20:00)  T(F): 99 (01 Sep 2024 22:00), Max: 99.5 (01 Sep 2024 20:00)  HR: 117 (02 Sep 2024 02:00) (98 - 123)  BP: 179/77 (02 Sep 2024 02:00) (156/68 - 216/85)  BP(mean): 105 (02 Sep 2024 02:00) (92 - 115)  RR: 17 (02 Sep 2024 02:00) (12 - 20)  SpO2: 96% (02 Sep 2024 02:00) (94% - 99%)    Parameters below as of 02 Sep 2024 02:00  Patient On (Oxygen Delivery Method): nasal cannula  O2 Flow (L/min): 2      Physical Exam:    Constitutional: NAD  HEENT: PERRL, EOMI  Respiratory: Respirations non-labored, no accessory muscle use  Gastrointestinal: Soft, non-tender, non-distended  Neurological: A&O x 3      LABS:                        8.1    10.91 )-----------( 317      ( 01 Sep 2024 06:36 )             27.5     09-01    135  |  96  |  47.1<H>  ----------------------------<  152<H>  4.5   |  25.0  |  8.46<H>    Ca    8.6      01 Sep 2024 06:36  Phos  9.1     09-01  Mg     2.8     09-01    TPro  5.3<L>  /  Alb  1.9<L>  /  TBili  <0.2<L>  /  DBili  x   /  AST  12  /  ALT  <5  /  AlkPhos  58  09-01      Urinalysis Basic - ( 01 Sep 2024 06:36 )    Color: x / Appearance: x / SG: x / pH: x  Gluc: 152 mg/dL / Ketone: x  / Bili: x / Urobili: x   Blood: x / Protein: x / Nitrite: x   Leuk Esterase: x / RBC: x / WBC x   Sq Epi: x / Non Sq Epi: x / Bacteria: x        A: 62 YO F s/p FAREED, SBR, PD cath removal with temp R IJ placement for HD  NPO except medications   OOBTC  Started Reglan   AROBF  CAMILLA

## 2024-09-02 NOTE — PROGRESS NOTE ADULT - SUBJECTIVE AND OBJECTIVE BOX
Yanely Gonzalez MD (Available on [employer])  [facility] Division of Hospital Medicine    Chief Complaint:  abdominal pain    SUBJECTIVE / OVERNIGHT EVENTS:  Pt seen and examined at bedside. C/o throat discomfort due to NGT. Pain is mild in abdomen.   Patient denies chest pain, SOB, abd pain, N/V, fever, chills, dysuria or any other complaints. All remainder ROS negative.     INTERVAL EVENTS:  -Pt tachycardic and hypertensive. EKG with sinus tachycardia.  -D-dimer was done which was elevated as expected in ESRD pt, however given recent surgery and tachycardia would obtain CTA chest to r/o PE. Pt will get HD tomorrow. Spoke to Dr Melendez from nephrology.   -On IV anti-htn with bp ranging 160s-170s      MEDICATIONS  (STANDING):  albuterol    0.083%. 2.5 milliGRAM(s) Nebulizer once  benzocaine 20% Spray 1 Spray(s) Topical four times a day  ceFAZolin  Injectable. 2000 milliGRAM(s) IV Push once  dextrose 5% + sodium chloride 0.9%. 1000 milliLiter(s) (50 mL/Hr) IV Continuous <Continuous>  dextrose 5%. 1000 milliLiter(s) (100 mL/Hr) IV Continuous <Continuous>  dextrose 5%. 1000 milliLiter(s) (50 mL/Hr) IV Continuous <Continuous>  dextrose 50% Injectable 25 Gram(s) IV Push once  dextrose 50% Injectable 25 Gram(s) IV Push once  dextrose 50% Injectable 12.5 Gram(s) IV Push once  diltiazem Injectable 20 milliGRAM(s) IV Push every 4 hours  epoetin dustin-epbx (RETACRIT) Injectable 07157 Unit(s) IV Push <User Schedule>  folic acid 1 milliGRAM(s) Oral daily  hydrALAZINE Injectable 10 milliGRAM(s) IV Push every 4 hours  insulin lispro (ADMELOG) corrective regimen sliding scale   SubCutaneous three times a day before meals  labetalol Injectable 10 milliGRAM(s) IV Push every 4 hours  metoclopramide Injectable 10 milliGRAM(s) IV Push every 12 hours  metoprolol tartrate Injectable 5 milliGRAM(s) IV Push every 6 hours  piperacillin/tazobactam IVPB.. 3.375 Gram(s) IV Intermittent every 12 hours  PPD  5 Tuberculin Unit(s) Injectable 5 Unit(s) IntraDermal once    MEDICATIONS  (PRN):  benzocaine/menthol Lozenge 1 Lozenge Oral every 4 hours PRN Sore Throat  dextrose Oral Gel 15 Gram(s) Oral once PRN Blood Glucose LESS THAN 70 milliGRAM(s)/deciliter  HYDROmorphone  Injectable 0.5 milliGRAM(s) IV Push every 4 hours PRN Moderate Pain (4 - 6)  HYDROmorphone  Injectable 1 milliGRAM(s) IV Push every 4 hours PRN Severe Pain (7 - 10)  naloxone Injectable 0.1 milliGRAM(s) IV Push every 3 minutes PRN For ANY of the following changes in patient status:  A. RR LESS THAN 10 breaths per minute, B. Oxygen saturation LESS THAN 90%, C. Sedation score of 6  ondansetron Injectable 4 milliGRAM(s) IV Push every 6 hours PRN Nausea  ondansetron Injectable 4 milliGRAM(s) IV Push every 6 hours PRN Nausea and/or Vomiting  sodium chloride 0.9% lock flush 10 milliLiter(s) IV Push every 1 hour PRN Pre/post blood products, medications, blood draw, and to maintain line patency        I&O's Summary    01 Sep 2024 07:01  -  02 Sep 2024 07:00  --------------------------------------------------------  IN: 1050 mL / OUT: 1775 mL / NET: -725 mL    02 Sep 2024 07:01  -  02 Sep 2024 14:37  --------------------------------------------------------  IN: 350 mL / OUT: 575 mL / NET: -225 mL        PHYSICAL EXAM:  Vital Signs Last 24 Hrs  T(C): 37.1 (02 Sep 2024 12:15), Max: 37.5 (01 Sep 2024 20:00)  T(F): 98.7 (02 Sep 2024 12:15), Max: 99.5 (01 Sep 2024 20:00)  HR: 99 (02 Sep 2024 14:00) (99 - 129)  BP: 127/65 (02 Sep 2024 14:00) (127/65 - 195/98)  BP(mean): 82 (02 Sep 2024 14:00) (82 - 124)  RR: 14 (02 Sep 2024 14:00) (12 - 22)  SpO2: 96% (02 Sep 2024 14:00) (94% - 99%)    Parameters below as of 02 Sep 2024 14:00  Patient On (Oxygen Delivery Method): nasal cannula  O2 Flow (L/min): 2          CONSTITUTIONAL: NAD  ENMT: Moist oral mucosa, NGT with billous output   RESPIRATORY: Normal respiratory effort; lungs are clear to auscultation bilaterally  CARDIOVASCULAR: Regular rate and rhythm, normal S1 and S2, no murmur/rub/gallop; No lower extremity edema; Peripheral pulses are 2+ bilaterally  ABDOMEN: Nontender to palpation, normoactive bowel sounds, no rebound/guarding; No hepatosplenomegaly  MUSCLOSKELETAL:  Normal gait; no clubbing or cyanosis of digits; no joint swelling or tenderness to palpation  PSYCH: A+O to person, place, and time; affect appropriate  NEUROLOGY: CN 2-12 are intact and symmetric; no gross sensory deficits;   SKIN: No rashes; no palpable lesions    LABS:                        8.2    12.66 )-----------( 303      ( 02 Sep 2024 07:49 )             26.9     09-02    140  |  96  |  57.0<H>  ----------------------------<  177<H>  3.7   |  28.0  |  10.58<H>    Ca    8.1<L>      02 Sep 2024 07:49  Phos  9.3     09-02  Mg     2.8     09-01    TPro  5.5<L>  /  Alb  1.8<L>  /  TBili  <0.2<L>  /  DBili  x   /  AST  14  /  ALT  <5  /  AlkPhos  66  09-02          Urinalysis Basic - ( 02 Sep 2024 07:49 )    Color: x / Appearance: x / SG: x / pH: x  Gluc: 177 mg/dL / Ketone: x  / Bili: x / Urobili: x   Blood: x / Protein: x / Nitrite: x   Leuk Esterase: x / RBC: x / WBC x   Sq Epi: x / Non Sq Epi: x / Bacteria: x        Culture - Tissue with Gram Stain (collected 30 Aug 2024 15:19)  Source: Tissue Fibrinous Exudate for Culture  Gram Stain (02 Sep 2024 10:50):    Rare polymorphonuclear leukocytes seen per low power field    No organisms seen per oil power field  Preliminary Report (02 Sep 2024 10:50):    Rare Achromobacter xylosoxidans    Culture - Surgical Swab (collected 30 Aug 2024 15:15)  Source: Surgical Swab Peritoneal Fluid  Preliminary Report (01 Sep 2024 07:12):    No growth to date      CAPILLARY BLOOD GLUCOSE      POCT Blood Glucose.: 126 mg/dL (02 Sep 2024 11:26)  POCT Blood Glucose.: 180 mg/dL (02 Sep 2024 08:28)  POCT Blood Glucose.: 160 mg/dL (02 Sep 2024 06:16)  POCT Blood Glucose.: 165 mg/dL (02 Sep 2024 00:11)  POCT Blood Glucose.: 167 mg/dL (01 Sep 2024 17:49)        RADIOLOGY & ADDITIONAL TESTS:  Results Reviewed:   Imaging Personally Reviewed:  Electrocardiogram Personally Reviewed:

## 2024-09-02 NOTE — PROGRESS NOTE ADULT - ASSESSMENT
A) CRF, DM 2, Hypertension, s/p small bowel  repair with high GI  fluid loss still and  not  able to eat.    High D-dimer with negative  CT  angio. today.    P) HD  tomorrow. ? start TPN tomorrow. needs better  dialysis access also.

## 2024-09-02 NOTE — PROGRESS NOTE ADULT - SUBJECTIVE AND OBJECTIVE BOX
NEPHROLOGY INTERVAL HPI/OVERNIGHT EVENTS:    No new events.    MEDICATIONS  (STANDING):  albuterol    0.083%. 2.5 milliGRAM(s) Nebulizer once  ceFAZolin  Injectable. 2000 milliGRAM(s) IV Push once  dextrose 5% + sodium chloride 0.9%. 1000 milliLiter(s) (50 mL/Hr) IV Continuous <Continuous>  dextrose 5%. 1000 milliLiter(s) (50 mL/Hr) IV Continuous <Continuous>  dextrose 5%. 1000 milliLiter(s) (100 mL/Hr) IV Continuous <Continuous>  dextrose 50% Injectable 12.5 Gram(s) IV Push once  dextrose 50% Injectable 25 Gram(s) IV Push once  dextrose 50% Injectable 25 Gram(s) IV Push once  diltiazem Injectable 20 milliGRAM(s) IV Push every 4 hours  epoetin dustin-epbx (RETACRIT) Injectable 44445 Unit(s) IV Push <User Schedule>  folic acid 1 milliGRAM(s) Oral daily  hydrALAZINE Injectable 10 milliGRAM(s) IV Push every 2 hours  insulin lispro (ADMELOG) corrective regimen sliding scale   SubCutaneous three times a day before meals  labetalol Injectable 10 milliGRAM(s) IV Push every 4 hours  metoclopramide Injectable 10 milliGRAM(s) IV Push every 12 hours  metoprolol tartrate Injectable 5 milliGRAM(s) IV Push every 6 hours  piperacillin/tazobactam IVPB.. 3.375 Gram(s) IV Intermittent every 12 hours  PPD  5 Tuberculin Unit(s) Injectable 5 Unit(s) IntraDermal once    MEDICATIONS  (PRN):  dextrose Oral Gel 15 Gram(s) Oral once PRN Blood Glucose LESS THAN 70 milliGRAM(s)/deciliter  HYDROmorphone  Injectable 1 milliGRAM(s) IV Push every 4 hours PRN Severe Pain (7 - 10)  HYDROmorphone  Injectable 0.5 milliGRAM(s) IV Push every 4 hours PRN Moderate Pain (4 - 6)  naloxone Injectable 0.1 milliGRAM(s) IV Push every 3 minutes PRN For ANY of the following changes in patient status:  A. RR LESS THAN 10 breaths per minute, B. Oxygen saturation LESS THAN 90%, C. Sedation score of 6  ondansetron Injectable 4 milliGRAM(s) IV Push every 6 hours PRN Nausea  ondansetron Injectable 4 milliGRAM(s) IV Push every 6 hours PRN Nausea and/or Vomiting  sodium chloride 0.9% lock flush 10 milliLiter(s) IV Push every 1 hour PRN Pre/post blood products, medications, blood draw, and to maintain line patency      Allergies    No Known Allergies        Vital Signs Last 24 Hrs  T(C): 36.8 (02 Sep 2024 15:44), Max: 37.5 (01 Sep 2024 20:00)  T(F): 98.3 (02 Sep 2024 15:44), Max: 99.5 (01 Sep 2024 20:00)  HR: 121 (02 Sep 2024 16:00) (99 - 129)  BP: 177/83 (02 Sep 2024 16:00) (127/65 - 195/98)  BP(mean): 109 (02 Sep 2024 16:00) (82 - 124)  RR: 17 (02 Sep 2024 16:00) (12 - 22)  SpO2: 96% (02 Sep 2024 16:00) (94% - 99%)    Parameters below as of 02 Sep 2024 16:00  Patient On (Oxygen Delivery Method): nasal cannula  O2 Flow (L/min): 2    T(C): 37 (01 Sep 2024 07:42), Max: 37.2 (01 Sep 2024 00:28)  T(F): 98.6 (01 Sep 2024 07:42), Max: 98.9 (01 Sep 2024 00:28)  HR: 107 (01 Sep 2024 12:00) (98 - 120)  BP: 176/79 (01 Sep 2024 12:00) (171/66 - 219/91)  BP(mean): 115 (01 Sep 2024 06:00) (106 - 115)  RR: 16 (01 Sep 2024 12:00) (10 - 20)  SpO2: 97% (01 Sep 2024 12:00) (92% - 98%)    Parameters below as of 01 Sep 2024 12:00  Patient On (Oxygen Delivery Method): nasal cannula  O2 Flow (L/min): 2      PHYSICAL EXAM:      PHYSICAL EXAM:    GENERAL: Appears  acutely ill in bed  + NG tube , No JVD + RIJ raul cath   NERVOUS SYSTEM: Alert, oriented  Lungs : No rales, No rhonchi,   HEART:  No murmur,  No rub, No gallops  ABDOMEN: dressed , dec BS , high output still  Ext Muscle  wasting         LABS:    09-02    140  |  96  |  57.0<H>  ----------------------------<  177<H>  3.7   |  28.0  |  10.58<H>    Ca    8.1<L>      02 Sep 2024 07:49  Phos  9.3     09-02  Mg     2.8     09-01    TPro  5.5<L>  /  Alb  1.8<L>  /  TBili  <0.2<L>  /  DBili  x   /  AST  14  /  ALT  <5  /  AlkPhos  66  09-02                          8.1    10.91 )-----------( 317      ( 01 Sep 2024 06:36 )             27.5     09-01    135  |  96  |  47.1<H>  ----------------------------<  152<H>  4.5   |  25.0  |  8.46<H>    Ca    8.6      01 Sep 2024 06:36  Phos  9.1     09-01  Mg     2.8     09-01    TPro  5.3<L>  /  Alb  1.9<L>  /  TBili  <0.2<L>  /  DBili  x   /  AST  12  /  ALT  <5  /  AlkPhos  58  09-01      Urinalysis Basic - ( 01 Sep 2024 06:36 )    Color: x / Appearance: x / SG: x / pH: x  Gluc: 152 mg/dL / Ketone: x  / Bili: x / Urobili: x   Blood: x / Protein: x / Nitrite: x   Leuk Esterase: x / RBC: x / WBC x   Sq Epi: x / Non Sq Epi: x / Bacteria: x      Phosphorus: 9.1 mg/dL (09-01 @ 06:36)  Magnesium: 2.8 mg/dL (09-01 @ 06:36)          RADIOLOGY & ADDITIONAL TESTS:

## 2024-09-02 NOTE — PROGRESS NOTE ADULT - ASSESSMENT
- 63F with ESRD on PD (5 days/week), HTN, DM admitted for abdominal pain, distention, subjective fevers, and PD catheter malfunction.     ID consulted for peritonitis associated with PD catheter     - Peritoneal fluid with WBC 16,800 (88% neutrophils) c/w peritonitis  - Peritoneal culture with Achromobacter xylosoxidans susceptible to piperacillin-tazobactam, meropenem and TMP/SMX  - BCX neg  - 8/28 CT AP with findings c/w SBO   - s/p Shiley on 8/27   - s/p ex-lap on 8/30 >> Fibrinous tissue seen in pelvic region where small bowel was densely adhered. Small bowel released with blunt dissection. Suspected area of small bowel perforation resected, approximately 15cm. Side-to-side small bowel anastomosis. PD catheter removed.     - Follow surgical cultures   - HD per nephrology  - remains tachycardic and hypertensive   - afebrile      - Continue piperacillin-tazobactam     Will follow

## 2024-09-03 LAB
-  AMIKACIN: SIGNIFICANT CHANGE UP
-  AZTREONAM: SIGNIFICANT CHANGE UP
-  CEFEPIME: SIGNIFICANT CHANGE UP
-  CEFTRIAXONE: SIGNIFICANT CHANGE UP
-  CIPROFLOXACIN: SIGNIFICANT CHANGE UP
-  GENTAMICIN: SIGNIFICANT CHANGE UP
-  LEVOFLOXACIN: SIGNIFICANT CHANGE UP
-  MEROPENEM: SIGNIFICANT CHANGE UP
-  PIPERACILLIN/TAZOBACTAM: SIGNIFICANT CHANGE UP
-  TOBRAMYCIN: SIGNIFICANT CHANGE UP
-  TRIMETHOPRIM/SULFAMETHOXAZOLE: SIGNIFICANT CHANGE UP
ANION GAP SERPL CALC-SCNC: 17 MMOL/L — SIGNIFICANT CHANGE UP (ref 5–17)
BUN SERPL-MCNC: 45.3 MG/DL — HIGH (ref 8–20)
CALCIUM SERPL-MCNC: 7.7 MG/DL — LOW (ref 8.4–10.5)
CHLORIDE SERPL-SCNC: 97 MMOL/L — SIGNIFICANT CHANGE UP (ref 96–108)
CO2 SERPL-SCNC: 30 MMOL/L — HIGH (ref 22–29)
CREAT SERPL-MCNC: 8.6 MG/DL — HIGH (ref 0.5–1.3)
EGFR: 5 ML/MIN/1.73M2 — LOW
GLUCOSE BLDC GLUCOMTR-MCNC: 110 MG/DL — HIGH (ref 70–99)
GLUCOSE BLDC GLUCOMTR-MCNC: 137 MG/DL — HIGH (ref 70–99)
GLUCOSE BLDC GLUCOMTR-MCNC: 154 MG/DL — HIGH (ref 70–99)
GLUCOSE BLDC GLUCOMTR-MCNC: 171 MG/DL — HIGH (ref 70–99)
GLUCOSE BLDC GLUCOMTR-MCNC: 188 MG/DL — HIGH (ref 70–99)
GLUCOSE SERPL-MCNC: 303 MG/DL — HIGH (ref 70–99)
HCT VFR BLD CALC: 28.8 % — LOW (ref 34.5–45)
HGB BLD-MCNC: 8.4 G/DL — LOW (ref 11.5–15.5)
MAGNESIUM SERPL-MCNC: 2.6 MG/DL — SIGNIFICANT CHANGE UP (ref 1.8–2.6)
MCHC RBC-ENTMCNC: 23.7 PG — LOW (ref 27–34)
MCHC RBC-ENTMCNC: 29.2 GM/DL — LOW (ref 32–36)
MCV RBC AUTO: 81.1 FL — SIGNIFICANT CHANGE UP (ref 80–100)
METHOD TYPE: SIGNIFICANT CHANGE UP
PLATELET # BLD AUTO: 323 K/UL — SIGNIFICANT CHANGE UP (ref 150–400)
POTASSIUM SERPL-MCNC: 3.6 MMOL/L — SIGNIFICANT CHANGE UP (ref 3.5–5.3)
POTASSIUM SERPL-SCNC: 3.6 MMOL/L — SIGNIFICANT CHANGE UP (ref 3.5–5.3)
RBC # BLD: 3.55 M/UL — LOW (ref 3.8–5.2)
RBC # FLD: 15.9 % — HIGH (ref 10.3–14.5)
SODIUM SERPL-SCNC: 143 MMOL/L — SIGNIFICANT CHANGE UP (ref 135–145)
WBC # BLD: 11.03 K/UL — HIGH (ref 3.8–10.5)
WBC # FLD AUTO: 11.03 K/UL — HIGH (ref 3.8–10.5)

## 2024-09-03 PROCEDURE — 71045 X-RAY EXAM CHEST 1 VIEW: CPT | Mod: 26

## 2024-09-03 PROCEDURE — 99233 SBSQ HOSP IP/OBS HIGH 50: CPT

## 2024-09-03 PROCEDURE — 71045 X-RAY EXAM CHEST 1 VIEW: CPT | Mod: 26,77

## 2024-09-03 PROCEDURE — 99232 SBSQ HOSP IP/OBS MODERATE 35: CPT

## 2024-09-03 RX ORDER — HYDRALAZINE HCL 50 MG
10 TABLET ORAL
Refills: 0 | Status: DISCONTINUED | OUTPATIENT
Start: 2024-09-03 | End: 2024-09-04

## 2024-09-03 RX ORDER — CHLORHEXIDINE GLUCONATE 40 MG/ML
1 SOLUTION TOPICAL DAILY
Refills: 0 | Status: DISCONTINUED | OUTPATIENT
Start: 2024-09-03 | End: 2024-09-12

## 2024-09-03 RX ORDER — LIDOCAINE/BENZALKONIUM/ALCOHOL
1 SOLUTION, NON-ORAL TOPICAL DAILY
Refills: 0 | Status: DISCONTINUED | OUTPATIENT
Start: 2024-09-03 | End: 2024-09-12

## 2024-09-03 RX ORDER — HYDROMORPHONE HYDROCHLORIDE 2 MG/1
1 TABLET ORAL EVERY 6 HOURS
Refills: 0 | Status: DISCONTINUED | OUTPATIENT
Start: 2024-09-03 | End: 2024-09-03

## 2024-09-03 RX ORDER — CLONIDINE HCL 0.2 MG
1 TABLET ORAL
Refills: 0 | Status: DISCONTINUED | OUTPATIENT
Start: 2024-09-03 | End: 2024-09-12

## 2024-09-03 RX ORDER — ACETAMINOPHEN 325 MG/1
1000 TABLET ORAL EVERY 6 HOURS
Refills: 0 | Status: COMPLETED | OUTPATIENT
Start: 2024-09-03 | End: 2024-09-06

## 2024-09-03 RX ORDER — HYDROMORPHONE HYDROCHLORIDE 2 MG/1
0.5 TABLET ORAL EVERY 4 HOURS
Refills: 0 | Status: DISCONTINUED | OUTPATIENT
Start: 2024-09-03 | End: 2024-09-06

## 2024-09-03 RX ORDER — CLONIDINE HCL 0.2 MG
1 TABLET ORAL
Refills: 0 | Status: DISCONTINUED | OUTPATIENT
Start: 2024-09-03 | End: 2024-09-03

## 2024-09-03 RX ORDER — HYDROMORPHONE HYDROCHLORIDE 2 MG/1
1 TABLET ORAL EVERY 6 HOURS
Refills: 0 | Status: DISCONTINUED | OUTPATIENT
Start: 2024-09-03 | End: 2024-09-05

## 2024-09-03 RX ADMIN — Medication 1 SPRAY(S): at 12:11

## 2024-09-03 RX ADMIN — METOPROLOL TARTRATE 5 MILLIGRAM(S): 100 TABLET ORAL at 12:11

## 2024-09-03 RX ADMIN — METOPROLOL TARTRATE 5 MILLIGRAM(S): 100 TABLET ORAL at 06:03

## 2024-09-03 RX ADMIN — Medication 1 PATCH: at 17:14

## 2024-09-03 RX ADMIN — METOPROLOL TARTRATE 5 MILLIGRAM(S): 100 TABLET ORAL at 00:35

## 2024-09-03 RX ADMIN — Medication 1 SPRAY(S): at 17:14

## 2024-09-03 RX ADMIN — Medication 20 MILLIGRAM(S): at 10:44

## 2024-09-03 RX ADMIN — Medication 1: at 07:43

## 2024-09-03 RX ADMIN — Medication 10 MILLIGRAM(S): at 20:05

## 2024-09-03 RX ADMIN — CHLORHEXIDINE GLUCONATE 1 APPLICATION(S): 40 SOLUTION TOPICAL at 12:12

## 2024-09-03 RX ADMIN — HYDROMORPHONE HYDROCHLORIDE 1 MILLIGRAM(S): 2 TABLET ORAL at 08:30

## 2024-09-03 RX ADMIN — Medication 20 MILLIGRAM(S): at 02:38

## 2024-09-03 RX ADMIN — Medication 10 MILLIGRAM(S): at 16:34

## 2024-09-03 RX ADMIN — Medication 10 MILLIGRAM(S): at 22:10

## 2024-09-03 RX ADMIN — DILTIAZEM HYDROCHLORIDE 20 MILLIGRAM(S): 5 INJECTION INTRAVENOUS at 13:40

## 2024-09-03 RX ADMIN — DILTIAZEM HYDROCHLORIDE 20 MILLIGRAM(S): 5 INJECTION INTRAVENOUS at 10:11

## 2024-09-03 RX ADMIN — METOPROLOL TARTRATE 5 MILLIGRAM(S): 100 TABLET ORAL at 17:13

## 2024-09-03 RX ADMIN — Medication 10 MILLIGRAM(S): at 17:13

## 2024-09-03 RX ADMIN — Medication 1 PATCH: at 20:40

## 2024-09-03 RX ADMIN — DILTIAZEM HYDROCHLORIDE 20 MILLIGRAM(S): 5 INJECTION INTRAVENOUS at 06:03

## 2024-09-03 RX ADMIN — HYDROMORPHONE HYDROCHLORIDE 1 MILLIGRAM(S): 2 TABLET ORAL at 12:12

## 2024-09-03 RX ADMIN — HYDROMORPHONE HYDROCHLORIDE 1 MILLIGRAM(S): 2 TABLET ORAL at 11:12

## 2024-09-03 RX ADMIN — EPOETIN ALFA 10000 UNIT(S): 3000 SOLUTION INTRAVENOUS; SUBCUTANEOUS at 11:29

## 2024-09-03 RX ADMIN — DILTIAZEM HYDROCHLORIDE 20 MILLIGRAM(S): 5 INJECTION INTRAVENOUS at 17:12

## 2024-09-03 RX ADMIN — ACETAMINOPHEN 400 MILLIGRAM(S): 325 TABLET ORAL at 17:13

## 2024-09-03 RX ADMIN — Medication 1 LOZENGE: at 00:35

## 2024-09-03 RX ADMIN — Medication 50 MILLILITER(S): at 12:10

## 2024-09-03 RX ADMIN — PIPERACILLIN SODIUM AND TAZOBACTAM SODIUM 25 GRAM(S): 3; .375 INJECTION, POWDER, FOR SOLUTION INTRAVENOUS at 17:14

## 2024-09-03 RX ADMIN — DILTIAZEM HYDROCHLORIDE 20 MILLIGRAM(S): 5 INJECTION INTRAVENOUS at 22:26

## 2024-09-03 RX ADMIN — HYDROMORPHONE HYDROCHLORIDE 1 MILLIGRAM(S): 2 TABLET ORAL at 07:43

## 2024-09-03 RX ADMIN — DILTIAZEM HYDROCHLORIDE 20 MILLIGRAM(S): 5 INJECTION INTRAVENOUS at 02:38

## 2024-09-03 RX ADMIN — Medication 1 PATCH: at 20:41

## 2024-09-03 RX ADMIN — Medication 50 MILLILITER(S): at 02:39

## 2024-09-03 RX ADMIN — Medication 50 MILLILITER(S): at 22:26

## 2024-09-03 RX ADMIN — PIPERACILLIN SODIUM AND TAZOBACTAM SODIUM 25 GRAM(S): 3; .375 INJECTION, POWDER, FOR SOLUTION INTRAVENOUS at 06:02

## 2024-09-03 RX ADMIN — Medication 10 MILLIGRAM(S): at 06:02

## 2024-09-03 RX ADMIN — Medication 1 SPRAY(S): at 06:03

## 2024-09-03 RX ADMIN — Medication 10 MILLIGRAM(S): at 14:55

## 2024-09-03 RX ADMIN — Medication 1 PATCH: at 12:11

## 2024-09-03 NOTE — PROGRESS NOTE ADULT - SUBJECTIVE AND OBJECTIVE BOX
[facility]  INFECTIOUS DISEASES at [place] and [place]  ===============================================================                  George Tobias MD               Diplomates American Board of Internal Medicine & Infectious Diseases                * [place] Office - Appt - Tel  [phone] Fax [phone]                * [place] Office - Appt - Tel [phone] Fax [phone]                                  Hospital Consult line:  [phone]  ==============================================================    ADALID CASE [id number]    Follow up: peritonitis associated to PD catheter    No acute events overnight   afebrile  Tachycardic, hypertensive     I have personally reviewed the labs and data; pertinent labs and data are listed in this note; please see below.     _______________________________________________________________  REVIEW OF SYSTEMS  Continues to have abd pain. Discomfort from NGT, intermittent nausea. No fever or chills.   ________________________________________________________________  Allergies:  No Known Allergies    ________________________________________________________________  PHYSICAL EXAM  GEN: chronically ill appearing but in NAD  HEENT: NGT  NECK: Supple.   LUNGS: unlabored breathing, poor inspiratory effort, CTA anteriorly   HEART: Regular tachycardia   ABDOMEN: distended, mildly tender, surgical incision covered.   : No Paredes catheter  LINES: RIGOBERTO Ruiz   ________________________________________________________________  Vitals:  T(F): 98.6 (03 Sep 2024 15:53), Max: 98.6 (03 Sep 2024 04:02)  HR: 108 (03 Sep 2024 15:25)  BP: 176/94 (03 Sep 2024 15:25)  RR: 15 (03 Sep 2024 15:25)  SpO2: 97% (03 Sep 2024 15:25) (95% - 100%)  temp max in last 48H T(F): , Max: 99.5 (09-01-24 @ 20:00)    Current Antibiotics:  piperacillin/tazobactam IVPB.. 3.375 Gram(s) IV Intermittent every 12 hours    Other medications:  acetaminophen   IVPB .. 1000 milliGRAM(s) IV Intermittent every 6 hours  albuterol    0.083%. 2.5 milliGRAM(s) Nebulizer once  benzocaine 20% Spray 1 Spray(s) Topical four times a day  chlorhexidine 2% Cloths 1 Application(s) Topical daily  cloNIDine Patch 0.3 mG/24Hr(s) 1 patch Transdermal every 7 days  dextrose 5% + sodium chloride 0.9%. 1000 milliLiter(s) IV Continuous <Continuous>  dextrose 5%. 1000 milliLiter(s) IV Continuous <Continuous>  dextrose 5%. 1000 milliLiter(s) IV Continuous <Continuous>  dextrose 50% Injectable 25 Gram(s) IV Push once  dextrose 50% Injectable 25 Gram(s) IV Push once  dextrose 50% Injectable 12.5 Gram(s) IV Push once  diltiazem Injectable 20 milliGRAM(s) IV Push every 4 hours  epoetin dustin-epbx (RETACRIT) Injectable 59616 Unit(s) IV Push <User Schedule>  folic acid 1 milliGRAM(s) Oral daily  insulin lispro (ADMELOG) corrective regimen sliding scale   SubCutaneous three times a day before meals  lidocaine   4% Patch 1 Patch Transdermal daily  metoclopramide Injectable 10 milliGRAM(s) IV Push every 12 hours  metoprolol tartrate Injectable 5 milliGRAM(s) IV Push every 6 hours  PPD  5 Tuberculin Unit(s) Injectable 5 Unit(s) IntraDermal once                            8.4    11.03 )-----------( 323      ( 03 Sep 2024 06:19 )             28.8     09-03    143  |  97  |  45.3<H>  ----------------------------<  303<H>  3.6   |  30.0<H>  |  8.60<H>    Ca    7.7<L>      03 Sep 2024 06:19  Phos  9.3     09-02  Mg     2.6     09-03    TPro  5.5<L>  /  Alb  1.8<L>  /  TBili  <0.2<L>  /  DBili  x   /  AST  14  /  ALT  <5  /  AlkPhos  66  09-02    RECENT CULTURES:  08-30 @ 15:19 Tissue Fibrinous Exudate for Culture Achromobacter xylosoxidans    Rare Achromobacter xylosoxidans  - Amikacin: R >32  - Aztreonam: R >16  - Cefepime: I 16  - Ceftriaxone: I 32  - Ciprofloxacin: S 1  - Gentamicin: R >8  - Levofloxacin: S 1  - Meropenem: S <=1  - Piperacillin/Tazobactam: S <=8  - Tobramycin: R >8    Rare polymorphonuclear leukocytes seen per low power field  No organisms seen per oil power field      08-30 @ 15:15 Surgical Swab Peritoneal Fluid     No growth to date        08-27 @ 11:52 .Blood Blood-Peripheral     No growth at 5 days        08-27 @ 11:50 .Blood Blood-Peripheral     No growth at 5 days        08-27 @ 00:00 Peritoneal Peritoneal Fluid Achromobacter xylosoxidans    Few Achromobacter xylosoxidans    No polymorphonuclear leukocytes  No organisms seen  by cytocentrifuge        WBC Count: 11.03 K/uL (09-03-24 @ 06:19)  WBC Count: 12.66 K/uL (09-02-24 @ 07:49)  WBC Count: 10.91 K/uL (09-01-24 @ 06:36)  WBC Count: 10.25 K/uL (08-31-24 @ 05:55)  WBC Count: 8.68 K/uL (08-30-24 @ 05:14)    Creatinine: 8.60 mg/dL (09-03-24 @ 06:19)  Creatinine: 10.58 mg/dL (09-02-24 @ 07:49)  Creatinine: 8.46 mg/dL (09-01-24 @ 06:36)  Creatinine: 13.15 mg/dL (08-31-24 @ 05:55)  Creatinine: 11.51 mg/dL (08-30-24 @ 05:14)    ________________________________________________________________  CARDIOLOGY   ________________________________________________________________  RADIOLOGY  < from: CT Angio Chest PE Protocol w/ IV Cont (09.02.24 @ 17:49) >  IMPRESSION:  No pulmonary embolism.    Small bilateral pleural effusions with partial atelectasis of both lower   lobes. Right pleural effusion is partially loculated. Patchy dependent   bilateral lung opacities represent atelectasis with or without   superimposed infection. A few right lung nodules measure up to 5 mm are   indeterminate. Three-month follow-up CT recommended    There is a moderate pericardial effusion.    < end of copied text >    < from: CT Abdomen and Pelvis No Cont (08.28.24 @ 17:19) >    IMPRESSION:  Large volume intraperitoneal fluid with several droplets free air   possibly introduced from catheter.    Dilated stomach and small bowel with relatively collapsed colon and   distal small bowel consistent with small bowel obstruction    Anasarca, small bilateral pleural effusions, and small-to-moderate   pericardial effusion    < end of copied text >

## 2024-09-03 NOTE — PROGRESS NOTE ADULT - SUBJECTIVE AND OBJECTIVE BOX
ADALID MANPREET  [id number]      Chief Complaint:  Follow up ESRD on PD, Peritonitis, post removal of PD catheter. pericardial effusion, tachycardia    Interval History:  /p ex-lap on 8/30, post removal of PD catheter    Tele:  Sinus tachycardia      albuterol    0.083%. 2.5 milliGRAM(s) Nebulizer once  benzocaine 20% Spray 1 Spray(s) Topical four times a day  benzocaine/menthol Lozenge 1 Lozenge Oral every 4 hours PRN  ceFAZolin  Injectable. 2000 milliGRAM(s) IV Push once  chlorhexidine 2% Cloths 1 Application(s) Topical daily  cloNIDine Patch 0.3 mG/24Hr(s) 1 patch Transdermal every 7 days  cloNIDine Patch 0.3 mG/24Hr(s) 1 patch Transdermal every 7 days  dextrose 5% + sodium chloride 0.9%. 1000 milliLiter(s) IV Continuous <Continuous>  dextrose 5%. 1000 milliLiter(s) IV Continuous <Continuous>  dextrose 5%. 1000 milliLiter(s) IV Continuous <Continuous>  dextrose 50% Injectable 25 Gram(s) IV Push once  dextrose 50% Injectable 25 Gram(s) IV Push once  dextrose 50% Injectable 12.5 Gram(s) IV Push once  dextrose Oral Gel 15 Gram(s) Oral once PRN  diltiazem Injectable 20 milliGRAM(s) IV Push every 4 hours  epoetin dustin-epbx (RETACRIT) Injectable 02376 Unit(s) IV Push <User Schedule>  folic acid 1 milliGRAM(s) Oral daily  hydrALAZINE Injectable 10 milliGRAM(s) IV Push every 2 hours  hydrALAZINE Injectable 20 milliGRAM(s) IV Push every 6 hours PRN  HYDROmorphone  Injectable 1 milliGRAM(s) IV Push every 4 hours PRN  HYDROmorphone  Injectable 0.5 milliGRAM(s) IV Push every 4 hours PRN  insulin lispro (ADMELOG) corrective regimen sliding scale   SubCutaneous three times a day before meals  metoclopramide Injectable 10 milliGRAM(s) IV Push every 12 hours  metoprolol tartrate Injectable 5 milliGRAM(s) IV Push every 6 hours  naloxone Injectable 0.1 milliGRAM(s) IV Push every 3 minutes PRN  ondansetron Injectable 4 milliGRAM(s) IV Push every 6 hours PRN  ondansetron Injectable 4 milliGRAM(s) IV Push every 6 hours PRN  piperacillin/tazobactam IVPB.. 3.375 Gram(s) IV Intermittent every 12 hours  PPD  5 Tuberculin Unit(s) Injectable 5 Unit(s) IntraDermal once  sodium chloride 0.9% lock flush 10 milliLiter(s) IV Push every 1 hour PRN          Physical Exam:  T(C): 36.5 (09-03-24 @ 12:40), Max: 37 (09-03-24 @ 04:02)  HR: 125 (09-03-24 @ 14:00) (107 - 125)  BP: 201/95 (09-03-24 @ 14:00) (159/98 - 213/110)  RR: 15 (09-03-24 @ 14:00) (10 - 24)  SpO2: 97% (09-03-24 @ 14:00) (95% - 100%)  Wt(kg): --  General: Comfortable in NAD  Neck: No JVD  CVS: Tachycardic s1s2, no s3  Pulm: CTA b/l  Abd: soft, non-tender  Ext: No c/c/e  Neuro A&O x3  Psych: Normal affect      Labs:   03 Sep 2024 06:19    143    |  97     |  45.3   ----------------------------<  303    3.6     |  30.0   |  8.60     Ca    7.7        03 Sep 2024 06:19  Phos  9.3       02 Sep 2024 07:49  Mg     2.6       03 Sep 2024 06:19    TPro  5.5    /  Alb  1.8    /  TBili  <0.2   /  DBili  x      /  AST  14     /  ALT  <5     /  AlkPhos  66     02 Sep 2024 07:49                          8.4    11.03 )-----------( 323      ( 03 Sep 2024 06:19 )             28.8       ECHO 2/2024: Normal BIV function, EF 65-70%, mild MR/TR  PHARM NUCLEAR STRESS 10/2023: No ischemic/infarct, EF 77%    ECG 8/26/2024: SR, LAFB, PRWP, consider prior ASMI  ECG 8/27/2024: SR, LAFB, PRWP, consider prior ASMI       < from: CT Angio Chest PE Protocol w/ IV Cont (09.02.24 @ 17:49) >  No pulmonary embolism.    Small bilateral pleural effusions with partial atelectasis of both lower   lobes. Right pleural effusion is partially loculated. Patchy dependent   bilateral lung opacities represent atelectasis with or without   superimposed infection. A few right lung nodules measure up to 5 mm are   indeterminate. Three-month follow-up CT recommended    There is a moderate pericardial effusion.      Assessment:  63F with ESRD on PD (5 days/week), HTN, DM admitted for abdominal pain, distention, subjective fevers, and PD catheter malfunction. s/p ex-lap on 8/30 >> Fibrinous tissue seen in pelvic region where small bowel was densely adhered. Small bowel released with blunt dissection. Suspected area of small bowel perforation resected, approximately 15cm. Side-to-side small bowel anastomosis. PD catheter removed. Cardiology requested to for evaluation of pericardial effusion noted on a recent CT scan.   Patient was early seen on this admission for cardiac risk stratification prior to ex-lap.       Telemetry with Sinus tachycardia: likely multifactorial (pain, Infection, NG tube)  Pericardial effusion: likely Uremic in nature    Plan:  1. Echocardiogram   2. Rest as per primary team and other consultants   3. Will continue to follow.

## 2024-09-03 NOTE — PROGRESS NOTE ADULT - ASSESSMENT
64yo F pt w/ pmhx of ESRD on peritoneal dialysis 5x per week, HTN, HLD, DM presented to ED c/o worsening diffused abdominal pain with associated distention over the past week. Pt reports dialysis cath has not been draining for the last week. She attempts peritoneal dialysis but her unable to get any fluid return. She denies fever, chills, N/V/D, urinary sx, chest pain, SOB, or any other complaints.  (27 Aug 2024 03:04)  Has been constipated in the past    ESRD   Now w PD catheter complication - peritonitis   CT abd noted --> + SBO   POD # 3 -->  Ex-lap, FAREED,  SBR and PD cath removal  HD today --> will need eventual permacath   Abx as per ID     RO - Phoslo     HTN - Watch on meds     Anemia - added Epogen with HD  added folate  Ferritin 1100  Transfuse as needed     Moderate pericardial effusion on CT  Check ECHO

## 2024-09-03 NOTE — PROGRESS NOTE ADULT - SUBJECTIVE AND OBJECTIVE BOX
SUBJECTIVE / 24H EVENTS:    Pt seen and examined at bedside by the team during rounds. Pt denies CP, SOB, N/V, fever, chills. Patient had episodes of tachycardia and SOB overnight. Patient endorsed BM and minimal flatus.     MEDICATIONS  (STANDING):  albuterol    0.083%. 2.5 milliGRAM(s) Nebulizer once  benzocaine 20% Spray 1 Spray(s) Topical four times a day  ceFAZolin  Injectable. 2000 milliGRAM(s) IV Push once  cloNIDine Patch 0.2 mG/24Hr(s) 1 patch Transdermal every 7 days  dextrose 5% + sodium chloride 0.9%. 1000 milliLiter(s) (50 mL/Hr) IV Continuous <Continuous>  dextrose 5%. 1000 milliLiter(s) (50 mL/Hr) IV Continuous <Continuous>  dextrose 5%. 1000 milliLiter(s) (100 mL/Hr) IV Continuous <Continuous>  dextrose 50% Injectable 12.5 Gram(s) IV Push once  dextrose 50% Injectable 25 Gram(s) IV Push once  dextrose 50% Injectable 25 Gram(s) IV Push once  diltiazem Injectable 20 milliGRAM(s) IV Push every 4 hours  epoetin dustin-epbx (RETACRIT) Injectable 03535 Unit(s) IV Push <User Schedule>  folic acid 1 milliGRAM(s) Oral daily  insulin lispro (ADMELOG) corrective regimen sliding scale   SubCutaneous three times a day before meals  metoclopramide Injectable 10 milliGRAM(s) IV Push every 12 hours  metoprolol tartrate Injectable 5 milliGRAM(s) IV Push every 6 hours  piperacillin/tazobactam IVPB.. 3.375 Gram(s) IV Intermittent every 12 hours  PPD  5 Tuberculin Unit(s) Injectable 5 Unit(s) IntraDermal once    MEDICATIONS  (PRN):  benzocaine/menthol Lozenge 1 Lozenge Oral every 4 hours PRN Sore Throat  dextrose Oral Gel 15 Gram(s) Oral once PRN Blood Glucose LESS THAN 70 milliGRAM(s)/deciliter  hydrALAZINE Injectable 20 milliGRAM(s) IV Push every 6 hours PRN systolic > 150  HYDROmorphone  Injectable 1 milliGRAM(s) IV Push every 4 hours PRN Severe Pain (7 - 10)  HYDROmorphone  Injectable 0.5 milliGRAM(s) IV Push every 4 hours PRN Moderate Pain (4 - 6)  naloxone Injectable 0.1 milliGRAM(s) IV Push every 3 minutes PRN For ANY of the following changes in patient status:  A. RR LESS THAN 10 breaths per minute, B. Oxygen saturation LESS THAN 90%, C. Sedation score of 6  ondansetron Injectable 4 milliGRAM(s) IV Push every 6 hours PRN Nausea  ondansetron Injectable 4 milliGRAM(s) IV Push every 6 hours PRN Nausea and/or Vomiting  sodium chloride 0.9% lock flush 10 milliLiter(s) IV Push every 1 hour PRN Pre/post blood products, medications, blood draw, and to maintain line patency      Vital Signs Last 24 Hrs  T(C): 37 (03 Sep 2024 04:02), Max: 37.1 (02 Sep 2024 12:15)  T(F): 98.6 (03 Sep 2024 04:02), Max: 98.7 (02 Sep 2024 12:15)  HR: 113 (03 Sep 2024 06:00) (99 - 123)  BP: 162/84 (03 Sep 2024 06:00) (127/65 - 194/90)  BP(mean): 105 (03 Sep 2024 06:00) (82 - 123)  RR: 19 (03 Sep 2024 06:00) (14 - 22)  SpO2: 96% (03 Sep 2024 06:00) (94% - 100%)    Parameters below as of 03 Sep 2024 06:00  Patient On (Oxygen Delivery Method): room air        Physical Exam  Constitutional: patient appears comfortable resting in bed, in no apparent distress  HEENT: head normocephalic and atraumatic, no blood in nares or oral cavity, NGT in place  Respiratory: respirations are unlabored, no accessory muscle use, no conversational dyspnea  Cardiovascular: regular rate & rhythm  Gastrointestinal: abdomen is soft & non-distended, non-tender, no rebound tenderness / guarding  Neurological: GCS15, A&O x 3  Musculoskeletal: RENTERIA x 4 spontaneously, extremities are without point tenderness or deformity  Skin: mucous membranes moist, no diaphoresis, pallor, cyanosis or jaundice      I&O's Detail    01 Sep 2024 07:01  -  02 Sep 2024 07:00  --------------------------------------------------------  IN:    dextrose 5% + sodium chloride 0.9%: 1050 mL  Total IN: 1050 mL    OUT:    Nasogastric/Oral tube (mL): 1700 mL    Voided (mL): 75 mL  Total OUT: 1775 mL    Total NET: -725 mL      02 Sep 2024 07:01  -  03 Sep 2024 06:43  --------------------------------------------------------  IN:    dextrose 5% + sodium chloride 0.9%: 1150 mL    IV PiggyBack: 100 mL  Total IN: 1250 mL    OUT:    Nasogastric/Oral tube (mL): 1300 mL    Voided (mL): 200 mL  Total OUT: 1500 mL    Total NET: -250 mL          LABS:                        8.4    11.03 )-----------( 323      ( 03 Sep 2024 06:19 )             28.8     09-02    140  |  96  |  57.0<H>  ----------------------------<  177<H>  3.7   |  28.0  |  10.58<H>    Ca    8.1<L>      02 Sep 2024 07:49  Phos  9.3     09-02    TPro  5.5<L>  /  Alb  1.8<L>  /  TBili  <0.2<L>  /  DBili  x   /  AST  14  /  ALT  <5  /  AlkPhos  66  09-02      Urinalysis Basic - ( 02 Sep 2024 07:49 )    Color: x / Appearance: x / SG: x / pH: x  Gluc: 177 mg/dL / Ketone: x  / Bili: x / Urobili: x   Blood: x / Protein: x / Nitrite: x   Leuk Esterase: x / RBC: x / WBC x   Sq Epi: x / Non Sq Epi: x / Bacteria: x       SUBJECTIVE / 24H EVENTS:    Pt seen and examined at bedside by the team during rounds. Pt denies CP, SOB, fever, chills. Reports abdominal pain is persistent and she has had nausea. Patient had episodes of tachycardia and SOB overnight. Patient endorsed BM and minimal flatus.         Vital Signs Last 24 Hrs  T(C): 37 (03 Sep 2024 04:02), Max: 37.1 (02 Sep 2024 12:15)  T(F): 98.6 (03 Sep 2024 04:02), Max: 98.7 (02 Sep 2024 12:15)  HR: 113 (03 Sep 2024 06:00) (99 - 123)  BP: 162/84 (03 Sep 2024 06:00) (127/65 - 194/90)  BP(mean): 105 (03 Sep 2024 06:00) (82 - 123)  RR: 19 (03 Sep 2024 06:00) (14 - 22)  SpO2: 96% (03 Sep 2024 06:00) (94% - 100%)    Parameters below as of 03 Sep 2024 06:00  Patient On (Oxygen Delivery Method): room air        Physical Exam  HEENT: head normocephalic and atraumatic, NGT in place  Respiratory: respirations are unlabored, no accessory muscle use, no conversational dyspnea  Gastrointestinal: abdomen is distended, tender to palpation, tympanic to percussion  Musculoskeletal: RENTERIA x 4 spontaneously, extremities are without point tenderness or deformity      I&O's Detail    01 Sep 2024 07:01  -  02 Sep 2024 07:00  --------------------------------------------------------  IN:    dextrose 5% + sodium chloride 0.9%: 1050 mL  Total IN: 1050 mL    OUT:    Nasogastric/Oral tube (mL): 1700 mL    Voided (mL): 75 mL  Total OUT: 1775 mL    Total NET: -725 mL      02 Sep 2024 07:01  -  03 Sep 2024 06:43  --------------------------------------------------------  IN:    dextrose 5% + sodium chloride 0.9%: 1150 mL    IV PiggyBack: 100 mL  Total IN: 1250 mL    OUT:    Nasogastric/Oral tube (mL): 1300 mL    Voided (mL): 200 mL  Total OUT: 1500 mL    Total NET: -250 mL        LABS:                        8.4    11.03 )-----------( 323      ( 03 Sep 2024 06:19 )             28.8     09-02    140  |  96  |  57.0<H>  ----------------------------<  177<H>  3.7   |  28.0  |  10.58<H>    Ca    8.1<L>      02 Sep 2024 07:49  Phos  9.3     09-02    TPro  5.5<L>  /  Alb  1.8<L>  /  TBili  <0.2<L>  /  DBili  x   /  AST  14  /  ALT  <5  /  AlkPhos  66  09-02      Urinalysis Basic - ( 02 Sep 2024 07:49 )  Color: x / Appearance: x / SG: x / pH: x  Gluc: 177 mg/dL / Ketone: x  / Bili: x / Urobili: x   Blood: x / Protein: x / Nitrite: x   Leuk Esterase: x / RBC: x / WBC x   Sq Epi: x / Non Sq Epi: x / Bacteria: x

## 2024-09-03 NOTE — PROGRESS NOTE ADULT - SUBJECTIVE AND OBJECTIVE BOX
Yanely Gonzalez MD (Available on [facility])  [facility] Division of Hospital Medicine    Chief Complaint:  sbo    SUBJECTIVE / OVERNIGHT EVENTS:  Pt seen and examined at bedside. pt c/o abdominal pain, discomfort of ngt and inability to sleep. She endorsed chronic insomnia.  at bedside. Updated.    Patient denies chest pain, SOB, abd pain, N/V, fever, chills, dysuria or any other complaints. All remainder ROS negative.     INTERVAL EVENTS:  -Pt remains hypertensive to 190s, Hydralazine changed to q2h standing, Clonidine patch increased to 0.3mg/qweekly  -Appreciate Gen surg input, plan to clamp ngt tomorrow pending clinical progression. Discussed with resident if she might need longer NPO and TPN should be initiated  -Appreciate pain consult> changes made accordingly  -CTA chest neg for PE  -Cards input appreciated: pericardial effusion likely uremic. Fu echo, spoke to Dr Chen in person.     MEDICATIONS  (STANDING):  acetaminophen   IVPB .. 1000 milliGRAM(s) IV Intermittent every 6 hours  albuterol    0.083%. 2.5 milliGRAM(s) Nebulizer once  benzocaine 20% Spray 1 Spray(s) Topical four times a day  ceFAZolin  Injectable. 2000 milliGRAM(s) IV Push once  chlorhexidine 2% Cloths 1 Application(s) Topical daily  cloNIDine Patch 0.3 mG/24Hr(s) 1 patch Transdermal every 7 days  cloNIDine Patch 0.3 mG/24Hr(s) 1 patch Transdermal every 7 days  dextrose 5% + sodium chloride 0.9%. 1000 milliLiter(s) (50 mL/Hr) IV Continuous <Continuous>  dextrose 5%. 1000 milliLiter(s) (50 mL/Hr) IV Continuous <Continuous>  dextrose 5%. 1000 milliLiter(s) (100 mL/Hr) IV Continuous <Continuous>  dextrose 50% Injectable 12.5 Gram(s) IV Push once  dextrose 50% Injectable 25 Gram(s) IV Push once  dextrose 50% Injectable 25 Gram(s) IV Push once  diltiazem Injectable 20 milliGRAM(s) IV Push every 4 hours  epoetin dustin-epbx (RETACRIT) Injectable 07703 Unit(s) IV Push <User Schedule>  folic acid 1 milliGRAM(s) Oral daily  hydrALAZINE Injectable 10 milliGRAM(s) IV Push every 2 hours  insulin lispro (ADMELOG) corrective regimen sliding scale   SubCutaneous three times a day before meals  lidocaine   4% Patch 1 Patch Transdermal daily  metoclopramide Injectable 10 milliGRAM(s) IV Push every 12 hours  metoprolol tartrate Injectable 5 milliGRAM(s) IV Push every 6 hours  piperacillin/tazobactam IVPB.. 3.375 Gram(s) IV Intermittent every 12 hours  PPD  5 Tuberculin Unit(s) Injectable 5 Unit(s) IntraDermal once    MEDICATIONS  (PRN):  benzocaine/menthol Lozenge 1 Lozenge Oral every 4 hours PRN Sore Throat  dextrose Oral Gel 15 Gram(s) Oral once PRN Blood Glucose LESS THAN 70 milliGRAM(s)/deciliter  hydrALAZINE Injectable 20 milliGRAM(s) IV Push every 6 hours PRN systolic > 150  HYDROmorphone  Injectable 1 milliGRAM(s) IV Push every 6 hours PRN Severe Pain (7 - 10)  HYDROmorphone  Injectable 0.5 milliGRAM(s) IV Push every 4 hours PRN Moderate Pain (4 - 6)  naloxone Injectable 0.1 milliGRAM(s) IV Push every 3 minutes PRN For ANY of the following changes in patient status:  A. RR LESS THAN 10 breaths per minute, B. Oxygen saturation LESS THAN 90%, C. Sedation score of 6  ondansetron Injectable 4 milliGRAM(s) IV Push every 6 hours PRN Nausea  ondansetron Injectable 4 milliGRAM(s) IV Push every 6 hours PRN Nausea and/or Vomiting  sodium chloride 0.9% lock flush 10 milliLiter(s) IV Push every 1 hour PRN Pre/post blood products, medications, blood draw, and to maintain line patency        I&O's Summary    02 Sep 2024 07:01  -  03 Sep 2024 07:00  --------------------------------------------------------  IN: 1250 mL / OUT: 1500 mL / NET: -250 mL    03 Sep 2024 07:01  -  03 Sep 2024 15:48  --------------------------------------------------------  IN: 1000 mL / OUT: 1200 mL / NET: -200 mL        PHYSICAL EXAM:  Vital Signs Last 24 Hrs  T(C): 36.5 (03 Sep 2024 12:40), Max: 37 (03 Sep 2024 04:02)  T(F): 97.7 (03 Sep 2024 12:40), Max: 98.6 (03 Sep 2024 04:02)  HR: 108 (03 Sep 2024 15:25) (107 - 125)  BP: 176/94 (03 Sep 2024 15:25) (159/98 - 213/110)  BP(mean): 116 (03 Sep 2024 15:25) (102 - 149)  RR: 15 (03 Sep 2024 15:25) (10 - 24)  SpO2: 97% (03 Sep 2024 15:25) (95% - 100%)    Parameters below as of 03 Sep 2024 15:25  Patient On (Oxygen Delivery Method): room air            CONSTITUTIONAL: in moderate distress, sick looking  ENMT: Dry mucus membrane, NGT with billous output  RESPIRATORY: Normal respiratory effort; lungs are clear to auscultation bilaterally  CARDIOVASCULAR: Regular rate and rhythm, normal S1 and S2, no murmur/rub/gallop  ABDOMEN: tender to palpation, normoactive bowel sounds, no rebound/guarding; No hepatosplenomegaly  EXTREMITIES: No LE swelling   PSYCH: A+O to person, place, and time; affect appropriate  NEUROLOGY: CN 2-12 are intact and symmetric; no gross sensory deficits;   SKIN: No rashes; no palpable lesions    LABS:                        8.4    11.03 )-----------( 323      ( 03 Sep 2024 06:19 )             28.8     09-03    143  |  97  |  45.3<H>  ----------------------------<  303<H>  3.6   |  30.0<H>  |  8.60<H>    Ca    7.7<L>      03 Sep 2024 06:19  Phos  9.3     09-02  Mg     2.6     09-03    TPro  5.5<L>  /  Alb  1.8<L>  /  TBili  <0.2<L>  /  DBili  x   /  AST  14  /  ALT  <5  /  AlkPhos  66  09-02          Urinalysis Basic - ( 03 Sep 2024 06:19 )    Color: x / Appearance: x / SG: x / pH: x  Gluc: 303 mg/dL / Ketone: x  / Bili: x / Urobili: x   Blood: x / Protein: x / Nitrite: x   Leuk Esterase: x / RBC: x / WBC x   Sq Epi: x / Non Sq Epi: x / Bacteria: x        CAPILLARY BLOOD GLUCOSE      POCT Blood Glucose.: 110 mg/dL (03 Sep 2024 12:10)  POCT Blood Glucose.: 188 mg/dL (03 Sep 2024 07:41)  POCT Blood Glucose.: 154 mg/dL (03 Sep 2024 06:10)  POCT Blood Glucose.: 171 mg/dL (03 Sep 2024 01:30)  POCT Blood Glucose.: 143 mg/dL (02 Sep 2024 18:00)        RADIOLOGY &   ADDITIONAL TESTS:  Results Reviewed:   Imaging Personally Reviewed:  Electrocardiogram Personally Reviewed:

## 2024-09-03 NOTE — PROGRESS NOTE ADULT - ASSESSMENT
- 63F with ESRD on PD (5 days/week), HTN, DM admitted for abdominal pain, distention, subjective fevers, and PD catheter malfunction.     ID consulted for peritonitis associated with PD catheter     - Peritoneal fluid with WBC 16,800 (88% neutrophils) c/w peritonitis  - Peritoneal culture with Achromobacter xylosoxidans susceptible to piperacillin-tazobactam, meropenem, levofloxacin and TMP/SMX  - BCX neg  - 8/28 CT AP with findings c/w SBO   - s/p Shiley on 8/27   - s/p ex-lap on 8/30 >> Fibrinous tissue seen in pelvic region where small bowel was densely adhered. Small bowel released with blunt dissection. Suspected area of small bowel perforation resected, approximately 15cm. Side-to-side small bowel anastomosis. PD catheter removed.     - Surgical cultures with Achromobacter xylosoxidans susceptible to piperacillin-tazobactam, meropenem, levofloxacin and TMP/SMX  - HD per nephrology  - remains tachycardic and hypertensive   - afebrile      - Continue piperacillin-tazobactam     Will follow

## 2024-09-03 NOTE — PROGRESS NOTE ADULT - SUBJECTIVE AND OBJECTIVE BOX
Interval Hx: s/o ex lap w/resection, DC'd PCA, pain 10/10   Patient seen during rounds  Patient reports pain to be controlled on current medications  Patient denies sedation with medications      Analgesic Dosing for past 24 hours reviewed as below:    HYDROmorphone  Injectable   1 milliGRAM(s) IV Push (09-03-24 @ 11:12)   1 milliGRAM(s) IV Push (09-03-24 @ 07:43)   1 milliGRAM(s) IV Push (09-02-24 @ 22:16)    HYDROmorphone  Injectable   0.5 milliGRAM(s) IV Push (09-02-24 @ 13:43)    metoclopramide Injectable   10 milliGRAM(s) IV Push (09-03-24 @ 06:02)   10 milliGRAM(s) IV Push (09-02-24 @ 18:02)    ondansetron Injectable   4 milliGRAM(s) IV Push (09-02-24 @ 11:30)          T(C): 36.6 (09-03-24 @ 09:15), Max: 37.1 (09-02-24 @ 12:15)  HR: 122 (09-03-24 @ 11:00) (99 - 122)  BP: 166/90 (09-03-24 @ 11:00) (127/65 - 205/126)  RR: 24 (09-03-24 @ 11:00) (10 - 24)  SpO2: 96% (09-03-24 @ 11:00) (94% - 100%)      09-02-24 @ 07:01  -  09-03-24 @ 07:00  --------------------------------------------------------  IN: 1250 mL / OUT: 1500 mL / NET: -250 mL        albuterol    0.083%. 2.5 milliGRAM(s) Nebulizer once  benzocaine 20% Spray 1 Spray(s) Topical four times a day  benzocaine/menthol Lozenge 1 Lozenge Oral every 4 hours PRN  ceFAZolin  Injectable. 2000 milliGRAM(s) IV Push once  cloNIDine Patch 0.2 mG/24Hr(s) 1 patch Transdermal every 7 days  dextrose 5% + sodium chloride 0.9%. 1000 milliLiter(s) IV Continuous <Continuous>  dextrose 5%. 1000 milliLiter(s) IV Continuous <Continuous>  dextrose 5%. 1000 milliLiter(s) IV Continuous <Continuous>  dextrose 50% Injectable 12.5 Gram(s) IV Push once  dextrose 50% Injectable 25 Gram(s) IV Push once  dextrose 50% Injectable 25 Gram(s) IV Push once  dextrose Oral Gel 15 Gram(s) Oral once PRN  diltiazem Injectable 20 milliGRAM(s) IV Push every 4 hours  epoetin dustin-epbx (RETACRIT) Injectable 99555 Unit(s) IV Push <User Schedule>  folic acid 1 milliGRAM(s) Oral daily  hydrALAZINE Injectable 20 milliGRAM(s) IV Push every 6 hours PRN  HYDROmorphone  Injectable 0.5 milliGRAM(s) IV Push every 4 hours PRN  HYDROmorphone  Injectable 1 milliGRAM(s) IV Push every 4 hours PRN  insulin lispro (ADMELOG) corrective regimen sliding scale   SubCutaneous three times a day before meals  metoclopramide Injectable 10 milliGRAM(s) IV Push every 12 hours  metoprolol tartrate Injectable 5 milliGRAM(s) IV Push every 6 hours  naloxone Injectable 0.1 milliGRAM(s) IV Push every 3 minutes PRN  ondansetron Injectable 4 milliGRAM(s) IV Push every 6 hours PRN  ondansetron Injectable 4 milliGRAM(s) IV Push every 6 hours PRN  piperacillin/tazobactam IVPB.. 3.375 Gram(s) IV Intermittent every 12 hours  PPD  5 Tuberculin Unit(s) Injectable 5 Unit(s) IntraDermal once  sodium chloride 0.9% lock flush 10 milliLiter(s) IV Push every 1 hour PRN                          8.4    11.03 )-----------( 323      ( 03 Sep 2024 06:19 )             28.8     09-03    143  |  97  |  45.3<H>  ----------------------------<  303<H>  3.6   |  30.0<H>  |  8.60<H>    Ca    7.7<L>      03 Sep 2024 06:19  Phos  9.3     09-02  Mg     2.6     09-03    TPro  5.5<L>  /  Alb  1.8<L>  /  TBili  <0.2<L>  /  DBili  x   /  AST  14  /  ALT  <5  /  AlkPhos  66  09-02      Urinalysis Basic - ( 03 Sep 2024 06:19 )    Color: x / Appearance: x / SG: x / pH: x  Gluc: 303 mg/dL / Ketone: x  / Bili: x / Urobili: x   Blood: x / Protein: x / Nitrite: x   Leuk Esterase: x / RBC: x / WBC x   Sq Epi: x / Non Sq Epi: x / Bacteria: x        Pain Service   [phone]

## 2024-09-03 NOTE — PROGRESS NOTE ADULT - ASSESSMENT
Assessment:    63y Female w/ PMH of ESRD on peritoneal dialysis 5 x per week, HTN, HLD, and DM admitted for peritonitis and peritoneal dialysis catheter dysfunction, needing HD. Now found to have SBO s/p ex-lap. Strict NPO with NGT to LIS.     # PD catheter associated peritonitis  # PD catheter malfunction  # SBO POD #3  - 8/30/24- Exploratory laparotomy with lysis of adhesions. Suspected area of small bowel perforation resected, approximately 15cm. Side-to-side small bowel anastomosis. PD catheter removed.  - NPO, IVF at 50cc/hr   - Dilaudid IVP PRN pain scale  - Pain management consulted. PCA pump discontinued since pt was using out of propotion to pain. Appreciate recs from Dr Alejandrina Lazar  - IV tylenol 1g as needed for breakthrough, unable to place a standing q8h order   - Inc spirometry, Mobilize  - Peritoneal fluid culture with Achromobacter xylosoxidans  - On Zosyn.   -ID on board     #Pericardial effusion on CTA chest:  -Appreciate cards recs  -Hemodyanmically stable, likely 2/2 uremia  -Fu echo    #Sinus Tachycardia:  -EKG with sinus tachycardia  -Dimer elevated, will do CTA chest to r/o PE>> negative  -Tachycardia likely 2/2 pain, ongoing ngt discomfort    #Uncontrolled HTN  -cw Labetalol, Lopressor, Cardizem IIVP q4h  -Hydralazine q2h IVP  -clonidine patch increased to 0.3mg/qweekly  -Hydralazine PRN     # ESRD s/p PD now on HD  - now started on HD (8/29) via Right IJ Shiley  - hep panel done. Ordered PPD  - Will likely need HD for some time    #Constipation  - Miralax when PO started    #DM2- controlled  - c/w insulin lispro  - Q 6 hr accuchecks when NPO    #HLD  - c/w statin, metoprolol, and diltiazem    #Hyperkalemia   - c/w Lokelma  - monitor BMP daily      DVT ppx: SCD      Disposition: Medically active. SBO s/p laprotomy. On IV abx. Uncontrolled htn, will remain in SDU due to frequent anti-htn ivp and frequent bp monitoring.

## 2024-09-03 NOTE — PROGRESS NOTE ADULT - ASSESSMENT
64 YO F s/p FAREED, SBR, PD cath removal POD4 with temp R IJ placement for HD  OOBTC  Started Reglan   AROBF  NGT Clamp Trial, remove if tolerating  ADAT  CAMILLA  62 YO F s/p FAREED, SBR, PD cath removal POD4 with temp R IJ placement for HD  OOBTC  Started Reglan   NGT Clamp Trial, remove if tolerating  ADAT  CAMILLA  62 YO F s/p FAREED, SBR, PD cath removal POD4 with temp R IJ placement for HD    - OOB, encourage ambulation  - NGT clamp trial  - cont reglan

## 2024-09-03 NOTE — PROGRESS NOTE ADULT - ASSESSMENT
64 YO F s/p FAREED, SBR  esrd on hd  currently with NGT on suction  insufficient relief with dilaudid ivp 1mg  denies sedation      while NPO:  Start acetaminophen IVPB 1g q6 hours standing. HOLD for LFT dysfunction. MAX daily dose = 4 grams   - Recommend starting robaxin IVPB 750mg TID standing. HOLD for sedation   start lidocaine   Patch 12 hours on, 12 hours off. Max 3 patches on at one time   change iv dilaudid 0.5mg/1mg q6h prn mod/sev pain      when tolerating PO:  can DC iv analgesics  - Recommend starting hydromorphone PO 2mg/4mg [id number] PRN mod/severe pain  - Recommend starting acetaminophen PO 975mg [id number] standing. HOLD for liver function test dysfunction  - Recommend starting robaxin 750mg q8h standing. HOLD for sedation   start lidocaine   Patch 12 hours on, 12 hours off. Max 3 patches on at one time

## 2024-09-03 NOTE — PROGRESS NOTE ADULT - SUBJECTIVE AND OBJECTIVE BOX
NEPHROLOGY INTERVAL HPI/OVERNIGHT EVENTS:    Seen earlier  ID follow up of OR cultures noted --> (+) Zosyn   Meds noted   Abd distended ---> + NGtube ---> surgery follow up noted   Needs eventual permacath  CT angio negative for PE     MEDICATIONS  (STANDING):  acetaminophen   IVPB .. 1000 milliGRAM(s) IV Intermittent every 6 hours  albuterol    0.083%. 2.5 milliGRAM(s) Nebulizer once  benzocaine 20% Spray 1 Spray(s) Topical four times a day  chlorhexidine 2% Cloths 1 Application(s) Topical daily  cloNIDine Patch 0.3 mG/24Hr(s) 1 patch Transdermal every 7 days  dextrose 5% + sodium chloride 0.9%. 1000 milliLiter(s) (50 mL/Hr) IV Continuous <Continuous>  dextrose 5%. 1000 milliLiter(s) (100 mL/Hr) IV Continuous <Continuous>  dextrose 5%. 1000 milliLiter(s) (50 mL/Hr) IV Continuous <Continuous>  dextrose 50% Injectable 25 Gram(s) IV Push once  dextrose 50% Injectable 25 Gram(s) IV Push once  dextrose 50% Injectable 12.5 Gram(s) IV Push once  diltiazem Injectable 20 milliGRAM(s) IV Push every 4 hours  epoetin dustin-epbx (RETACRIT) Injectable 71044 Unit(s) IV Push <User Schedule>  folic acid 1 milliGRAM(s) Oral daily  insulin lispro (ADMELOG) corrective regimen sliding scale   SubCutaneous three times a day before meals  lidocaine   4% Patch 1 Patch Transdermal daily  metoclopramide Injectable 10 milliGRAM(s) IV Push every 12 hours  metoprolol tartrate Injectable 5 milliGRAM(s) IV Push every 6 hours  piperacillin/tazobactam IVPB.. 3.375 Gram(s) IV Intermittent every 12 hours  PPD  5 Tuberculin Unit(s) Injectable 5 Unit(s) IntraDermal once    MEDICATIONS  (PRN):  benzocaine/menthol Lozenge 1 Lozenge Oral every 4 hours PRN Sore Throat  dextrose Oral Gel 15 Gram(s) Oral once PRN Blood Glucose LESS THAN 70 milliGRAM(s)/deciliter  hydrALAZINE Injectable 10 milliGRAM(s) IV Push every 2 hours PRN SBP > 140  hydrALAZINE Injectable 20 milliGRAM(s) IV Push every 6 hours PRN systolic > 150  HYDROmorphone  Injectable 1 milliGRAM(s) IV Push every 6 hours PRN Severe Pain (7 - 10)  HYDROmorphone  Injectable 0.5 milliGRAM(s) IV Push every 4 hours PRN Moderate Pain (4 - 6)  naloxone Injectable 0.1 milliGRAM(s) IV Push every 3 minutes PRN For ANY of the following changes in patient status:  A. RR LESS THAN 10 breaths per minute, B. Oxygen saturation LESS THAN 90%, C. Sedation score of 6  ondansetron Injectable 4 milliGRAM(s) IV Push every 6 hours PRN Nausea  ondansetron Injectable 4 milliGRAM(s) IV Push every 6 hours PRN Nausea and/or Vomiting  sodium chloride 0.9% lock flush 10 milliLiter(s) IV Push every 1 hour PRN Pre/post blood products, medications, blood draw, and to maintain line patency      Allergies    No Known Allergies    Intolerances          eczema      Vital Signs Last 24 Hrs  T(C): 37 (03 Sep 2024 15:53), Max: 37 (03 Sep 2024 04:02)  T(F): 98.6 (03 Sep 2024 15:53), Max: 98.6 (03 Sep 2024 04:02)  HR: 115 (03 Sep 2024 16:00) (107 - 125)  BP: 182/99 (03 Sep 2024 16:00) (160/83 - 213/110)  BP(mean): 124 (03 Sep 2024 16:00) (102 - 149)  RR: 22 (03 Sep 2024 16:00) (10 - 24)  SpO2: 100% (03 Sep 2024 16:00) (95% - 100%)    Parameters below as of 03 Sep 2024 16:00  Patient On (Oxygen Delivery Method): room air      Daily     Daily Weight in k.5 (03 Sep 2024 12:40)  I&O's Detail    02 Sep 2024 07:01  -  03 Sep 2024 07:00  --------------------------------------------------------  IN:    dextrose 5% + sodium chloride 0.9%: 1150 mL    IV PiggyBack: 100 mL  Total IN: 1250 mL    OUT:    Nasogastric/Oral tube (mL): 1300 mL    Voided (mL): 200 mL  Total OUT: 1500 mL    Total NET: -250 mL      03 Sep 2024 07:01  -  03 Sep 2024 16:55  --------------------------------------------------------  IN:    Other (mL): 1000 mL  Total IN: 1000 mL    OUT:    Nasogastric/Oral tube (mL): 200 mL    Other (mL): 1000 mL  Total OUT: 1200 mL    Total NET: -200 mL        I&O's Summary    02 Sep 2024 07:01  -  03 Sep 2024 07:00  --------------------------------------------------------  IN: 1250 mL / OUT: 1500 mL / NET: -250 mL    03 Sep 2024 07:01  -  03 Sep 2024 16:55  --------------------------------------------------------  IN: 1000 mL / OUT: 1200 mL / NET: -200 mL        PHYSICAL EXAM:    PHYSICAL EXAM:      PHYSICAL EXAM:  GENERAL: NAD  + NG tube , No JVD + RIJ raul cath   NERVOUS SYSTEM:  A/O x3  Lungs : No rales, No rhonchi,   HEART:  No murmur,  No rub, No gallops  ABDOMEN: dressed , dec BS , PD cath out   Ext dec edema     LABS:                        8.4    11.03 )-----------( 323      ( 03 Sep 2024 06:19 )             28.8         143  |  97  |  45.3<H>  ----------------------------<  303<H>  3.6   |  30.0<H>  |  8.60<H>    Ca    7.7<L>      03 Sep 2024 06:19  Phos  9.3       Mg     2.6         TPro  5.5<L>  /  Alb  1.8<L>  /  TBili  <0.2<L>  /  DBili  x   /  AST  14  /  ALT  <5  /  AlkPhos  66        Urinalysis Basic - ( 03 Sep 2024 06:19 )    Color: x / Appearance: x / SG: x / pH: x  Gluc: 303 mg/dL / Ketone: x  / Bili: x / Urobili: x   Blood: x / Protein: x / Nitrite: x   Leuk Esterase: x / RBC: x / WBC x   Sq Epi: x / Non Sq Epi: x / Bacteria: x      Magnesium: 2.6 mg/dL ( @ 06:19)        < from: CT Angio Chest PE Protocol w/ IV Cont (24 @ 17:49) >    ACC: [id number] EXAM:  CT ANGIO CHEST PULM ART [facility]   ORDERED BY: KHAI AGEE     PROCEDURE DATE:  2024          INTERPRETATION:  EXAMINATION: CT ANGIO CHEST PULMONARY ARTERY    CLINICAL INDICATION: Elevated DImer    TECHNIQUE: CTA of the chest was performed after the administration of 70   cc administered of Omnipaque 350, 30 cc discarded.  MIP images were   reconstructed.    COMPARISON: None.    FINDINGS:    PULMONARY EMBOLISM: No pulmonary embolism..    AIRWAYS AND LUNGS: The central tracheobronchial tree is patent.  Small   bilateral pleural effusions with partial atelectasis of both lower lobes.   Right pleural effusion is partially loculated. Patchy dependent bilateral   lung opacities. A few right lung nodules measure up to 5 mm.    MEDIASTINUM AND PLEURA: There are no enlarged mediastinal, hilar or   axillary lymph nodes. The visualized portion of the thyroid gland is   unremarkable. There is no pneumothorax.    HEART AND VESSELS: The heart is normal in size.   There are   atherosclerotic calcifications of the aorta and coronary arteries.  There   is a moderate pericardial effusion.    UPPER ABDOMEN: Images of the upper abdomen demonstrate ascites.    BONES AND SOFT TISSUES: The bones are unremarkable.  The soft tissues are   unremarkable.    IMPRESSION:  No pulmonary embolism.    Small bilateral pleural effusions with partial atelectasis of both lower   lobes. Right pleural effusion is partially loculated. Patchy dependent   bilateral lung opacities represent atelectasis with or without   superimposed infection. A few right lung nodules measure up to 5 mm are   indeterminate. Three-month follow-up CT recommended    There is a moderate pericardial effusion.    --- End of Report ---            ALINE THOMAS MD; Attending Radiologist  This document has been electronically signed. Sep  2 2024  6:08PM    < end of copied text >    RADIOLOGY & ADDITIONAL TESTS:

## 2024-09-04 LAB
ANION GAP SERPL CALC-SCNC: 14 MMOL/L — SIGNIFICANT CHANGE UP (ref 5–17)
BUN SERPL-MCNC: 29.5 MG/DL — HIGH (ref 8–20)
CALCIUM SERPL-MCNC: 8.1 MG/DL — LOW (ref 8.4–10.5)
CHLORIDE SERPL-SCNC: 96 MMOL/L — SIGNIFICANT CHANGE UP (ref 96–108)
CO2 SERPL-SCNC: 32 MMOL/L — HIGH (ref 22–29)
CREAT SERPL-MCNC: 6.27 MG/DL — HIGH (ref 0.5–1.3)
EGFR: 7 ML/MIN/1.73M2 — LOW
GLUCOSE BLDC GLUCOMTR-MCNC: 123 MG/DL — HIGH (ref 70–99)
GLUCOSE BLDC GLUCOMTR-MCNC: 125 MG/DL — HIGH (ref 70–99)
GLUCOSE BLDC GLUCOMTR-MCNC: 133 MG/DL — HIGH (ref 70–99)
GLUCOSE BLDC GLUCOMTR-MCNC: 140 MG/DL — HIGH (ref 70–99)
GLUCOSE SERPL-MCNC: 122 MG/DL — HIGH (ref 70–99)
HCT VFR BLD CALC: 28.2 % — LOW (ref 34.5–45)
HGB BLD-MCNC: 7.9 G/DL — LOW (ref 11.5–15.5)
MCHC RBC-ENTMCNC: 23.5 PG — LOW (ref 27–34)
MCHC RBC-ENTMCNC: 28 GM/DL — LOW (ref 32–36)
MCV RBC AUTO: 83.9 FL — SIGNIFICANT CHANGE UP (ref 80–100)
MRSA PCR RESULT.: SIGNIFICANT CHANGE UP
NRBC # BLD: 2 /100 WBCS — HIGH (ref 0–0)
PLATELET # BLD AUTO: 299 K/UL — SIGNIFICANT CHANGE UP (ref 150–400)
POTASSIUM SERPL-MCNC: 3.4 MMOL/L — LOW (ref 3.5–5.3)
POTASSIUM SERPL-SCNC: 3.4 MMOL/L — LOW (ref 3.5–5.3)
RBC # BLD: 3.36 M/UL — LOW (ref 3.8–5.2)
RBC # FLD: 16.4 % — HIGH (ref 10.3–14.5)
S AUREUS DNA NOSE QL NAA+PROBE: SIGNIFICANT CHANGE UP
SODIUM SERPL-SCNC: 142 MMOL/L — SIGNIFICANT CHANGE UP (ref 135–145)
WBC # BLD: 10.38 K/UL — SIGNIFICANT CHANGE UP (ref 3.8–10.5)
WBC # FLD AUTO: 10.38 K/UL — SIGNIFICANT CHANGE UP (ref 3.8–10.5)

## 2024-09-04 PROCEDURE — 93306 TTE W/DOPPLER COMPLETE: CPT | Mod: 26

## 2024-09-04 PROCEDURE — 99233 SBSQ HOSP IP/OBS HIGH 50: CPT

## 2024-09-04 PROCEDURE — 99232 SBSQ HOSP IP/OBS MODERATE 35: CPT

## 2024-09-04 PROCEDURE — 71045 X-RAY EXAM CHEST 1 VIEW: CPT | Mod: 26,77

## 2024-09-04 PROCEDURE — 71045 X-RAY EXAM CHEST 1 VIEW: CPT | Mod: 26

## 2024-09-04 PROCEDURE — 99223 1ST HOSP IP/OBS HIGH 75: CPT | Mod: GC

## 2024-09-04 RX ORDER — HYDRALAZINE HCL 50 MG
10 TABLET ORAL EVERY 4 HOURS
Refills: 0 | Status: DISCONTINUED | OUTPATIENT
Start: 2024-09-04 | End: 2024-09-08

## 2024-09-04 RX ORDER — DILTIAZEM HYDROCHLORIDE 5 MG/ML
20 INJECTION INTRAVENOUS EVERY 4 HOURS
Refills: 0 | Status: DISCONTINUED | OUTPATIENT
Start: 2024-09-04 | End: 2024-09-11

## 2024-09-04 RX ORDER — NICARDIPINE HCL 20 MG
5 CAPSULE ORAL
Qty: 40 | Refills: 0 | Status: DISCONTINUED | OUTPATIENT
Start: 2024-09-04 | End: 2024-09-04

## 2024-09-04 RX ORDER — HYDRALAZINE HCL 50 MG
10 TABLET ORAL EVERY 4 HOURS
Refills: 0 | Status: DISCONTINUED | OUTPATIENT
Start: 2024-09-04 | End: 2024-09-04

## 2024-09-04 RX ADMIN — DILTIAZEM HYDROCHLORIDE 20 MILLIGRAM(S): 5 INJECTION INTRAVENOUS at 06:10

## 2024-09-04 RX ADMIN — DILTIAZEM HYDROCHLORIDE 20 MILLIGRAM(S): 5 INJECTION INTRAVENOUS at 21:26

## 2024-09-04 RX ADMIN — Medication 20 MILLIGRAM(S): at 21:28

## 2024-09-04 RX ADMIN — Medication 10 MILLIGRAM(S): at 06:10

## 2024-09-04 RX ADMIN — Medication 1 PATCH: at 05:37

## 2024-09-04 RX ADMIN — Medication 1 SPRAY(S): at 23:56

## 2024-09-04 RX ADMIN — PIPERACILLIN SODIUM AND TAZOBACTAM SODIUM 25 GRAM(S): 3; .375 INJECTION, POWDER, FOR SOLUTION INTRAVENOUS at 06:08

## 2024-09-04 RX ADMIN — Medication 1 SPRAY(S): at 00:18

## 2024-09-04 RX ADMIN — Medication 1 PATCH: at 08:23

## 2024-09-04 RX ADMIN — DILTIAZEM HYDROCHLORIDE 20 MILLIGRAM(S): 5 INJECTION INTRAVENOUS at 02:20

## 2024-09-04 RX ADMIN — HYDROMORPHONE HYDROCHLORIDE 1 MILLIGRAM(S): 2 TABLET ORAL at 12:51

## 2024-09-04 RX ADMIN — ACETAMINOPHEN 400 MILLIGRAM(S): 325 TABLET ORAL at 17:37

## 2024-09-04 RX ADMIN — Medication 25 MG/HR: at 14:21

## 2024-09-04 RX ADMIN — METOPROLOL TARTRATE 5 MILLIGRAM(S): 100 TABLET ORAL at 11:10

## 2024-09-04 RX ADMIN — Medication 5 UNIT(S): at 14:27

## 2024-09-04 RX ADMIN — METOPROLOL TARTRATE 5 MILLIGRAM(S): 100 TABLET ORAL at 17:36

## 2024-09-04 RX ADMIN — PIPERACILLIN SODIUM AND TAZOBACTAM SODIUM 25 GRAM(S): 3; .375 INJECTION, POWDER, FOR SOLUTION INTRAVENOUS at 17:37

## 2024-09-04 RX ADMIN — DILTIAZEM HYDROCHLORIDE 20 MILLIGRAM(S): 5 INJECTION INTRAVENOUS at 09:15

## 2024-09-04 RX ADMIN — HYDROMORPHONE HYDROCHLORIDE 1 MILLIGRAM(S): 2 TABLET ORAL at 22:00

## 2024-09-04 RX ADMIN — Medication 1 SPRAY(S): at 06:05

## 2024-09-04 RX ADMIN — DILTIAZEM HYDROCHLORIDE 20 MILLIGRAM(S): 5 INJECTION INTRAVENOUS at 17:35

## 2024-09-04 RX ADMIN — ACETAMINOPHEN 400 MILLIGRAM(S): 325 TABLET ORAL at 00:18

## 2024-09-04 RX ADMIN — CHLORHEXIDINE GLUCONATE 1 APPLICATION(S): 40 SOLUTION TOPICAL at 11:19

## 2024-09-04 RX ADMIN — ACETAMINOPHEN 400 MILLIGRAM(S): 325 TABLET ORAL at 11:17

## 2024-09-04 RX ADMIN — ACETAMINOPHEN 400 MILLIGRAM(S): 325 TABLET ORAL at 23:57

## 2024-09-04 RX ADMIN — Medication 20 MILLIGRAM(S): at 12:20

## 2024-09-04 RX ADMIN — METOPROLOL TARTRATE 5 MILLIGRAM(S): 100 TABLET ORAL at 00:17

## 2024-09-04 RX ADMIN — ACETAMINOPHEN 400 MILLIGRAM(S): 325 TABLET ORAL at 06:08

## 2024-09-04 RX ADMIN — Medication 20 MILLIGRAM(S): at 04:16

## 2024-09-04 RX ADMIN — HYDROMORPHONE HYDROCHLORIDE 1 MILLIGRAM(S): 2 TABLET ORAL at 21:24

## 2024-09-04 RX ADMIN — Medication 1 PATCH: at 12:43

## 2024-09-04 RX ADMIN — Medication 50 MILLILITER(S): at 09:15

## 2024-09-04 RX ADMIN — Medication 10 MILLIGRAM(S): at 17:35

## 2024-09-04 RX ADMIN — METOPROLOL TARTRATE 5 MILLIGRAM(S): 100 TABLET ORAL at 06:10

## 2024-09-04 RX ADMIN — HYDROMORPHONE HYDROCHLORIDE 1 MILLIGRAM(S): 2 TABLET ORAL at 13:51

## 2024-09-04 RX ADMIN — Medication 1 PATCH: at 19:27

## 2024-09-04 RX ADMIN — Medication 1 PATCH: at 23:59

## 2024-09-04 RX ADMIN — Medication 1 PATCH: at 14:26

## 2024-09-04 RX ADMIN — Medication 1 PATCH: at 11:19

## 2024-09-04 RX ADMIN — Medication 10 MILLIGRAM(S): at 00:20

## 2024-09-04 NOTE — PROGRESS NOTE ADULT - ASSESSMENT
62 YO F s/p FAREED, SBR  esrd on hd  currently with NGT on suction  denies sedation    Pain controlled  Pain service will sign off  Please reconsult as needed     if pain becomes uncontrolled:  - Recommend starting robaxin IVPB 750mg TID standing. HOLD for sedation       when tolerating PO:  can DC iv analgesics  - Recommend starting hydromorphone PO 2mg/4mg [id number] PRN mod/severe pain  - Recommend starting acetaminophen PO 975mg [id number] standing. HOLD for liver function test dysfunction  - Recommend starting robaxin 750mg q8h standing. HOLD for sedation   start lidocaine   Patch 12 hours on, 12 hours off. Max 3 patches on at one time

## 2024-09-04 NOTE — PROGRESS NOTE ADULT - SUBJECTIVE AND OBJECTIVE BOX
ADALID MANPREET  [id number]      Chief Complaint:  Follow up ESRD on PD, Peritonitis, post removal of PD catheter. pericardial effusion, tachycardia    Interval History:  Patient very uncomfortable with abd pain and gagging on NGT.  Denies CP, SOB, palps, h/a.    Tele:  Sinus tachycardia          acetaminophen   IVPB .. 1000 milliGRAM(s) IV Intermittent every 6 hours  albuterol    0.083%. 2.5 milliGRAM(s) Nebulizer once  benzocaine 20% Spray 1 Spray(s) Topical four times a day  benzocaine/menthol Lozenge 1 Lozenge Oral every 4 hours PRN  chlorhexidine 2% Cloths 1 Application(s) Topical daily  cloNIDine Patch 0.3 mG/24Hr(s) 1 patch Transdermal every 7 days  dextrose 5% + sodium chloride 0.9%. 1000 milliLiter(s) IV Continuous <Continuous>  dextrose 5%. 1000 milliLiter(s) IV Continuous <Continuous>  dextrose 5%. 1000 milliLiter(s) IV Continuous <Continuous>  dextrose 50% Injectable 25 Gram(s) IV Push once  dextrose 50% Injectable 25 Gram(s) IV Push once  dextrose 50% Injectable 12.5 Gram(s) IV Push once  dextrose Oral Gel 15 Gram(s) Oral once PRN  diltiazem Injectable 20 milliGRAM(s) IV Push every 4 hours  epoetin dustin-epbx (RETACRIT) Injectable 06479 Unit(s) IV Push <User Schedule>  folic acid 1 milliGRAM(s) Oral daily  hydrALAZINE Injectable 10 milliGRAM(s) IV Push every 2 hours PRN  hydrALAZINE Injectable 20 milliGRAM(s) IV Push every 6 hours PRN  HYDROmorphone  Injectable 1 milliGRAM(s) IV Push every 6 hours PRN  HYDROmorphone  Injectable 0.5 milliGRAM(s) IV Push every 4 hours PRN  insulin lispro (ADMELOG) corrective regimen sliding scale   SubCutaneous three times a day before meals  lidocaine   4% Patch 1 Patch Transdermal daily  metoclopramide Injectable 10 milliGRAM(s) IV Push every 12 hours  metoprolol tartrate Injectable 5 milliGRAM(s) IV Push every 6 hours  naloxone Injectable 0.1 milliGRAM(s) IV Push every 3 minutes PRN  ondansetron Injectable 4 milliGRAM(s) IV Push every 6 hours PRN  ondansetron Injectable 4 milliGRAM(s) IV Push every 6 hours PRN  piperacillin/tazobactam IVPB.. 3.375 Gram(s) IV Intermittent every 12 hours  PPD  5 Tuberculin Unit(s) Injectable 5 Unit(s) IntraDermal once  sodium chloride 0.9% lock flush 10 milliLiter(s) IV Push every 1 hour PRN          Physical Exam:  T(C): 37.1 (09-04-24 @ 08:48), Max: 37.2 (09-04-24 @ 00:41)  HR: 113 (09-04-24 @ 11:22) (105 - 131)  BP: 212/110 (09-04-24 @ 11:22) (134/73 - 227/100)  RR: 18 (09-04-24 @ 11:22) (14 - 22)  SpO2: 95% (09-04-24 @ 11:22) (93% - 100%)  Wt(kg): --  General: Appears uncomfortable  Neck: No JVD  CVS: nl s1s2, no s3, tachy, regular  Pulm: CTA b/l  Abd: soft, non-tender  Ext: No c/c/e  Neuro A&O x3  Psych: Normal affect      Labs:   04 Sep 2024 06:53    142    |  96     |  29.5   ----------------------------<  122    3.4     |  32.0   |  6.27     Ca    8.1        04 Sep 2024 06:53  Mg     2.6       03 Sep 2024 06:19                            7.9    10.38 )-----------( 299      ( 04 Sep 2024 06:53 )             28.2       ECHO 2/2024: Normal BIV function, EF 65-70%, mild MR/TR  PHARM NUCLEAR STRESS 10/2023: No ischemic/infarct, EF 77%    < from: CT Angio Chest PE Protocol w/ IV Cont (09.02.24 @ 17:49) >  No pulmonary embolism.    Small bilateral pleural effusions with partial atelectasis of both lower   lobes. Right pleural effusion is partially loculated. Patchy dependent   bilateral lung opacities represent atelectasis with or without   superimposed infection. A few right lung nodules measure up to 5 mm are   indeterminate. Three-month follow-up CT recommended    There is a moderate pericardial effusion.        Assessment:  63F with ESRD on PD (5 days/week), HTN, DM admitted for abdominal pain, distention, subjective fevers, and PD catheter malfunction. s/p ex-lap on 8/30 >> Fibrinous tissue seen in pelvic region where small bowel was densely adhered. Small bowel released with blunt dissection. Suspected area of small bowel perforation resected, approximately 15cm. Side-to-side small bowel anastomosis. PD catheter removed. Cardiology requested to for evaluation of pericardial effusion noted on a recent CT scan.  Patient was early seen on this admission for cardiac risk stratification prior to ex-lap.   -Telemetry with Sinus tachycardia: likely multifactorial (pain, Infection, NG tube).  -Pericardial effusion likely uremic in nature.  Awaiting echo.  -BP very elevated.  Known elevated at baseline but higher 2/2 pain and discomfort.    Plan:  1. F/u echocardiogram.  2. Agree with Metoprolol IV for BP and other IVP meds and Clonidine patch.  3. BP likely better when can take po.  4. Pain control will help with elevated BP and HR.  5. HD today.  If BP still >220/110 would recommend Cardene gtt.  6. Rest per primary team.    D/w med NP.

## 2024-09-04 NOTE — CONSULT NOTE ADULT - ATTENDING COMMENTS
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 (diagnostic paracentesis performed by ED), hence underwent right IJ nontunneled dialysis catheter placement on August 28;Subsequently underwent exploratory laparotomy on August 30 for small bowel obstruction with lysis of adhesions, fibrinous tissue seen in the pelvic region where small bowel was densely adherent and subsequently small bowel was released with blunt dissection as well as suspected area of small bowel perforation resected of approximately 15 cm with side-to-side small bowel anastomosis with removal of PD catheter and since then patient has remained on NG tube with low wall suction with continuous output as well as patient remaining n.p.o. and unable to take her home dose antihypertensive medication which is Toprol-XL, diltiazem, clonidine, hydralazine and has been tachycardic and hypertensive since 24 hours post admission which was improved with IV labetalolWhich was discontinued few days ago, with patient's blood pressure increasing close to systolic of 200-220 again today and started on nicardipine infusion medical ICU consulted.  Upon further inquiry patient did not have any new symptoms except for gagging sensation from NG tube and denied any chest pain palpitation dizziness extremity weakness.Air entry equal bilaterally, tachycardic with blood pressure improved to systolic of 160 on nicardipine infusion.  abdomen distended and tender at the site of insertion hyperactive bowel sounds with no obvious guarding or rigidity.     Recommended to transition back to scheduled IV labetalol every 4 hours, IV hydralazine scheduled every 4 hours, IV Cardizem intermittently every 6-8 hours and clonidine patch with as needed IV hydralazine as well as as needed IV labetalol in between every 2 hours with blood pressure monitoring every 2 hours while patient being currently managed in stepdown unit and fluid removal with hemodialysis hopefully by tomorrow.  Recommend restarting p.o. antihypertensive as per home regimen when able to resume p.o. medication.  Management of small bowel obstruction/high output NG tube as per surgical team in conjunction with electrolyte management.  Management of end-stage renal disease/intermittent hemodialysis as per nephrology.  Management of pericardial effusion as per cardiology.  Point-of-care ultrasound performed without obvious RV collapse and mild to moderate pericardial effusion circumferentially around.Plan discussed with primary team attending as well as bedside nursing and ICU team who are in agreement for now.    Thank you for your consult.   Please call us back with any worsening clinical status or with concerns & questions.   We will Sign Off for now.     Rodrigo Hudson MD   Division of Critical Care Medicine  Department of Medicine   [facility]   Cell [phone]

## 2024-09-04 NOTE — CONSULT NOTE ADULT - ASSESSMENT
3/F with PMHx ESRD on Peritoneal Dialysis, HTN (on multiple Anti-HTNs), HLD, Diabetes initially admitted for peritonitis 2/2 catheter dysfunction, needing HD via [facility]. Later, found to have SBO s/p ex-lap which showed small bowel perforation, requiring 15cm small bowel resection with anastomosis & lysis of adhesion on 8/30. ICU consulted for hypertensive urgency    #Hypertensive Urgency  #Sinus Tachycardia  #Pericardial Effusion  #ESRD on HD (previously on PD)  #SBO (s/p ex lap with small bowel resection)    - Rebound hypertension and tachycardia secondary to oral hypertesion medication withdrawal   - strict NPO since Ex-lap  - Reports from pt  her blood pressure at home ranges from 160-220  - D/c Cardene drip  - Standing IV labetalol 20mg Q4, IV diltiazem 20mg Q4 & IV hydralazine 10mg Q4 until patient able to tolerate PO  - C/w Clonidine 0.3mg patch  - PRN IV hydralazine 20mg Q6 & IV Labetalol 10mg Q2H  - Mulitmodal pain regimen per Pain Management  - Not showing signs of acute focal neurological deficits  - Low threshold for Head CT  - Q4 neurological checks  - Get TTE to evaluate moderate pericardial effusion seen on CTA chest  - HD as per Nephro    Does not warrant ICU level of care at present, pls feel free to reconsult us as needed     63/F with PMHx ESRD on Peritoneal Dialysis, HTN (on multiple Anti-HTNs), HLD, Diabetes initially admitted for peritonitis 2/2 catheter dysfunction, needing HD via [facility]. Later, found to have SBO s/p ex-lap which showed small bowel perforation, requiring 15cm small bowel resection with anastomosis & lysis of adhesion on 8/30. ICU consulted for hypertensive urgency    #Hypertensive Urgency  #Sinus Tachycardia  #Pericardial Effusion  #ESRD on HD (previously on PD)  #SBO (s/p ex lap with small bowel resection)    - Rebound hypertension and tachycardia secondary to oral hypertesion medication withdrawal   - strict NPO since Ex-lap  - Reports from pt  her blood pressure at home ranges from 160-220  - D/c Cardene drip  - Standing IV labetalol 20mg Q4, IV diltiazem 20mg Q4 & IV hydralazine 10mg Q4 until patient able to tolerate PO  - C/w Clonidine 0.3mg patch  - PRN IV hydralazine 20mg Q6 & IV Labetalol 10mg Q2H  - Mulitmodal pain regimen per Pain Management  - Not showing signs of acute focal neurological deficits  - Low threshold for Head CT  - Q4 neurological checks  - Get TTE to evaluate moderate pericardial effusion seen on CTA chest  - HD as per Nephro    Does not warrant ICU level of care at present, pls feel free to reconsult us as needed

## 2024-09-04 NOTE — DIETITIAN INITIAL EVALUATION ADULT - ETIOLOGY
related to inability to meet sufficient protein-energy needs in setting of SBO s/p resection with NGT for decompression, ESRD on PD

## 2024-09-04 NOTE — PROGRESS NOTE ADULT - SUBJECTIVE AND OBJECTIVE BOX
[facility]  INFECTIOUS DISEASES at [place] and [place]  ===============================================================                  George Tobias MD               Diplomates American Board of Internal Medicine & Infectious Diseases                * [place] Office - Appt - Tel  [phone] Fax [phone]                * [place] Office - Appt - Tel [phone] Fax [phone]                                  Hospital Consult line:  [phone]  ==============================================================    ADALID CASE [id number]    Follow up: peritonitis associated with PD catheter     No acute events overnight   afebrile   hemodynamically stable     I have personally reviewed the labs and data; pertinent labs and data are listed in this note; please see below.     _______________________________________________________________  REVIEW OF SYSTEMS  Feeling much better. Abdominal pain improving. Passing flatus, had a BM. Wants NGT out. She is thirsty and hungry   ________________________________________________________________  Allergies:  No Known Allergies      ________________________________________________________________  PHYSICAL EXAM  GEN: chronically ill appearing but in NAD, lying in bed.   HEENT: NGT   LUNGS: eupneic. CTA B/L.  HEART: regular tachycardia   ABDOMEN: Soft, ND, NT, midline incision covered.     :  No Paredes catheter  EXTREMITIES: without  edema.  MSK: No joint swelling or deformity   NEUROLOGIC: Grossly no motor focal deficits. More alert and interactive   PSYCHIATRIC: Appropriate affect and mood  SKIN: No rash  LINES: PIV, [facility]   ________________________________________________________________  Vitals:  T(F): 98.8 (04 Sep 2024 08:48), Max: 98.9 (04 Sep 2024 00:41)  HR: 119 (04 Sep 2024 09:10)  BP: 194/92 (04 Sep 2024 09:10)  RR: 20 (04 Sep 2024 09:10)  SpO2: 95% (04 Sep 2024 09:10) (93% - 100%)  temp max in last 48H T(F): , Max: 98.9 (09-04-24 @ 00:41)    Current Antibiotics:  piperacillin/tazobactam IVPB.. 3.375 Gram(s) IV Intermittent every 12 hours    Other medications:  acetaminophen   IVPB .. 1000 milliGRAM(s) IV Intermittent every 6 hours  albuterol    0.083%. 2.5 milliGRAM(s) Nebulizer once  benzocaine 20% Spray 1 Spray(s) Topical four times a day  chlorhexidine 2% Cloths 1 Application(s) Topical daily  cloNIDine Patch 0.3 mG/24Hr(s) 1 patch Transdermal every 7 days  dextrose 5% + sodium chloride 0.9%. 1000 milliLiter(s) IV Continuous <Continuous>  dextrose 5%. 1000 milliLiter(s) IV Continuous <Continuous>  dextrose 5%. 1000 milliLiter(s) IV Continuous <Continuous>  dextrose 50% Injectable 12.5 Gram(s) IV Push once  dextrose 50% Injectable 25 Gram(s) IV Push once  dextrose 50% Injectable 25 Gram(s) IV Push once  diltiazem Injectable 20 milliGRAM(s) IV Push every 4 hours  epoetin dustin-epbx (RETACRIT) Injectable 97789 Unit(s) IV Push <User Schedule>  folic acid 1 milliGRAM(s) Oral daily  insulin lispro (ADMELOG) corrective regimen sliding scale   SubCutaneous three times a day before meals  lidocaine   4% Patch 1 Patch Transdermal daily  metoclopramide Injectable 10 milliGRAM(s) IV Push every 12 hours  metoprolol tartrate Injectable 5 milliGRAM(s) IV Push every 6 hours  PPD  5 Tuberculin Unit(s) Injectable 5 Unit(s) IntraDermal once                            7.9    10.38 )-----------( 299      ( 04 Sep 2024 06:53 )             28.2     09-04    142  |  96  |  29.5<H>  ----------------------------<  122<H>  3.4<L>   |  32.0<H>  |  6.27<H>    Ca    8.1<L>      04 Sep 2024 06:53  Mg     2.6     09-03      RECENT CULTURES:  08-30 @ 15:19 Tissue Fibrinous Exudate for Culture Achromobacter xylosoxidans    Rare Achromobacter xylosoxidans    - Amikacin: R >32  - Aztreonam: R >16  - Cefepime: I 16  - Ceftriaxone: I 32  - Ciprofloxacin: S 1  - Gentamicin: R >8  - Levofloxacin: S 1  - Meropenem: S <=1  - Piperacillin/Tazobactam: S <=8  - Tobramycin: R >8    Rare polymorphonuclear leukocytes seen per low power field  No organisms seen per oil power field      08-30 @ 15:15 Surgical Swab Peritoneal Fluid     No growth to date        08-27 @ 11:52 .Blood Blood-Peripheral     No growth at 5 days        08-27 @ 11:50 .Blood Blood-Peripheral     No growth at 5 days        08-27 @ 00:00 Peritoneal Peritoneal Fluid Achromobacter xylosoxidans    Few Achromobacter xylosoxidans    No polymorphonuclear leukocytes  No organisms seen  by cytocentrifuge      WBC Count: 10.38 K/uL (09-04-24 @ 06:53)  WBC Count: 11.03 K/uL (09-03-24 @ 06:19)  WBC Count: 12.66 K/uL (09-02-24 @ 07:49)  WBC Count: 10.91 K/uL (09-01-24 @ 06:36)  WBC Count: 10.25 K/uL (08-31-24 @ 05:55)    Creatinine: 6.27 mg/dL (09-04-24 @ 06:53)  Creatinine: 8.60 mg/dL (09-03-24 @ 06:19)  Creatinine: 10.58 mg/dL (09-02-24 @ 07:49)  Creatinine: 8.46 mg/dL (09-01-24 @ 06:36)  Creatinine: 13.15 mg/dL (08-31-24 @ 05:55)    ________________________________________________________________  CARDIOLOGY   ________________________________________________________________  RADIOLOGY  < from: Xray Chest 1 View- PORTABLE-Urgent (Xray Chest 1 View- PORTABLE-Urgent .) (09.03.24 @ 13:57) >  INTERPRETATION:  AP semierect chest on September 3, 2024 at 12:50 PM.   Patient had NG tube adjustment.    Heart magnified by technique. NG tube is been withdrawn with tip still   remaining in antrum but not coiling in the stomach.    Slight infiltrate at right base medially unchanged. Right jugular line   remains.    IMPRESSION: NG tube adjustment.    < end of copied text >    < from: CT Angio Chest PE Protocol w/ IV Cont (09.02.24 @ 17:49) >  FINDINGS:    PULMONARY EMBOLISM: No pulmonary embolism..    AIRWAYS AND LUNGS: The central tracheobronchial tree is patent.  Small   bilateral pleural effusions with partial atelectasis of both lower lobes.   Right pleural effusion is partially loculated. Patchy dependent bilateral   lung opacities. A few right lung nodules measure up to 5 mm.    MEDIASTINUM AND PLEURA: There are no enlarged mediastinal, hilar or   axillary lymph nodes. The visualized portion of the thyroid gland is   unremarkable. There is no pneumothorax.    HEART AND VESSELS: The heart is normal in size.   There are   atherosclerotic calcifications of the aorta and coronary arteries.  There   is a moderate pericardial effusion.    UPPER ABDOMEN: Images of the upper abdomen demonstrate ascites.    BONES AND SOFT TISSUES: The bones are unremarkable.  The soft tissues are   unremarkable.    IMPRESSION:  No pulmonary embolism.    Small bilateral pleural effusions with partial atelectasis of both lower   lobes. Right pleural effusion is partially loculated. Patchy dependent   bilateral lung opacities represent atelectasis with or without   superimposed infection. A few right lung nodules measure up to 5 mm are   indeterminate. Three-month follow-up CT recommended    There is a moderate pericardial effusion.    < end of copied text >

## 2024-09-04 NOTE — DIETITIAN INITIAL EVALUATION ADULT - PERTINENT MEDS FT
MEDICATIONS  (STANDING):  diltiazem Injectable 20 milliGRAM(s) IV Push every 4 hours  epoetin dustin-epbx (RETACRIT) Injectable 69844 Unit(s) IV Push <User Schedule>  folic acid 1 milliGRAM(s) Oral daily  insulin lispro (ADMELOG) corrective regimen sliding scale   SubCutaneous three times a day before meals  metoclopramide Injectable 10 milliGRAM(s) IV Push every 12 hours  metoprolol tartrate Injectable 5 milliGRAM(s) IV Push every 6 hours  piperacillin/tazobactam IVPB.. 3.375 Gram(s) IV Intermittent every 12 hours

## 2024-09-04 NOTE — DIETITIAN NUTRITION RISK NOTIFICATION - ADDITIONAL COMMENTS/DIETITIAN RECOMMENDATIONS
1) If unable to advance to PO diet, recommend TPN/PPN  2) Advance to [facility] as feasible  3) Monitor electrolytes, correct PRN  4) Monitor weights daily for trend/accuracy

## 2024-09-04 NOTE — DIETITIAN INITIAL EVALUATION ADULT - ORAL INTAKE PTA/DIET HISTORY
Pt admitted with abdominal pain, found with SBO s/p ex-lap with lysis of adhesions/small resection (8/30). NGT for decompression noted, Pt has been NPO x8 days. Pt states UBW ~130lbs, she tries to follow a high protein diet at home. Pt eager to eat, asking for clear liquids.

## 2024-09-04 NOTE — PROGRESS NOTE ADULT - SUBJECTIVE AND OBJECTIVE BOX
Interval Hx:  Patient seen during rounds  Patient reports pain to be controlled on current medications  Patient denies sedation with medications     Analgesic Dosing for past 24 hours reviewed as below:    acetaminophen   IVPB ..   400 mL/Hr IV Intermittent (09-04-24 @ 11:17)   400 mL/Hr IV Intermittent (09-04-24 @ 06:08)   400 mL/Hr IV Intermittent (09-04-24 @ 00:18)   400 mL/Hr IV Intermittent (09-03-24 @ 17:13)    HYDROmorphone  Injectable   1 milliGRAM(s) IV Push (09-04-24 @ 12:51)    metoclopramide Injectable   10 milliGRAM(s) IV Push (09-04-24 @ 06:10)   10 milliGRAM(s) IV Push (09-03-24 @ 17:13)          T(C): 37.1 (09-04-24 @ 08:48), Max: 37.2 (09-04-24 @ 00:41)  HR: 113 (09-04-24 @ 13:20) (105 - 131)  BP: 190/103 (09-04-24 @ 13:20) (134/73 - 227/100)  RR: 18 (09-04-24 @ 13:20) (14 - 22)  SpO2: 95% (09-04-24 @ 13:20) (93% - 100%)      09-03-24 @ 07:01  -  09-04-24 @ 07:00  --------------------------------------------------------  IN: 2150 mL / OUT: 2000 mL / NET: 150 mL    09-04-24 @ 07:01  -  09-04-24 @ 13:47  --------------------------------------------------------  IN: 250 mL / OUT: 800 mL / NET: -550 mL        acetaminophen   IVPB .. 1000 milliGRAM(s) IV Intermittent every 6 hours  albuterol    0.083%. 2.5 milliGRAM(s) Nebulizer once  benzocaine 20% Spray 1 Spray(s) Topical four times a day  benzocaine/menthol Lozenge 1 Lozenge Oral every 4 hours PRN  chlorhexidine 2% Cloths 1 Application(s) Topical daily  cloNIDine Patch 0.3 mG/24Hr(s) 1 patch Transdermal every 7 days  dextrose 5% + sodium chloride 0.9%. 1000 milliLiter(s) IV Continuous <Continuous>  dextrose 5%. 1000 milliLiter(s) IV Continuous <Continuous>  dextrose 5%. 1000 milliLiter(s) IV Continuous <Continuous>  dextrose 50% Injectable 25 Gram(s) IV Push once  dextrose 50% Injectable 25 Gram(s) IV Push once  dextrose 50% Injectable 12.5 Gram(s) IV Push once  dextrose Oral Gel 15 Gram(s) Oral once PRN  diltiazem Injectable 20 milliGRAM(s) IV Push every 4 hours  epoetin dustin-epbx (RETACRIT) Injectable 62433 Unit(s) IV Push <User Schedule>  folic acid 1 milliGRAM(s) Oral daily  hydrALAZINE Injectable 20 milliGRAM(s) IV Push every 6 hours PRN  hydrALAZINE Injectable 10 milliGRAM(s) IV Push every 2 hours PRN  HYDROmorphone  Injectable 1 milliGRAM(s) IV Push every 6 hours PRN  HYDROmorphone  Injectable 0.5 milliGRAM(s) IV Push every 4 hours PRN  insulin lispro (ADMELOG) corrective regimen sliding scale   SubCutaneous three times a day before meals  lidocaine   4% Patch 1 Patch Transdermal daily  metoclopramide Injectable 10 milliGRAM(s) IV Push every 12 hours  metoprolol tartrate Injectable 5 milliGRAM(s) IV Push every 6 hours  naloxone Injectable 0.1 milliGRAM(s) IV Push every 3 minutes PRN  niCARdipine Infusion 5 mG/Hr IV Continuous <Continuous>  ondansetron Injectable 4 milliGRAM(s) IV Push every 6 hours PRN  ondansetron Injectable 4 milliGRAM(s) IV Push every 6 hours PRN  piperacillin/tazobactam IVPB.. 3.375 Gram(s) IV Intermittent every 12 hours  PPD  5 Tuberculin Unit(s) Injectable 5 Unit(s) IntraDermal once  sodium chloride 0.9% lock flush 10 milliLiter(s) IV Push every 1 hour PRN                          7.9    10.38 )-----------( 299      ( 04 Sep 2024 06:53 )             28.2     09-04    142  |  96  |  29.5<H>  ----------------------------<  122<H>  3.4<L>   |  32.0<H>  |  6.27<H>    Ca    8.1<L>      04 Sep 2024 06:53  Mg     2.6     09-03        Urinalysis Basic - ( 04 Sep 2024 06:53 )    Color: x / Appearance: x / SG: x / pH: x  Gluc: 122 mg/dL / Ketone: x  / Bili: x / Urobili: x   Blood: x / Protein: x / Nitrite: x   Leuk Esterase: x / RBC: x / WBC x   Sq Epi: x / Non Sq Epi: x / Bacteria: x        Pain Service   [phone]

## 2024-09-04 NOTE — PROGRESS NOTE ADULT - ASSESSMENT
- 63F with ESRD on PD (5 days/week), HTN, DM admitted for abdominal pain, distention, subjective fevers, and PD catheter malfunction.     ID consulted for peritonitis associated with PD catheter     - Peritoneal fluid with WBC 16,800 (88% neutrophils) c/w peritonitis  - Peritoneal culture with Achromobacter xylosoxidans susceptible to piperacillin-tazobactam, meropenem, levofloxacin and TMP/SMX  - BCX neg  - 8/28 CT AP with findings c/w SBO   - s/p [place] on 8/27. Will need eventual PermaCath (no objections)  - s/p ex-lap on 8/30 >> Fibrinous tissue seen in pelvic region where small bowel was densely adhered. Small bowel released with blunt dissection. Suspected area of small bowel perforation resected, approximately 15cm. Side-to-side small bowel anastomosis. PD catheter removed.     - Surgical cultures with Achromobacter xylosoxidans susceptible to piperacillin-tazobactam, meropenem, levofloxacin and TMP/SMX  - HD per nephrology  - remains tachycardic and hypertensive   - TTE ordered for pericardial effusion seen on CT   - afebrile     - clinically improving    - Continue piperacillin-tazobactam adjusted to renal function     Will follow

## 2024-09-04 NOTE — CONSULT NOTE ADULT - SUBJECTIVE AND OBJECTIVE BOX
Patient is a 63y old  Female who presents with a chief complaint of ESRD needing HD (04 Sep 2024 16:57)      BRIEF HOSPITAL COURSE:     Events last 24 hours: 63/F with PMHx ESRD on Peritoneal Dialysis, HTN (on multiple Anti-HTNs), HLD, Diabetes initially admitted for peritonitis 2/2 catheter dysfunction, needing HD via [facility]. Later, found to have SBO s/p ex-lap which showed small bowel perforation, requiring 15cm small bowel resection with anastomosis & lysis of adhesion on . Currently on strict NPO with NGT to low intermittent suction.    ICU consulted for Hypertensive urgency & sinus tachycardia (SBP 200s) despite IV hydralazine 20mg x 1, Lopressor 5mg x 2, Cardizem 20mg x 2 along with Clonidine 0.3mg patch. Cardiology consulted, started on Cardene drip. Last HD yesterday with no fluid removal done due to low-flow through the [facility].    Saw and examined patient at bedside, Reports discomfort  with the NG tube, otherwise, Denies any chest pain, palpitations, visual changes, SOB, abdominal pain, dysuria.     PAST MEDICAL & SURGICAL HISTORY:  HTN (hypertension)  DM (diabetes mellitus)  HLD (hyperlipidemia)  Overactive thyroid gland  S/P  section        Allergies    No Known Allergies    Intolerances      FAMILY HISTORY:  Family history of breast cancer in female (Aunt)    Family history of prostate cancer (Uncle)    Family history of hypertension (Father, Mother)        Review of Systems:  As above      Medications:  piperacillin/tazobactam IVPB.. 3.375 Gram(s) IV Intermittent every 12 hours    cloNIDine Patch 0.3 mG/24Hr(s) 1 patch Transdermal every 7 days  diltiazem Injectable 20 milliGRAM(s) IV Push every 4 hours  hydrALAZINE Injectable 10 milliGRAM(s) IV Push every 4 hours  hydrALAZINE Injectable 20 milliGRAM(s) IV Push every 6 hours PRN  labetalol Injectable 10 milliGRAM(s) IV Push every 2 hours PRN  labetalol Injectable 20 milliGRAM(s) IV Push every 4 hours  metoprolol tartrate Injectable 5 milliGRAM(s) IV Push every 6 hours    albuterol    0.083%. 2.5 milliGRAM(s) Nebulizer once    acetaminophen   IVPB .. 1000 milliGRAM(s) IV Intermittent every 6 hours  HYDROmorphone  Injectable 1 milliGRAM(s) IV Push every 6 hours PRN  HYDROmorphone  Injectable 0.5 milliGRAM(s) IV Push every 4 hours PRN  metoclopramide Injectable 10 milliGRAM(s) IV Push every 12 hours  ondansetron Injectable 4 milliGRAM(s) IV Push every 6 hours PRN  ondansetron Injectable 4 milliGRAM(s) IV Push every 6 hours PRN            dextrose 50% Injectable 25 Gram(s) IV Push once  dextrose 50% Injectable 25 Gram(s) IV Push once  dextrose 50% Injectable 12.5 Gram(s) IV Push once  dextrose Oral Gel 15 Gram(s) Oral once PRN  insulin lispro (ADMELOG) corrective regimen sliding scale   SubCutaneous three times a day before meals    dextrose 5% + sodium chloride 0.9%. 1000 milliLiter(s) IV Continuous <Continuous>  dextrose 5%. 1000 milliLiter(s) IV Continuous <Continuous>  dextrose 5%. 1000 milliLiter(s) IV Continuous <Continuous>  folic acid 1 milliGRAM(s) Oral daily  sodium chloride 0.9% lock flush 10 milliLiter(s) IV Push every 1 hour PRN    epoetin dustin-epbx (RETACRIT) Injectable 76803 Unit(s) IV Push <User Schedule>    benzocaine 20% Spray 1 Spray(s) Topical four times a day  benzocaine/menthol Lozenge 1 Lozenge Oral every 4 hours PRN  chlorhexidine 2% Cloths 1 Application(s) Topical daily  lidocaine   4% Patch 1 Patch Transdermal daily    naloxone Injectable 0.1 milliGRAM(s) IV Push every 3 minutes PRN          ICU Vital Signs Last 24 Hrs  T(C): 36.8 (04 Sep 2024 14:00), Max: 37.2 (04 Sep 2024 00:41)  T(F): 98.3 (04 Sep 2024 14:00), Max: 98.9 (04 Sep 2024 00:41)  HR: 131 (04 Sep 2024 16:05) (105 - 131)  BP: 162/66 (04 Sep 2024 16:05) (134/73 - 227/100)  BP(mean): 95 (04 Sep 2024 16:05) (91 - 142)  ABP: --  ABP(mean): --  RR: 20 (04 Sep 2024 16:05) (14 - 22)  SpO2: 98% (04 Sep 2024 16:05) (93% - 98%)    O2 Parameters below as of 04 Sep 2024 16:05  Patient On (Oxygen Delivery Method): room air          Vital Signs Last 24 Hrs  T(C): 36.8 (04 Sep 2024 14:00), Max: 37.2 (04 Sep 2024 00:41)  T(F): 98.3 (04 Sep 2024 14:00), Max: 98.9 (04 Sep 2024 00:41)  HR: 131 (04 Sep 2024 16:05) (105 - 131)  BP: 162/66 (04 Sep 2024 16:05) (134/73 - 227/100)  BP(mean): 95 (04 Sep 2024 16:05) (91 - 142)  RR: 20 (04 Sep 2024 16:05) (14 - 22)  SpO2: 98% (04 Sep 2024 16:05) (93% - 98%)    Parameters below as of 04 Sep 2024 16:05  Patient On (Oxygen Delivery Method): room air            I&O's Detail    03 Sep 2024 07:01  -  04 Sep 2024 07:00  --------------------------------------------------------  IN:    dextrose 5% + sodium chloride 0.9%: 1050 mL    IV PiggyBack: 100 mL    Other (mL): 1000 mL  Total IN: 2150 mL    OUT:    Nasogastric/Oral tube (mL): 900 mL    Other (mL): 1000 mL    Voided (mL): 100 mL  Total OUT: 2000 mL    Total NET: 150 mL      04 Sep 2024 07:01  -  04 Sep 2024 17:12  --------------------------------------------------------  IN:    dextrose 5% + sodium chloride 0.9%: 250 mL  Total IN: 250 mL    OUT:    Nasogastric/Oral tube (mL): 800 mL  Total OUT: 800 mL    Total NET: -550 mL            LABS:                        7.9    10.38 )-----------( 299      ( 04 Sep 2024 06:53 )             28.2         142  |  96  |  29.5<H>  ----------------------------<  122<H>  3.4<L>   |  32.0<H>  |  6.27<H>    Ca    8.1<L>      04 Sep 2024 06:53  Mg     2.6     09-03            CAPILLARY BLOOD GLUCOSE      POCT Blood Glucose.: 123 mg/dL (04 Sep 2024 11:20)      Urinalysis Basic - ( 04 Sep 2024 06:53 )    Color: x / Appearance: x / SG: x / pH: x  Gluc: 122 mg/dL / Ketone: x  / Bili: x / Urobili: x   Blood: x / Protein: x / Nitrite: x   Leuk Esterase: x / RBC: x / WBC x   Sq Epi: x / Non Sq Epi: x / Bacteria: x      CULTURES:  Culture Results:   Rare Achromobacter xylosoxidans ( @ 15:19)  Culture Results:   No growth to date ( @ 15:15)      Physical Examination:  General: No acute distress.  Alert, oriented, interactive, nonfocal  HEENT: Pupils equal, reactive to light.  Symmetric. NGT (+) to LIS  PULM: Clear to auscultation bilaterally, no wheezes  CVS: Regular rhythm, tachycardic, S1/S2 (+)  ABD: soft, minimal tenderness, normal BS, no guarding/rebound  EXT: No edema, nontender  SKIN: Warm and well perfused, no rashes noted.

## 2024-09-04 NOTE — DIETITIAN INITIAL EVALUATION ADULT - ADD RECOMMEND
If unable to advance to PO diet, consider TPN/PPN; Advance to [facility] as feasible; Monitor electrolytes, correct PRN

## 2024-09-04 NOTE — PROGRESS NOTE ADULT - SUBJECTIVE AND OBJECTIVE BOX
NEPHROLOGY INTERVAL HPI/OVERNIGHT EVENTS:    No new events.    MEDICATIONS  (STANDING):  albuterol    0.083%. 2.5 milliGRAM(s) Nebulizer once  ceFAZolin  Injectable. 2000 milliGRAM(s) IV Push once  dextrose 5% + sodium chloride 0.9%. 1000 milliLiter(s) (50 mL/Hr) IV Continuous <Continuous>  dextrose 5%. 1000 milliLiter(s) (50 mL/Hr) IV Continuous <Continuous>  dextrose 5%. 1000 milliLiter(s) (100 mL/Hr) IV Continuous <Continuous>  dextrose 50% Injectable 12.5 Gram(s) IV Push once  dextrose 50% Injectable 25 Gram(s) IV Push once  dextrose 50% Injectable 25 Gram(s) IV Push once  diltiazem Injectable 20 milliGRAM(s) IV Push every 4 hours  epoetin dustin-epbx (RETACRIT) Injectable 99183 Unit(s) IV Push <User Schedule>  folic acid 1 milliGRAM(s) Oral daily  hydrALAZINE Injectable 10 milliGRAM(s) IV Push every 2 hours  insulin lispro (ADMELOG) corrective regimen sliding scale   SubCutaneous three times a day before meals  labetalol Injectable 10 milliGRAM(s) IV Push every 4 hours  metoclopramide Injectable 10 milliGRAM(s) IV Push every 12 hours  metoprolol tartrate Injectable 5 milliGRAM(s) IV Push every 6 hours  piperacillin/tazobactam IVPB.. 3.375 Gram(s) IV Intermittent every 12 hours  PPD  5 Tuberculin Unit(s) Injectable 5 Unit(s) IntraDermal once    MEDICATIONS  (PRN):  dextrose Oral Gel 15 Gram(s) Oral once PRN Blood Glucose LESS THAN 70 milliGRAM(s)/deciliter  HYDROmorphone  Injectable 1 milliGRAM(s) IV Push every 4 hours PRN Severe Pain (7 - 10)  HYDROmorphone  Injectable 0.5 milliGRAM(s) IV Push every 4 hours PRN Moderate Pain (4 - 6)  naloxone Injectable 0.1 milliGRAM(s) IV Push every 3 minutes PRN For ANY of the following changes in patient status:  A. RR LESS THAN 10 breaths per minute, B. Oxygen saturation LESS THAN 90%, C. Sedation score of 6  ondansetron Injectable 4 milliGRAM(s) IV Push every 6 hours PRN Nausea  ondansetron Injectable 4 milliGRAM(s) IV Push every 6 hours PRN Nausea and/or Vomiting  sodium chloride 0.9% lock flush 10 milliLiter(s) IV Push every 1 hour PRN Pre/post blood products, medications, blood draw, and to maintain line patency      Allergies    No Known Allergies        Vital Signs Last 24 Hrs  T(C): 37.1 (04 Sep 2024 08:48), Max: 37.2 (04 Sep 2024 00:41)  T(F): 98.8 (04 Sep 2024 08:48), Max: 98.9 (04 Sep 2024 00:41)  HR: 130 (04 Sep 2024 10:00) (105 - 130)  BP: 227/108 (04 Sep 2024 10:00) (134/73 - 227/108)  BP(mean): 112 (04 Sep 2024 06:00) (91 - 141)  RR: 18 (04 Sep 2024 10:00) (14 - 22)  SpO2: 96% (04 Sep 2024 10:00) (93% - 100%)    Parameters below as of 04 Sep 2024 10:00  Patient On (Oxygen Delivery Method): room air      T(C): 36.8 (02 Sep 2024 15:44), Max: 37.5 (01 Sep 2024 20:00)  T(F): 98.3 (02 Sep 2024 15:44), Max: 99.5 (01 Sep 2024 20:00)  HR: 121 (02 Sep 2024 16:00) (99 - 129)  BP: 177/83 (02 Sep 2024 16:00) (127/65 - 195/98)  BP(mean): 109 (02 Sep 2024 16:00) (82 - 124)  RR: 17 (02 Sep 2024 16:00) (12 - 22)  SpO2: 96% (02 Sep 2024 16:00) (94% - 99%)    Parameters below as of 02 Sep 2024 16:00  Patient On (Oxygen Delivery Method): nasal cannula  O2 Flow (L/min): 2    T(C): 37 (01 Sep 2024 07:42), Max: 37.2 (01 Sep 2024 00:28)  T(F): 98.6 (01 Sep 2024 07:42), Max: 98.9 (01 Sep 2024 00:28)  HR: 107 (01 Sep 2024 12:00) (98 - 120)  BP: 176/79 (01 Sep 2024 12:00) (171/66 - 219/91)  BP(mean): 115 (01 Sep 2024 06:00) (106 - 115)  RR: 16 (01 Sep 2024 12:00) (10 - 20)  SpO2: 97% (01 Sep 2024 12:00) (92% - 98%)    Parameters below as of 01 Sep 2024 12:00  Patient On (Oxygen Delivery Method): nasal cannula  O2 Flow (L/min): 2      PHYSICAL EXAM:      PHYSICAL EXAM:    GENERAL: Appears  acutely ill inchair  + NG tube , No JVD + RIJ raul cath   NERVOUS SYSTEM: Alert, oriented  Lungs : No rales, No rhonchi,   HEART:  No murmur,  No rub, No gallops  ABDOMEN: dressed , dec BS , N/G   tube without  feed  Ext Muscle  wasting         LABS:    09-04    142  |  96  |  29.5<H>  ----------------------------<  122<H>  3.4<L>   |  32.0<H>  |  6.27<H>    Ca    8.1<L>      04 Sep 2024 06:53  Mg     2.6     09-03        09-02    140  |  96  |  57.0<H>  ----------------------------<  177<H>  3.7   |  28.0  |  10.58<H>    Ca    8.1<L>      02 Sep 2024 07:49  Phos  9.3     09-02  Mg     2.8     09-01    TPro  5.5<L>  /  Alb  1.8<L>  /  TBili  <0.2<L>  /  DBili  x   /  AST  14  /  ALT  <5  /  AlkPhos  66  09-02                          8.1    10.91 )-----------( 317      ( 01 Sep 2024 06:36 )             27.5     09-01    135  |  96  |  47.1<H>  ----------------------------<  152<H>  4.5   |  25.0  |  8.46<H>    Ca    8.6      01 Sep 2024 06:36  Phos  9.1     09-01  Mg     2.8     09-01    TPro  5.3<L>  /  Alb  1.9<L>  /  TBili  <0.2<L>  /  DBili  x   /  AST  12  /  ALT  <5  /  AlkPhos  58  09-01      Urinalysis Basic - ( 01 Sep 2024 06:36 )    Color: x / Appearance: x / SG: x / pH: x  Gluc: 152 mg/dL / Ketone: x  / Bili: x / Urobili: x   Blood: x / Protein: x / Nitrite: x   Leuk Esterase: x / RBC: x / WBC x   Sq Epi: x / Non Sq Epi: x / Bacteria: x      Phosphorus: 9.1 mg/dL (09-01 @ 06:36)  Magnesium: 2.8 mg/dL (09-01 @ 06:36)          RADIOLOGY & ADDITIONAL TESTS:

## 2024-09-04 NOTE — DIETITIAN NUTRITION RISK NOTIFICATION - TREATMENT: THE FOLLOWING DIET HAS BEEN RECOMMENDED
Diet, NPO:   With Ice Chips/Sips of Water (09-01-24 @ 17:41) [Available for Activation]  Diet, NPO (08-28-24 @ 19:45) [Active]

## 2024-09-04 NOTE — DIETITIAN INITIAL EVALUATION ADULT - NSFNSGIIOFT_GEN_A_CORE
09-03-24 @ 07:01  -  09-04-24 @ 07:00  --------------------------------------------------------  OUT:    Nasogastric/Oral tube (mL): 900 mL  Total OUT: 900 mL    Total NET: -900 mL      09-04-24 @ 07:01  -  09-04-24 @ 13:07  --------------------------------------------------------  OUT:    Nasogastric/Oral tube (mL): 800 mL  Total OUT: 800 mL    Total NET: -800 mL

## 2024-09-04 NOTE — DIETITIAN INITIAL EVALUATION ADULT - PERTINENT LABORATORY DATA
09-04    142  |  96  |  29.5<H>  ----------------------------<  122<H>  3.4<L>   |  32.0<H>  |  6.27<H>    Ca    8.1<L>      04 Sep 2024 06:53  Mg     2.6     09-03    POCT Blood Glucose.: 123 mg/dL (09-04-24 @ 11:20)  A1C with Estimated Average Glucose Result: 7.0 % (08-28-24 @ 04:38)

## 2024-09-04 NOTE — PROGRESS NOTE ADULT - SUBJECTIVE AND OBJECTIVE BOX
Yanely Gonzalez MD (Available on [employer])  [facility] Division of Hospital Medicine    Chief Complaint:  abdominal pain    SUBJECTIVE / OVERNIGHT EVENTS:  Pt seen and examined at bedside. Endorses pain is a little better.     Patient denies chest pain, SOB, abd pain, N/V, fever, chills, dysuria or any other complaints. All remainder ROS negative.     INTERVAL EVENTS:  -Pt hypertensive to 200s today, Nicardapene drip was started briefly. ICU consulted for transfer and titration of drip. Recs appreciated. Pt to remain on SDU with resumption of current IVP of anti-htn meds.   -Will resume Hydralazine 10mg q4h, Labetalol 20mg IVP q4h, Cardizem 20mg IVP q4h, metoprolol 5mg IVP q6h   -Labetalol 10mg IVP q2h PRN   -Hydralazine 20mg IVP q6h PRN   -Will touch base with Gen surg tomorrow since NGT is still to LIS, pt should be started on TPN if extended expected NPO status  -Also arvin not working last HD session, will consult vascular in AM   -Pending TTE    MEDICATIONS  (STANDING):  acetaminophen   IVPB .. 1000 milliGRAM(s) IV Intermittent every 6 hours  albuterol    0.083%. 2.5 milliGRAM(s) Nebulizer once  benzocaine 20% Spray 1 Spray(s) Topical four times a day  chlorhexidine 2% Cloths 1 Application(s) Topical daily  cloNIDine Patch 0.3 mG/24Hr(s) 1 patch Transdermal every 7 days  dextrose 5% + sodium chloride 0.9%. 1000 milliLiter(s) (50 mL/Hr) IV Continuous <Continuous>  dextrose 5%. 1000 milliLiter(s) (50 mL/Hr) IV Continuous <Continuous>  dextrose 5%. 1000 milliLiter(s) (100 mL/Hr) IV Continuous <Continuous>  dextrose 50% Injectable 12.5 Gram(s) IV Push once  dextrose 50% Injectable 25 Gram(s) IV Push once  dextrose 50% Injectable 25 Gram(s) IV Push once  diltiazem Injectable 20 milliGRAM(s) IV Push every 4 hours  epoetin dustin-epbx (RETACRIT) Injectable 72536 Unit(s) IV Push <User Schedule>  folic acid 1 milliGRAM(s) Oral daily  hydrALAZINE Injectable 10 milliGRAM(s) IV Push every 4 hours  insulin lispro (ADMELOG) corrective regimen sliding scale   SubCutaneous three times a day before meals  labetalol Injectable 20 milliGRAM(s) IV Push every 4 hours  lidocaine   4% Patch 1 Patch Transdermal daily  metoclopramide Injectable 10 milliGRAM(s) IV Push every 12 hours  metoprolol tartrate Injectable 5 milliGRAM(s) IV Push every 6 hours  piperacillin/tazobactam IVPB.. 3.375 Gram(s) IV Intermittent every 12 hours    MEDICATIONS  (PRN):  benzocaine/menthol Lozenge 1 Lozenge Oral every 4 hours PRN Sore Throat  dextrose Oral Gel 15 Gram(s) Oral once PRN Blood Glucose LESS THAN 70 milliGRAM(s)/deciliter  hydrALAZINE Injectable 20 milliGRAM(s) IV Push every 6 hours PRN systolic > 150  HYDROmorphone  Injectable 0.5 milliGRAM(s) IV Push every 4 hours PRN Moderate Pain (4 - 6)  HYDROmorphone  Injectable 1 milliGRAM(s) IV Push every 6 hours PRN Severe Pain (7 - 10)  labetalol Injectable 10 milliGRAM(s) IV Push every 2 hours PRN Systolic blood pressure >185  naloxone Injectable 0.1 milliGRAM(s) IV Push every 3 minutes PRN For ANY of the following changes in patient status:  A. RR LESS THAN 10 breaths per minute, B. Oxygen saturation LESS THAN 90%, C. Sedation score of 6  ondansetron Injectable 4 milliGRAM(s) IV Push every 6 hours PRN Nausea  ondansetron Injectable 4 milliGRAM(s) IV Push every 6 hours PRN Nausea and/or Vomiting  sodium chloride 0.9% lock flush 10 milliLiter(s) IV Push every 1 hour PRN Pre/post blood products, medications, blood draw, and to maintain line patency        I&O's Summary    03 Sep 2024 07:01  -  04 Sep 2024 07:00  --------------------------------------------------------  IN: 2150 mL / OUT: 2000 mL / NET: 150 mL    04 Sep 2024 07:01  -  04 Sep 2024 16:58  --------------------------------------------------------  IN: 250 mL / OUT: 800 mL / NET: -550 mL        PHYSICAL EXAM:  Vital Signs Last 24 Hrs  T(C): 36.8 (04 Sep 2024 14:00), Max: 37.2 (04 Sep 2024 00:41)  T(F): 98.3 (04 Sep 2024 14:00), Max: 98.9 (04 Sep 2024 00:41)  HR: 131 (04 Sep 2024 16:05) (105 - 131)  BP: 162/66 (04 Sep 2024 16:05) (134/73 - 227/100)  BP(mean): 95 (04 Sep 2024 16:05) (91 - 142)  RR: 20 (04 Sep 2024 16:05) (14 - 22)  SpO2: 98% (04 Sep 2024 16:05) (93% - 98%)    Parameters below as of 04 Sep 2024 16:05  Patient On (Oxygen Delivery Method): room air            CONSTITUTIONAL: NAD  ENMT: Moist oral mucosa, NGT with billous output  RESPIRATORY: Normal respiratory effort; lungs are clear to auscultation bilaterally  CARDIOVASCULAR: Regular rate and rhythm, normal S1 and S2, no murmur/rub/gallop  ABDOMEN: Nontender to palpation, normoactive bowel sounds, no rebound/guarding; No hepatosplenomegaly  EXTREMITIES: No LE swelling   PSYCH: A+O to person, place, and time; affect appropriate  NEUROLOGY: CN 2-12 are intact and symmetric; no gross sensory deficits;   SKIN: No rashes; no palpable lesions    LABS:                        7.9    10.38 )-----------( 299      ( 04 Sep 2024 06:53 )             28.2     09-04    142  |  96  |  29.5<H>  ----------------------------<  122<H>  3.4<L>   |  32.0<H>  |  6.27<H>    Ca    8.1<L>      04 Sep 2024 06:53  Mg     2.6     09-03            Urinalysis Basic - ( 04 Sep 2024 06:53 )    Color: x / Appearance: x / SG: x / pH: x  Gluc: 122 mg/dL / Ketone: x  / Bili: x / Urobili: x   Blood: x / Protein: x / Nitrite: x   Leuk Esterase: x / RBC: x / WBC x   Sq Epi: x / Non Sq Epi: x / Bacteria: x        CAPILLARY BLOOD GLUCOSE      POCT Blood Glucose.: 123 mg/dL (04 Sep 2024 11:20)  POCT Blood Glucose.: 140 mg/dL (04 Sep 2024 06:04)  POCT Blood Glucose.: 137 mg/dL (03 Sep 2024 17:11)        RADIOLOGY & ADDITIONAL TESTS:  Results Reviewed:   Imaging Personally Reviewed:  Electrocardiogram Personally Reviewed:

## 2024-09-04 NOTE — PROGRESS NOTE ADULT - ASSESSMENT
Assessment:    63y Female w/ PMH of ESRD on peritoneal dialysis 5 x per week, HTN, HLD, and DM admitted for peritonitis and peritoneal dialysis catheter dysfunction, needing HD. Now found to have SBO s/p ex-lap. Strict NPO with NGT to LIS. Now with hypertensive Urgency being managed on IVP anti-htn meds.     # PD catheter associated peritonitis  # PD catheter malfunction  # SBO POD #3  - 8/30/24- Exploratory laparotomy with lysis of adhesions. Suspected area of small bowel perforation resected, approximately 15cm. Side-to-side small bowel anastomosis. PD catheter removed.  - NPO, IVF at 50cc/hr   - Dilaudid IVP PRN pain scale  - Pain management consulted. PCA pump discontinued since pt was using out of propotion to pain. Appreciate recs from Dr Alejandrina Lazar  - IV tylenol 1g as needed for breakthrough, unable to place a standing q8h order   - Inc spirometry, Mobilize  - Peritoneal fluid culture with Achromobacter xylosoxidans  - On Zosyn.   -ID on board     #Pericardial effusion on CTA chest:  -Appreciate cards recs  -Hemodyanmically stable, likely 2/2 uremia  -Fu echo    #Sinus Tachycardia:  -EKG with sinus tachycardia  -Dimer elevated, will do CTA chest to r/o PE>> negative  -Tachycardia likely 2/2 pain, ongoing ngt discomfort    #Hypertensive Urgency   -cw Labetalol 20mg IVP , Hydralazine 10mg IVP, Cardizem 20mg IVP q4h  -Hydralazine 20mg q6h IVP  -Labetalol 10mg ivp q2h prn   -clonidine patch increased to 0.3mg/qweekly  -Hydralazine PRN     # ESRD s/p PD now on HD  - now started on HD (8/29) via Right IJ [place]  - hep panel done. Ordered PPD  - Will likely need HD for some time    #R IJ Joseph dysfunction:  -Will consult vascular in AM     #Constipation  - Miralax when PO started    #DM2- controlled  - c/w insulin lispro  - Q 6 hr accuchecks when NPO    #HLD  - c/w statin    #Hyperkalemia Now resolved   - monitor BMP daily      DVT ppx: SCD      Disposition: Medically active. SBO s/p laprotomy. On IV abx. Uncontrolled htn, will remain in SDU due to frequent anti-htn ivp and frequent bp monitoring.

## 2024-09-04 NOTE — PROGRESS NOTE ADULT - ASSESSMENT
64 YO F s/p Exploratory Laparotomy, FAREED, SBR, PD cath removal POD5 with temp R IJ placement for HD    - OOB, encourage ambulation  - Remove NGT, keep NPO  - cont reglan 64 YO F s/p Exploratory Laparotomy, FAREED, SBR, PD cath removal POD5 with temp R IJ placement for HD    - OOB, encourage ambulation  - Clamp trial NGT for 4 hr, will dc if patient tolerates  - cont reglan

## 2024-09-04 NOTE — DIETITIAN INITIAL EVALUATION ADULT - OTHER INFO
63y Female w/ PMH of ESRD on peritoneal dialysis 5 x per week, HTN, HLD, and DM admitted for peritonitis and peritoneal dialysis catheter dysfunction, needing HD. Now found to have SBO s/p ex-lap. Strict NPO with NGT to LIS.

## 2024-09-04 NOTE — PROGRESS NOTE ADULT - SUBJECTIVE AND OBJECTIVE BOX
SUBJECTIVE / 24H EVENTS:    Pt seen and examined at bedside by the team during rounds. Pt denies CP, SOB, fever, chills. Reports mild persistent abdominal pain. Endorses Nausea. Patient had episodes of tachycardia and HTN overnight. Patient endorsed BM and minimal flatus.     MEDICATIONS  (STANDING):  acetaminophen   IVPB .. 1000 milliGRAM(s) IV Intermittent every 6 hours  albuterol    0.083%. 2.5 milliGRAM(s) Nebulizer once  benzocaine 20% Spray 1 Spray(s) Topical four times a day  chlorhexidine 2% Cloths 1 Application(s) Topical daily  cloNIDine Patch 0.3 mG/24Hr(s) 1 patch Transdermal every 7 days  dextrose 5% + sodium chloride 0.9%. 1000 milliLiter(s) (50 mL/Hr) IV Continuous <Continuous>  dextrose 5%. 1000 milliLiter(s) (100 mL/Hr) IV Continuous <Continuous>  dextrose 5%. 1000 milliLiter(s) (50 mL/Hr) IV Continuous <Continuous>  dextrose 50% Injectable 25 Gram(s) IV Push once  dextrose 50% Injectable 25 Gram(s) IV Push once  dextrose 50% Injectable 12.5 Gram(s) IV Push once  diltiazem Injectable 20 milliGRAM(s) IV Push every 4 hours  epoetin dustin-epbx (RETACRIT) Injectable 97082 Unit(s) IV Push <User Schedule>  folic acid 1 milliGRAM(s) Oral daily  insulin lispro (ADMELOG) corrective regimen sliding scale   SubCutaneous three times a day before meals  lidocaine   4% Patch 1 Patch Transdermal daily  metoclopramide Injectable 10 milliGRAM(s) IV Push every 12 hours  metoprolol tartrate Injectable 5 milliGRAM(s) IV Push every 6 hours  piperacillin/tazobactam IVPB.. 3.375 Gram(s) IV Intermittent every 12 hours  PPD  5 Tuberculin Unit(s) Injectable 5 Unit(s) IntraDermal once    MEDICATIONS  (PRN):  benzocaine/menthol Lozenge 1 Lozenge Oral every 4 hours PRN Sore Throat  dextrose Oral Gel 15 Gram(s) Oral once PRN Blood Glucose LESS THAN 70 milliGRAM(s)/deciliter  hydrALAZINE Injectable 10 milliGRAM(s) IV Push every 2 hours PRN SBP > 140  hydrALAZINE Injectable 20 milliGRAM(s) IV Push every 6 hours PRN systolic > 150  HYDROmorphone  Injectable 1 milliGRAM(s) IV Push every 6 hours PRN Severe Pain (7 - 10)  HYDROmorphone  Injectable 0.5 milliGRAM(s) IV Push every 4 hours PRN Moderate Pain (4 - 6)  naloxone Injectable 0.1 milliGRAM(s) IV Push every 3 minutes PRN For ANY of the following changes in patient status:  A. RR LESS THAN 10 breaths per minute, B. Oxygen saturation LESS THAN 90%, C. Sedation score of 6  ondansetron Injectable 4 milliGRAM(s) IV Push every 6 hours PRN Nausea  ondansetron Injectable 4 milliGRAM(s) IV Push every 6 hours PRN Nausea and/or Vomiting  sodium chloride 0.9% lock flush 10 milliLiter(s) IV Push every 1 hour PRN Pre/post blood products, medications, blood draw, and to maintain line patency      Vital Signs Last 24 Hrs  T(C): 37.1 (04 Sep 2024 04:30), Max: 37.2 (04 Sep 2024 00:41)  T(F): 98.7 (04 Sep 2024 04:30), Max: 98.9 (04 Sep 2024 00:41)  HR: 113 (04 Sep 2024 06:00) (105 - 125)  BP: 165/91 (04 Sep 2024 06:00) (134/73 - 213/110)  BP(mean): 112 (04 Sep 2024 06:00) (91 - 149)  RR: 22 (04 Sep 2024 06:00) (10 - 24)  SpO2: 98% (04 Sep 2024 06:00) (93% - 100%)    Parameters below as of 04 Sep 2024 06:00  Patient On (Oxygen Delivery Method): room air        Physical Exam  HEENT: head normocephalic and atraumatic, NGT in place  Respiratory: respirations are unlabored, no accessory muscle use, no conversational dyspnea  Gastrointestinal: abdomen is mildly distended, nontender to palpation, no tympany.  Musculoskeletal: RENTERIA x 4 spontaneously, extremities are without point tenderness or deformity      I&O's Detail    03 Sep 2024 07:01  -  04 Sep 2024 07:00  --------------------------------------------------------  IN:    dextrose 5% + sodium chloride 0.9%: 1050 mL    IV PiggyBack: 100 mL    Other (mL): 1000 mL  Total IN: 2150 mL    OUT:    Nasogastric/Oral tube (mL): 900 mL    Other (mL): 1000 mL    Voided (mL): 100 mL  Total OUT: 2000 mL    Total NET: 150 mL          LABS:                        8.4    11.03 )-----------( 323      ( 03 Sep 2024 06:19 )             28.8     09-03    143  |  97  |  45.3<H>  ----------------------------<  303<H>  3.6   |  30.0<H>  |  8.60<H>    Ca    7.7<L>      03 Sep 2024 06:19  Phos  9.3     09-02  Mg     2.6     09-03    TPro  5.5<L>  /  Alb  1.8<L>  /  TBili  <0.2<L>  /  DBili  x   /  AST  14  /  ALT  <5  /  AlkPhos  66  09-02      Urinalysis Basic - ( 03 Sep 2024 06:19 )    Color: x / Appearance: x / SG: x / pH: x  Gluc: 303 mg/dL / Ketone: x  / Bili: x / Urobili: x   Blood: x / Protein: x / Nitrite: x   Leuk Esterase: x / RBC: x / WBC x   Sq Epi: x / Non Sq Epi: x / Bacteria: x       SUBJECTIVE / 24H EVENTS:    Pt seen and examined at bedside by the team during rounds. Pt denies CP, SOB, fever, chills. Reports mild persistent abdominal pain. Endorses Nausea. Patient had episodes of tachycardia and HTN overnight. Patient endorsed BM and minimal flatus. Pt very uncomfortable with NGT in place.       Vital Signs Last 24 Hrs  T(C): 37.1 (04 Sep 2024 04:30), Max: 37.2 (04 Sep 2024 00:41)  T(F): 98.7 (04 Sep 2024 04:30), Max: 98.9 (04 Sep 2024 00:41)  HR: 113 (04 Sep 2024 06:00) (105 - 125)  BP: 165/91 (04 Sep 2024 06:00) (134/73 - 213/110)  BP(mean): 112 (04 Sep 2024 06:00) (91 - 149)  RR: 22 (04 Sep 2024 06:00) (10 - 24)  SpO2: 98% (04 Sep 2024 06:00) (93% - 100%)    Parameters below as of 04 Sep 2024 06:00  Patient On (Oxygen Delivery Method): room air      Physical Exam  HEENT: head normocephalic and atraumatic, NGT in place  Respiratory: respirations are unlabored, no accessory muscle use, no conversational dyspnea  Gastrointestinal: abdomen is mildly distended, nontender to palpation, no tympany.  Musculoskeletal: RENTERIA x 4 spontaneously, extremities are without point tenderness or deformity      I&O's Detail    03 Sep 2024 07:01  -  04 Sep 2024 07:00  --------------------------------------------------------  IN:    dextrose 5% + sodium chloride 0.9%: 1050 mL    IV PiggyBack: 100 mL    Other (mL): 1000 mL  Total IN: 2150 mL    OUT:    Nasogastric/Oral tube (mL): 900 mL    Other (mL): 1000 mL    Voided (mL): 100 mL  Total OUT: 2000 mL    Total NET: 150 mL      LABS:                        8.4    11.03 )-----------( 323      ( 03 Sep 2024 06:19 )             28.8     09-03    143  |  97  |  45.3<H>  ----------------------------<  303<H>  3.6   |  30.0<H>  |  8.60<H>    Ca    7.7<L>      03 Sep 2024 06:19  Phos  9.3     09-02  Mg     2.6     09-03    TPro  5.5<L>  /  Alb  1.8<L>  /  TBili  <0.2<L>  /  DBili  x   /  AST  14  /  ALT  <5  /  AlkPhos  66  09-02      Urinalysis Basic - ( 03 Sep 2024 06:19 )    Color: x / Appearance: x / SG: x / pH: x  Gluc: 303 mg/dL / Ketone: x  / Bili: x / Urobili: x   Blood: x / Protein: x / Nitrite: x   Leuk Esterase: x / RBC: x / WBC x   Sq Epi: x / Non Sq Epi: x / Bacteria: x

## 2024-09-05 LAB
ANION GAP SERPL CALC-SCNC: 11 MMOL/L — SIGNIFICANT CHANGE UP (ref 5–17)
ANION GAP SERPL CALC-SCNC: 13 MMOL/L — SIGNIFICANT CHANGE UP (ref 5–17)
BLD GP AB SCN SERPL QL: SIGNIFICANT CHANGE UP
BUN SERPL-MCNC: 24.5 MG/DL — HIGH (ref 8–20)
BUN SERPL-MCNC: 39.4 MG/DL — HIGH (ref 8–20)
CALCIUM SERPL-MCNC: 8.3 MG/DL — LOW (ref 8.4–10.5)
CALCIUM SERPL-MCNC: 8.3 MG/DL — LOW (ref 8.4–10.5)
CHLORIDE SERPL-SCNC: 93 MMOL/L — LOW (ref 96–108)
CHLORIDE SERPL-SCNC: 96 MMOL/L — SIGNIFICANT CHANGE UP (ref 96–108)
CO2 SERPL-SCNC: 38 MMOL/L — HIGH (ref 22–29)
CO2 SERPL-SCNC: 44 MMOL/L — HIGH (ref 22–29)
CREAT SERPL-MCNC: 5.56 MG/DL — HIGH (ref 0.5–1.3)
CREAT SERPL-MCNC: 8.86 MG/DL — HIGH (ref 0.5–1.3)
CULTURE RESULTS: ABNORMAL
CULTURE RESULTS: SIGNIFICANT CHANGE UP
EGFR: 5 ML/MIN/1.73M2 — LOW
EGFR: 8 ML/MIN/1.73M2 — LOW
GLUCOSE BLDC GLUCOMTR-MCNC: 103 MG/DL — HIGH (ref 70–99)
GLUCOSE BLDC GLUCOMTR-MCNC: 123 MG/DL — HIGH (ref 70–99)
GLUCOSE BLDC GLUCOMTR-MCNC: 123 MG/DL — HIGH (ref 70–99)
GLUCOSE BLDC GLUCOMTR-MCNC: 87 MG/DL — SIGNIFICANT CHANGE UP (ref 70–99)
GLUCOSE SERPL-MCNC: 133 MG/DL — HIGH (ref 70–99)
GLUCOSE SERPL-MCNC: 149 MG/DL — HIGH (ref 70–99)
HCT VFR BLD CALC: 26.8 % — LOW (ref 34.5–45)
HCT VFR BLD CALC: 33.3 % — LOW (ref 34.5–45)
HGB BLD-MCNC: 7.7 G/DL — LOW (ref 11.5–15.5)
HGB BLD-MCNC: 9.9 G/DL — LOW (ref 11.5–15.5)
MAGNESIUM SERPL-MCNC: 2.4 MG/DL — SIGNIFICANT CHANGE UP (ref 1.8–2.6)
MCHC RBC-ENTMCNC: 24.1 PG — LOW (ref 27–34)
MCHC RBC-ENTMCNC: 24.9 PG — LOW (ref 27–34)
MCHC RBC-ENTMCNC: 28.7 GM/DL — LOW (ref 32–36)
MCHC RBC-ENTMCNC: 29.7 GM/DL — LOW (ref 32–36)
MCV RBC AUTO: 83.9 FL — SIGNIFICANT CHANGE UP (ref 80–100)
MCV RBC AUTO: 84 FL — SIGNIFICANT CHANGE UP (ref 80–100)
NRBC # BLD: 1 /100 WBCS — HIGH (ref 0–0)
NRBC # BLD: 2 /100 WBCS — HIGH (ref 0–0)
OB PNL GAST: POSITIVE
OCCULT BLOOD, GASTRIC FLUID PH: 1 — SIGNIFICANT CHANGE UP
ORGANISM # SPEC MICROSCOPIC CNT: ABNORMAL
ORGANISM # SPEC MICROSCOPIC CNT: SIGNIFICANT CHANGE UP
PHOSPHATE SERPL-MCNC: 6.6 MG/DL — HIGH (ref 2.4–4.7)
PLATELET # BLD AUTO: 265 K/UL — SIGNIFICANT CHANGE UP (ref 150–400)
PLATELET # BLD AUTO: 267 K/UL — SIGNIFICANT CHANGE UP (ref 150–400)
POTASSIUM SERPL-MCNC: 2.8 MMOL/L — CRITICAL LOW (ref 3.5–5.3)
POTASSIUM SERPL-MCNC: 3.5 MMOL/L — SIGNIFICANT CHANGE UP (ref 3.5–5.3)
POTASSIUM SERPL-SCNC: 2.8 MMOL/L — CRITICAL LOW (ref 3.5–5.3)
POTASSIUM SERPL-SCNC: 3.5 MMOL/L — SIGNIFICANT CHANGE UP (ref 3.5–5.3)
RBC # BLD: 3.19 M/UL — LOW (ref 3.8–5.2)
RBC # BLD: 3.97 M/UL — SIGNIFICANT CHANGE UP (ref 3.8–5.2)
RBC # FLD: 17.1 % — HIGH (ref 10.3–14.5)
RBC # FLD: 17.2 % — HIGH (ref 10.3–14.5)
SODIUM SERPL-SCNC: 145 MMOL/L — SIGNIFICANT CHANGE UP (ref 135–145)
SODIUM SERPL-SCNC: 150 MMOL/L — HIGH (ref 135–145)
SPECIMEN SOURCE: SIGNIFICANT CHANGE UP
SPECIMEN SOURCE: SIGNIFICANT CHANGE UP
WBC # BLD: 18.38 K/UL — HIGH (ref 3.8–10.5)
WBC # BLD: 9.62 K/UL — SIGNIFICANT CHANGE UP (ref 3.8–10.5)
WBC # FLD AUTO: 18.38 K/UL — HIGH (ref 3.8–10.5)
WBC # FLD AUTO: 9.62 K/UL — SIGNIFICANT CHANGE UP (ref 3.8–10.5)

## 2024-09-05 PROCEDURE — 99223 1ST HOSP IP/OBS HIGH 75: CPT

## 2024-09-05 PROCEDURE — 99233 SBSQ HOSP IP/OBS HIGH 50: CPT

## 2024-09-05 RX ORDER — POTASSIUM CHLORIDE 10 MEQ
10 TABLET, EXT RELEASE, PARTICLES/CRYSTALS ORAL
Refills: 0 | Status: COMPLETED | OUTPATIENT
Start: 2024-09-05 | End: 2024-09-05

## 2024-09-05 RX ORDER — PANTOPRAZOLE SODIUM 40 MG
40 TABLET, DELAYED RELEASE (ENTERIC COATED) ORAL
Refills: 0 | Status: DISCONTINUED | OUTPATIENT
Start: 2024-09-05 | End: 2024-09-05

## 2024-09-05 RX ORDER — PANTOPRAZOLE SODIUM 40 MG
40 TABLET, DELAYED RELEASE (ENTERIC COATED) ORAL
Refills: 0 | Status: DISCONTINUED | OUTPATIENT
Start: 2024-09-05 | End: 2024-09-12

## 2024-09-05 RX ORDER — POTASSIUM CHLORIDE 10 MEQ
40 TABLET, EXT RELEASE, PARTICLES/CRYSTALS ORAL ONCE
Refills: 0 | Status: DISCONTINUED | OUTPATIENT
Start: 2024-09-05 | End: 2024-09-05

## 2024-09-05 RX ORDER — HYDROMORPHONE HYDROCHLORIDE 2 MG/1
1 TABLET ORAL EVERY 4 HOURS
Refills: 0 | Status: DISCONTINUED | OUTPATIENT
Start: 2024-09-05 | End: 2024-09-05

## 2024-09-05 RX ADMIN — Medication 100 MILLIEQUIVALENT(S): at 07:01

## 2024-09-05 RX ADMIN — Medication 20 MILLIGRAM(S): at 03:19

## 2024-09-05 RX ADMIN — EPOETIN ALFA 10000 UNIT(S): 3000 SOLUTION INTRAVENOUS; SUBCUTANEOUS at 10:12

## 2024-09-05 RX ADMIN — ACETAMINOPHEN 400 MILLIGRAM(S): 325 TABLET ORAL at 23:53

## 2024-09-05 RX ADMIN — Medication 20 MILLIGRAM(S): at 21:48

## 2024-09-05 RX ADMIN — PIPERACILLIN SODIUM AND TAZOBACTAM SODIUM 25 GRAM(S): 3; .375 INJECTION, POWDER, FOR SOLUTION INTRAVENOUS at 09:27

## 2024-09-05 RX ADMIN — Medication 75 MILLILITER(S): at 09:27

## 2024-09-05 RX ADMIN — ACETAMINOPHEN 400 MILLIGRAM(S): 325 TABLET ORAL at 05:17

## 2024-09-05 RX ADMIN — Medication 10 MILLIGRAM(S): at 03:19

## 2024-09-05 RX ADMIN — DILTIAZEM HYDROCHLORIDE 20 MILLIGRAM(S): 5 INJECTION INTRAVENOUS at 09:59

## 2024-09-05 RX ADMIN — HYDROMORPHONE HYDROCHLORIDE 1 MILLIGRAM(S): 2 TABLET ORAL at 11:07

## 2024-09-05 RX ADMIN — Medication 100 MILLIEQUIVALENT(S): at 05:54

## 2024-09-05 RX ADMIN — Medication 20 MILLIGRAM(S): at 17:52

## 2024-09-05 RX ADMIN — DILTIAZEM HYDROCHLORIDE 20 MILLIGRAM(S): 5 INJECTION INTRAVENOUS at 01:10

## 2024-09-05 RX ADMIN — CHLORHEXIDINE GLUCONATE 1 APPLICATION(S): 40 SOLUTION TOPICAL at 13:40

## 2024-09-05 RX ADMIN — ACETAMINOPHEN 400 MILLIGRAM(S): 325 TABLET ORAL at 13:39

## 2024-09-05 RX ADMIN — ACETAMINOPHEN 400 MILLIGRAM(S): 325 TABLET ORAL at 17:51

## 2024-09-05 RX ADMIN — Medication 10 MILLIGRAM(S): at 05:17

## 2024-09-05 RX ADMIN — Medication 10 MILLIGRAM(S): at 09:59

## 2024-09-05 RX ADMIN — DILTIAZEM HYDROCHLORIDE 20 MILLIGRAM(S): 5 INJECTION INTRAVENOUS at 13:38

## 2024-09-05 RX ADMIN — Medication 75 MILLILITER(S): at 17:50

## 2024-09-05 RX ADMIN — Medication 40 MILLIGRAM(S): at 17:52

## 2024-09-05 RX ADMIN — Medication 20 MILLIGRAM(S): at 05:14

## 2024-09-05 RX ADMIN — Medication 10 MILLIGRAM(S): at 16:20

## 2024-09-05 RX ADMIN — DILTIAZEM HYDROCHLORIDE 20 MILLIGRAM(S): 5 INJECTION INTRAVENOUS at 05:17

## 2024-09-05 RX ADMIN — DILTIAZEM HYDROCHLORIDE 20 MILLIGRAM(S): 5 INJECTION INTRAVENOUS at 17:52

## 2024-09-05 RX ADMIN — Medication 20 MILLIGRAM(S): at 01:10

## 2024-09-05 RX ADMIN — Medication 1 SPRAY(S): at 05:49

## 2024-09-05 RX ADMIN — Medication 10 MILLIGRAM(S): at 18:18

## 2024-09-05 RX ADMIN — Medication 20 MILLIGRAM(S): at 13:37

## 2024-09-05 RX ADMIN — Medication 1 PATCH: at 21:22

## 2024-09-05 RX ADMIN — Medication 10 MILLIGRAM(S): at 21:49

## 2024-09-05 RX ADMIN — Medication 1 PATCH: at 08:51

## 2024-09-05 RX ADMIN — Medication 10 MILLIGRAM(S): at 13:36

## 2024-09-05 RX ADMIN — Medication 75 MILLILITER(S): at 18:21

## 2024-09-05 RX ADMIN — PIPERACILLIN SODIUM AND TAZOBACTAM SODIUM 25 GRAM(S): 3; .375 INJECTION, POWDER, FOR SOLUTION INTRAVENOUS at 21:49

## 2024-09-05 RX ADMIN — HYDROMORPHONE HYDROCHLORIDE 1 MILLIGRAM(S): 2 TABLET ORAL at 12:07

## 2024-09-05 RX ADMIN — Medication 40 MILLIGRAM(S): at 09:27

## 2024-09-05 RX ADMIN — Medication 10 MILLIGRAM(S): at 17:51

## 2024-09-05 RX ADMIN — Medication 1 PATCH: at 13:41

## 2024-09-05 RX ADMIN — DILTIAZEM HYDROCHLORIDE 20 MILLIGRAM(S): 5 INJECTION INTRAVENOUS at 21:49

## 2024-09-05 NOTE — CONSULT NOTE ADULT - NS ATTEND AMEND GEN_ALL_CORE FT
I agree with assessment and plan as above. Pt with ESRD on PD, peritonitis, SBO s/p resection. GI now called due to concern for coffee ground material from NG tube. No melena. Coffee ground material can be expected in cases of SBO/ileus secondary to stasis. Continue PPI. Continue to trend Hgb. No plans for endoscopic intervention at this time. GI will sign off, please call back as needed.

## 2024-09-05 NOTE — PROGRESS NOTE ADULT - SUBJECTIVE AND OBJECTIVE BOX
C/O HA  passing gas had bm   afebrile  severe HTN only PO as per med   abd soft nontedner less distended   NGT still draining will clamp trial today   Pt presently on HD  Needs better BP control as per medicine  Surgically stable  if tolerates clamping will remove NGT

## 2024-09-05 NOTE — CHART NOTE - NSCHARTNOTEFT_GEN_A_CORE
Called by RN w/ critical K value of 2.8; Mg/Phos added stat  Pt is asymptomatic, in no distress  Pt is NPO  KCL 10mEq IVPB x3 doses  Repeat BMP @ 12  H/H been dropping since admission from normal value to 7.7 this am  RN reported NGT putting out brown gastric secretions  Will repeat CBC w/ T&S @ 12  May need to be transfused  RN to monitor and escalate for any change in pt's status. Called by RN w/ critical K value of 2.8; Mg/Phos added stat  Pt is asymptomatic, in no distress  Pt is NPO  KCL 10mEq IVPB x3 doses  Repeat BMP @ 12  H/H been dropping since admission from normal value to 7.7 this am  RN reported NGT putting out brown gastric secretions, occult blood from sample ordered.  Will repeat CBC w/ T&S @ 12  May need to be transfused  RN to monitor and escalate for any change in pt's status.

## 2024-09-05 NOTE — PROGRESS NOTE ADULT - ASSESSMENT
Assessment:    63y Female w/ PMH of ESRD on peritoneal dialysis 5 x per week, HTN, HLD, and DM admitted for peritonitis and peritoneal dialysis catheter dysfunction, needing HD. Now found to have SBO s/p ex-lap. Strict NPO with NGT to LIS. Now with hypertensive Urgency being managed on IVP anti-htn meds.     # PD catheter associated peritonitis  # PD catheter malfunction  # SBO POD #5  - 8/30/24- Exploratory laparotomy with lysis of adhesions. Suspected area of small bowel perforation resected, approximately 15cm. Side-to-side small bowel anastomosis. PD catheter removed.  - NPO, IVF at 50cc/hr   - Dilaudid IVP PRN pain scale  - IV tylenol 1g as needed for breakthrough, unable to place a standing q8h order   - Inc spirometry, Mobilize  - Peritoneal fluid culture with Achromobacter xylosoxidans  - On Zosyn.   -ID on board   -NGt clamped per surgery    #GI bleed likely from prior surgery:  -GI consult recs appreciated   -PPI bid    #Hypokalemia:  #Hypernatremia   #Dehydration 2/2 NPO status  -S/p K repletion  -repeat BMP  -D5 1/2 NS at 75cc/hr     #Pericardial effusion on CTA chest:  -Appreciate cards recs  -Hemodyanmically stable, likely 2/2 uremia  -Fu echo    #Sinus Tachycardia: likely 2/2 pain, discomfort from ngt, infection   -EKG with sinus tachycardia  -Dimer elevated, will do CTA chest to r/o PE>> negative  -Tachycardia likely 2/2 pain, ongoing ngt discomfort    #Hypertensive Urgency   -cw Labetalol 20mg IVP , Hydralazine 10mg IVP, Cardizem 20mg IVP q4h  -Hydralazine 20mg q6h IVP  -Labetalol 10mg ivp q2h prn   -clonidine patch increased to 0.3mg/qweekly  -Hydralazine PRN     # ESRD s/p PD now on HD  - now started on HD (8/29) via Right [facility]  - hep panel done. Ordered PPD  - HD per renal    #R IJ Shiley dysfunction:  -Will consult vascular for Permcath placement     #Constipation  - Miralax     #DM2- controlled  - c/w insulin lispro  - Q 6 hr accuchecks when NPO    #HLD  - c/w statin        DVT ppx: SCD      Disposition: Medically active. SBO s/p laprotomy. On IV abx. Uncontrolled HTN and high output NGT.

## 2024-09-05 NOTE — PROGRESS NOTE ADULT - ASSESSMENT
Assessment:   64yo Female POD 6 s/p ex lap, removal of PD catheter, SBR for SBP and SBO. Post op c/b ileus, tachycardia, HTN crisis requiring nicardipine drip. Patient is now has slow return of bowel function. Will plan to clamp NGT for 4 hours and possibly remove tube. If patient does not tolerate clamp trial, patient will likely require TPN.    Plan:   - NGT clamp trial  - HTN control per medical team

## 2024-09-05 NOTE — CONSULT NOTE ADULT - SUBJECTIVE AND OBJECTIVE BOX
63/F with PMHx ESRD on Peritoneal Dialysis, HTN (on multiple Anti-HTNs), HLD, Diabetes initially admitted for peritonitis 2/2 catheter dysfunction, needing HD via [facility]. Later, found to have SBO s/p ex-lap which showed small bowel perforation, requiring 15cm small bowel resection with anastomosis & lysis of adhesion on 8/30. Currently on strict NPO with NGT to low intermittent suction (with 3.6 L outpt last 24 hrs). GI consulted with concerns for possible coffee ground emesis.    Patient states she has felt nauseated. However, she denies vomiting, fever, chills, melena,hematochezia, SOB, CP. Is having BM, states her last one was brown.     T(C): 36.4 (09-05-24 @ 08:38), Max: 36.8 (09-04-24 @ 14:00)  HR: 122 (09-05-24 @ 09:01) (102 - 131)  BP: 224/116 (09-05-24 @ 09:01) (152/92 - 227/100)  RR: 16 (09-05-24 @ 09:01) (12 - 21)  SpO2: 95% (09-05-24 @ 09:01) (94% - 98%)  Wt(kg): --Vital Signs Last 24 Hrs    Parameters below as of 05 Sep 2024 09:01  Patient On (Oxygen Delivery Method): room air    Review of Systems:  -All other ROS negative, except those noted in HPI    PHYSICAL EXAM:  GENERAL: NAD, well-groomed,   HEAD:  Atraumatic, Normocephalic  EYES: EOMI, PERRLA, conjunctiva and sclera clear  ENMT: dry mucous membranes, NG in place to low intermittent suction  NECK: Supple,   NERVOUS SYSTEM:  Alert & Oriented X3, Good concentration;   CHEST/LUNG: no accessory muscle use, on RA  HEART: Regular rate and rhythm;   ABDOMEN: tender near incision sites, soft, staples in place with no erythema/edema noted  EXTREMITIES:  No clubbing, cyanosis, or edema  SKIN: No rashes or lesions    acetaminophen   IVPB .. 1000 milliGRAM(s) IV Intermittent every 6 hours  albuterol    0.083%. 2.5 milliGRAM(s) Nebulizer once  benzocaine 20% Spray 1 Spray(s) Topical four times a day  benzocaine/menthol Lozenge 1 Lozenge Oral every 4 hours PRN  chlorhexidine 2% Cloths 1 Application(s) Topical daily  cloNIDine Patch 0.3 mG/24Hr(s) 1 patch Transdermal every 7 days  dextrose 5% + sodium chloride 0.45%. 1000 milliLiter(s) IV Continuous <Continuous>  dextrose 5%. 1000 milliLiter(s) IV Continuous <Continuous>  dextrose 5%. 1000 milliLiter(s) IV Continuous <Continuous>  dextrose 50% Injectable 25 Gram(s) IV Push once  dextrose 50% Injectable 25 Gram(s) IV Push once  dextrose 50% Injectable 12.5 Gram(s) IV Push once  dextrose Oral Gel 15 Gram(s) Oral once PRN  diltiazem Injectable 20 milliGRAM(s) IV Push every 4 hours  epoetin dustin-epbx (RETACRIT) Injectable 82342 Unit(s) IV Push <User Schedule>  folic acid 1 milliGRAM(s) Oral daily  hydrALAZINE Injectable 10 milliGRAM(s) IV Push every 4 hours  hydrALAZINE Injectable 20 milliGRAM(s) IV Push every 6 hours PRN  HYDROmorphone  Injectable 1 milliGRAM(s) IV Push every 6 hours PRN  HYDROmorphone  Injectable 0.5 milliGRAM(s) IV Push every 4 hours PRN  insulin lispro (ADMELOG) corrective regimen sliding scale   SubCutaneous three times a day before meals  labetalol Injectable 10 milliGRAM(s) IV Push every 2 hours PRN  labetalol Injectable 20 milliGRAM(s) IV Push every 4 hours  lidocaine   4% Patch 1 Patch Transdermal daily  metoclopramide Injectable 10 milliGRAM(s) IV Push every 12 hours  naloxone Injectable 0.1 milliGRAM(s) IV Push every 3 minutes PRN  ondansetron Injectable 4 milliGRAM(s) IV Push every 6 hours PRN  ondansetron Injectable 4 milliGRAM(s) IV Push every 6 hours PRN  pantoprazole  Injectable 40 milliGRAM(s) IV Push two times a day  piperacillin/tazobactam IVPB.. 3.375 Gram(s) IV Intermittent every 12 hours  potassium chloride  10 mEq/100 mL IVPB 10 milliEquivalent(s) IV Intermittent every 1 hour  sodium chloride 0.9% lock flush 10 milliLiter(s) IV Push every 1 hour PRN      LABS:                        7.7    9.62  )-----------( 267      ( 05 Sep 2024 04:50 )             26.8     09-05    150<H>  |  93<L>  |  39.4<H>  ----------------------------<  149<H>  2.8<LL>   |  44.0<H>  |  8.86<H>    Ca    8.3<L>      05 Sep 2024 04:50  Phos  6.6     09-05  Mg     2.4     09-05        Urinalysis Basic - ( 05 Sep 2024 04:50 )    Color: x / Appearance: x / SG: x / pH: x  Gluc: 149 mg/dL / Ketone: x  / Bili: x / Urobili: x   Blood: x / Protein: x / Nitrite: x   Leuk Esterase: x / RBC: x / WBC x   Sq Epi: x / Non Sq Epi: x / Bacteria: x      CAPILLARY BLOOD GLUCOSE      POCT Blood Glucose.: 123 mg/dL (05 Sep 2024 08:53)  POCT Blood Glucose.: 125 mg/dL (04 Sep 2024 23:54)  POCT Blood Glucose.: 133 mg/dL (04 Sep 2024 17:17)  POCT Blood Glucose.: 123 mg/dL (04 Sep 2024 11:20)        Urinalysis Basic - ( 05 Sep 2024 04:50 )    Color: x / Appearance: x / SG: x / pH: x  Gluc: 149 mg/dL / Ketone: x  / Bili: x / Urobili: x   Blood: x / Protein: x / Nitrite: x   Leuk Esterase: x / RBC: x / WBC x   Sq Epi: x / Non Sq Epi: x / Bacteria: x

## 2024-09-05 NOTE — PROGRESS NOTE ADULT - ASSESSMENT
A) CRF, DM 2, Hypertension, s/p small bowel  repair with N/G  tube with high out put, pt needs  nutrition as not  able  to take po. Pt needs TPN. Pt  also needs permacath placement.    P) HD now, iv k, 1 unit  packed cells on HD.

## 2024-09-05 NOTE — PROGRESS NOTE ADULT - SUBJECTIVE AND OBJECTIVE BOX
GENERAL SURGERY PROGRESS NOTE    Subjective:   Patient examined at bedside this AM. Reports she has ongoing abdominal pain. Continues to express nausea, with occasional spit up of mucus. Continues to pass gas and had some BM    Objective:  Vital Signs  T(C): 36.4 (09-05 @ 08:38), Max: 36.8 (09-04 @ 14:00)  HR: 122 (09-05 @ 09:01) (102 - 131)  BP: 224/116 (09-05 @ 09:01) (152/92 - 227/100)  RR: 16 (09-05 @ 09:01) (12 - 21)  SpO2: 95% (09-05 @ 09:01) (94% - 98%)      09-04-24 @ 07:01  -  09-05-24 @ 07:00  --------------------------------------------------------  IN:  Total IN: 0 mL    OUT:    Intermittent Catheterization - Urethral (mL): 700 mL    Nasogastric/Oral tube (mL): 2950 mL  Total OUT: 3650 mL    Total NET: -3650 mL      Physical Exam:  General: alert and oriented, NAD  Resp: airway patent, respirations unlabored  CVS: tachycardic with regular rhythm  Abdomen: soft, nondistended, tender to palpation along incision  Extremities: no edema  Skin: warm, dry, appropriate color      Labs:                        7.7    9.62  )-----------( 267      ( 05 Sep 2024 04:50 )             26.8   09-05    150<H>  |  93<L>  |  39.4<H>  ----------------------------<  149<H>  2.8<LL>   |  44.0<H>  |  8.86<H>    Ca    8.3<L>      05 Sep 2024 04:50  Phos  6.6     09-05  Mg     2.4     09-05      CAPILLARY BLOOD GLUCOSE  POCT Blood Glucose.: 123 mg/dL (05 Sep 2024 08:53)  POCT Blood Glucose.: 125 mg/dL (04 Sep 2024 23:54)  POCT Blood Glucose.: 133 mg/dL (04 Sep 2024 17:17)  POCT Blood Glucose.: 123 mg/dL (04 Sep 2024 11:20)

## 2024-09-05 NOTE — PROGRESS NOTE ADULT - SUBJECTIVE AND OBJECTIVE BOX
NEPHROLOGY INTERVAL HPI/OVERNIGHT EVENTS:    No new events.    MEDICATIONS  (STANDING):  albuterol    0.083%. 2.5 milliGRAM(s) Nebulizer once  ceFAZolin  Injectable. 2000 milliGRAM(s) IV Push once  dextrose 5% + sodium chloride 0.9%. 1000 milliLiter(s) (50 mL/Hr) IV Continuous <Continuous>  dextrose 5%. 1000 milliLiter(s) (50 mL/Hr) IV Continuous <Continuous>  dextrose 5%. 1000 milliLiter(s) (100 mL/Hr) IV Continuous <Continuous>  dextrose 50% Injectable 12.5 Gram(s) IV Push once  dextrose 50% Injectable 25 Gram(s) IV Push once  dextrose 50% Injectable 25 Gram(s) IV Push once  diltiazem Injectable 20 milliGRAM(s) IV Push every 4 hours  epoetin dustin-epbx (RETACRIT) Injectable 16346 Unit(s) IV Push <User Schedule>  folic acid 1 milliGRAM(s) Oral daily  hydrALAZINE Injectable 10 milliGRAM(s) IV Push every 2 hours  insulin lispro (ADMELOG) corrective regimen sliding scale   SubCutaneous three times a day before meals  labetalol Injectable 10 milliGRAM(s) IV Push every 4 hours  metoclopramide Injectable 10 milliGRAM(s) IV Push every 12 hours  metoprolol tartrate Injectable 5 milliGRAM(s) IV Push every 6 hours  piperacillin/tazobactam IVPB.. 3.375 Gram(s) IV Intermittent every 12 hours  PPD  5 Tuberculin Unit(s) Injectable 5 Unit(s) IntraDermal once    MEDICATIONS  (PRN):  dextrose Oral Gel 15 Gram(s) Oral once PRN Blood Glucose LESS THAN 70 milliGRAM(s)/deciliter  HYDROmorphone  Injectable 1 milliGRAM(s) IV Push every 4 hours PRN Severe Pain (7 - 10)  HYDROmorphone  Injectable 0.5 milliGRAM(s) IV Push every 4 hours PRN Moderate Pain (4 - 6)  naloxone Injectable 0.1 milliGRAM(s) IV Push every 3 minutes PRN For ANY of the following changes in patient status:  A. RR LESS THAN 10 breaths per minute, B. Oxygen saturation LESS THAN 90%, C. Sedation score of 6  ondansetron Injectable 4 milliGRAM(s) IV Push every 6 hours PRN Nausea  ondansetron Injectable 4 milliGRAM(s) IV Push every 6 hours PRN Nausea and/or Vomiting  sodium chloride 0.9% lock flush 10 milliLiter(s) IV Push every 1 hour PRN Pre/post blood products, medications, blood draw, and to maintain line patency      Allergies    No Known Allergies        Vital Signs Last 24 Hrs  T(C): 36.4 (05 Sep 2024 08:38), Max: 36.8 (04 Sep 2024 14:00)  T(F): 97.6 (05 Sep 2024 08:38), Max: 98.3 (04 Sep 2024 14:00)  HR: 122 (05 Sep 2024 09:01) (102 - 131)  BP: 224/116 (05 Sep 2024 09:01) (152/92 - 227/100)  BP(mean): 148 (05 Sep 2024 09:01) (95 - 148)  RR: 16 (05 Sep 2024 09:01) (12 - 21)  SpO2: 95% (05 Sep 2024 09:01) (94% - 98%)    Parameters below as of 05 Sep 2024 09:01  Patient On (Oxygen Delivery Method): room air      T(C): 37.1 (04 Sep 2024 08:48), Max: 37.2 (04 Sep 2024 00:41)  T(F): 98.8 (04 Sep 2024 08:48), Max: 98.9 (04 Sep 2024 00:41)  HR: 130 (04 Sep 2024 10:00) (105 - 130)  BP: 227/108 (04 Sep 2024 10:00) (134/73 - 227/108)  BP(mean): 112 (04 Sep 2024 06:00) (91 - 141)  RR: 18 (04 Sep 2024 10:00) (14 - 22)  SpO2: 96% (04 Sep 2024 10:00) (93% - 100%)    Parameters below as of 04 Sep 2024 10:00  Patient On (Oxygen Delivery Method): room air      T(C): 36.8 (02 Sep 2024 15:44), Max: 37.5 (01 Sep 2024 20:00)  T(F): 98.3 (02 Sep 2024 15:44), Max: 99.5 (01 Sep 2024 20:00)  HR: 121 (02 Sep 2024 16:00) (99 - 129)  BP: 177/83 (02 Sep 2024 16:00) (127/65 - 195/98)  BP(mean): 109 (02 Sep 2024 16:00) (82 - 124)  RR: 17 (02 Sep 2024 16:00) (12 - 22)  SpO2: 96% (02 Sep 2024 16:00) (94% - 99%)    Parameters below as of 02 Sep 2024 16:00  Patient On (Oxygen Delivery Method): nasal cannula  O2 Flow (L/min): 2    T(C): 37 (01 Sep 2024 07:42), Max: 37.2 (01 Sep 2024 00:28)  T(F): 98.6 (01 Sep 2024 07:42), Max: 98.9 (01 Sep 2024 00:28)  HR: 107 (01 Sep 2024 12:00) (98 - 120)  BP: 176/79 (01 Sep 2024 12:00) (171/66 - 219/91)  BP(mean): 115 (01 Sep 2024 06:00) (106 - 115)  RR: 16 (01 Sep 2024 12:00) (10 - 20)  SpO2: 97% (01 Sep 2024 12:00) (92% - 98%)    Parameters below as of 01 Sep 2024 12:00  Patient On (Oxygen Delivery Method): nasal cannula  O2 Flow (L/min): 2    PHYSICAL EXAM:    GENERAL: Appears  acutely ill in bed  + NG tube , No JVD + RIJ raul cath   NERVOUS SYSTEM: Alert, oriented  Lungs : No rales, No rhonchi,   HEART:  No murmur,  No rub, No gallops  ABDOMEN: dressed , dec BS , N/G   tube without  feed  Ext Muscle  wasting         LABS:    09-05    150<H>  |  93<L>  |  39.4<H>  ----------------------------<  149<H>  2.8<LL>   |  44.0<H>  |  8.86<H>    Ca    8.3<L>      05 Sep 2024 04:50  Phos  6.6     09-05  Mg     2.4     09-05 09-04    142  |  96  |  29.5<H>  ----------------------------<  122<H>  3.4<L>   |  32.0<H>  |  6.27<H>    Ca    8.1<L>      04 Sep 2024 06:53  Mg     2.6     09-03 09-02    140  |  96  |  57.0<H>  ----------------------------<  177<H>  3.7   |  28.0  |  10.58<H>    Ca    8.1<L>      02 Sep 2024 07:49  Phos  9.3     09-02  Mg     2.8     09-01    TPro  5.5<L>  /  Alb  1.8<L>  /  TBili  <0.2<L>  /  DBili  x   /  AST  14  /  ALT  <5  /  AlkPhos  66  09-02                          8.1    10.91 )-----------( 317      ( 01 Sep 2024 06:36 )             27.5     09-01    135  |  96  |  47.1<H>  ----------------------------<  152<H>  4.5   |  25.0  |  8.46<H>    Ca    8.6      01 Sep 2024 06:36  Phos  9.1     09-01  Mg     2.8     09-01    TPro  5.3<L>  /  Alb  1.9<L>  /  TBili  <0.2<L>  /  DBili  x   /  AST  12  /  ALT  <5  /  AlkPhos  58  09-01      Urinalysis Basic - ( 01 Sep 2024 06:36 )    Color: x / Appearance: x / SG: x / pH: x  Gluc: 152 mg/dL / Ketone: x  / Bili: x / Urobili: x   Blood: x / Protein: x / Nitrite: x   Leuk Esterase: x / RBC: x / WBC x   Sq Epi: x / Non Sq Epi: x / Bacteria: x      Phosphorus: 9.1 mg/dL (09-01 @ 06:36)  Magnesium: 2.8 mg/dL (09-01 @ 06:36)          RADIOLOGY & ADDITIONAL TESTS:

## 2024-09-05 NOTE — PROGRESS NOTE ADULT - SUBJECTIVE AND OBJECTIVE BOX
Yanely Gonzalez MD (Available on [employer])  [facility] Division of Hospital Medicine    Chief Complaint:  abdominal pain    SUBJECTIVE / OVERNIGHT EVENTS:  Pt seen and examined at bedside. ENdorses discomfort from NGT and abdominal pain. Looks lethargic.     Patient denies chest pain, SOB, abd pain, N/V, fever, chills, dysuria or any other complaints. All remainder ROS negative.     INTERVAL EVENTS:  -Pt had ngt with brownish output overnight, hgb down to 7.7<8.2 yesterday. Heme/occult positive. Started on PPI IV Bid. GI consulted, appreciate recs to cw PPI for now  -General surgery was reached out for high output through NGT> recs to clamp NGT for 4 hrs and monitor output. Initiation of TPN was discussed as well since pt has been NPO for almost a week now  -Renal recs appreciated.   -Pt is s/p 1 unit of PRBC with HD  -s/p K replenishment, Na-150 IVF switched to D5 0.45% NS at 75cc/hr  -Vascular consulted for Permcath placement       MEDICATIONS  (STANDING):  acetaminophen   IVPB .. 1000 milliGRAM(s) IV Intermittent every 6 hours  albuterol    0.083%. 2.5 milliGRAM(s) Nebulizer once  benzocaine 20% Spray 1 Spray(s) Topical four times a day  chlorhexidine 2% Cloths 1 Application(s) Topical daily  cloNIDine Patch 0.3 mG/24Hr(s) 1 patch Transdermal every 7 days  dextrose 5% + sodium chloride 0.45%. 1000 milliLiter(s) (75 mL/Hr) IV Continuous <Continuous>  dextrose 5%. 1000 milliLiter(s) (100 mL/Hr) IV Continuous <Continuous>  dextrose 5%. 1000 milliLiter(s) (50 mL/Hr) IV Continuous <Continuous>  dextrose 50% Injectable 12.5 Gram(s) IV Push once  dextrose 50% Injectable 25 Gram(s) IV Push once  dextrose 50% Injectable 25 Gram(s) IV Push once  diltiazem Injectable 20 milliGRAM(s) IV Push every 4 hours  epoetin dustin-epbx (RETACRIT) Injectable 48094 Unit(s) IV Push <User Schedule>  folic acid 1 milliGRAM(s) Oral daily  hydrALAZINE Injectable 10 milliGRAM(s) IV Push every 4 hours  insulin lispro (ADMELOG) corrective regimen sliding scale   SubCutaneous three times a day before meals  labetalol Injectable 20 milliGRAM(s) IV Push every 4 hours  lidocaine   4% Patch 1 Patch Transdermal daily  metoclopramide Injectable 10 milliGRAM(s) IV Push every 12 hours  pantoprazole  Injectable 40 milliGRAM(s) IV Push two times a day  piperacillin/tazobactam IVPB.. 3.375 Gram(s) IV Intermittent every 12 hours  potassium chloride  10 mEq/100 mL IVPB 10 milliEquivalent(s) IV Intermittent every 1 hour    MEDICATIONS  (PRN):  benzocaine/menthol Lozenge 1 Lozenge Oral every 4 hours PRN Sore Throat  dextrose Oral Gel 15 Gram(s) Oral once PRN Blood Glucose LESS THAN 70 milliGRAM(s)/deciliter  hydrALAZINE Injectable 20 milliGRAM(s) IV Push every 6 hours PRN systolic > 150  HYDROmorphone  Injectable 1 milliGRAM(s) IV Push every 4 hours PRN Severe Pain (7 - 10)  HYDROmorphone  Injectable 0.5 milliGRAM(s) IV Push every 4 hours PRN Moderate Pain (4 - 6)  labetalol Injectable 10 milliGRAM(s) IV Push every 2 hours PRN Systolic blood pressure >185  naloxone Injectable 0.1 milliGRAM(s) IV Push every 3 minutes PRN For ANY of the following changes in patient status:  A. RR LESS THAN 10 breaths per minute, B. Oxygen saturation LESS THAN 90%, C. Sedation score of 6  ondansetron Injectable 4 milliGRAM(s) IV Push every 6 hours PRN Nausea  ondansetron Injectable 4 milliGRAM(s) IV Push every 6 hours PRN Nausea and/or Vomiting  sodium chloride 0.9% lock flush 10 milliLiter(s) IV Push every 1 hour PRN Pre/post blood products, medications, blood draw, and to maintain line patency        I&O's Summary    04 Sep 2024 07:01  -  05 Sep 2024 07:00  --------------------------------------------------------  IN: 1375 mL / OUT: 3650 mL / NET: -4107 mL    05 Sep 2024 07:01  -  05 Sep 2024 12:54  --------------------------------------------------------  IN: 0 mL / OUT: 400 mL / NET: -400 mL        PHYSICAL EXAM:  Vital Signs Last 24 Hrs  T(C): 36.9 (05 Sep 2024 09:49), Max: 36.9 (05 Sep 2024 09:49)  T(F): 98.4 (05 Sep 2024 09:49), Max: 98.4 (05 Sep 2024 09:49)  HR: 123 (05 Sep 2024 09:49) (102 - 131)  BP: 217/119 (05 Sep 2024 09:49) (152/92 - 224/116)  BP(mean): 148 (05 Sep 2024 09:01) (95 - 148)  RR: 16 (05 Sep 2024 09:49) (12 - 21)  SpO2: 96% (05 Sep 2024 09:49) (94% - 98%)    Parameters below as of 05 Sep 2024 09:49  Patient On (Oxygen Delivery Method): room air            CONSTITUTIONAL: Sick looking   ENMT: Moist oral mucosa, NGT to LIS with brownish output   RESPIRATORY: Normal respiratory effort; lungs are clear to auscultation bilaterally  CARDIOVASCULAR: Regular rate and rhythm, normal S1 and S2, no murmur/rub/gallop  ABDOMEN: Nontender to palpation, +bowel sounds, no rebound/guarding; No hepatosplenomegaly  EXTREMITIES: No LE swelling   PSYCH: A+O to person, place, and time; affect appropriate  NEUROLOGY: CN 2-12 are intact and symmetric; no gross sensory deficits;   SKIN: No rashes; no palpable lesions    LABS:                        7.7    9.62  )-----------( 267      ( 05 Sep 2024 04:50 )             26.8     09-05    150<H>  |  93<L>  |  39.4<H>  ----------------------------<  149<H>  2.8<LL>   |  44.0<H>  |  8.86<H>    Ca    8.3<L>      05 Sep 2024 04:50  Phos  6.6     09-05  Mg     2.4     09-05            Urinalysis Basic - ( 05 Sep 2024 04:50 )    Color: x / Appearance: x / SG: x / pH: x  Gluc: 149 mg/dL / Ketone: x  / Bili: x / Urobili: x   Blood: x / Protein: x / Nitrite: x   Leuk Esterase: x / RBC: x / WBC x   Sq Epi: x / Non Sq Epi: x / Bacteria: x        CAPILLARY BLOOD GLUCOSE      POCT Blood Glucose.: 123 mg/dL (05 Sep 2024 12:34)  POCT Blood Glucose.: 123 mg/dL (05 Sep 2024 08:53)  POCT Blood Glucose.: 125 mg/dL (04 Sep 2024 23:54)  POCT Blood Glucose.: 133 mg/dL (04 Sep 2024 17:17)        RADIOLOGY &   TESTS:  Results Reviewed:   Imaging Personally Reviewed:  Electrocardiogram Personally Reviewed:

## 2024-09-05 NOTE — CONSULT NOTE ADULT - ASSESSMENT
63/F with PMHx ESRD on Peritoneal Dialysis, HTN (on multiple Anti-HTNs), HLD, Diabetes initially admitted for peritonitis 2/2 catheter dysfunction, [facility]. Later, found to have SBO s/p ex-lap which showed small bowel perforation, requiring 15cm small bowel resection with anastomosis & lysis of adhesion on 8/30. Currently on strict NPO with NGT to low intermittent suction (with 3.6 L outpt last 24 hrs). GI consulted with concerns for possible coffee ground emesis. Noted to be hypertensive and tachycardic in room. Exam with tenderness at incision sites, soft abdomen.  -No indication for endoscopic intervention at this time, especially given recent surgical procedure. Hb noted 7.7 (was 7.9 yesterday). Drop during admission in setting of surgery, no concerns for active bleed at this time. Can continue with PPI BID for now  -f/u surgery recs, inc NG outpt may be component of post op Ileus   -dialysis per nephro   continue to trend cbc 63/F with PMHx ESRD on Peritoneal Dialysis, HTN (on multiple Anti-HTNs), HLD, Diabetes initially admitted for peritonitis 2/2 catheter dysfunction, needing HD [facility]. Later, found to have SBO s/p ex-lap which showed small bowel perforation, requiring 15cm small bowel resection with anastomosis & lysis of adhesion on 8/30. Currently on strict NPO with NGT to low intermittent suction (with 3.6 L outpt last 24 hrs). GI consulted with concerns for possible coffee ground emesis. Noted to be hypertensive and tachycardic in room. Exam with tenderness at incision sites, soft abdomen.  -No indication for endoscopic intervention at this time, especially given recent surgical procedure. Hb noted 7.7 (was 7.9 yesterday). Drop during admission in setting of surgery, no concerns for active bleed at this time. Anemia multifactorial in setting of AoCD and recent procedure. Can continue with PPI BID for now  -f/u surgery recs, inc NG outpt may be component of post op Ileus   -dialysis per nephro   -continue to trend cbc  -GI will sign off, please reconsult with any questions or concerns

## 2024-09-05 NOTE — PROGRESS NOTE ADULT - SUBJECTIVE AND OBJECTIVE BOX
ADALID MANPREET  [id number]          Chief Complaint:  Follow up ESRD on PD, Peritonitis, post removal of PD catheter. pericardial effusion, tachycardia    Interval History:  Patient very uncomfortable with abd pain and gagging on NGT.  Denies CP, SOB, palps, h/a.    Tele:  Sinus tachycardia          acetaminophen   IVPB .. 1000 milliGRAM(s) IV Intermittent every 6 hours  albuterol    0.083%. 2.5 milliGRAM(s) Nebulizer once  benzocaine 20% Spray 1 Spray(s) Topical four times a day  benzocaine/menthol Lozenge 1 Lozenge Oral every 4 hours PRN  chlorhexidine 2% Cloths 1 Application(s) Topical daily  cloNIDine Patch 0.3 mG/24Hr(s) 1 patch Transdermal every 7 days  dextrose 5% + sodium chloride 0.45%. 1000 milliLiter(s) IV Continuous <Continuous>  dextrose 5%. 1000 milliLiter(s) IV Continuous <Continuous>  dextrose 5%. 1000 milliLiter(s) IV Continuous <Continuous>  dextrose 50% Injectable 25 Gram(s) IV Push once  dextrose 50% Injectable 25 Gram(s) IV Push once  dextrose 50% Injectable 12.5 Gram(s) IV Push once  dextrose Oral Gel 15 Gram(s) Oral once PRN  diltiazem Injectable 20 milliGRAM(s) IV Push every 4 hours  epoetin dustin-epbx (RETACRIT) Injectable 23751 Unit(s) IV Push <User Schedule>  folic acid 1 milliGRAM(s) Oral daily  hydrALAZINE Injectable 10 milliGRAM(s) IV Push every 4 hours  hydrALAZINE Injectable 20 milliGRAM(s) IV Push every 6 hours PRN  HYDROmorphone  Injectable 1 milliGRAM(s) IV Push every 6 hours PRN  HYDROmorphone  Injectable 0.5 milliGRAM(s) IV Push every 4 hours PRN  insulin lispro (ADMELOG) corrective regimen sliding scale   SubCutaneous three times a day before meals  labetalol Injectable 10 milliGRAM(s) IV Push every 2 hours PRN  labetalol Injectable 20 milliGRAM(s) IV Push every 4 hours  lidocaine   4% Patch 1 Patch Transdermal daily  metoclopramide Injectable 10 milliGRAM(s) IV Push every 12 hours  naloxone Injectable 0.1 milliGRAM(s) IV Push every 3 minutes PRN  ondansetron Injectable 4 milliGRAM(s) IV Push every 6 hours PRN  ondansetron Injectable 4 milliGRAM(s) IV Push every 6 hours PRN  pantoprazole  Injectable 40 milliGRAM(s) IV Push two times a day  piperacillin/tazobactam IVPB.. 3.375 Gram(s) IV Intermittent every 12 hours  potassium chloride  10 mEq/100 mL IVPB 10 milliEquivalent(s) IV Intermittent every 1 hour  sodium chloride 0.9% lock flush 10 milliLiter(s) IV Push every 1 hour PRN          Physical Exam:  T(C): 36.4 (09-05-24 @ 08:38), Max: 36.8 (09-04-24 @ 14:00)  HR: 113 (09-05-24 @ 06:00) (102 - 131)  BP: 175/103 (09-05-24 @ 06:00) (152/92 - 227/100)  RR: 17 (09-05-24 @ 06:00) (12 - 21)  SpO2: 95% (09-05-24 @ 06:00) (94% - 98%)  Wt(kg): --  General: Appears uncomfortable  Neck: supple   CVS: nl s1s2, no s3, tachy, regular  Pulm: CTA b/l  Abd: soft, ND  Ext: No c/c/e  Neuro A&O x3  Psych: Normal affect      I&O's Summary    04 Sep 2024 07:01  -  05 Sep 2024 07:00  --------------------------------------------------------  IN: 1375 mL / OUT: 3650 mL / NET: -2275 mL          Labs:   05 Sep 2024 04:50    150    |  93     |  39.4   ----------------------------<  149    2.8     |  44.0   |  8.86     Ca    8.3        05 Sep 2024 04:50  Phos  6.6       05 Sep 2024 04:50  Mg     2.4       05 Sep 2024 04:50                            7.7    9.62  )-----------( 267      ( 05 Sep 2024 04:50 )             26.8         ECHO 2/2024: Normal BIV function, EF 65-70%, mild MR/TR  PHARM NUCLEAR STRESS 10/2023: No ischemic/infarct, EF 77%    < from: CT Angio Chest PE Protocol w/ IV Cont (09.02.24 @ 17:49) >  No pulmonary embolism.    Small bilateral pleural effusions with partial atelectasis of both lower   lobes. Right pleural effusion is partially loculated. Patchy dependent   bilateral lung opacities represent atelectasis with or without   superimposed infection. A few right lung nodules measure up to 5 mm are   indeterminate. Three-month follow-up CT recommended    There is a moderate pericardial effusion.    ECHO 9/2024:   1. Mild left ventricular hypertrophy.   2. Left ventricular cavity is small. Left ventricular systolic function is hyperdynamic with an ejection fraction visually estimated at >75 %.   3. The left ventricular diastolic function is indeterminate. Analysis of left ventricular diastolic function and filling pressure is made challenging by the presence of tachycardia.   4. Normal right ventricular cavity size and normal right ventricular systolic function.   5. Leftatrium is normal in size.   6. The right atrium is normal in size.   7. Thickened mitral valve leaflets.   8. Estimated pulmonary artery systolic pressure is 29 mmHg, normal pulmonary artery pressure.   9. Small-moderate pericardial effusion posterior to the LV apex, small pericardial effusion noted adjacent to the posterior left ventricle and small pericardial effusion noted adjacent to the lateral left ventricle with no evidence of hemodynamic compromise (or echocardiographic evidence of cardiac tamponade).  10. The inferior vena cava is normal in size measuring 1.35 cm in diameter, (normal <2.1cm) with normal inspiratory collapse (normal >50%) consistent with normal right atrial pressure (~3, range 0-5mmHg).  11. Aortic valve anatomy cannot be determined with normal systolic excursion. Fibrocalcific aortic valve sclerosis without stenosis.        Assessment:  63F with ESRD on PD (5 days/week), HTN, DM admitted for abdominal pain, distention, subjective fevers, and PD catheter malfunction. s/p ex-lap on 8/30 >> Fibrinous tissue seen in pelvic region where small bowel was densely adhered. Small bowel released with blunt dissection. Suspected area of small bowel perforation resected, approximately 15cm. Side-to-side small bowel anastomosis. PD catheter removed. Cardiology requested to for evaluation of pericardial effusion noted on a recent CT scan.  Patient was early seen on this admission for cardiac risk stratification prior to ex-lap.   -Telemetry with Sinus tachycardia: likely multifactorial (pain, Infection, NG tube).  -Pericardial effusion likely uremic in nature.  Awaiting echo.  -BP very elevated.  Known elevated at baseline but higher 2/2 pain and discomfort and medication withdrawal.  -Echo reviewed, normal BiV function and small-moderate pericardial effusion without tamponade. Maybe related to ESRD, treat with HD    Plan: (TO BE SEEN)   1.   2. Agree with Metoprolol IV for BP and other IVP meds and Clonidine patch.  3. BP likely better when can take po.  4. Pain control will help with elevated BP and HR.  5. HD per renal.   6.        Ciaran Santana MD ADALID MANPREET  [id number]          Chief Complaint:  Follow up ESRD on PD, Peritonitis, post removal of PD catheter. pericardial effusion, tachycardia    Interval History:  no chest pain, nauseated. Wants NHT out    Tele:  Sinus tachycardia/SR          acetaminophen   IVPB .. 1000 milliGRAM(s) IV Intermittent every 6 hours  albuterol    0.083%. 2.5 milliGRAM(s) Nebulizer once  benzocaine 20% Spray 1 Spray(s) Topical four times a day  benzocaine/menthol Lozenge 1 Lozenge Oral every 4 hours PRN  chlorhexidine 2% Cloths 1 Application(s) Topical daily  cloNIDine Patch 0.3 mG/24Hr(s) 1 patch Transdermal every 7 days  dextrose 5% + sodium chloride 0.45%. 1000 milliLiter(s) IV Continuous <Continuous>  dextrose 5%. 1000 milliLiter(s) IV Continuous <Continuous>  dextrose 5%. 1000 milliLiter(s) IV Continuous <Continuous>  dextrose 50% Injectable 25 Gram(s) IV Push once  dextrose 50% Injectable 25 Gram(s) IV Push once  dextrose 50% Injectable 12.5 Gram(s) IV Push once  dextrose Oral Gel 15 Gram(s) Oral once PRN  diltiazem Injectable 20 milliGRAM(s) IV Push every 4 hours  epoetin dustin-epbx (RETACRIT) Injectable 65267 Unit(s) IV Push <User Schedule>  folic acid 1 milliGRAM(s) Oral daily  hydrALAZINE Injectable 10 milliGRAM(s) IV Push every 4 hours  hydrALAZINE Injectable 20 milliGRAM(s) IV Push every 6 hours PRN  HYDROmorphone  Injectable 1 milliGRAM(s) IV Push every 6 hours PRN  HYDROmorphone  Injectable 0.5 milliGRAM(s) IV Push every 4 hours PRN  insulin lispro (ADMELOG) corrective regimen sliding scale   SubCutaneous three times a day before meals  labetalol Injectable 10 milliGRAM(s) IV Push every 2 hours PRN  labetalol Injectable 20 milliGRAM(s) IV Push every 4 hours  lidocaine   4% Patch 1 Patch Transdermal daily  metoclopramide Injectable 10 milliGRAM(s) IV Push every 12 hours  naloxone Injectable 0.1 milliGRAM(s) IV Push every 3 minutes PRN  ondansetron Injectable 4 milliGRAM(s) IV Push every 6 hours PRN  ondansetron Injectable 4 milliGRAM(s) IV Push every 6 hours PRN  pantoprazole  Injectable 40 milliGRAM(s) IV Push two times a day  piperacillin/tazobactam IVPB.. 3.375 Gram(s) IV Intermittent every 12 hours  potassium chloride  10 mEq/100 mL IVPB 10 milliEquivalent(s) IV Intermittent every 1 hour  sodium chloride 0.9% lock flush 10 milliLiter(s) IV Push every 1 hour PRN          Physical Exam:  T(C): 36.4 (09-05-24 @ 08:38), Max: 36.8 (09-04-24 @ 14:00)  HR: 113 (09-05-24 @ 06:00) (102 - 131)  BP: 175/103 (09-05-24 @ 06:00) (152/92 - 227/100)  RR: 17 (09-05-24 @ 06:00) (12 - 21)  SpO2: 95% (09-05-24 @ 06:00) (94% - 98%)  Wt(kg): --  General: Appears uncomfortable  HEENT: supple , NGT in place and draining   CVS: nl s1s2, no s3, tachy, regular  Pulm: CTA b/l  Abd: soft, ND  Ext: No c/c/e  Neuro A&O x3  Psych: Normal affect      I&O's Summary    04 Sep 2024 07:01  -  05 Sep 2024 07:00  --------------------------------------------------------  IN: 1375 mL / OUT: 3650 mL / NET: -2275 mL          Labs:   05 Sep 2024 04:50    150    |  93     |  39.4   ----------------------------<  149    2.8     |  44.0   |  8.86     Ca    8.3        05 Sep 2024 04:50  Phos  6.6       05 Sep 2024 04:50  Mg     2.4       05 Sep 2024 04:50                            7.7    9.62  )-----------( 267      ( 05 Sep 2024 04:50 )             26.8         ECHO 2/2024: Normal BIV function, EF 65-70%, mild MR/TR  PHARM NUCLEAR STRESS 10/2023: No ischemic/infarct, EF 77%    < from: CT Angio Chest PE Protocol w/ IV Cont (09.02.24 @ 17:49) >  No pulmonary embolism.    Small bilateral pleural effusions with partial atelectasis of both lower   lobes. Right pleural effusion is partially loculated. Patchy dependent   bilateral lung opacities represent atelectasis with or without   superimposed infection. A few right lung nodules measure up to 5 mm are   indeterminate. Three-month follow-up CT recommended    There is a moderate pericardial effusion.    ECHO 9/2024:   1. Mild left ventricular hypertrophy.   2. Left ventricular cavity is small. Left ventricular systolic function is hyperdynamic with an ejection fraction visually estimated at >75 %.   3. The left ventricular diastolic function is indeterminate. Analysis of left ventricular diastolic function and filling pressure is made challenging by the presence of tachycardia.   4. Normal right ventricular cavity size and normal right ventricular systolic function.   5. Leftatrium is normal in size.   6. The right atrium is normal in size.   7. Thickened mitral valve leaflets.   8. Estimated pulmonary artery systolic pressure is 29 mmHg, normal pulmonary artery pressure.   9. Small-moderate pericardial effusion posterior to the LV apex, small pericardial effusion noted adjacent to the posterior left ventricle and small pericardial effusion noted adjacent to the lateral left ventricle with no evidence of hemodynamic compromise (or echocardiographic evidence of cardiac tamponade).  10. The inferior vena cava is normal in size measuring 1.35 cm in diameter, (normal <2.1cm) with normal inspiratory collapse (normal >50%) consistent with normal right atrial pressure (~3, range 0-5mmHg).  11. Aortic valve anatomy cannot be determined with normal systolic excursion. Fibrocalcific aortic valve sclerosis without stenosis.        Assessment:  63F with ESRD on PD (5 days/week), HTN, DM admitted for abdominal pain, distention, subjective fevers, and PD catheter malfunction. s/p ex-lap on 8/30 >> Fibrinous tissue seen in pelvic region where small bowel was densely adhered. Small bowel released with blunt dissection. Suspected area of small bowel perforation resected, approximately 15cm. Side-to-side small bowel anastomosis. PD catheter removed. Cardiology requested to for evaluation of pericardial effusion noted on a recent CT scan.  Patient was early seen on this admission for cardiac risk stratification prior to ex-lap.   -Telemetry with Sinus tachycardia: likely multifactorial (pain, Infection, NG tube).  -Pericardial effusion likely uremic in nature.  Awaiting echo.  -BP very elevated.  Known elevated at baseline but higher 2/2 pain and discomfort and medication withdrawal.  -Echo reviewed, normal BiV function and small-moderate pericardial effusion without tamponade. Maybe related to ESRD, treat with HD    Plan:   1. CV stable at thist time, continue HD for pericardial effusion.  2. Agree with Metoprolol IV for BP and other IVP meds and Clonidine patch.  3. BP likely better when can take po.   4. Pain control will help with elevated BP and HR.      Ciaran Santana MD

## 2024-09-06 LAB
ANION GAP SERPL CALC-SCNC: 11 MMOL/L — SIGNIFICANT CHANGE UP (ref 5–17)
BUN SERPL-MCNC: 22.2 MG/DL — HIGH (ref 8–20)
CALCIUM SERPL-MCNC: 8 MG/DL — LOW (ref 8.4–10.5)
CHLORIDE SERPL-SCNC: 97 MMOL/L — SIGNIFICANT CHANGE UP (ref 96–108)
CO2 SERPL-SCNC: 32 MMOL/L — HIGH (ref 22–29)
CREAT SERPL-MCNC: 5.19 MG/DL — HIGH (ref 0.5–1.3)
EGFR: 9 ML/MIN/1.73M2 — LOW
GLUCOSE BLDC GLUCOMTR-MCNC: 115 MG/DL — HIGH (ref 70–99)
GLUCOSE BLDC GLUCOMTR-MCNC: 119 MG/DL — HIGH (ref 70–99)
GLUCOSE BLDC GLUCOMTR-MCNC: 122 MG/DL — HIGH (ref 70–99)
GLUCOSE BLDC GLUCOMTR-MCNC: 145 MG/DL — HIGH (ref 70–99)
GLUCOSE BLDC GLUCOMTR-MCNC: 207 MG/DL — HIGH (ref 70–99)
GLUCOSE SERPL-MCNC: 118 MG/DL — HIGH (ref 70–99)
HCT VFR BLD CALC: 30.2 % — LOW (ref 34.5–45)
HGB BLD-MCNC: 9 G/DL — LOW (ref 11.5–15.5)
MAGNESIUM SERPL-MCNC: 1.8 MG/DL — SIGNIFICANT CHANGE UP (ref 1.6–2.6)
MCHC RBC-ENTMCNC: 25.1 PG — LOW (ref 27–34)
MCHC RBC-ENTMCNC: 29.8 GM/DL — LOW (ref 32–36)
MCV RBC AUTO: 84.1 FL — SIGNIFICANT CHANGE UP (ref 80–100)
NRBC # BLD: 1 /100 WBCS — HIGH (ref 0–0)
PLATELET # BLD AUTO: 229 K/UL — SIGNIFICANT CHANGE UP (ref 150–400)
POTASSIUM SERPL-MCNC: 3.3 MMOL/L — LOW (ref 3.5–5.3)
POTASSIUM SERPL-SCNC: 3.3 MMOL/L — LOW (ref 3.5–5.3)
RBC # BLD: 3.59 M/UL — LOW (ref 3.8–5.2)
RBC # FLD: 17.5 % — HIGH (ref 10.3–14.5)
SODIUM SERPL-SCNC: 140 MMOL/L — SIGNIFICANT CHANGE UP (ref 135–145)
SURGICAL PATHOLOGY STUDY: SIGNIFICANT CHANGE UP
WBC # BLD: 16.05 K/UL — HIGH (ref 3.8–10.5)
WBC # FLD AUTO: 16.05 K/UL — HIGH (ref 3.8–10.5)

## 2024-09-06 PROCEDURE — 99233 SBSQ HOSP IP/OBS HIGH 50: CPT

## 2024-09-06 PROCEDURE — 99232 SBSQ HOSP IP/OBS MODERATE 35: CPT

## 2024-09-06 RX ORDER — POTASSIUM CHLORIDE 10 MEQ
10 TABLET, EXT RELEASE, PARTICLES/CRYSTALS ORAL
Refills: 0 | Status: COMPLETED | OUTPATIENT
Start: 2024-09-06 | End: 2024-09-06

## 2024-09-06 RX ADMIN — Medication 75 MILLILITER(S): at 10:31

## 2024-09-06 RX ADMIN — Medication 10 MILLIGRAM(S): at 17:46

## 2024-09-06 RX ADMIN — DILTIAZEM HYDROCHLORIDE 20 MILLIGRAM(S): 5 INJECTION INTRAVENOUS at 11:12

## 2024-09-06 RX ADMIN — Medication 1 PATCH: at 19:40

## 2024-09-06 RX ADMIN — Medication 100 GRAM(S): at 13:17

## 2024-09-06 RX ADMIN — Medication 75 MILLILITER(S): at 21:23

## 2024-09-06 RX ADMIN — ACETAMINOPHEN 400 MILLIGRAM(S): 325 TABLET ORAL at 05:26

## 2024-09-06 RX ADMIN — Medication 10 MILLIGRAM(S): at 14:33

## 2024-09-06 RX ADMIN — Medication 10 MILLIGRAM(S): at 05:26

## 2024-09-06 RX ADMIN — Medication 10 MILLIGRAM(S): at 17:47

## 2024-09-06 RX ADMIN — Medication 20 MILLIGRAM(S): at 14:35

## 2024-09-06 RX ADMIN — Medication 1 PATCH: at 07:00

## 2024-09-06 RX ADMIN — HYDROMORPHONE HYDROCHLORIDE 0.5 MILLIGRAM(S): 2 TABLET ORAL at 13:14

## 2024-09-06 RX ADMIN — Medication 100 MILLIEQUIVALENT(S): at 09:31

## 2024-09-06 RX ADMIN — Medication 100 MILLIEQUIVALENT(S): at 11:12

## 2024-09-06 RX ADMIN — Medication 20 MILLIGRAM(S): at 02:20

## 2024-09-06 RX ADMIN — CHLORHEXIDINE GLUCONATE 1 APPLICATION(S): 40 SOLUTION TOPICAL at 16:47

## 2024-09-06 RX ADMIN — DILTIAZEM HYDROCHLORIDE 20 MILLIGRAM(S): 5 INJECTION INTRAVENOUS at 02:20

## 2024-09-06 RX ADMIN — Medication 40 MILLIGRAM(S): at 05:26

## 2024-09-06 RX ADMIN — PIPERACILLIN SODIUM AND TAZOBACTAM SODIUM 25 GRAM(S): 3; .375 INJECTION, POWDER, FOR SOLUTION INTRAVENOUS at 21:22

## 2024-09-06 RX ADMIN — Medication 20 MILLIGRAM(S): at 05:25

## 2024-09-06 RX ADMIN — Medication 20 MILLIGRAM(S): at 10:31

## 2024-09-06 RX ADMIN — DILTIAZEM HYDROCHLORIDE 20 MILLIGRAM(S): 5 INJECTION INTRAVENOUS at 05:26

## 2024-09-06 RX ADMIN — Medication 40 MILLIGRAM(S): at 17:46

## 2024-09-06 RX ADMIN — HYDROMORPHONE HYDROCHLORIDE 0.5 MILLIGRAM(S): 2 TABLET ORAL at 13:45

## 2024-09-06 RX ADMIN — DILTIAZEM HYDROCHLORIDE 20 MILLIGRAM(S): 5 INJECTION INTRAVENOUS at 14:33

## 2024-09-06 RX ADMIN — DILTIAZEM HYDROCHLORIDE 20 MILLIGRAM(S): 5 INJECTION INTRAVENOUS at 21:22

## 2024-09-06 RX ADMIN — Medication 20 MILLIGRAM(S): at 17:47

## 2024-09-06 RX ADMIN — PIPERACILLIN SODIUM AND TAZOBACTAM SODIUM 25 GRAM(S): 3; .375 INJECTION, POWDER, FOR SOLUTION INTRAVENOUS at 09:48

## 2024-09-06 RX ADMIN — Medication 2: at 14:34

## 2024-09-06 RX ADMIN — Medication 10 MILLIGRAM(S): at 10:31

## 2024-09-06 RX ADMIN — Medication 20 MILLIGRAM(S): at 12:37

## 2024-09-06 RX ADMIN — ACETAMINOPHEN 400 MILLIGRAM(S): 325 TABLET ORAL at 11:29

## 2024-09-06 RX ADMIN — Medication 1 PATCH: at 01:00

## 2024-09-06 RX ADMIN — Medication 100 MILLIEQUIVALENT(S): at 12:14

## 2024-09-06 RX ADMIN — ACETAMINOPHEN 1000 MILLIGRAM(S): 325 TABLET ORAL at 12:00

## 2024-09-06 RX ADMIN — DILTIAZEM HYDROCHLORIDE 20 MILLIGRAM(S): 5 INJECTION INTRAVENOUS at 17:47

## 2024-09-06 RX ADMIN — Medication 10 MILLIGRAM(S): at 02:20

## 2024-09-06 RX ADMIN — Medication 20 MILLIGRAM(S): at 04:16

## 2024-09-06 NOTE — PROGRESS NOTE ADULT - SUBJECTIVE AND OBJECTIVE BOX
SUBJECTIVE / OVERNIGHT EVENTS:  Patient examined this morning. Not in acute distress. NGT out yesterday 9/5 due to slow return of bowel function.  Multiple bowel movements overnight containing mucous blood. Passing flatus.   /96  RR 20        MEDICATIONS  (STANDING):  acetaminophen   IVPB .. 1000 milliGRAM(s) IV Intermittent every 6 hours  albuterol    0.083%. 2.5 milliGRAM(s) Nebulizer once  benzocaine 20% Spray 1 Spray(s) Topical four times a day  chlorhexidine 2% Cloths 1 Application(s) Topical daily  cloNIDine Patch 0.3 mG/24Hr(s) 1 patch Transdermal every 7 days  dextrose 5% + sodium chloride 0.45%. 1000 milliLiter(s) (75 mL/Hr) IV Continuous <Continuous>  dextrose 5%. 1000 milliLiter(s) (50 mL/Hr) IV Continuous <Continuous>  dextrose 5%. 1000 milliLiter(s) (100 mL/Hr) IV Continuous <Continuous>  dextrose 50% Injectable 12.5 Gram(s) IV Push once  dextrose 50% Injectable 25 Gram(s) IV Push once  dextrose 50% Injectable 25 Gram(s) IV Push once  diltiazem Injectable 20 milliGRAM(s) IV Push every 4 hours  epoetin dustin-epbx (RETACRIT) Injectable 00264 Unit(s) IV Push <User Schedule>  folic acid 1 milliGRAM(s) Oral daily  hydrALAZINE Injectable 10 milliGRAM(s) IV Push every 4 hours  insulin lispro (ADMELOG) corrective regimen sliding scale   SubCutaneous three times a day before meals  labetalol Injectable 20 milliGRAM(s) IV Push every 4 hours  lidocaine   4% Patch 1 Patch Transdermal daily  metoclopramide Injectable 10 milliGRAM(s) IV Push every 12 hours  pantoprazole  Injectable 40 milliGRAM(s) IV Push two times a day  piperacillin/tazobactam IVPB.. 3.375 Gram(s) IV Intermittent every 12 hours    MEDICATIONS  (PRN):  benzocaine/menthol Lozenge 1 Lozenge Oral every 4 hours PRN Sore Throat  dextrose Oral Gel 15 Gram(s) Oral once PRN Blood Glucose LESS THAN 70 milliGRAM(s)/deciliter  hydrALAZINE Injectable 20 milliGRAM(s) IV Push every 6 hours PRN systolic > 150  HYDROmorphone  Injectable 1 milliGRAM(s) IV Push every 4 hours PRN Severe Pain (7 - 10)  HYDROmorphone  Injectable 0.5 milliGRAM(s) IV Push every 4 hours PRN Moderate Pain (4 - 6)  labetalol Injectable 10 milliGRAM(s) IV Push every 2 hours PRN Systolic blood pressure >185  naloxone Injectable 0.1 milliGRAM(s) IV Push every 3 minutes PRN For ANY of the following changes in patient status:  A. RR LESS THAN 10 breaths per minute, B. Oxygen saturation LESS THAN 90%, C. Sedation score of 6  ondansetron Injectable 4 milliGRAM(s) IV Push every 6 hours PRN Nausea  ondansetron Injectable 4 milliGRAM(s) IV Push every 6 hours PRN Nausea and/or Vomiting  sodium chloride 0.9% lock flush 10 milliLiter(s) IV Push every 1 hour PRN Pre/post blood products, medications, blood draw, and to maintain line patency    CAPILLARY BLOOD GLUCOSE      POCT Blood Glucose.: 119 mg/dL (06 Sep 2024 06:22)  POCT Blood Glucose.: 103 mg/dL (05 Sep 2024 23:55)  POCT Blood Glucose.: 87 mg/dL (05 Sep 2024 17:51)  POCT Blood Glucose.: 123 mg/dL (05 Sep 2024 12:34)  POCT Blood Glucose.: 123 mg/dL (05 Sep 2024 08:53)    I&O's Summary    04 Sep 2024 07:01  -  05 Sep 2024 07:00  --------------------------------------------------------  IN: 1375 mL / OUT: 3650 mL / NET: -2275 mL    05 Sep 2024 07:01  -  06 Sep 2024 06:58  --------------------------------------------------------  IN: 1675 mL / OUT: 915 mL / NET: 760 mL        PHYSICAL EXAM:  Vital Signs Last 24 Hrs  T(C): 37.2 (06 Sep 2024 04:00), Max: 37.2 (05 Sep 2024 13:05)  T(F): 99 (06 Sep 2024 04:00), Max: 99 (06 Sep 2024 04:00)  HR: 101 (06 Sep 2024 06:00) (101 - 123)  BP: 161/87 (06 Sep 2024 06:00) (141/96 - 229/125)  BP(mean): 107 (06 Sep 2024 06:00) (100 - 156)  RR: 16 (06 Sep 2024 06:00) (13 - 20)  SpO2: 97% (06 Sep 2024 06:00) (95% - 100%)    Parameters below as of 06 Sep 2024 06:00  Patient On (Oxygen Delivery Method): room air      CONSTITUTIONAL: NAD, well-developed, well-groomed  RESPIRATORY: Normal respiratory effort  CARDIOVASCULAR: Regular rate and rhythm  ABDOMEN: Nontender to palpation, normoactive bowel sounds, no rebound/guarding  PSYCH: A+O to person, place, and time; affect appropriate      LABS:                        9.0    16.05 )-----------( 229      ( 06 Sep 2024 04:34 )             30.2     09-06    140  |  97  |  22.2<H>  ----------------------------<  118<H>  3.3<L>   |  32.0<H>  |  5.19<H>    Ca    8.0<L>      06 Sep 2024 04:34  Phos  6.6     09-05  Mg     1.8     09-06            Urinalysis Basic - ( 06 Sep 2024 04:34 )    Color: x / Appearance: x / SG: x / pH: x  Gluc: 118 mg/dL / Ketone: x  / Bili: x / Urobili: x   Blood: x / Protein: x / Nitrite: x   Leuk Esterase: x / RBC: x / WBC x   Sq Epi: x / Non Sq Epi: x / Bacteria: x   SUBJECTIVE / OVERNIGHT EVENTS:  Patient examined this morning. Not in acute distress. NGT out yesterday 9/5 due to slow return of bowel function.  Multiple bowel movements overnight containing mucous blood. Passing flatus.   /96  RR 20        MEDICATIONS  (STANDING):  acetaminophen   IVPB .. 1000 milliGRAM(s) IV Intermittent every 6 hours  albuterol    0.083%. 2.5 milliGRAM(s) Nebulizer once  benzocaine 20% Spray 1 Spray(s) Topical four times a day  chlorhexidine 2% Cloths 1 Application(s) Topical daily  cloNIDine Patch 0.3 mG/24Hr(s) 1 patch Transdermal every 7 days  dextrose 5% + sodium chloride 0.45%. 1000 milliLiter(s) (75 mL/Hr) IV Continuous <Continuous>  dextrose 5%. 1000 milliLiter(s) (50 mL/Hr) IV Continuous <Continuous>  dextrose 5%. 1000 milliLiter(s) (100 mL/Hr) IV Continuous <Continuous>  dextrose 50% Injectable 12.5 Gram(s) IV Push once  dextrose 50% Injectable 25 Gram(s) IV Push once  dextrose 50% Injectable 25 Gram(s) IV Push once  diltiazem Injectable 20 milliGRAM(s) IV Push every 4 hours  epoetin dustin-epbx (RETACRIT) Injectable 91146 Unit(s) IV Push <User Schedule>  folic acid 1 milliGRAM(s) Oral daily  hydrALAZINE Injectable 10 milliGRAM(s) IV Push every 4 hours  insulin lispro (ADMELOG) corrective regimen sliding scale   SubCutaneous three times a day before meals  labetalol Injectable 20 milliGRAM(s) IV Push every 4 hours  lidocaine   4% Patch 1 Patch Transdermal daily  metoclopramide Injectable 10 milliGRAM(s) IV Push every 12 hours  pantoprazole  Injectable 40 milliGRAM(s) IV Push two times a day  piperacillin/tazobactam IVPB.. 3.375 Gram(s) IV Intermittent every 12 hours    MEDICATIONS  (PRN):  benzocaine/menthol Lozenge 1 Lozenge Oral every 4 hours PRN Sore Throat  dextrose Oral Gel 15 Gram(s) Oral once PRN Blood Glucose LESS THAN 70 milliGRAM(s)/deciliter  hydrALAZINE Injectable 20 milliGRAM(s) IV Push every 6 hours PRN systolic > 150  HYDROmorphone  Injectable 1 milliGRAM(s) IV Push every 4 hours PRN Severe Pain (7 - 10)  HYDROmorphone  Injectable 0.5 milliGRAM(s) IV Push every 4 hours PRN Moderate Pain (4 - 6)  labetalol Injectable 10 milliGRAM(s) IV Push every 2 hours PRN Systolic blood pressure >185  naloxone Injectable 0.1 milliGRAM(s) IV Push every 3 minutes PRN For ANY of the following changes in patient status:  A. RR LESS THAN 10 breaths per minute, B. Oxygen saturation LESS THAN 90%, C. Sedation score of 6  ondansetron Injectable 4 milliGRAM(s) IV Push every 6 hours PRN Nausea  ondansetron Injectable 4 milliGRAM(s) IV Push every 6 hours PRN Nausea and/or Vomiting  sodium chloride 0.9% lock flush 10 milliLiter(s) IV Push every 1 hour PRN Pre/post blood products, medications, blood draw, and to maintain line patency    CAPILLARY BLOOD GLUCOSE      POCT Blood Glucose.: 119 mg/dL (06 Sep 2024 06:22)  POCT Blood Glucose.: 103 mg/dL (05 Sep 2024 23:55)  POCT Blood Glucose.: 87 mg/dL (05 Sep 2024 17:51)  POCT Blood Glucose.: 123 mg/dL (05 Sep 2024 12:34)  POCT Blood Glucose.: 123 mg/dL (05 Sep 2024 08:53)    I&O's Summary    04 Sep 2024 07:01  -  05 Sep 2024 07:00  --------------------------------------------------------  IN: 1375 mL / OUT: 3650 mL / NET: -2275 mL    05 Sep 2024 07:01  -  06 Sep 2024 06:58  --------------------------------------------------------  IN: 1675 mL / OUT: 915 mL / NET: 760 mL        PHYSICAL EXAM:  Vital Signs Last 24 Hrs  T(C): 37.2 (06 Sep 2024 04:00), Max: 37.2 (05 Sep 2024 13:05)  T(F): 99 (06 Sep 2024 04:00), Max: 99 (06 Sep 2024 04:00)  HR: 101 (06 Sep 2024 06:00) (101 - 123)  BP: 161/87 (06 Sep 2024 06:00) (141/96 - 229/125)  BP(mean): 107 (06 Sep 2024 06:00) (100 - 156)  RR: 16 (06 Sep 2024 06:00) (13 - 20)  SpO2: 97% (06 Sep 2024 06:00) (95% - 100%)    Parameters below as of 06 Sep 2024 06:00  Patient On (Oxygen Delivery Method): room air      CONSTITUTIONAL: NAD, well-developed, well-groomed  RESPIRATORY: Normal respiratory effort  CARDIOVASCULAR: Regular rate and rhythm  ABDOMEN: Nontender to palpation, normoactive bowel sounds, no rebound/guarding, incision c/d/i with staples in place  PSYCH: A+O to person, place, and time; affect appropriate      LABS:                        9.0    16.05 )-----------( 229      ( 06 Sep 2024 04:34 )             30.2     09-06    140  |  97  |  22.2<H>  ----------------------------<  118<H>  3.3<L>   |  32.0<H>  |  5.19<H>    Ca    8.0<L>      06 Sep 2024 04:34  Phos  6.6     09-05  Mg     1.8     09-06            Urinalysis Basic - ( 06 Sep 2024 04:34 )    Color: x / Appearance: x / SG: x / pH: x  Gluc: 118 mg/dL / Ketone: x  / Bili: x / Urobili: x   Blood: x / Protein: x / Nitrite: x   Leuk Esterase: x / RBC: x / WBC x   Sq Epi: x / Non Sq Epi: x / Bacteria: x

## 2024-09-06 NOTE — PROGRESS NOTE ADULT - ASSESSMENT
A: Yue Marin is 63F initially presented with SBO, spontaneous bacterial peritonitis, and infected PD catheter. POD#7 from [facility], SBR, PD cath removal. Post-op course complicated by ileus, tachycardia, HTN crisis.   Hgb 7.7>9.9>9.0    Plan:   -trend H/H  -monitor for BM containing bloody mucus  -cont zosyn, PPI  -advance to [facility]  -OOB, ambulate, IS A: Yue Marin is 63F initially presented with SBO, spontaneous bacterial peritonitis, and infected PD catheter. POD#7 from [facility], SBR, PD cath removal. Post-op course complicated by ileus, tachycardia, HTN crisis.   Hgb 7.7>9.9>9.0    Plan:   -trend H/H  -monitor for BM containing bloody mucus  -cont zosyn, PPI  -advance to [facility], possibly regular diet in afternoon if tolerates clears  -OOB, ambulate, IS  -continue care per primary team  -discussed with attending surgeon

## 2024-09-06 NOTE — PROCEDURE NOTE - ADDITIONAL PROCEDURE DETAILS
4Fr Bard Power Midline 12cm catheter, insertion in (R) (Brachial VEIN). Good flash, good blood return, flushes easily, tourniquet removed, catheter secured, bed lowered and sharps removed and disposed. Risk vs Benefits reviewed with patient. Patient in agreement. All questions and concerns addressed.

## 2024-09-06 NOTE — PROGRESS NOTE ADULT - ASSESSMENT
- 63F with ESRD on PD (5 days/week), HTN, DM admitted for abdominal pain, distention, subjective fevers, and PD catheter malfunction.     ID consulted for peritonitis associated with PD catheter     - Peritoneal fluid with WBC 16,800 (88% neutrophils) c/w peritonitis  - Peritoneal culture with Achromobacter xylosoxidans susceptible to piperacillin-tazobactam, meropenem, levofloxacin and TMP/SMX  - BCX neg  - 8/28 CT AP with findings c/w SBO   - s/p [place] on 8/27. Will need eventual PermaCath  - s/p ex-lap on 8/30 >> Fibrinous tissue seen in pelvic region where small bowel was densely adhered. Small bowel released with blunt dissection. Suspected area of small bowel perforation resected, approximately 15cm. Side-to-side small bowel anastomosis. PD catheter removed.     - Surgical cultures with Achromobacter xylosoxidans susceptible to piperacillin-tazobactam, meropenem, levofloxacin and TMP/SMX  - HD per nephrology  - remains tachycardic  - BP better controlled  - afebrile     - Continue piperacillin-tazobactam adjusted to renal function while hospitalized   - anticipate 2 week course (thru 9/13)  - No objections from ID standpoint to PermaCath placement     Will follow

## 2024-09-06 NOTE — PROGRESS NOTE ADULT - ASSESSMENT
64yo F pt w/ pmhx of ESRD on peritoneal dialysis 5x per week, HTN, HLD, DM presented to ED c/o worsening diffused abdominal pain with associated distention over the past week. Pt reports dialysis cath has not been draining for the last week. She attempts peritoneal dialysis but her unable to get any fluid return. She denies fever, chills, N/V/D, urinary sx, chest pain, SOB, or any other complaints.  (27 Aug 2024 03:04)  Has been constipated in the past    ESRD   HD on TTS schedule for now  Now w PD catheter complication - peritonitis   CT abd noted --> + SBO   s/p Ex-lap, FAREED,  SBR and PD cath removal  HD today --> will need eventual permacath   Abx as per ID     RO - Phoslo     HTN - Watch on meds     Anemia - added Epogen with HD  Cont folate  Ferritin 1100  Transfuse as needed     Monitor labs will follow

## 2024-09-06 NOTE — PROGRESS NOTE ADULT - SUBJECTIVE AND OBJECTIVE BOX
[facility]  INFECTIOUS DISEASES at [place] and [place]  ===============================================================                  George Tobias MD               Diplomates American Board of Internal Medicine & Infectious Diseases                * [place] Office - Appt - Tel  [phone] Fax [phone]                * [place] Office - Appt - Tel [phone] Fax [phone]                                  Hospital Consult line:  [phone]  ==============================================================    ADALID CASE [id number]    Follow up: peritonitis associated with PD catheter s/p explant     NGT removed.   No acute events   BP better controlled   afebrile   tachycardia persists     I have personally reviewed the labs and data; pertinent labs and data are listed in this note; please see below.     _______________________________________________________________  REVIEW OF SYSTEMS  Tolerating diet. Passing gas and BMs. No abdominal pain, nausea or vomiting. Wondering when will she go home.   ________________________________________________________________  Allergies:  No Known Allergies    ________________________________________________________________  PHYSICAL EXAM  GEN: chronically ill appearing but in NAD, lying in bed.   HEENT: Anicteric sclerae. Moist mucous membranes.  LUNGS: eupneic. CTA B/L.  HEART: RRR, no m/r/g  ABDOMEN: Soft, NT, ND +BS.    :No Paredes catheter  EXTREMITIES: without  edema.  MSK: No joint swelling  NEUROLOGIC: moving all extremities   PSYCHIATRIC: calm, cooperative   SKIN: No rash  LINES: PIV, RIJ Shiley    ________________________________________________________________  Vitals:  T(F): 98.9 (06 Sep 2024 08:00), Max: 99 (06 Sep 2024 04:00)  HR: 110 (06 Sep 2024 11:00)  BP: 164/73 (06 Sep 2024 11:00)  RR: 18 (06 Sep 2024 10:00)  SpO2: 100% (06 Sep 2024 10:00) (95% - 100%)  temp max in last 48H T(F): , Max: 99 (09-06-24 @ 04:00)    Current Antibiotics:  piperacillin/tazobactam IVPB.. 3.375 Gram(s) IV Intermittent every 12 hours    Other medications:  albuterol    0.083%. 2.5 milliGRAM(s) Nebulizer once  benzocaine 20% Spray 1 Spray(s) Topical four times a day  chlorhexidine 2% Cloths 1 Application(s) Topical daily  cloNIDine Patch 0.3 mG/24Hr(s) 1 patch Transdermal every 7 days  dextrose 5% + sodium chloride 0.45%. 1000 milliLiter(s) IV Continuous <Continuous>  dextrose 5%. 1000 milliLiter(s) IV Continuous <Continuous>  dextrose 5%. 1000 milliLiter(s) IV Continuous <Continuous>  dextrose 50% Injectable 12.5 Gram(s) IV Push once  dextrose 50% Injectable 25 Gram(s) IV Push once  dextrose 50% Injectable 25 Gram(s) IV Push once  diltiazem Injectable 20 milliGRAM(s) IV Push every 4 hours  epoetin dustin-epbx (RETACRIT) Injectable 45718 Unit(s) IV Push <User Schedule>  folic acid 1 milliGRAM(s) Oral daily  hydrALAZINE Injectable 10 milliGRAM(s) IV Push every 4 hours  insulin lispro (ADMELOG) corrective regimen sliding scale   SubCutaneous three times a day before meals  labetalol Injectable 20 milliGRAM(s) IV Push every 4 hours  lidocaine   4% Patch 1 Patch Transdermal daily  magnesium sulfate  IVPB 1 Gram(s) IV Intermittent once  metoclopramide Injectable 10 milliGRAM(s) IV Push every 12 hours  pantoprazole  Injectable 40 milliGRAM(s) IV Push two times a day                            9.0    16.05 )-----------( 229      ( 06 Sep 2024 04:34 )             30.2     09-06    140  |  97  |  22.2<H>  ----------------------------<  118<H>  3.3<L>   |  32.0<H>  |  5.19<H>    Ca    8.0<L>      06 Sep 2024 04:34  Phos  6.6     09-05  Mg     1.8     09-06      RECENT CULTURES:  08-30 @ 15:19 Tissue Fibrinous Exudate for Culture Achromobacter xylosoxidans    Rare Achromobacter xylosoxidans  - Amikacin: R >32  - Aztreonam: R >16  - Cefepime: I 16  - Ceftriaxone: I 32  - Ciprofloxacin: S 1  - Gentamicin: R >8  - Levofloxacin: S 1  - Meropenem: S <=1  - Piperacillin/Tazobactam: S <=8  - Tobramycin: R >8  - Trimethoprim/Sulfamethoxazole: S <=0.5/9.5    Rare polymorphonuclear leukocytes seen per low power field  No organisms seen per oil power field      08-30 @ 15:15 Surgical Swab Peritoneal Fluid     No growth at 5 days        08-27 @ 11:52 .Blood Blood-Peripheral     No growth at 5 days        08-27 @ 11:50 .Blood Blood-Peripheral     No growth at 5 days        08-27 @ 00:00 Peritoneal Peritoneal Fluid Achromobacter xylosoxidans    Few Achromobacter xylosoxidans    No polymorphonuclear leukocytes  No organisms seen  by cytocentrifuge    WBC Count: 16.05 K/uL (09-06-24 @ 04:34)  WBC Count: 18.38 K/uL (09-05-24 @ 13:58)  WBC Count: 9.62 K/uL (09-05-24 @ 04:50)  WBC Count: 10.38 K/uL (09-04-24 @ 06:53)  WBC Count: 11.03 K/uL (09-03-24 @ 06:19)  WBC Count: 12.66 K/uL (09-02-24 @ 07:49)    Creatinine: 5.19 mg/dL (09-06-24 @ 04:34)  Creatinine: 5.56 mg/dL (09-05-24 @ 13:58)  Creatinine: 8.86 mg/dL (09-05-24 @ 04:50)  Creatinine: 6.27 mg/dL (09-04-24 @ 06:53)  Creatinine: 8.60 mg/dL (09-03-24 @ 06:19)  Creatinine: 10.58 mg/dL (09-02-24 @ 07:49)    ________________________________________________________________  CARDIOLOGY   < from: TTE W or WO Ultrasound Enhancing Agent (09.04.24 @ 20:14) >  CONCLUSIONS:      1. Mild left ventricular hypertrophy.   2. Left ventricular cavity is small. Left ventricular systolic function is hyperdynamic with an ejection fraction visually estimated at >75 %.   3. The left ventricular diastolic function is indeterminate. Analysis of left ventricular diastolic function and filling pressure is made challenging by the presence of tachycardia.   4. Normal right ventricular cavity size and normal right ventricular systolic function.   5. Leftatrium is normal in size.   6. The right atrium is normal in size.   7. Thickened mitral valve leaflets.   8. Estimated pulmonary artery systolic pressure is 29 mmHg, normal pulmonary artery pressure.   9. Small-moderate pericardial effusion posterior to the LV apex, small pericardial effusion noted adjacent to the posterior left ventricle and small pericardial effusion noted adjacent to the lateral left ventricle with no evidence of hemodynamic compromise (or echocardiographic evidence of cardiac tamponade).  10. The inferior vena cava is normal in size measuring 1.35 cm in diameter, (normal <2.1cm) with normal inspiratory collapse (normal >50%) consistent with normal right atrial pressure (~3, range 0-5mmHg).  11. Aortic valve anatomy cannot be determined with normal systolic excursion. Fibrocalcific aortic valve sclerosis without stenosis.    < end of copied text >    ________________________________________________________________  RADIOLOGY  < from: CT Abdomen and Pelvis No Cont (08.28.24 @ 17:19) >  IMPRESSION:  Large volume intraperitoneal fluid with several droplets free air   possibly introduced from catheter.    Dilated stomach and small bowel with relatively collapsed colon and   distal small bowel consistent with small bowel obstruction    Anasarca, small bilateral pleural effusions, and small-to-moderate   pericardial effusion    < end of copied text >

## 2024-09-06 NOTE — PROGRESS NOTE ADULT - SUBJECTIVE AND OBJECTIVE BOX
NEPHROLOGY INTERVAL HPI/OVERNIGHT EVENTS:    Examined earlier  Feeling better  Denies HA CP no SOB    MEDICATIONS  (STANDING):  albuterol    0.083%. 2.5 milliGRAM(s) Nebulizer once  benzocaine 20% Spray 1 Spray(s) Topical four times a day  chlorhexidine 2% Cloths 1 Application(s) Topical daily  cloNIDine Patch 0.3 mG/24Hr(s) 1 patch Transdermal every 7 days  dextrose 5% + sodium chloride 0.45%. 1000 milliLiter(s) (75 mL/Hr) IV Continuous <Continuous>  dextrose 5%. 1000 milliLiter(s) (50 mL/Hr) IV Continuous <Continuous>  dextrose 5%. 1000 milliLiter(s) (100 mL/Hr) IV Continuous <Continuous>  dextrose 50% Injectable 12.5 Gram(s) IV Push once  dextrose 50% Injectable 25 Gram(s) IV Push once  dextrose 50% Injectable 25 Gram(s) IV Push once  diltiazem Injectable 20 milliGRAM(s) IV Push every 4 hours  epoetin dustin-epbx (RETACRIT) Injectable 07620 Unit(s) IV Push <User Schedule>  folic acid 1 milliGRAM(s) Oral daily  hydrALAZINE Injectable 10 milliGRAM(s) IV Push every 4 hours  insulin lispro (ADMELOG) corrective regimen sliding scale   SubCutaneous three times a day before meals  labetalol Injectable 20 milliGRAM(s) IV Push every 4 hours  lidocaine   4% Patch 1 Patch Transdermal daily  metoclopramide Injectable 10 milliGRAM(s) IV Push every 12 hours  pantoprazole  Injectable 40 milliGRAM(s) IV Push two times a day  piperacillin/tazobactam IVPB.. 3.375 Gram(s) IV Intermittent every 12 hours    MEDICATIONS  (PRN):  benzocaine/menthol Lozenge 1 Lozenge Oral every 4 hours PRN Sore Throat  dextrose Oral Gel 15 Gram(s) Oral once PRN Blood Glucose LESS THAN 70 milliGRAM(s)/deciliter  hydrALAZINE Injectable 20 milliGRAM(s) IV Push every 6 hours PRN systolic > 150  HYDROmorphone  Injectable 1 milliGRAM(s) IV Push every 4 hours PRN Severe Pain (7 - 10)  HYDROmorphone  Injectable 0.5 milliGRAM(s) IV Push every 4 hours PRN Moderate Pain (4 - 6)  labetalol Injectable 10 milliGRAM(s) IV Push every 2 hours PRN Systolic blood pressure >185  naloxone Injectable 0.1 milliGRAM(s) IV Push every 3 minutes PRN For ANY of the following changes in patient status:  A. RR LESS THAN 10 breaths per minute, B. Oxygen saturation LESS THAN 90%, C. Sedation score of 6  ondansetron Injectable 4 milliGRAM(s) IV Push every 6 hours PRN Nausea  ondansetron Injectable 4 milliGRAM(s) IV Push every 6 hours PRN Nausea and/or Vomiting  sodium chloride 0.9% lock flush 10 milliLiter(s) IV Push every 1 hour PRN Pre/post blood products, medications, blood draw, and to maintain line patency      Allergies    No Known Allergies    Intolerances        Vital Signs Last 24 Hrs  T(C): 37.2 (06 Sep 2024 08:00), Max: 37.2 (06 Sep 2024 04:00)  T(F): 98.9 (06 Sep 2024 08:00), Max: 99 (06 Sep 2024 04:00)  HR: 110 (06 Sep 2024 11:00) (101 - 118)  BP: 164/73 (06 Sep 2024 11:00) (141/96 - 200/108)  BP(mean): 97 (06 Sep 2024 11:00) (95 - 134)  RR: 18 (06 Sep 2024 10:00) (13 - 20)  SpO2: 100% (06 Sep 2024 10:00) (95% - 100%)    Parameters below as of 06 Sep 2024 10:00  Patient On (Oxygen Delivery Method): room air      Daily     Daily     PHYSICAL EXAM:  GENERAL: NAD  + NG tube , No JVD + RIJ raul cath   NERVOUS SYSTEM:  A/O x3  Lungs : No rales, No rhonchi,   HEART:  No murmur,  No rub, No gallops  ABDOMEN: dressed , dec BS , PD cath out   Ext dec edema       LABS:                        9.0    16.05 )-----------( 229      ( 06 Sep 2024 04:34 )             30.2     09-06    140  |  97  |  22.2<H>  ----------------------------<  118<H>  3.3<L>   |  32.0<H>  |  5.19<H>    Ca    8.0<L>      06 Sep 2024 04:34  Phos  6.6     09-05  Mg     1.8     09-06        Urinalysis Basic - ( 06 Sep 2024 04:34 )    Color: x / Appearance: x / SG: x / pH: x  Gluc: 118 mg/dL / Ketone: x  / Bili: x / Urobili: x   Blood: x / Protein: x / Nitrite: x   Leuk Esterase: x / RBC: x / WBC x   Sq Epi: x / Non Sq Epi: x / Bacteria: x      Magnesium: 1.8 mg/dL (09-06 @ 04:34)          RADIOLOGY & ADDITIONAL TESTS:

## 2024-09-06 NOTE — PROGRESS NOTE ADULT - SUBJECTIVE AND OBJECTIVE BOX
ADALID MANPREET  [id number]          Chief Complaint:  Follow up ESRD on PD, Peritonitis, post removal of PD catheter. pericardial effusion, tachycardia    Interval History:  no chest pain, nauseated.    Tele:  Sinus tachycardia/SR        acetaminophen   IVPB .. 1000 milliGRAM(s) IV Intermittent every 6 hours  albuterol    0.083%. 2.5 milliGRAM(s) Nebulizer once  benzocaine 20% Spray 1 Spray(s) Topical four times a day  benzocaine/menthol Lozenge 1 Lozenge Oral every 4 hours PRN  chlorhexidine 2% Cloths 1 Application(s) Topical daily  cloNIDine Patch 0.3 mG/24Hr(s) 1 patch Transdermal every 7 days  dextrose 5% + sodium chloride 0.45%. 1000 milliLiter(s) IV Continuous <Continuous>  dextrose 5%. 1000 milliLiter(s) IV Continuous <Continuous>  dextrose 5%. 1000 milliLiter(s) IV Continuous <Continuous>  dextrose 50% Injectable 12.5 Gram(s) IV Push once  dextrose 50% Injectable 25 Gram(s) IV Push once  dextrose 50% Injectable 25 Gram(s) IV Push once  dextrose Oral Gel 15 Gram(s) Oral once PRN  diltiazem Injectable 20 milliGRAM(s) IV Push every 4 hours  epoetin dustin-epbx (RETACRIT) Injectable 15822 Unit(s) IV Push <User Schedule>  folic acid 1 milliGRAM(s) Oral daily  hydrALAZINE Injectable 10 milliGRAM(s) IV Push every 4 hours  hydrALAZINE Injectable 20 milliGRAM(s) IV Push every 6 hours PRN  HYDROmorphone  Injectable 1 milliGRAM(s) IV Push every 4 hours PRN  HYDROmorphone  Injectable 0.5 milliGRAM(s) IV Push every 4 hours PRN  insulin lispro (ADMELOG) corrective regimen sliding scale   SubCutaneous three times a day before meals  labetalol Injectable 10 milliGRAM(s) IV Push every 2 hours PRN  labetalol Injectable 20 milliGRAM(s) IV Push every 4 hours  lidocaine   4% Patch 1 Patch Transdermal daily  magnesium sulfate  IVPB 1 Gram(s) IV Intermittent once  metoclopramide Injectable 10 milliGRAM(s) IV Push every 12 hours  naloxone Injectable 0.1 milliGRAM(s) IV Push every 3 minutes PRN  ondansetron Injectable 4 milliGRAM(s) IV Push every 6 hours PRN  ondansetron Injectable 4 milliGRAM(s) IV Push every 6 hours PRN  pantoprazole  Injectable 40 milliGRAM(s) IV Push two times a day  piperacillin/tazobactam IVPB.. 3.375 Gram(s) IV Intermittent every 12 hours  potassium chloride  10 mEq/100 mL IVPB 10 milliEquivalent(s) IV Intermittent every 1 hour  sodium chloride 0.9% lock flush 10 milliLiter(s) IV Push every 1 hour PRN          Physical Exam:  T(C): 37.2 (09-06-24 @ 08:00), Max: 37.2 (09-05-24 @ 13:05)  HR: 104 (09-06-24 @ 08:01) (101 - 117)  BP: 145/76 (09-06-24 @ 08:01) (141/96 - 229/125)  RR: 16 (09-06-24 @ 08:01) (13 - 20)  SpO2: 100% (09-06-24 @ 08:01) (95% - 100%)  Wt(kg): --  General: Appears uncomfortable  HEENT: supple , NGT in place and draining   CVS: nl s1s2, no s3, tachy, regular  Pulm: CTA b/l  Abd: soft, ND  Ext: No c/c/e  Neuro A&O x3  Psych: Normal affect    I&O's Summary    05 Sep 2024 07:01  -  06 Sep 2024 07:00  --------------------------------------------------------  IN: 1675 mL / OUT: 915 mL / NET: 760 mL          Labs:   06 Sep 2024 04:34    140    |  97     |  22.2   ----------------------------<  118    3.3     |  32.0   |  5.19     Ca    8.0        06 Sep 2024 04:34  Phos  6.6       05 Sep 2024 04:50  Mg     1.8       06 Sep 2024 04:34                            9.0    16.05 )-----------( 229      ( 06 Sep 2024 04:34 )             30.2         ECHO 2/2024: Normal BIV function, EF 65-70%, mild MR/TR  PHARM NUCLEAR STRESS 10/2023: No ischemic/infarct, EF 77%    < from: CT Angio Chest PE Protocol w/ IV Cont (09.02.24 @ 17:49) >  No pulmonary embolism.    Small bilateral pleural effusions with partial atelectasis of both lower   lobes. Right pleural effusion is partially loculated. Patchy dependent   bilateral lung opacities represent atelectasis with or without   superimposed infection. A few right lung nodules measure up to 5 mm are   indeterminate. Three-month follow-up CT recommended    There is a moderate pericardial effusion.    ECHO 9/2024:   1. Mild left ventricular hypertrophy.   2. Left ventricular cavity is small. Left ventricular systolic function is hyperdynamic with an ejection fraction visually estimated at >75 %.   3. The left ventricular diastolic function is indeterminate. Analysis of left ventricular diastolic function and filling pressure is made challenging by the presence of tachycardia.   4. Normal right ventricular cavity size and normal right ventricular systolic function.   5. Leftatrium is normal in size.   6. The right atrium is normal in size.   7. Thickened mitral valve leaflets.   8. Estimated pulmonary artery systolic pressure is 29 mmHg, normal pulmonary artery pressure.   9. Small-moderate pericardial effusion posterior to the LV apex, small pericardial effusion noted adjacent to the posterior left ventricle and small pericardial effusion noted adjacent to the lateral left ventricle with no evidence of hemodynamic compromise (or echocardiographic evidence of cardiac tamponade).  10. The inferior vena cava is normal in size measuring 1.35 cm in diameter, (normal <2.1cm) with normal inspiratory collapse (normal >50%) consistent with normal right atrial pressure (~3, range 0-5mmHg).  11. Aortic valve anatomy cannot be determined with normal systolic excursion. Fibrocalcific aortic valve sclerosis without stenosis.        Assessment:  63F with ESRD on PD (5 days/week), HTN, DM admitted for abdominal pain, distention, subjective fevers, and PD catheter malfunction. s/p ex-lap on 8/30 >> Fibrinous tissue seen in pelvic region where small bowel was densely adhered. Small bowel released with blunt dissection. Suspected area of small bowel perforation resected, approximately 15cm. Side-to-side small bowel anastomosis. PD catheter removed. Cardiology requested to for evaluation of pericardial effusion noted on a recent CT scan.  Patient was early seen on this admission for cardiac risk stratification prior to ex-lap.   -Telemetry with Sinus tachycardia: likely multifactorial (pain, Infection, NG tube).  -Pericardial effusion likely uremic in nature.  Awaiting echo.  -BP very elevated.  Known elevated at baseline but higher 2/2 pain and discomfort and medication withdrawal.  -Echo reviewed, normal BiV function and small-moderate pericardial effusion without tamponade. Maybe related to ESRD, treat with HD    Plan: (TO BE SEEN)  1. CV stable at thist time, continue HD for pericardial effusion.  2. Transition back to po meds  3. Pain control will help with elevated BP and HR.  4.  Will not actively follow, call with questions. Would repeat echo prior to discharge once recovered to reassess effusion size  6.         Ciaran Santana MD ADALID MANPREET  [id number]          Chief Complaint:  Follow up ESRD on PD, Peritonitis, post removal of PD catheter. pericardial effusion, tachycardia    Interval History:  no chest pain, feels better today. NGT out now    Tele:  Sinus tachycardia/SR        acetaminophen   IVPB .. 1000 milliGRAM(s) IV Intermittent every 6 hours  albuterol    0.083%. 2.5 milliGRAM(s) Nebulizer once  benzocaine 20% Spray 1 Spray(s) Topical four times a day  benzocaine/menthol Lozenge 1 Lozenge Oral every 4 hours PRN  chlorhexidine 2% Cloths 1 Application(s) Topical daily  cloNIDine Patch 0.3 mG/24Hr(s) 1 patch Transdermal every 7 days  dextrose 5% + sodium chloride 0.45%. 1000 milliLiter(s) IV Continuous <Continuous>  dextrose 5%. 1000 milliLiter(s) IV Continuous <Continuous>  dextrose 5%. 1000 milliLiter(s) IV Continuous <Continuous>  dextrose 50% Injectable 12.5 Gram(s) IV Push once  dextrose 50% Injectable 25 Gram(s) IV Push once  dextrose 50% Injectable 25 Gram(s) IV Push once  dextrose Oral Gel 15 Gram(s) Oral once PRN  diltiazem Injectable 20 milliGRAM(s) IV Push every 4 hours  epoetin dustin-epbx (RETACRIT) Injectable 09287 Unit(s) IV Push <User Schedule>  folic acid 1 milliGRAM(s) Oral daily  hydrALAZINE Injectable 10 milliGRAM(s) IV Push every 4 hours  hydrALAZINE Injectable 20 milliGRAM(s) IV Push every 6 hours PRN  HYDROmorphone  Injectable 1 milliGRAM(s) IV Push every 4 hours PRN  HYDROmorphone  Injectable 0.5 milliGRAM(s) IV Push every 4 hours PRN  insulin lispro (ADMELOG) corrective regimen sliding scale   SubCutaneous three times a day before meals  labetalol Injectable 10 milliGRAM(s) IV Push every 2 hours PRN  labetalol Injectable 20 milliGRAM(s) IV Push every 4 hours  lidocaine   4% Patch 1 Patch Transdermal daily  magnesium sulfate  IVPB 1 Gram(s) IV Intermittent once  metoclopramide Injectable 10 milliGRAM(s) IV Push every 12 hours  naloxone Injectable 0.1 milliGRAM(s) IV Push every 3 minutes PRN  ondansetron Injectable 4 milliGRAM(s) IV Push every 6 hours PRN  ondansetron Injectable 4 milliGRAM(s) IV Push every 6 hours PRN  pantoprazole  Injectable 40 milliGRAM(s) IV Push two times a day  piperacillin/tazobactam IVPB.. 3.375 Gram(s) IV Intermittent every 12 hours  potassium chloride  10 mEq/100 mL IVPB 10 milliEquivalent(s) IV Intermittent every 1 hour  sodium chloride 0.9% lock flush 10 milliLiter(s) IV Push every 1 hour PRN          Physical Exam:  T(C): 37.2 (09-06-24 @ 08:00), Max: 37.2 (09-05-24 @ 13:05)  HR: 104 (09-06-24 @ 08:01) (101 - 117)  BP: 145/76 (09-06-24 @ 08:01) (141/96 - 229/125)  RR: 16 (09-06-24 @ 08:01) (13 - 20)  SpO2: 100% (09-06-24 @ 08:01) (95% - 100%)  Wt(kg): --  General: Appears uncomfortable  HEENT: supple  CVS: nl s1s2, no s3, tachy, regular  Pulm: CTA b/l  Abd: soft, ND  Ext: No c/c/e  Neuro A&O x3  Psych: Normal affect    I&O's Summary    05 Sep 2024 07:01  -  06 Sep 2024 07:00  --------------------------------------------------------  IN: 1675 mL / OUT: 915 mL / NET: 760 mL          Labs:   06 Sep 2024 04:34    140    |  97     |  22.2   ----------------------------<  118    3.3     |  32.0   |  5.19     Ca    8.0        06 Sep 2024 04:34  Phos  6.6       05 Sep 2024 04:50  Mg     1.8       06 Sep 2024 04:34                            9.0    16.05 )-----------( 229      ( 06 Sep 2024 04:34 )             30.2         ECHO 2/2024: Normal BIV function, EF 65-70%, mild MR/TR  PHARM NUCLEAR STRESS 10/2023: No ischemic/infarct, EF 77%    < from: CT Angio Chest PE Protocol w/ IV Cont (09.02.24 @ 17:49) >  No pulmonary embolism.    Small bilateral pleural effusions with partial atelectasis of both lower   lobes. Right pleural effusion is partially loculated. Patchy dependent   bilateral lung opacities represent atelectasis with or without   superimposed infection. A few right lung nodules measure up to 5 mm are   indeterminate. Three-month follow-up CT recommended    There is a moderate pericardial effusion.    ECHO 9/2024:   1. Mild left ventricular hypertrophy.   2. Left ventricular cavity is small. Left ventricular systolic function is hyperdynamic with an ejection fraction visually estimated at >75 %.   3. The left ventricular diastolic function is indeterminate. Analysis of left ventricular diastolic function and filling pressure is made challenging by the presence of tachycardia.   4. Normal right ventricular cavity size and normal right ventricular systolic function.   5. Leftatrium is normal in size.   6. The right atrium is normal in size.   7. Thickened mitral valve leaflets.   8. Estimated pulmonary artery systolic pressure is 29 mmHg, normal pulmonary artery pressure.   9. Small-moderate pericardial effusion posterior to the LV apex, small pericardial effusion noted adjacent to the posterior left ventricle and small pericardial effusion noted adjacent to the lateral left ventricle with no evidence of hemodynamic compromise (or echocardiographic evidence of cardiac tamponade).  10. The inferior vena cava is normal in size measuring 1.35 cm in diameter, (normal <2.1cm) with normal inspiratory collapse (normal >50%) consistent with normal right atrial pressure (~3, range 0-5mmHg).  11. Aortic valve anatomy cannot be determined with normal systolic excursion. Fibrocalcific aortic valve sclerosis without stenosis.        Assessment:  63F with ESRD on PD (5 days/week), HTN, DM admitted for abdominal pain, distention, subjective fevers, and PD catheter malfunction. s/p ex-lap on 8/30 >> Fibrinous tissue seen in pelvic region where small bowel was densely adhered. Small bowel released with blunt dissection. Suspected area of small bowel perforation resected, approximately 15cm. Side-to-side small bowel anastomosis. PD catheter removed. Cardiology requested to for evaluation of pericardial effusion noted on a recent CT scan.  Patient was early seen on this admission for cardiac risk stratification prior to ex-lap.   -Telemetry with Sinus tachycardia: likely multifactorial (pain, Infection, NG tube).  -Pericardial effusion likely uremic in nature.  Awaiting echo.  -BP very elevated.  Known elevated at baseline but higher 2/2 pain and discomfort and medication withdrawal.  -Echo reviewed, normal BiV function and small-moderate pericardial effusion without tamponade. Maybe related to ESRD, treat with HD    Plan:  1. CV stable at thist time, continue HD for pericardial effusion.  2. Transition back to po meds for HTN  3. Pain control will help with elevated BP and HR.  4.  Will not actively follow, call with questions. repeat echo prior to discharge once recovered to reassess effusion size and further surveillance as outpatient. thank you         Ciaran Santana MD

## 2024-09-06 NOTE — PROGRESS NOTE ADULT - ASSESSMENT
63y Female w/ PMH of ESRD on peritoneal dialysis 5 x per week, HTN, HLD, and DM admitted for peritonitis and peritoneal dialysis catheter dysfunction, needing HD. Now found to have SBO s/p ex-lap. Strict NPO with NGT to LIS. Now with hypertensive Urgency being managed on IVP anti-htn meds.     # PD catheter associated peritonitis  # PD catheter malfunction  # SBO POD #5  - 8/30/24- Exploratory laparotomy with lysis of adhesions. Suspected area of small bowel perforation resected, approximately 15cm. Side-to-side small bowel anastomosis. PD catheter removed.  - NPO, IVF at 50cc/hr   - Dilaudid IVP PRN pain scale  - IV tylenol 1g as needed for breakthrough, unable to place a standing q8h order   - Inc spirometry, Mobilize  - Peritoneal fluid culture with Achromobacter xylosoxidans  - On Zosyn.   -ID on board   -NGt clamped and later removed by Surgery   -Diet advanced to CLD today   -Monitor for hematochezia    #GI bleed likely from prior surgery:  -GI consult recs appreciated. No intervention at this time   -PPI bid    #Hypokalemia:  #Hypernatremia   #Dehydration 2/2 NPO status  -S/p K repletion and Mg repletion  -trend BMP and Mg  -D5 1/2 NS at 75cc/hr     #Pericardial effusion on CTA chest:  -Appreciate cards recs  -Hemodyanmically stable, likely 2/2 uremia  -Fu echo-Normal biventricular function, small to mod pericardial effusion. Recs to cw HD for it     #Sinus Tachycardia: likely 2/2 pain, discomfort from ngt, infection > now improved   -EKG with sinus tachycardia  -Dimer elevated, will do CTA chest to r/o PE>> negative  -Tachycardia likely 2/2 pain, ongoing ngt discomfort    #Hypertensive Urgency> improving   -cw Labetalol 20mg IVP , Hydralazine 10mg IVP, Cardizem 20mg IVP q4h  -Hydralazine 20mg q6h IVP  -Labetalol 10mg ivp q2h prn   -clonidine patch increased to 0.3mg/qweekly  -Hydralazine PRN     # ESRD s/p PD now on HD  - now started on HD (8/29) via Right IJ Joseph  - hep panel done. Ordered PPD  - HD per renal    #R IJ Joseph dysfunction:  -Vascular consulted, asked IR  -Permcath prior to dc     #Urinary retention:  -Past 24hrs pt has been straight cath X 3 times.  -Will cw bladder scan q6h, if this afternoon she retains will place a lobo  -Urology consult op     #Constipation  - Miralax     #DM2- controlled  - c/w insulin lispro  - Q 6 hr accuchecks when NPO    #HLD  - c/w statin        DVT ppx: SCD      Disposition: Medically active. SBO s/p laprotomy. On IV abx. Now on CLD. Will keep all meds IV for now pending passing this dietary trial, if next 24hr she remains stable IV meds can be transitioned to oral and pt can be downgraded.

## 2024-09-06 NOTE — PROGRESS NOTE ADULT - SUBJECTIVE AND OBJECTIVE BOX
Yanely Gonzalez MD (Available on [employer])  [facility] Division of Hospital Medicine    Chief Complaint:  abdominal pain    SUBJECTIVE / OVERNIGHT EVENTS:  Pt seen and examined at bedside. Pt endorses feeling much better comparatively, eager to eat jello.     Patient denies chest pain, SOB, abd pain, N/V, fever, chills, dysuria or any other complaints. All remainder ROS negative.     INTERVAL EVENTS:  -Pt hemodynamically stable for now. Multiple episodes of BM with bloody mucous  -NGT was removed yesterday by Gen surg and trial of CLD started today. Awaiting slow return of bowel function. TPN on hold for now if able to tolerate   -H&H stable, s/p 1 unit of PRBC w/HD yesterday  -Electrolytes repleted, K-3.3, Mg-1.5  -Vascular consulted and recs to call IR for permcath, planned prior to dc   -On PPI IV bid     MEDICATIONS  (STANDING):  acetaminophen   IVPB .. 1000 milliGRAM(s) IV Intermittent every 6 hours  albuterol    0.083%. 2.5 milliGRAM(s) Nebulizer once  benzocaine 20% Spray 1 Spray(s) Topical four times a day  chlorhexidine 2% Cloths 1 Application(s) Topical daily  cloNIDine Patch 0.3 mG/24Hr(s) 1 patch Transdermal every 7 days  dextrose 5% + sodium chloride 0.45%. 1000 milliLiter(s) (75 mL/Hr) IV Continuous <Continuous>  dextrose 5%. 1000 milliLiter(s) (50 mL/Hr) IV Continuous <Continuous>  dextrose 5%. 1000 milliLiter(s) (100 mL/Hr) IV Continuous <Continuous>  dextrose 50% Injectable 12.5 Gram(s) IV Push once  dextrose 50% Injectable 25 Gram(s) IV Push once  dextrose 50% Injectable 25 Gram(s) IV Push once  diltiazem Injectable 20 milliGRAM(s) IV Push every 4 hours  epoetin dustin-epbx (RETACRIT) Injectable 97083 Unit(s) IV Push <User Schedule>  folic acid 1 milliGRAM(s) Oral daily  hydrALAZINE Injectable 10 milliGRAM(s) IV Push every 4 hours  insulin lispro (ADMELOG) corrective regimen sliding scale   SubCutaneous three times a day before meals  labetalol Injectable 20 milliGRAM(s) IV Push every 4 hours  lidocaine   4% Patch 1 Patch Transdermal daily  magnesium sulfate  IVPB 1 Gram(s) IV Intermittent once  metoclopramide Injectable 10 milliGRAM(s) IV Push every 12 hours  pantoprazole  Injectable 40 milliGRAM(s) IV Push two times a day  piperacillin/tazobactam IVPB.. 3.375 Gram(s) IV Intermittent every 12 hours  potassium chloride  10 mEq/100 mL IVPB 10 milliEquivalent(s) IV Intermittent every 1 hour    MEDICATIONS  (PRN):  benzocaine/menthol Lozenge 1 Lozenge Oral every 4 hours PRN Sore Throat  dextrose Oral Gel 15 Gram(s) Oral once PRN Blood Glucose LESS THAN 70 milliGRAM(s)/deciliter  hydrALAZINE Injectable 20 milliGRAM(s) IV Push every 6 hours PRN systolic > 150  HYDROmorphone  Injectable 1 milliGRAM(s) IV Push every 4 hours PRN Severe Pain (7 - 10)  HYDROmorphone  Injectable 0.5 milliGRAM(s) IV Push every 4 hours PRN Moderate Pain (4 - 6)  labetalol Injectable 10 milliGRAM(s) IV Push every 2 hours PRN Systolic blood pressure >185  naloxone Injectable 0.1 milliGRAM(s) IV Push every 3 minutes PRN For ANY of the following changes in patient status:  A. RR LESS THAN 10 breaths per minute, B. Oxygen saturation LESS THAN 90%, C. Sedation score of 6  ondansetron Injectable 4 milliGRAM(s) IV Push every 6 hours PRN Nausea  ondansetron Injectable 4 milliGRAM(s) IV Push every 6 hours PRN Nausea and/or Vomiting  sodium chloride 0.9% lock flush 10 milliLiter(s) IV Push every 1 hour PRN Pre/post blood products, medications, blood draw, and to maintain line patency        I&O's Summary    05 Sep 2024 07:01  -  06 Sep 2024 07:00  --------------------------------------------------------  IN: 1675 mL / OUT: 915 mL / NET: 760 mL        PHYSICAL EXAM:  Vital Signs Last 24 Hrs  T(C): 37.2 (06 Sep 2024 08:00), Max: 37.2 (05 Sep 2024 13:05)  T(F): 98.9 (06 Sep 2024 08:00), Max: 99 (06 Sep 2024 04:00)  HR: 104 (06 Sep 2024 08:01) (101 - 123)  BP: 145/76 (06 Sep 2024 08:01) (141/96 - 229/125)  BP(mean): 95 (06 Sep 2024 08:01) (95 - 156)  RR: 16 (06 Sep 2024 08:01) (13 - 20)  SpO2: 100% (06 Sep 2024 08:01) (95% - 100%)    Parameters below as of 06 Sep 2024 08:01  Patient On (Oxygen Delivery Method): room air            CONSTITUTIONAL: NAD  ENMT: Dry oral mucosa  RESPIRATORY: Normal respiratory effort; lungs are clear to auscultation bilaterally  CARDIOVASCULAR: Regular rate and rhythm, normal S1 and S2, no murmur/rub/gallop  ABDOMEN: Nontender to palpation, Hyperactive bowel sounds, no rebound/guarding; No hepatosplenomegaly  EXTREMITIES: No LE swelling   PSYCH: A+O to person, place, and time; affect appropriate  NEUROLOGY: CN 2-12 are intact and symmetric; no gross sensory deficits;   SKIN: No rashes; no palpable lesions    LABS:                        9.0    16.05 )-----------( 229      ( 06 Sep 2024 04:34 )             30.2     09-06    140  |  97  |  22.2<H>  ----------------------------<  118<H>  3.3<L>   |  32.0<H>  |  5.19<H>    Ca    8.0<L>      06 Sep 2024 04:34  Phos  6.6     09-05  Mg     1.8     09-06            Urinalysis Basic - ( 06 Sep 2024 04:34 )    Color: x / Appearance: x / SG: x / pH: x  Gluc: 118 mg/dL / Ketone: x  / Bili: x / Urobili: x   Blood: x / Protein: x / Nitrite: x   Leuk Esterase: x / RBC: x / WBC x   Sq Epi: x / Non Sq Epi: x / Bacteria: x        CAPILLARY BLOOD GLUCOSE      POCT Blood Glucose.: 119 mg/dL (06 Sep 2024 06:22)  POCT Blood Glucose.: 103 mg/dL (05 Sep 2024 23:55)  POCT Blood Glucose.: 87 mg/dL (05 Sep 2024 17:51)  POCT Blood Glucose.: 123 mg/dL (05 Sep 2024 12:34)        RADIOLOGY & ADDITIONAL TESTS:  Results Reviewed:   Imaging Personally Reviewed:  Electrocardiogram Personally Reviewed:

## 2024-09-07 LAB
ANION GAP SERPL CALC-SCNC: 13 MMOL/L — SIGNIFICANT CHANGE UP (ref 5–17)
BLD GP AB SCN SERPL QL: SIGNIFICANT CHANGE UP
BUN SERPL-MCNC: 31.5 MG/DL — HIGH (ref 8–20)
CALCIUM SERPL-MCNC: 7 MG/DL — LOW (ref 8.4–10.5)
CHLORIDE SERPL-SCNC: 93 MMOL/L — LOW (ref 96–108)
CO2 SERPL-SCNC: 25 MMOL/L — SIGNIFICANT CHANGE UP (ref 22–29)
CREAT SERPL-MCNC: 7.09 MG/DL — HIGH (ref 0.5–1.3)
EGFR: 6 ML/MIN/1.73M2 — LOW
GLUCOSE BLDC GLUCOMTR-MCNC: 114 MG/DL — HIGH (ref 70–99)
GLUCOSE BLDC GLUCOMTR-MCNC: 120 MG/DL — HIGH (ref 70–99)
GLUCOSE BLDC GLUCOMTR-MCNC: 121 MG/DL — HIGH (ref 70–99)
GLUCOSE BLDC GLUCOMTR-MCNC: 95 MG/DL — SIGNIFICANT CHANGE UP (ref 70–99)
GLUCOSE SERPL-MCNC: 165 MG/DL — HIGH (ref 70–99)
HCT VFR BLD CALC: 23.2 % — LOW (ref 34.5–45)
HCT VFR BLD CALC: 23.7 % — LOW (ref 34.5–45)
HGB BLD-MCNC: 7.1 G/DL — LOW (ref 11.5–15.5)
HGB BLD-MCNC: 7.1 G/DL — LOW (ref 11.5–15.5)
MAGNESIUM SERPL-MCNC: 1.9 MG/DL — SIGNIFICANT CHANGE UP (ref 1.8–2.6)
MCHC RBC-ENTMCNC: 25.4 PG — LOW (ref 27–34)
MCHC RBC-ENTMCNC: 30 GM/DL — LOW (ref 32–36)
MCV RBC AUTO: 84.9 FL — SIGNIFICANT CHANGE UP (ref 80–100)
PLATELET # BLD AUTO: 204 K/UL — SIGNIFICANT CHANGE UP (ref 150–400)
POTASSIUM SERPL-MCNC: 3.3 MMOL/L — LOW (ref 3.5–5.3)
POTASSIUM SERPL-SCNC: 3.3 MMOL/L — LOW (ref 3.5–5.3)
RBC # BLD: 2.79 M/UL — LOW (ref 3.8–5.2)
RBC # FLD: 17 % — HIGH (ref 10.3–14.5)
SODIUM SERPL-SCNC: 131 MMOL/L — LOW (ref 135–145)
WBC # BLD: 13.61 K/UL — HIGH (ref 3.8–10.5)
WBC # FLD AUTO: 13.61 K/UL — HIGH (ref 3.8–10.5)

## 2024-09-07 PROCEDURE — 99233 SBSQ HOSP IP/OBS HIGH 50: CPT

## 2024-09-07 RX ORDER — HYDRALAZINE HCL 50 MG
25 TABLET ORAL THREE TIMES A DAY
Refills: 0 | Status: DISCONTINUED | OUTPATIENT
Start: 2024-09-07 | End: 2024-09-07

## 2024-09-07 RX ADMIN — Medication 10 MILLIGRAM(S): at 09:47

## 2024-09-07 RX ADMIN — PIPERACILLIN SODIUM AND TAZOBACTAM SODIUM 25 GRAM(S): 3; .375 INJECTION, POWDER, FOR SOLUTION INTRAVENOUS at 08:13

## 2024-09-07 RX ADMIN — Medication 20 MILLIGRAM(S): at 02:16

## 2024-09-07 RX ADMIN — Medication 1 PATCH: at 18:16

## 2024-09-07 RX ADMIN — Medication 1 PATCH: at 18:41

## 2024-09-07 RX ADMIN — DILTIAZEM HYDROCHLORIDE 20 MILLIGRAM(S): 5 INJECTION INTRAVENOUS at 18:15

## 2024-09-07 RX ADMIN — DILTIAZEM HYDROCHLORIDE 20 MILLIGRAM(S): 5 INJECTION INTRAVENOUS at 09:46

## 2024-09-07 RX ADMIN — Medication 40 MILLIGRAM(S): at 05:40

## 2024-09-07 RX ADMIN — Medication 20 MILLIGRAM(S): at 11:56

## 2024-09-07 RX ADMIN — Medication 5 UNIT(S): at 09:44

## 2024-09-07 RX ADMIN — Medication 10 MILLIGRAM(S): at 18:16

## 2024-09-07 RX ADMIN — Medication 20 MILLIGRAM(S): at 18:16

## 2024-09-07 RX ADMIN — Medication 10 MILLIGRAM(S): at 02:16

## 2024-09-07 RX ADMIN — CHLORHEXIDINE GLUCONATE 1 APPLICATION(S): 40 SOLUTION TOPICAL at 08:16

## 2024-09-07 RX ADMIN — DILTIAZEM HYDROCHLORIDE 20 MILLIGRAM(S): 5 INJECTION INTRAVENOUS at 21:36

## 2024-09-07 RX ADMIN — Medication 10 MILLIGRAM(S): at 21:36

## 2024-09-07 RX ADMIN — Medication 10 MILLIGRAM(S): at 18:15

## 2024-09-07 RX ADMIN — Medication 20 MILLIGRAM(S): at 21:37

## 2024-09-07 RX ADMIN — DILTIAZEM HYDROCHLORIDE 20 MILLIGRAM(S): 5 INJECTION INTRAVENOUS at 05:40

## 2024-09-07 RX ADMIN — Medication 20 MILLIGRAM(S): at 10:51

## 2024-09-07 RX ADMIN — DILTIAZEM HYDROCHLORIDE 20 MILLIGRAM(S): 5 INJECTION INTRAVENOUS at 02:16

## 2024-09-07 RX ADMIN — Medication 10 MILLIGRAM(S): at 05:43

## 2024-09-07 RX ADMIN — Medication 20 MILLIGRAM(S): at 13:59

## 2024-09-07 RX ADMIN — Medication 10 MILLIGRAM(S): at 14:00

## 2024-09-07 RX ADMIN — Medication 75 MILLILITER(S): at 08:17

## 2024-09-07 RX ADMIN — Medication 1 MILLIGRAM(S): at 08:18

## 2024-09-07 RX ADMIN — Medication 10 MILLIGRAM(S): at 05:40

## 2024-09-07 RX ADMIN — Medication 1 PATCH: at 06:50

## 2024-09-07 RX ADMIN — DILTIAZEM HYDROCHLORIDE 20 MILLIGRAM(S): 5 INJECTION INTRAVENOUS at 13:59

## 2024-09-07 RX ADMIN — Medication 40 MILLIGRAM(S): at 18:16

## 2024-09-07 RX ADMIN — EPOETIN ALFA 10000 UNIT(S): 3000 SOLUTION INTRAVENOUS; SUBCUTANEOUS at 09:53

## 2024-09-07 RX ADMIN — Medication 20 MILLIGRAM(S): at 05:40

## 2024-09-07 NOTE — CHART NOTE - NSCHARTNOTEFT_GEN_A_CORE
PA NOTE-MEDICINE    Called by RN due to questioning Additional Hydralazine order for 10 mg po 3 x Daily.  Pt already receiving Hydralazine 10 mg IVP Q 4 Hr along with labetalol.  BP: 165/80 (07 Sep 2024 20:00) (134/61 - 208/84)    Pt Currently due for Hydralazine 10 Mg IVP     DC'd PO Hydralazine po until clarified by Attending in AM  RN instructed to Recall PA for any increase in Pt BP or any changes in Pt Status   Will sign out to AM Team

## 2024-09-07 NOTE — PROGRESS NOTE ADULT - SUBJECTIVE AND OBJECTIVE BOX
Soto Stanton MD  [facility]  Contact via Teams or text/call at [phone]    Patient is a 63y old  Female who presents with a chief complaint of ESRD needing HD (07 Sep 2024 11:36)    Feels okay.  Seen during dialysis    Patient seen and examined at bedside. No overnight events reported.     ALLERGIES:  No Known Allergies    MEDICATIONS  (STANDING):  albuterol    0.083%. 2.5 milliGRAM(s) Nebulizer once  benzocaine 20% Spray 1 Spray(s) Topical four times a day  chlorhexidine 2% Cloths 1 Application(s) Topical daily  cloNIDine Patch 0.3 mG/24Hr(s) 1 patch Transdermal every 7 days  dextrose 5% + sodium chloride 0.45%. 1000 milliLiter(s) (75 mL/Hr) IV Continuous <Continuous>  dextrose 5%. 1000 milliLiter(s) (100 mL/Hr) IV Continuous <Continuous>  dextrose 5%. 1000 milliLiter(s) (50 mL/Hr) IV Continuous <Continuous>  dextrose 50% Injectable 25 Gram(s) IV Push once  dextrose 50% Injectable 25 Gram(s) IV Push once  dextrose 50% Injectable 12.5 Gram(s) IV Push once  diltiazem Injectable 20 milliGRAM(s) IV Push every 4 hours  epoetin dustin-epbx (RETACRIT) Injectable 26556 Unit(s) IV Push <User Schedule>  folic acid 1 milliGRAM(s) Oral daily  hydrALAZINE Injectable 10 milliGRAM(s) IV Push every 4 hours  insulin lispro (ADMELOG) corrective regimen sliding scale   SubCutaneous three times a day before meals  labetalol Injectable 20 milliGRAM(s) IV Push every 4 hours  lidocaine   4% Patch 1 Patch Transdermal daily  metoclopramide Injectable 10 milliGRAM(s) IV Push every 12 hours  pantoprazole  Injectable 40 milliGRAM(s) IV Push two times a day    MEDICATIONS  (PRN):  benzocaine/menthol Lozenge 1 Lozenge Oral every 4 hours PRN Sore Throat  dextrose Oral Gel 15 Gram(s) Oral once PRN Blood Glucose LESS THAN 70 milliGRAM(s)/deciliter  hydrALAZINE Injectable 20 milliGRAM(s) IV Push every 6 hours PRN systolic > 150  HYDROmorphone  Injectable 1 milliGRAM(s) IV Push every 4 hours PRN Severe Pain (7 - 10)  HYDROmorphone  Injectable 0.5 milliGRAM(s) IV Push every 4 hours PRN Moderate Pain (4 - 6)  labetalol Injectable 10 milliGRAM(s) IV Push every 2 hours PRN Systolic blood pressure >185  naloxone Injectable 0.1 milliGRAM(s) IV Push every 3 minutes PRN For ANY of the following changes in patient status:  A. RR LESS THAN 10 breaths per minute, B. Oxygen saturation LESS THAN 90%, C. Sedation score of 6  ondansetron Injectable 4 milliGRAM(s) IV Push every 6 hours PRN Nausea  ondansetron Injectable 4 milliGRAM(s) IV Push every 6 hours PRN Nausea and/or Vomiting  sodium chloride 0.9% lock flush 10 milliLiter(s) IV Push every 1 hour PRN Pre/post blood products, medications, blood draw, and to maintain line patency    Vital Signs Last 24 Hrs  T(F): 98.4 (07 Sep 2024 12:47), Max: 99.4 (07 Sep 2024 08:13)  HR: 98 (07 Sep 2024 14:00) (91 - 111)  BP: 186/88 (07 Sep 2024 14:00) (134/61 - 208/84)  RR: 15 (07 Sep 2024 14:00) (13 - 25)  SpO2: 98% (07 Sep 2024 14:00) (96% - 100%)  I&O's Summary    06 Sep 2024 07:01  -  07 Sep 2024 07:00  --------------------------------------------------------  IN: 2550 mL / OUT: 190 mL / NET: 2360 mL    07 Sep 2024 07:01  -  07 Sep 2024 15:23  --------------------------------------------------------  IN: 1284 mL / OUT: 0 mL / NET: 1284 mL      PHYSICAL EXAM:  General: NAD, A/O x 3  ENT: No gross hearing impairment, Moist mucous membranes, no thrush  Neck: Supple, No JVD  Lungs: Clear to auscultation bilaterally, good air entry, non-labored breathing  Cardio: RRR, S1/S2, No murmur  Abdomen: Soft, Nontender, Nondistended; Bowel sounds present  Extremities: No calf tenderness, No cyanosis, No pitting edema  Psych: Appropriate mood and affect    LABS:                        7.1    x     )-----------( x        ( 07 Sep 2024 08:55 )             23.2     09-07    131  |  93  |  31.5  ----------------------------<  165  3.3   |  25.0  |  7.09    Ca    7.0      07 Sep 2024 04:43  Phos  6.6     09-05  Mg     1.9     09-07    POCT Blood Glucose.: 95 mg/dL (07 Sep 2024 11:59)  POCT Blood Glucose.: 120 mg/dL (07 Sep 2024 08:12)  POCT Blood Glucose.: 145 mg/dL (06 Sep 2024 21:22)  POCT Blood Glucose.: 122 mg/dL (06 Sep 2024 17:43)    Urinalysis Basic - ( 07 Sep 2024 04:43 )    Color: x / Appearance: x / SG: x / pH: x  Gluc: 165 mg/dL / Ketone: x  / Bili: x / Urobili: x   Blood: x / Protein: x / Nitrite: x   Leuk Esterase: x / RBC: x / WBC x   Sq Epi: x / Non Sq Epi: x / Bacteria: x      RADIOLOGY & ADDITIONAL TESTS:    Care Discussed with Consultants/Other Providers:

## 2024-09-07 NOTE — PROGRESS NOTE ADULT - SUBJECTIVE AND OBJECTIVE BOX
NEPHROLOGY INTERVAL HPI/OVERNIGHT EVENTS:    Examined  No distress noted  Denies HA CP no SOB  FOBT+  anemia drop in h/h    MEDICATIONS  (STANDING):  albuterol    0.083%. 2.5 milliGRAM(s) Nebulizer once  benzocaine 20% Spray 1 Spray(s) Topical four times a day  chlorhexidine 2% Cloths 1 Application(s) Topical daily  cloNIDine Patch 0.3 mG/24Hr(s) 1 patch Transdermal every 7 days  dextrose 5% + sodium chloride 0.45%. 1000 milliLiter(s) (75 mL/Hr) IV Continuous <Continuous>  dextrose 5%. 1000 milliLiter(s) (100 mL/Hr) IV Continuous <Continuous>  dextrose 5%. 1000 milliLiter(s) (50 mL/Hr) IV Continuous <Continuous>  dextrose 50% Injectable 25 Gram(s) IV Push once  dextrose 50% Injectable 25 Gram(s) IV Push once  dextrose 50% Injectable 12.5 Gram(s) IV Push once  diltiazem Injectable 20 milliGRAM(s) IV Push every 4 hours  epoetin dustin-epbx (RETACRIT) Injectable 77749 Unit(s) IV Push <User Schedule>  folic acid 1 milliGRAM(s) Oral daily  hydrALAZINE Injectable 10 milliGRAM(s) IV Push every 4 hours  insulin lispro (ADMELOG) corrective regimen sliding scale   SubCutaneous three times a day before meals  labetalol Injectable 20 milliGRAM(s) IV Push every 4 hours  lidocaine   4% Patch 1 Patch Transdermal daily  metoclopramide Injectable 10 milliGRAM(s) IV Push every 12 hours  pantoprazole  Injectable 40 milliGRAM(s) IV Push two times a day    MEDICATIONS  (PRN):  benzocaine/menthol Lozenge 1 Lozenge Oral every 4 hours PRN Sore Throat  dextrose Oral Gel 15 Gram(s) Oral once PRN Blood Glucose LESS THAN 70 milliGRAM(s)/deciliter  hydrALAZINE Injectable 20 milliGRAM(s) IV Push every 6 hours PRN systolic > 150  HYDROmorphone  Injectable 1 milliGRAM(s) IV Push every 4 hours PRN Severe Pain (7 - 10)  HYDROmorphone  Injectable 0.5 milliGRAM(s) IV Push every 4 hours PRN Moderate Pain (4 - 6)  labetalol Injectable 10 milliGRAM(s) IV Push every 2 hours PRN Systolic blood pressure >185  naloxone Injectable 0.1 milliGRAM(s) IV Push every 3 minutes PRN For ANY of the following changes in patient status:  A. RR LESS THAN 10 breaths per minute, B. Oxygen saturation LESS THAN 90%, C. Sedation score of 6  ondansetron Injectable 4 milliGRAM(s) IV Push every 6 hours PRN Nausea  ondansetron Injectable 4 milliGRAM(s) IV Push every 6 hours PRN Nausea and/or Vomiting  sodium chloride 0.9% lock flush 10 milliLiter(s) IV Push every 1 hour PRN Pre/post blood products, medications, blood draw, and to maintain line patency      Allergies    No Known Allergies    Intolerances        Vital Signs Last 24 Hrs  T(C): 37.4 (07 Sep 2024 08:13), Max: 37.4 (07 Sep 2024 08:13)  T(F): 99.4 (07 Sep 2024 08:13), Max: 99.4 (07 Sep 2024 08:13)  HR: 97 (07 Sep 2024 08:00) (91 - 118)  BP: 167/89 (07 Sep 2024 08:00) (134/61 - 200/89)  BP(mean): 100 (07 Sep 2024 08:00) (80 - 111)  RR: 18 (07 Sep 2024 08:00) (13 - 19)  SpO2: 98% (07 Sep 2024 08:00) (96% - 100%)    Parameters below as of 07 Sep 2024 08:00  Patient On (Oxygen Delivery Method): room air      Daily     Daily     PHYSICAL EXAM:  GENERAL: NAD  + NG tube , No JVD + RIJ raul cath   NERVOUS SYSTEM:  A/O x3  Lungs : No rales, No rhonchi,   HEART:  No murmur,  No rub, No gallops  ABDOMEN: dressed , dec BS , PD cath out   Ext dec edema       LABS:                        7.1    13.61 )-----------( 204      ( 07 Sep 2024 04:43 )             23.7     09-07    131<L>  |  93<L>  |  31.5<H>  ----------------------------<  165<H>  3.3<L>   |  25.0  |  7.09<H>    Ca    7.0<L>      07 Sep 2024 04:43  Mg     1.9     09-07        Urinalysis Basic - ( 07 Sep 2024 04:43 )    Color: x / Appearance: x / SG: x / pH: x  Gluc: 165 mg/dL / Ketone: x  / Bili: x / Urobili: x   Blood: x / Protein: x / Nitrite: x   Leuk Esterase: x / RBC: x / WBC x   Sq Epi: x / Non Sq Epi: x / Bacteria: x      Magnesium: 1.9 mg/dL (09-07 @ 04:43)          RADIOLOGY & ADDITIONAL TESTS:

## 2024-09-07 NOTE — PROGRESS NOTE ADULT - ASSESSMENT
64yo F pt w/ pmhx of ESRD on peritoneal dialysis 5x per week, HTN, HLD, DM presented to ED c/o worsening diffused abdominal pain with associated distention over the past week. Pt reports dialysis cath has not been draining for the last week. She attempts peritoneal dialysis but her unable to get any fluid return. She denies fever, chills, N/V/D, urinary sx, chest pain, SOB, or any other complaints.  (27 Aug 2024 03:04)  Has been constipated in the past    ESRD   HD on TTS schedule for now- dialysis today  Now w PD catheter complication - peritonitis   CT abd noted --> + SBO   s/p Ex-lap, FAREED,  SBR and PD cath removal  HD today --> will need eventual permacath   Abx as per ID     RO - Phoslo     HTN - Watch on meds     Anemia - cont  Epogen with HD, needs transfusion will order to units prbc to give today w HD, + FOBT  Cont folate  Ferritin 1100    Monitor labs will follow

## 2024-09-07 NOTE — PROGRESS NOTE ADULT - ASSESSMENT
63y Female w/ PMH of ESRD on peritoneal dialysis 5 x per week, HTN, HLD, and DM admitted for peritonitis and peritoneal dialysis catheter dysfunction, needing HD. Now found to have SBO s/p ex-lap. Strict NPO with NGT to LIS. Now with hypertensive Urgency being managed on IVP anti-htn meds.     PD catheter associated peritonitis  PD catheter malfunction  SBO POD #6  - 8/30/24- Exploratory laparotomy with lysis of adhesions. Suspected area of small bowel perforation resected, approximately 15cm. Side-to-side small bowel anastomosis. PD catheter removed.  - NPO, IVF at 50cc/hr   - Dilaudid IVP PRN pain scale  - IV tylenol 1g as needed for breakthrough, unable to place a standing q8h order   - Inc spirometry, Mobilize  - Peritoneal fluid culture with Achromobacter xylosoxidans  - On Zosyn per ID   - s/p NGT removed    - Diet advanced  - Monitor for hematochezia    GI bleed likely from prior surgery  -GI consult recs appreciated. No intervention at this time   -PPI bid    ESRD on  Peritoneal Dialysis, but PD catheter removed  Hypokalemia  Hypernatremia   - on HD (8/29) via Right IJ Shiley  - HD per renal    Pericardial effusion on CTA chest:  - Appreciate cards recs  - Hemodyanmically stable, likely 2/2 uremia  - Fu echo-Normal biventricular function, small to mod pericardial effusion. Recs to cw HD for it     Hypertensive Urgency> improving   - start oral hydralazine 25 mg TID  -cw Labetalol 20mg IVP , Hydralazine 10mg IVP, Cardizem 20mg IVP q4h  - Hydralazine 20mg q6h IVP  - Labetalol 10mg ivp q2h prn   - clonidine patch increased to 0.3mg/qweekly  - Hydralazine PRN     Urinary retention:  - with lobo currently   - Urology follow up     DM2- controlled  - c/w insulin lispro    HLD  - c/w statin    DVT ppx: SCD    Disposition: Medically active. SBO s/p laprotomy. On IV abx. Now on CLD.

## 2024-09-07 NOTE — PROGRESS NOTE ADULT - ASSESSMENT
A 63F initially presented with SBO, spontaneous bacterial peritonitis, and infected PD catheter. POD#8 from FAREED, SBR, PD cath removal. Post-op course complicated by ileus, tachycardia, HTN crisis. Now tolerating PO intake with bowel movements with no further surgical intervention indicated at this time.    Plan:   -trend H/H  -cont PPI  -OOB, ambulate, IS  -continue care per primary team  - surgery will sign off      GM Esquivel, PGY-1  Surgery Resident

## 2024-09-07 NOTE — PROGRESS NOTE ADULT - SUBJECTIVE AND OBJECTIVE BOX
INTERVAL HPI:    Patient evaluated at bedside. No acute distress.   - overnight noted to have dropped Hgb, will receive 1 unit pRBC  - tolerating diet well    MEDICATIONS  (STANDING):  albuterol    0.083%. 2.5 milliGRAM(s) Nebulizer once  benzocaine 20% Spray 1 Spray(s) Topical four times a day  chlorhexidine 2% Cloths 1 Application(s) Topical daily  cloNIDine Patch 0.3 mG/24Hr(s) 1 patch Transdermal every 7 days  dextrose 5% + sodium chloride 0.45%. 1000 milliLiter(s) (75 mL/Hr) IV Continuous <Continuous>  dextrose 5%. 1000 milliLiter(s) (50 mL/Hr) IV Continuous <Continuous>  dextrose 5%. 1000 milliLiter(s) (100 mL/Hr) IV Continuous <Continuous>  dextrose 50% Injectable 12.5 Gram(s) IV Push once  dextrose 50% Injectable 25 Gram(s) IV Push once  dextrose 50% Injectable 25 Gram(s) IV Push once  diltiazem Injectable 20 milliGRAM(s) IV Push every 4 hours  epoetin dustin-epbx (RETACRIT) Injectable 44303 Unit(s) IV Push <User Schedule>  folic acid 1 milliGRAM(s) Oral daily  hydrALAZINE Injectable 10 milliGRAM(s) IV Push every 4 hours  insulin lispro (ADMELOG) corrective regimen sliding scale   SubCutaneous three times a day before meals  labetalol Injectable 20 milliGRAM(s) IV Push every 4 hours  lidocaine   4% Patch 1 Patch Transdermal daily  metoclopramide Injectable 10 milliGRAM(s) IV Push every 12 hours  pantoprazole  Injectable 40 milliGRAM(s) IV Push two times a day    MEDICATIONS  (PRN):  benzocaine/menthol Lozenge 1 Lozenge Oral every 4 hours PRN Sore Throat  dextrose Oral Gel 15 Gram(s) Oral once PRN Blood Glucose LESS THAN 70 milliGRAM(s)/deciliter  hydrALAZINE Injectable 20 milliGRAM(s) IV Push every 6 hours PRN systolic > 150  HYDROmorphone  Injectable 1 milliGRAM(s) IV Push every 4 hours PRN Severe Pain (7 - 10)  HYDROmorphone  Injectable 0.5 milliGRAM(s) IV Push every 4 hours PRN Moderate Pain (4 - 6)  labetalol Injectable 10 milliGRAM(s) IV Push every 2 hours PRN Systolic blood pressure >185  naloxone Injectable 0.1 milliGRAM(s) IV Push every 3 minutes PRN For ANY of the following changes in patient status:  A. RR LESS THAN 10 breaths per minute, B. Oxygen saturation LESS THAN 90%, C. Sedation score of 6  ondansetron Injectable 4 milliGRAM(s) IV Push every 6 hours PRN Nausea and/or Vomiting  ondansetron Injectable 4 milliGRAM(s) IV Push every 6 hours PRN Nausea  sodium chloride 0.9% lock flush 10 milliLiter(s) IV Push every 1 hour PRN Pre/post blood products, medications, blood draw, and to maintain line patency      Vital Signs Last 24 Hrs  T(C): 37.1 (07 Sep 2024 09:05), Max: 37.4 (07 Sep 2024 08:13)  T(F): 98.7 (07 Sep 2024 09:05), Max: 99.4 (07 Sep 2024 08:13)  HR: 101 (07 Sep 2024 10:03) (91 - 111)  BP: 175/77 (07 Sep 2024 10:03) (134/61 - 204/80)  BP(mean): 104 (07 Sep 2024 10:03) (80 - 111)  RR: 15 (07 Sep 2024 10:03) (13 - 19)  SpO2: 98% (07 Sep 2024 10:03) (96% - 100%)    Parameters below as of 07 Sep 2024 10:03  Patient On (Oxygen Delivery Method): room air        Constitutional: NAD  Respiratory: Normal work of breathing, no accessory muscle use  Gastrointestinal: soft, non tender, non distended abdomen  Neurological: A&O x 3  Psychiatric: Normal mood, normal affect      I&O's Detail    06 Sep 2024 07:01  -  07 Sep 2024 07:00  --------------------------------------------------------  IN:    dextrose 5% + sodium chloride 0.45%: 1725 mL    IV PiggyBack: 300 mL    IV PiggyBack: 100 mL    IV PiggyBack: 100 mL    Oral Fluid: 325 mL  Total IN: 2550 mL    OUT:    Indwelling Catheter - Urethral (mL): 190 mL    Voided (mL): 0 mL  Total OUT: 190 mL    Total NET: 2360 mL      07 Sep 2024 07:01  -  07 Sep 2024 11:37  --------------------------------------------------------  IN:    dextrose 5% + sodium chloride 0.45%: 375 mL    IV PiggyBack: 100 mL    Oral Fluid: 150 mL    PRBCs (Packed Red Blood Cells): 659 mL  Total IN: 1284 mL    OUT:  Total OUT: 0 mL    Total NET: 1284 mL          LABS:                        7.1    x     )-----------( x        ( 07 Sep 2024 08:55 )             23.2     09-07    131<L>  |  93<L>  |  31.5<H>  ----------------------------<  165<H>  3.3<L>   |  25.0  |  7.09<H>    Ca    7.0<L>      07 Sep 2024 04:43  Mg     1.9     09-07        Urinalysis Basic - ( 07 Sep 2024 04:43 )    Color: x / Appearance: x / SG: x / pH: x  Gluc: 165 mg/dL / Ketone: x  / Bili: x / Urobili: x   Blood: x / Protein: x / Nitrite: x   Leuk Esterase: x / RBC: x / WBC x   Sq Epi: x / Non Sq Epi: x / Bacteria: x        RADIOLOGY & ADDITIONAL STUDIES:

## 2024-09-08 LAB
ANION GAP SERPL CALC-SCNC: 11 MMOL/L — SIGNIFICANT CHANGE UP (ref 5–17)
BUN SERPL-MCNC: 17.3 MG/DL — SIGNIFICANT CHANGE UP (ref 8–20)
CALCIUM SERPL-MCNC: 7.3 MG/DL — LOW (ref 8.4–10.5)
CHLORIDE SERPL-SCNC: 94 MMOL/L — LOW (ref 96–108)
CO2 SERPL-SCNC: 25 MMOL/L — SIGNIFICANT CHANGE UP (ref 22–29)
CREAT SERPL-MCNC: 4.99 MG/DL — HIGH (ref 0.5–1.3)
EGFR: 9 ML/MIN/1.73M2 — LOW
GLUCOSE BLDC GLUCOMTR-MCNC: 103 MG/DL — HIGH (ref 70–99)
GLUCOSE BLDC GLUCOMTR-MCNC: 124 MG/DL — HIGH (ref 70–99)
GLUCOSE BLDC GLUCOMTR-MCNC: 134 MG/DL — HIGH (ref 70–99)
GLUCOSE BLDC GLUCOMTR-MCNC: 160 MG/DL — HIGH (ref 70–99)
GLUCOSE SERPL-MCNC: 106 MG/DL — HIGH (ref 70–99)
HCT VFR BLD CALC: 33.7 % — LOW (ref 34.5–45)
HGB BLD-MCNC: 10.5 G/DL — LOW (ref 11.5–15.5)
MCHC RBC-ENTMCNC: 25.9 PG — LOW (ref 27–34)
MCHC RBC-ENTMCNC: 31.2 GM/DL — LOW (ref 32–36)
MCV RBC AUTO: 83.2 FL — SIGNIFICANT CHANGE UP (ref 80–100)
PLATELET # BLD AUTO: 176 K/UL — SIGNIFICANT CHANGE UP (ref 150–400)
POTASSIUM SERPL-MCNC: 3.4 MMOL/L — LOW (ref 3.5–5.3)
POTASSIUM SERPL-SCNC: 3.4 MMOL/L — LOW (ref 3.5–5.3)
RBC # BLD: 4.05 M/UL — SIGNIFICANT CHANGE UP (ref 3.8–5.2)
RBC # FLD: 17.6 % — HIGH (ref 10.3–14.5)
SODIUM SERPL-SCNC: 130 MMOL/L — LOW (ref 135–145)
WBC # BLD: 15.28 K/UL — HIGH (ref 3.8–10.5)
WBC # FLD AUTO: 15.28 K/UL — HIGH (ref 3.8–10.5)

## 2024-09-08 PROCEDURE — 99232 SBSQ HOSP IP/OBS MODERATE 35: CPT

## 2024-09-08 RX ORDER — HYDRALAZINE HCL 50 MG
25 TABLET ORAL THREE TIMES A DAY
Refills: 0 | Status: DISCONTINUED | OUTPATIENT
Start: 2024-09-08 | End: 2024-09-09

## 2024-09-08 RX ORDER — PIPERACILLIN SODIUM AND TAZOBACTAM SODIUM 3; .375 G/15ML; G/15ML
3.38 INJECTION, POWDER, FOR SOLUTION INTRAVENOUS EVERY 12 HOURS
Refills: 0 | Status: DISCONTINUED | OUTPATIENT
Start: 2024-09-08 | End: 2024-09-12

## 2024-09-08 RX ADMIN — Medication 20 MILLIGRAM(S): at 05:05

## 2024-09-08 RX ADMIN — Medication 75 MILLILITER(S): at 18:08

## 2024-09-08 RX ADMIN — Medication 1 PATCH: at 21:48

## 2024-09-08 RX ADMIN — Medication 25 MILLIGRAM(S): at 21:41

## 2024-09-08 RX ADMIN — Medication 3 MILLIGRAM(S): at 22:10

## 2024-09-08 RX ADMIN — Medication 1 PATCH: at 06:47

## 2024-09-08 RX ADMIN — Medication 40 MILLIGRAM(S): at 05:05

## 2024-09-08 RX ADMIN — Medication 75 MILLILITER(S): at 09:38

## 2024-09-08 RX ADMIN — Medication 1 PATCH: at 09:41

## 2024-09-08 RX ADMIN — Medication 10 MILLIGRAM(S): at 05:05

## 2024-09-08 RX ADMIN — Medication 40 MILLIGRAM(S): at 18:07

## 2024-09-08 RX ADMIN — DILTIAZEM HYDROCHLORIDE 20 MILLIGRAM(S): 5 INJECTION INTRAVENOUS at 02:27

## 2024-09-08 RX ADMIN — PIPERACILLIN SODIUM AND TAZOBACTAM SODIUM 25 GRAM(S): 3; .375 INJECTION, POWDER, FOR SOLUTION INTRAVENOUS at 18:07

## 2024-09-08 RX ADMIN — DILTIAZEM HYDROCHLORIDE 20 MILLIGRAM(S): 5 INJECTION INTRAVENOUS at 21:42

## 2024-09-08 RX ADMIN — Medication 20 MILLIGRAM(S): at 02:27

## 2024-09-08 RX ADMIN — DILTIAZEM HYDROCHLORIDE 20 MILLIGRAM(S): 5 INJECTION INTRAVENOUS at 09:39

## 2024-09-08 RX ADMIN — Medication 10 MILLIGRAM(S): at 02:27

## 2024-09-08 RX ADMIN — Medication 1 PATCH: at 19:30

## 2024-09-08 RX ADMIN — Medication 20 MILLIGRAM(S): at 18:07

## 2024-09-08 RX ADMIN — Medication 20 MILLIGRAM(S): at 09:39

## 2024-09-08 RX ADMIN — Medication 1 MILLIGRAM(S): at 09:40

## 2024-09-08 RX ADMIN — Medication 25 MILLIGRAM(S): at 09:43

## 2024-09-08 RX ADMIN — Medication 10 MILLIGRAM(S): at 18:07

## 2024-09-08 RX ADMIN — DILTIAZEM HYDROCHLORIDE 20 MILLIGRAM(S): 5 INJECTION INTRAVENOUS at 18:07

## 2024-09-08 RX ADMIN — DILTIAZEM HYDROCHLORIDE 20 MILLIGRAM(S): 5 INJECTION INTRAVENOUS at 05:04

## 2024-09-08 RX ADMIN — Medication 20 MILLIGRAM(S): at 21:42

## 2024-09-08 RX ADMIN — Medication 20 MILLIGRAM(S): at 13:27

## 2024-09-08 RX ADMIN — Medication 1 PATCH: at 05:22

## 2024-09-08 RX ADMIN — DILTIAZEM HYDROCHLORIDE 20 MILLIGRAM(S): 5 INJECTION INTRAVENOUS at 13:26

## 2024-09-08 RX ADMIN — CHLORHEXIDINE GLUCONATE 1 APPLICATION(S): 40 SOLUTION TOPICAL at 09:40

## 2024-09-08 NOTE — PROGRESS NOTE ADULT - SUBJECTIVE AND OBJECTIVE BOX
NEPHROLOGY INTERVAL HPI/OVERNIGHT EVENTS:    Examined  No distress noted  Denies HA CP no SOB    MEDICATIONS  (STANDING):  albuterol    0.083%. 2.5 milliGRAM(s) Nebulizer once  benzocaine 20% Spray 1 Spray(s) Topical four times a day  chlorhexidine 2% Cloths 1 Application(s) Topical daily  cloNIDine Patch 0.3 mG/24Hr(s) 1 patch Transdermal every 7 days  dextrose 5% + sodium chloride 0.45%. 1000 milliLiter(s) (75 mL/Hr) IV Continuous <Continuous>  dextrose 5%. 1000 milliLiter(s) (100 mL/Hr) IV Continuous <Continuous>  dextrose 5%. 1000 milliLiter(s) (50 mL/Hr) IV Continuous <Continuous>  dextrose 50% Injectable 25 Gram(s) IV Push once  dextrose 50% Injectable 25 Gram(s) IV Push once  dextrose 50% Injectable 12.5 Gram(s) IV Push once  diltiazem Injectable 20 milliGRAM(s) IV Push every 4 hours  epoetin dustin-epbx (RETACRIT) Injectable 24113 Unit(s) IV Push <User Schedule>  folic acid 1 milliGRAM(s) Oral daily  hydrALAZINE 25 milliGRAM(s) Oral three times a day  insulin lispro (ADMELOG) corrective regimen sliding scale   SubCutaneous three times a day before meals  labetalol Injectable 20 milliGRAM(s) IV Push every 4 hours  lidocaine   4% Patch 1 Patch Transdermal daily  metoclopramide Injectable 10 milliGRAM(s) IV Push every 12 hours  pantoprazole  Injectable 40 milliGRAM(s) IV Push two times a day    MEDICATIONS  (PRN):  benzocaine/menthol Lozenge 1 Lozenge Oral every 4 hours PRN Sore Throat  dextrose Oral Gel 15 Gram(s) Oral once PRN Blood Glucose LESS THAN 70 milliGRAM(s)/deciliter  HYDROmorphone  Injectable 1 milliGRAM(s) IV Push every 4 hours PRN Severe Pain (7 - 10)  HYDROmorphone  Injectable 0.5 milliGRAM(s) IV Push every 4 hours PRN Moderate Pain (4 - 6)  labetalol Injectable 10 milliGRAM(s) IV Push every 2 hours PRN Systolic blood pressure >185  naloxone Injectable 0.1 milliGRAM(s) IV Push every 3 minutes PRN For ANY of the following changes in patient status:  A. RR LESS THAN 10 breaths per minute, B. Oxygen saturation LESS THAN 90%, C. Sedation score of 6  ondansetron Injectable 4 milliGRAM(s) IV Push every 6 hours PRN Nausea  ondansetron Injectable 4 milliGRAM(s) IV Push every 6 hours PRN Nausea and/or Vomiting  sodium chloride 0.9% lock flush 10 milliLiter(s) IV Push every 1 hour PRN Pre/post blood products, medications, blood draw, and to maintain line patency      Allergies    No Known Allergies    Intolerances        Vital Signs Last 24 Hrs  T(C): 36.8 (08 Sep 2024 07:53), Max: 37.6 (07 Sep 2024 19:31)  T(F): 98.3 (08 Sep 2024 07:53), Max: 99.6 (07 Sep 2024 19:31)  HR: 98 (08 Sep 2024 10:00) (98 - 110)  BP: 181/100 (08 Sep 2024 10:00) (121/59 - 208/84)  BP(mean): 125 (08 Sep 2024 08:00) (77 - 125)  RR: 16 (08 Sep 2024 10:00) (15 - 25)  SpO2: 100% (08 Sep 2024 10:00) (98% - 100%)    Parameters below as of 08 Sep 2024 10:00  Patient On (Oxygen Delivery Method): room air      Daily     Daily Weight in k.8 (07 Sep 2024 12:20)    PHYSICAL EXAM:  GENERAL: NAD  + NG tube , No JVD + RIJ raul cath neck site c/d/i  NERVOUS SYSTEM:  A/O x3  Lungs : No rales, No rhonchi,   HEART:  No murmur,  No rub, No gallops  ABDOMEN: dressed , dec BS , PD cath out     LABS:                        10.5   15.28 )-----------( 176      ( 08 Sep 2024 06:27 )             33.7     09-08    130<L>  |  94<L>  |  17.3  ----------------------------<  106<H>  3.4<L>   |  25.0  |  4.99<H>    Ca    7.3<L>      08 Sep 2024 06:27  Mg     1.9     -        Urinalysis Basic - ( 08 Sep 2024 06:27 )    Color: x / Appearance: x / SG: x / pH: x  Gluc: 106 mg/dL / Ketone: x  / Bili: x / Urobili: x   Blood: x / Protein: x / Nitrite: x   Leuk Esterase: x / RBC: x / WBC x   Sq Epi: x / Non Sq Epi: x / Bacteria: x              RADIOLOGY & ADDITIONAL TESTS:

## 2024-09-08 NOTE — PROGRESS NOTE ADULT - ASSESSMENT
62yo F pt w/ pmhx of ESRD on peritoneal dialysis 5x per week, HTN, HLD, DM presented to ED c/o worsening diffused abdominal pain with associated distention over the past week. Pt reports dialysis cath has not been draining for the last week. She attempts peritoneal dialysis but her unable to get any fluid return. She denies fever, chills, N/V/D, urinary sx, chest pain, SOB, or any other complaints.  (27 Aug 2024 03:04)  Has been constipated in the past    ESRD   Now w PD catheter complication - peritonitis   CT abd noted --> + SBO   s/p Ex-lap, FAREED,  SBR and PD cath removal  HD tomorrow will cont on MWF schedule --> will need eventual permacath   Abx as per ID     RO - Phoslo     HTN - Watch on meds     Anemia - cont  Epogen with HD, s/p transfusion two units prbc  w HD on 9/7, + FOBT  Cont folate  Ferritin 1100    Monitor labs will follow

## 2024-09-08 NOTE — PROGRESS NOTE ADULT - SUBJECTIVE AND OBJECTIVE BOX
Soto Stanton MD  [facility] Medicine  Contact via Teams or text/call at [phone]    Patient is a 63y old  Female who presents with a chief complaint of ESRD needing HD (07 Sep 2024 15:23)    Feels okay.  Denies headache, abdominal pain, sob.      Patient seen and examined at bedside. No overnight events reported.     ALLERGIES:  No Known Allergies    MEDICATIONS  (STANDING):  albuterol    0.083%. 2.5 milliGRAM(s) Nebulizer once  benzocaine 20% Spray 1 Spray(s) Topical four times a day  chlorhexidine 2% Cloths 1 Application(s) Topical daily  cloNIDine Patch 0.3 mG/24Hr(s) 1 patch Transdermal every 7 days  dextrose 5% + sodium chloride 0.45%. 1000 milliLiter(s) (75 mL/Hr) IV Continuous <Continuous>  dextrose 5%. 1000 milliLiter(s) (100 mL/Hr) IV Continuous <Continuous>  dextrose 5%. 1000 milliLiter(s) (50 mL/Hr) IV Continuous <Continuous>  dextrose 50% Injectable 25 Gram(s) IV Push once  dextrose 50% Injectable 25 Gram(s) IV Push once  dextrose 50% Injectable 12.5 Gram(s) IV Push once  diltiazem Injectable 20 milliGRAM(s) IV Push every 4 hours  epoetin dustin-epbx (RETACRIT) Injectable 54029 Unit(s) IV Push <User Schedule>  folic acid 1 milliGRAM(s) Oral daily  hydrALAZINE 25 milliGRAM(s) Oral three times a day  hydrALAZINE Injectable 10 milliGRAM(s) IV Push every 4 hours  insulin lispro (ADMELOG) corrective regimen sliding scale   SubCutaneous three times a day before meals  labetalol Injectable 20 milliGRAM(s) IV Push every 4 hours  lidocaine   4% Patch 1 Patch Transdermal daily  metoclopramide Injectable 10 milliGRAM(s) IV Push every 12 hours  pantoprazole  Injectable 40 milliGRAM(s) IV Push two times a day    MEDICATIONS  (PRN):  benzocaine/menthol Lozenge 1 Lozenge Oral every 4 hours PRN Sore Throat  dextrose Oral Gel 15 Gram(s) Oral once PRN Blood Glucose LESS THAN 70 milliGRAM(s)/deciliter  HYDROmorphone  Injectable 0.5 milliGRAM(s) IV Push every 4 hours PRN Moderate Pain (4 - 6)  HYDROmorphone  Injectable 1 milliGRAM(s) IV Push every 4 hours PRN Severe Pain (7 - 10)  labetalol Injectable 10 milliGRAM(s) IV Push every 2 hours PRN Systolic blood pressure >185  naloxone Injectable 0.1 milliGRAM(s) IV Push every 3 minutes PRN For ANY of the following changes in patient status:  A. RR LESS THAN 10 breaths per minute, B. Oxygen saturation LESS THAN 90%, C. Sedation score of 6  ondansetron Injectable 4 milliGRAM(s) IV Push every 6 hours PRN Nausea  ondansetron Injectable 4 milliGRAM(s) IV Push every 6 hours PRN Nausea and/or Vomiting  sodium chloride 0.9% lock flush 10 milliLiter(s) IV Push every 1 hour PRN Pre/post blood products, medications, blood draw, and to maintain line patency    Vital Signs Last 24 Hrs  T(F): 98.7 (08 Sep 2024 04:00), Max: 99.6 (07 Sep 2024 19:31)  HR: 101 (08 Sep 2024 06:00) (98 - 110)  BP: 167/83 (08 Sep 2024 06:00) (121/59 - 208/84)  RR: 17 (08 Sep 2024 06:00) (15 - 25)  SpO2: 99% (08 Sep 2024 06:00) (98% - 100%)  I&O's Summary    07 Sep 2024 07:01  -  08 Sep 2024 07:00  --------------------------------------------------------  IN: 2709 mL / OUT: 230 mL / NET: 2479 mL    PHYSICAL EXAM:  General: NAD, A/O x 3  ENT: No gross hearing impairment, Moist mucous membranes, no thrush  Neck: Supple, No JVD  Lungs: Clear to auscultation bilaterally, good air entry, non-labored breathing  Cardio: RRR, S1/S2, No murmur  Abdomen: Soft, Nontender, mild distension; Bowel sounds present  Extremities: No calf tenderness, No cyanosis, No pitting edema  Psych: Appropriate mood and affect    LABS:                        10.5   15.28 )-----------( 176      ( 08 Sep 2024 06:27 )             33.7     09-08    130  |  94  |  17.3  ----------------------------<  106  3.4   |  25.0  |  4.99    Ca    7.3      08 Sep 2024 06:27  Mg     1.9     09-07    POCT Blood Glucose.: 114 mg/dL (07 Sep 2024 21:28)  POCT Blood Glucose.: 121 mg/dL (07 Sep 2024 18:10)  POCT Blood Glucose.: 95 mg/dL (07 Sep 2024 11:59)  POCT Blood Glucose.: 120 mg/dL (07 Sep 2024 08:12)    Urinalysis Basic - ( 08 Sep 2024 06:27 )    Color: x / Appearance: x / SG: x / pH: x  Gluc: 106 mg/dL / Ketone: x  / Bili: x / Urobili: x   Blood: x / Protein: x / Nitrite: x   Leuk Esterase: x / RBC: x / WBC x   Sq Epi: x / Non Sq Epi: x / Bacteria: x    RADIOLOGY & ADDITIONAL TESTS:    Care Discussed with Consultants/Other Providers:

## 2024-09-08 NOTE — PROGRESS NOTE ADULT - ASSESSMENT
63y Female w/ PMH of ESRD on peritoneal dialysis 5 x per week, HTN, HLD, and DM admitted for peritonitis and peritoneal dialysis catheter dysfunction, needing HD. Now found to have SBO s/p ex-lap. Strict NPO with NGT to LIS. Now with hypertensive Urgency being managed on IVP anti-htn meds.     PD catheter associated peritonitis  PD catheter malfunction  SBO POD #7  - 8/30/24- Exploratory laparotomy with lysis of adhesions. Suspected area of small bowel perforation resected, approximately 15cm. Side-to-side small bowel anastomosis. PD catheter removed.  - NPO, IVF at 50cc/hr   - Dilaudid IVP PRN pain scale  - Inc spirometry, Mobilize  - Peritoneal fluid culture with Achromobacter xylosoxidans  - On Zosyn per ID   - s/p NGT removed    - Diet advanced to renal     GI bleed likely from prior surgery  - hemoglobin improved s/p 1 unit PRBC 9/7/24  - GI consult recs appreciated. No intervention at this time   - PPI bid  - Monitor for hematochezia    ESRD on Peritoneal Dialysis, but PD catheter removed  Hypokalemia  Hypernatremia   - on HD (8/29) via Right IJ Shiley  - HD per renal    Pericardial effusion on CTA chest:  - Appreciate cards recs  - Hemodyanmically stable, likely 2/2 uremia  - Fu echo-Normal biventricular function, small to mod pericardial effusion. Recs to cw HD for it     Hypertensive Urgency  - start oral hydralazine 25 mg TID, stop iv hydralaziine  - cont with prn Labetalol 20mg IVP, Cardizem 20mg IVP q4h  - Labetalol 10mg ivp q2h prn   - clonidine patch increased to 0.3mg/qweekly  - Hydralazine PRN     Urinary retention  - with lobo currently   - Urology follow up     DM2- controlled  - c/w insulin lispro    HLD  - c/w statin    DVT ppx: SCD    Disposition: Medically active. SBO s/p laparotomy. On IV abx. Now on renal diet

## 2024-09-09 LAB
ANION GAP SERPL CALC-SCNC: 11 MMOL/L — SIGNIFICANT CHANGE UP (ref 5–17)
BUN SERPL-MCNC: 25.5 MG/DL — HIGH (ref 8–20)
CALCIUM SERPL-MCNC: 7.1 MG/DL — LOW (ref 8.4–10.5)
CHLORIDE SERPL-SCNC: 98 MMOL/L — SIGNIFICANT CHANGE UP (ref 96–108)
CO2 SERPL-SCNC: 24 MMOL/L — SIGNIFICANT CHANGE UP (ref 22–29)
CREAT SERPL-MCNC: 6.65 MG/DL — HIGH (ref 0.5–1.3)
EGFR: 7 ML/MIN/1.73M2 — LOW
GLUCOSE BLDC GLUCOMTR-MCNC: 105 MG/DL — HIGH (ref 70–99)
GLUCOSE BLDC GLUCOMTR-MCNC: 127 MG/DL — HIGH (ref 70–99)
GLUCOSE BLDC GLUCOMTR-MCNC: 131 MG/DL — HIGH (ref 70–99)
GLUCOSE BLDC GLUCOMTR-MCNC: 174 MG/DL — HIGH (ref 70–99)
GLUCOSE SERPL-MCNC: 102 MG/DL — HIGH (ref 70–99)
HCT VFR BLD CALC: 32.6 % — LOW (ref 34.5–45)
HGB BLD-MCNC: 10.3 G/DL — LOW (ref 11.5–15.5)
MCHC RBC-ENTMCNC: 26.1 PG — LOW (ref 27–34)
MCHC RBC-ENTMCNC: 31.6 GM/DL — LOW (ref 32–36)
MCV RBC AUTO: 82.7 FL — SIGNIFICANT CHANGE UP (ref 80–100)
PLATELET # BLD AUTO: 175 K/UL — SIGNIFICANT CHANGE UP (ref 150–400)
POTASSIUM SERPL-MCNC: 3.4 MMOL/L — LOW (ref 3.5–5.3)
POTASSIUM SERPL-SCNC: 3.4 MMOL/L — LOW (ref 3.5–5.3)
RBC # BLD: 3.94 M/UL — SIGNIFICANT CHANGE UP (ref 3.8–5.2)
RBC # FLD: 17.6 % — HIGH (ref 10.3–14.5)
SODIUM SERPL-SCNC: 133 MMOL/L — LOW (ref 135–145)
WBC # BLD: 10.83 K/UL — HIGH (ref 3.8–10.5)
WBC # FLD AUTO: 10.83 K/UL — HIGH (ref 3.8–10.5)

## 2024-09-09 PROCEDURE — 99232 SBSQ HOSP IP/OBS MODERATE 35: CPT

## 2024-09-09 RX ORDER — HYDRALAZINE HCL 50 MG
50 TABLET ORAL THREE TIMES A DAY
Refills: 0 | Status: DISCONTINUED | OUTPATIENT
Start: 2024-09-09 | End: 2024-09-10

## 2024-09-09 RX ORDER — CLONIDINE HCL 0.2 MG
0.2 TABLET ORAL ONCE
Refills: 0 | Status: COMPLETED | OUTPATIENT
Start: 2024-09-09 | End: 2024-09-09

## 2024-09-09 RX ADMIN — DILTIAZEM HYDROCHLORIDE 20 MILLIGRAM(S): 5 INJECTION INTRAVENOUS at 11:28

## 2024-09-09 RX ADMIN — DILTIAZEM HYDROCHLORIDE 20 MILLIGRAM(S): 5 INJECTION INTRAVENOUS at 05:01

## 2024-09-09 RX ADMIN — Medication 20 MILLIGRAM(S): at 05:02

## 2024-09-09 RX ADMIN — Medication 25 MILLIGRAM(S): at 05:02

## 2024-09-09 RX ADMIN — Medication 10 MILLIGRAM(S): at 05:02

## 2024-09-09 RX ADMIN — CHLORHEXIDINE GLUCONATE 1 APPLICATION(S): 40 SOLUTION TOPICAL at 13:56

## 2024-09-09 RX ADMIN — DILTIAZEM HYDROCHLORIDE 20 MILLIGRAM(S): 5 INJECTION INTRAVENOUS at 22:17

## 2024-09-09 RX ADMIN — DILTIAZEM HYDROCHLORIDE 20 MILLIGRAM(S): 5 INJECTION INTRAVENOUS at 02:20

## 2024-09-09 RX ADMIN — Medication 50 MILLIGRAM(S): at 21:07

## 2024-09-09 RX ADMIN — Medication 0.2 MILLIGRAM(S): at 13:01

## 2024-09-09 RX ADMIN — Medication 1 MILLIGRAM(S): at 12:35

## 2024-09-09 RX ADMIN — Medication 50 MILLIGRAM(S): at 15:46

## 2024-09-09 RX ADMIN — Medication 40 MILLIGRAM(S): at 05:01

## 2024-09-09 RX ADMIN — Medication 1 PATCH: at 19:46

## 2024-09-09 RX ADMIN — Medication 10 MILLIGRAM(S): at 18:11

## 2024-09-09 RX ADMIN — Medication 40 MILLIGRAM(S): at 18:11

## 2024-09-09 RX ADMIN — Medication 20 MILLIGRAM(S): at 02:19

## 2024-09-09 RX ADMIN — PIPERACILLIN SODIUM AND TAZOBACTAM SODIUM 25 GRAM(S): 3; .375 INJECTION, POWDER, FOR SOLUTION INTRAVENOUS at 09:16

## 2024-09-09 RX ADMIN — Medication 1 PATCH: at 08:11

## 2024-09-09 RX ADMIN — Medication 10 MILLIGRAM(S): at 03:09

## 2024-09-09 RX ADMIN — DILTIAZEM HYDROCHLORIDE 20 MILLIGRAM(S): 5 INJECTION INTRAVENOUS at 15:47

## 2024-09-09 RX ADMIN — Medication 300 MILLIGRAM(S): at 21:08

## 2024-09-09 RX ADMIN — Medication 25 MILLIGRAM(S): at 11:25

## 2024-09-09 RX ADMIN — DILTIAZEM HYDROCHLORIDE 20 MILLIGRAM(S): 5 INJECTION INTRAVENOUS at 18:11

## 2024-09-09 RX ADMIN — Medication 200 MILLIGRAM(S): at 12:36

## 2024-09-09 NOTE — PROGRESS NOTE ADULT - SUBJECTIVE AND OBJECTIVE BOX
NEPHROLOGY INTERVAL HPI/OVERNIGHT EVENTS:    Examined  No distress noted  Denies HA CP no SOB  HD today  BP still high    MEDICATIONS  (STANDING):  albuterol    0.083%. 2.5 milliGRAM(s) Nebulizer once  benzocaine 20% Spray 1 Spray(s) Topical four times a day  chlorhexidine 2% Cloths 1 Application(s) Topical daily  cloNIDine Patch 0.3 mG/24Hr(s) 1 patch Transdermal every 7 days  dextrose 5%. 1000 milliLiter(s) (100 mL/Hr) IV Continuous <Continuous>  dextrose 5%. 1000 milliLiter(s) (50 mL/Hr) IV Continuous <Continuous>  dextrose 50% Injectable 25 Gram(s) IV Push once  dextrose 50% Injectable 25 Gram(s) IV Push once  dextrose 50% Injectable 12.5 Gram(s) IV Push once  diltiazem Injectable 20 milliGRAM(s) IV Push every 4 hours  epoetin dustin-epbx (RETACRIT) Injectable 64393 Unit(s) IV Push <User Schedule>  folic acid 1 milliGRAM(s) Oral daily  hydrALAZINE 25 milliGRAM(s) Oral three times a day  insulin lispro (ADMELOG) corrective regimen sliding scale   SubCutaneous three times a day before meals  labetalol 200 milliGRAM(s) Oral three times a day  lidocaine   4% Patch 1 Patch Transdermal daily  metoclopramide Injectable 10 milliGRAM(s) IV Push every 12 hours  pantoprazole  Injectable 40 milliGRAM(s) IV Push two times a day  piperacillin/tazobactam IVPB.. 3.375 Gram(s) IV Intermittent every 12 hours    MEDICATIONS  (PRN):  benzocaine/menthol Lozenge 1 Lozenge Oral every 4 hours PRN Sore Throat  dextrose Oral Gel 15 Gram(s) Oral once PRN Blood Glucose LESS THAN 70 milliGRAM(s)/deciliter  HYDROmorphone  Injectable 1 milliGRAM(s) IV Push every 4 hours PRN Severe Pain (7 - 10)  HYDROmorphone  Injectable 0.5 milliGRAM(s) IV Push every 4 hours PRN Moderate Pain (4 - 6)  labetalol Injectable 10 milliGRAM(s) IV Push every 2 hours PRN Systolic blood pressure >185  naloxone Injectable 0.1 milliGRAM(s) IV Push every 3 minutes PRN For ANY of the following changes in patient status:  A. RR LESS THAN 10 breaths per minute, B. Oxygen saturation LESS THAN 90%, C. Sedation score of 6  ondansetron Injectable 4 milliGRAM(s) IV Push every 6 hours PRN Nausea  ondansetron Injectable 4 milliGRAM(s) IV Push every 6 hours PRN Nausea and/or Vomiting  sodium chloride 0.9% lock flush 10 milliLiter(s) IV Push every 1 hour PRN Pre/post blood products, medications, blood draw, and to maintain line patency      Allergies    No Known Allergies    Intolerances        Vital Signs Last 24 Hrs  T(C): 36.9 (09 Sep 2024 12:45), Max: 37.1 (08 Sep 2024 21:35)  T(F): 98.5 (09 Sep 2024 12:45), Max: 98.7 (08 Sep 2024 21:35)  HR: 96 (09 Sep 2024 12:50) (83 - 106)  BP: 222/152 (09 Sep 2024 12:50) (143/68 - 222/152)  BP(mean): 115 (09 Sep 2024 08:00) (87 - 135)  RR: 18 (09 Sep 2024 11:28) (15 - 19)  SpO2: 100% (09 Sep 2024 11:28) (96% - 100%)    Parameters below as of 09 Sep 2024 11:28  Patient On (Oxygen Delivery Method): room air      Daily     Daily Weight in k.6 (09 Sep 2024 10:15)    PHYSICAL EXAM:  GENERAL: NAD  + NG tube , No JVD + RIJ raul cath neck site c/d/i  NERVOUS SYSTEM:  A/O x3  Lungs : No rales, No rhonchi,   HEART:  No murmur,  No rub, No gallops  ABDOMEN: dressed , dec BS , PD cath out       LABS:                        10.3   10.83 )-----------( 175      ( 09 Sep 2024 10:32 )             32.6     09-    133<L>  |  98  |  25.5<H>  ----------------------------<  102<H>  3.4<L>   |  24.0  |  6.65<H>    Ca    7.1<L>      09 Sep 2024 10:32        Urinalysis Basic - ( 09 Sep 2024 10:32 )    Color: x / Appearance: x / SG: x / pH: x  Gluc: 102 mg/dL / Ketone: x  / Bili: x / Urobili: x   Blood: x / Protein: x / Nitrite: x   Leuk Esterase: x / RBC: x / WBC x   Sq Epi: x / Non Sq Epi: x / Bacteria: x              RADIOLOGY & ADDITIONAL TESTS:

## 2024-09-09 NOTE — PROGRESS NOTE ADULT - TIME BILLING
Pain Management - chart review, patient interview
Patient : Melody Pathak Age: 35 year old Sex: female   MRN: [id number] Encounter Date: 1/7/2021      History     Chief Complaint   Patient presents with   • Hip Pain   • Headache Recurrent or Known DX Migraines     HPI    35-year-old female with history of lupus presents to the ER with headache that started today and chronic left hip pain.  Patient states she has chronic left hip pain secondary to her lupus she does have doctor that she follows with that she is visiting from [place].  Denies any trauma or fall.  Denies any leg swelling.  No history of DVT or PE in the past.  No nausea vomiting diarrhea.  Also states was a gradual onset headache that is currently not out of 10.  She takes tramadol for the pain which relieves her pain however she is out of tramadol as she is visiting.  Denies any numbness or tingling weakness denies any changes of vision.  Headache is located frontally.    No Known Allergies    Current Discharge Medication List      New Prescriptions    Details   traMADol (ULTRAM) 50 MG tablet Take 1 tablet by mouth every 4 hours as needed for Pain.  Qty: 10 tablet, Refills: 0             Past Medical History:   Diagnosis Date   • Lupus (CMS/HCC)        No past surgical history on file.    No family history on file.    Social History     Tobacco Use   • Smoking status: Not on file   Substance Use Topics   • Alcohol use: Not on file   • Drug use: Not on file       Review of Systems   Constitutional: Negative.    HENT:        Headache   Eyes: Negative.    Respiratory: Negative.    Cardiovascular: Negative.    Gastrointestinal: Negative.    Endocrine: Negative.    Genitourinary: Negative.    Musculoskeletal: Negative.    Allergic/Immunologic: Negative.    Neurological: Negative.    Hematological: Negative.    Psychiatric/Behavioral: Negative.    All other systems reviewed and are negative.      Physical Exam     ED Triage Vitals [01/07/21 1819]   ED Triage Vitals Group      Temp 97.8 °F (36.6 °C)      Heart 
patient eval, reviewing labs, notes, orders, images, medications and coordinating care
reviewing labs, notes, orders, images, medications and coordinating care
reviewing labs, notes, orders, images, medications and coordinating care
Chart review, interpretation of laboratory and imaging results,  patient evaluation, medication review, documentation, coordination with medical team. Interpretation  and review of microbiologic data. Overseeing antibiotic therapy.
Rate 81      Resp 16      BP (!) 128/98      SpO2 100 %      EtCO2 mmHg       Height 5' 2\" (1.575 m)      Weight 185 lb 6.5 oz (84.1 kg)      Weight Scale Used Standing scale      BMI (Calculated) 33.91      IBW/kg (Calculated) 50.1       Physical Exam  Vitals signs reviewed.   Constitutional:       Appearance: Normal appearance.   HENT:      Head: Normocephalic and atraumatic.      Right Ear: Tympanic membrane normal.      Left Ear: Tympanic membrane normal.      Nose: Nose normal.      Mouth/Throat:      Mouth: Mucous membranes are dry.   Eyes:      Extraocular Movements: Extraocular movements intact.      Pupils: Pupils are equal, round, and reactive to light.   Neck:      Musculoskeletal: Normal range of motion and neck supple.   Cardiovascular:      Rate and Rhythm: Normal rate and regular rhythm.      Pulses: Normal pulses.      Heart sounds: Normal heart sounds.   Pulmonary:      Effort: Pulmonary effort is normal.      Breath sounds: Normal breath sounds.   Abdominal:      General: Abdomen is flat.   Genitourinary:     General: Normal vulva.   Musculoskeletal:      Comments: Patient does have some mild pain on range of motion of her left hip.  No pain on palpation.  No swelling.  Neurovascular intact distally.  2+ DP pulses.   Skin:     General: Skin is warm.      Capillary Refill: Capillary refill takes less than 2 seconds.   Neurological:      General: No focal deficit present.      Mental Status: She is alert and oriented to person, place, and time.   Psychiatric:         Mood and Affect: Mood normal.         ED Course     Procedures    Lab Results     Results for orders placed or performed during the hospital encounter of 01/07/21   Basic Metabolic Panel   Result Value Ref Range    Fasting Status      Sodium 140 135 - 145 mmol/L    Potassium 4.1 3.4 - 5.1 mmol/L    Chloride 105 98 - 107 mmol/L    Carbon Dioxide 25 21 - 32 mmol/L    Anion Gap 14 10 - 20 mmol/L    Glucose 99 65 - 99 mg/dL    BUN 15 6 - 20 
Time spent reviewing the chart documentation, reviewing labs and imaging studies, evaluating the patient, discussing the plan of care with the consultants & medical team, and documenting.
mg/dL    Creatinine 1.05 (H) 0.51 - 0.95 mg/dL    Glomerular Filtration Rate 81 (L) >90 mL/min/1.73m2    BUN/ Creatinine Ratio 14 7 - 25    Calcium 9.4 8.4 - 10.2 mg/dL   CBC with Automated Differential (performable only)   Result Value Ref Range    WBC 8.4 4.2 - 11.0 K/mcL    RBC 4.16 4.00 - 5.20 mil/mcL    HGB 11.7 (L) 12.0 - 15.5 g/dL    HCT 36.4 36.0 - 46.5 %    MCV 87.5 78.0 - 100.0 fl    MCH 28.1 26.0 - 34.0 pg    MCHC 32.1 32.0 - 36.5 g/dL    RDW-CV 12.0 11.0 - 15.0 %    RDW-SD 38.2 (L) 39.0 - 50.0 fL     140 - 450 K/mcL    NRBC 0 <=0 /100 WBC    Neutrophil, Percent 65 %    Lymphocytes, Percent 25 %    Mono, Percent 7 %    Eosinophils, Percent 2 %    Basophils, Percent 1 %    Immature Granulocytes 0 %    Absolute Neutrophils 5.5 1.8 - 7.7 K/mcL    Absolute Lymphocytes 2.1 1.0 - 4.8 K/mcL    Absolute Monocytes 0.6 0.3 - 0.9 K/mcL    Absolute Eosinophils  0.2 0.0 - 0.5 K/mcL    Absolute Basophils 0.0 0.0 - 0.3 K/mcL    Absolute Immmature Granulocytes 0.0 0.0 - 0.2 K/mcL       EKG Results       Radiology Results     Imaging Results          XR HIP LEFT 2 VIEWS W PELVIS 1 VIEW (Final result)  Result time 01/07/21 22:12:43    Final result                 Impression:    Osteonecrosis of the left femoral head with collapse of the subchondral bone.  Severe cartilage loss in the left hip joint likely reflecting secondary osteoarthritis.    Electronically Signed by: VANE GARZA   Signed on: 1/7/2021 10:12 PM                Narrative:    XR HIP 2 VIEWS LEFT W PELVIS 1 VIEW : 1/7/2021 9:56 PM    HISTORY:  Left hip pain.    COMPARISON: None.    FINDINGS:     The pelvic ring is intact.  There is severe collapse and some fragmentation of the reticular surface of the left femoral head consistent with osteonecrosis.  Severe cartilage loss is present throughout the left hip, possibly secondary osteoarthritis.    Remodeling of the acetabulum.  Marginal osteophytes are present.  Right hip is radiographically normal.  
patient eval, reviewing labs, notes, orders, images, medications and coordinating care
The pelvic ring is intact.  An IUD projects in the mid pelvis.                                 ED Medication Orders (From admission, onward)    Ordered Start     Status Ordering Provider    01/07/21 2155 01/07/21 2156  metoCLOPramide (REGLAN) injection 10 mg  ONCE      Last MAR action: Given ACOSTA MCGARRY    01/07/21 2155 01/07/21 2156  ketorolac injection 15 mg  ONCE      Last MAR action: Given ACOSTA MCGARRY    01/07/21 2155 01/07/21 2156  diphenhydrAMINE (BENADRYL) injection 25 mg  ONCE      Last MAR action: Given ACOSTA MCGARRY    01/07/21 2155 01/07/21 2156  sodium chloride (NORMAL SALINE) 0.9 % bolus 1,000 mL  ONCE      Last MAR action: New Bag ACOSTA MCGARRY  Number of Diagnoses or Management Options  Left hip pain:   Post-traumatic headache, not intractable, unspecified chronicity pattern:   Diagnosis management comments: 35-year-old female with history of lupus presents to the ER with headache that started today and chronic left hip pain,.  Given her history of lupus there is concerned that the left hip pain may be secondary to avascular necrosis will obtain x-ray.  She had no trauma so I doubt that is a fracture.  I do not think this is a DVT either.  She also has a headache, without any focal neurological deficits or neurological symptoms.  We will treat her conservatively with Reglan, Toradol, Benadryl.  Give her some fluids.  This is a subarachnoid hemorrhage either so we will not obtain CT scan at this time.  If she feels better after the above we will discharge her home.    Patient discharged home, d/w with patient to follow up with her orthopedic surgeon and rheumatologist in [place].         Clinical Impression     ED Diagnosis   1. Post-traumatic headache, not intractable, unspecified chronicity pattern     2. Left hip pain         Disposition        Discharge 1/7/2021 11:18 PM  Melody Pathak discharge to home/self care.                         Acosta Mcgarry 
MD  01/07/21 [id number]    
Pain Management - chart review, patient interview
Time spent reviewing the chart documentation, reviewing labs and imaging studies, evaluating the patient, discussing the plan of care with the consultants & medical team, and documenting.
patient eval, reviewing labs, notes, orders, images, medications and coordinating care
Time spent reviewing the chart documentation, reviewing labs and imaging studies, evaluating the patient, discussing the plan of care with the consultants & medical team, and documenting.
Time spent reviewing the chart documentation, reviewing labs and imaging studies, evaluating the patient, discussing the plan of care with the consultants & medical team, and documenting.

## 2024-09-09 NOTE — PROGRESS NOTE ADULT - ASSESSMENT
63y Female w/ PMH of ESRD on peritoneal dialysis 5 x per week, HTN, HLD, and DM admitted for peritonitis and peritoneal dialysis catheter dysfunction, needing HD. Now found to have SBO s/p ex-lap. Strict NPO with NGT to LIS. Now with hypertensive Urgency being managed on IVP anti-htn meds.     PD catheter associated peritonitis  PD catheter malfunction  SBO POD #7  - 8/30/24- Exploratory laparotomy with lysis of adhesions. Suspected area of small bowel perforation resected, approximately 15cm. Side-to-side small bowel anastomosis. PD catheter removed.  - NPO, IVF at 50cc/hr   - Dilaudid IVP PRN pain scale  - Inc spirometry, Mobilize  - Peritoneal fluid culture with Achromobacter xylosoxidans  - s/p NGT removed    - Diet advanced to renal   - cont antibiotics until 9/13/24 per ID     GI bleed likely from prior surgery  - hemoglobin improved s/p 1 unit PRBC 9/7/24  - GI consult recs appreciated. No intervention at this time   - PPI bid  - Monitor for hematochezia    ESRD on Peritoneal Dialysis, but PD catheter removed  Hypokalemia  Hypernatremia   - on HD (8/29) via Right IJ Shiley  - HD per renal    Pericardial effusion on CTA chest:  - Appreciate cards recs  - Hemodyanmically stable, likely 2/2 uremia  - Fu echo-Normal biventricular function, small to mod pericardial effusion. Recs to cw HD for it     Hypertensive Urgency  -  oral hydralazine 25 mg TID, stop iv hydralaziine  - start labetalol 200 mg TID, stop iv standing labetalol  - cont with prn Labetalol 20mg IVP, Cardizem 20mg IVP q4h  - clonidine patch increased to 0.3mg/qweekly  - Hydralazine PRN     Urinary retention  - with lobo currently   - Urology follow up     DM2- controlled  - c/w insulin lispro    HLD  - c/w statin    DVT ppx: SCD    Disposition: improving, still medically active but hopeful for discharge in 48-72 hrs

## 2024-09-09 NOTE — PROGRESS NOTE ADULT - SUBJECTIVE AND OBJECTIVE BOX
[facility]  INFECTIOUS DISEASES at [place] [facility] [place]  ===============================================================                  George Tobias MD               Diplomates American Board of Internal Medicine & Infectious Diseases                * [place] Office - Appt - Tel  [phone] Fax [phone]                * [place] Office - Appt - Tel [phone] Fax [phone]                                  Hospital Consult line:  [phone]  ==============================================================    ADALID CASE [id number]    Follow up: peritonitis associated with PD catheter, s/p removal     Seen during dialysis   No acute events  Afebrile   hemodynamically stable     I have personally reviewed the labs and data; pertinent labs and data are listed in this note; please see below.     _______________________________________________________________  REVIEW OF SYSTEMS  Feeling so much better. Eager to go home. Tolerating regular diet. Having BMs. No fever or chills   ________________________________________________________________  Allergies:  No Known Allergies    ________________________________________________________________  PHYSICAL EXAM  GEN: chronically ill appearing, in NAD, lying in bed.   HEENT: Anicteric sclerae. Moist mucous membranes.   NECK: Supple.   LUNGS: eupneic. CTA B/L.  HEART: RRR, no m/r/g  ABDOMEN: Soft, NT, ND, midline surgical incision covered.    : No Paredes catheter  EXTREMITIES: well perfused, without  edema.  NEUROLOGIC: no motor focal deficitis   PSYCHIATRIC: Appropriate affect and mood  LINES: PIV, RIJ Joseph   ________________________________________________________________  Vitals:  T(F): 98.5 (09 Sep 2024 10:15), Max: 98.7 (08 Sep 2024 21:35)  HR: 99 (09 Sep 2024 10:15)  BP: 200/103 (09 Sep 2024 10:15)  RR: 18 (09 Sep 2024 10:15)  SpO2: 99% (09 Sep 2024 10:15) (96% - 100%)  temp max in last 48H T(F): , Max: 99.6 (09-07-24 @ 19:31)    Current Antibiotics:  piperacillin/tazobactam IVPB.. 3.375 Gram(s) IV Intermittent every 12 hours    Other medications:  albuterol    0.083%. 2.5 milliGRAM(s) Nebulizer once  benzocaine 20% Spray 1 Spray(s) Topical four times a day  chlorhexidine 2% Cloths 1 Application(s) Topical daily  cloNIDine Patch 0.3 mG/24Hr(s) 1 patch Transdermal every 7 days  dextrose 5%. 1000 milliLiter(s) IV Continuous <Continuous>  dextrose 5%. 1000 milliLiter(s) IV Continuous <Continuous>  dextrose 50% Injectable 12.5 Gram(s) IV Push once  dextrose 50% Injectable 25 Gram(s) IV Push once  dextrose 50% Injectable 25 Gram(s) IV Push once  diltiazem Injectable 20 milliGRAM(s) IV Push every 4 hours  epoetin dustin-epbx (RETACRIT) Injectable 06008 Unit(s) IV Push <User Schedule>  folic acid 1 milliGRAM(s) Oral daily  hydrALAZINE 25 milliGRAM(s) Oral three times a day  insulin lispro (ADMELOG) corrective regimen sliding scale   SubCutaneous three times a day before meals  labetalol Injectable 20 milliGRAM(s) IV Push every 4 hours  lidocaine   4% Patch 1 Patch Transdermal daily  metoclopramide Injectable 10 milliGRAM(s) IV Push every 12 hours  pantoprazole  Injectable 40 milliGRAM(s) IV Push two times a day                            10.3   10.83 )-----------( 175      ( 09 Sep 2024 10:32 )             32.6     09-09    133<L>  |  98  |  25.5<H>  ----------------------------<  102<H>  3.4<L>   |  24.0  |  6.65<H>    Ca    7.1<L>      09 Sep 2024 10:32      RECENT CULTURES:  08-30 @ 15:19 Tissue Fibrinous Exudate for Culture Achromobacter xylosoxidans    Rare Achromobacter xylosoxidans  - Amikacin: R >32  - Aztreonam: R >16  - Cefepime: I 16  - Ceftriaxone: I 32  - Ciprofloxacin: S 1  - Gentamicin: R >8  - Levofloxacin: S 1  - Meropenem: S <=1  - Piperacillin/Tazobactam: S <=8  - Tobramycin: R >8- Trimethoprim/Sulfamethoxazole: S <=0.5/9.5    Rare polymorphonuclear leukocytes seen per low power field  No organisms seen per oil power field      08-30 @ 15:15 Surgical Swab Peritoneal Fluid     No growth at 5 days      08-27 @ 11:52 .Blood Blood-Peripheral     No growth at 5 days      08-27 @ 11:50 .Blood Blood-Peripheral     No growth at 5 days      08-27 @ 00:00 Peritoneal Peritoneal Fluid Achromobacter xylosoxidans    Few Achromobacter xylosoxidans    No polymorphonuclear leukocytes  No organisms seen  by cytocentrifuge        WBC Count: 10.83 K/uL (09-09-24 @ 10:32)  WBC Count: 15.28 K/uL (09-08-24 @ 06:27)  WBC Count: 13.61 K/uL (09-07-24 @ 04:43)  WBC Count: 16.05 K/uL (09-06-24 @ 04:34)  WBC Count: 18.38 K/uL (09-05-24 @ 13:58)  WBC Count: 9.62 K/uL (09-05-24 @ 04:50)    Creatinine: 6.65 mg/dL (09-09-24 @ 10:32)  Creatinine: 4.99 mg/dL (09-08-24 @ 06:27)  Creatinine: 7.09 mg/dL (09-07-24 @ 04:43)  Creatinine: 5.19 mg/dL (09-06-24 @ 04:34)  Creatinine: 5.56 mg/dL (09-05-24 @ 13:58)  Creatinine: 8.86 mg/dL (09-05-24 @ 04:50)    _______________________________________________________________  CARDIOLOGY   < from: TTE W or WO Ultrasound Enhancing Agent (09.04.24 @ 20:14) >  CONCLUSIONS:      1. Mild left ventricular hypertrophy.   2. Left ventricular cavity is small. Left ventricular systolic function is hyperdynamic with an ejection fraction visually estimated at >75 %.   3. The left ventricular diastolic function is indeterminate. Analysis of left ventricular diastolic function and filling pressure is made challenging by the presence of tachycardia.   4. Normal right ventricular cavity size and normal right ventricular systolic function.   5. Leftatrium is normal in size.   6. The right atrium is normal in size.   7. Thickened mitral valve leaflets.   8. Estimated pulmonary artery systolic pressure is 29 mmHg, normal pulmonary artery pressure.   9. Small-moderate pericardial effusion posterior to the LV apex, small pericardial effusion noted adjacent to the posterior left ventricle and small pericardial effusion noted adjacent to the lateral left ventricle with no evidence of hemodynamic compromise (or echocardiographic evidence of cardiac tamponade).  10. The inferior vena cava is normal in size measuring 1.35 cm in diameter, (normal <2.1cm) with normal inspiratory collapse (normal >50%) consistent with normal right atrial pressure (~3, range 0-5mmHg).  11. Aortic valve anatomy cannot be determined with normal systolic excursion. Fibrocalcific aortic valve sclerosis without stenosis.    < end of copied text >    ________________________________________________________________  RADIOLOGY  < from: CT Abdomen and Pelvis No Cont (08.28.24 @ 17:19) >    IMPRESSION:  Large volume intraperitoneal fluid with several droplets free air   possibly introduced from catheter.    Dilated stomach and small bowel with relatively collapsed colon and   distal small bowel consistent with small bowel obstruction    Anasarca, small bilateral pleural effusions, and small-to-moderate   pericardial effusion    < end of copied text >

## 2024-09-09 NOTE — PROGRESS NOTE ADULT - SUBJECTIVE AND OBJECTIVE BOX
Soto Stanton MD  [facility] Medicine  Contact via Teams or text/call at [phone]    Patient is a 63y old  Female who presents with a chief complaint of ESRD needing HD (08 Sep 2024 11:01)    Feels better. No abdominal pain.      Patient seen and examined at bedside. No overnight events reported.     ALLERGIES:  No Known Allergies    MEDICATIONS  (STANDING):  albuterol    0.083%. 2.5 milliGRAM(s) Nebulizer once  benzocaine 20% Spray 1 Spray(s) Topical four times a day  chlorhexidine 2% Cloths 1 Application(s) Topical daily  cloNIDine Patch 0.3 mG/24Hr(s) 1 patch Transdermal every 7 days  dextrose 5%. 1000 milliLiter(s) (100 mL/Hr) IV Continuous <Continuous>  dextrose 5%. 1000 milliLiter(s) (50 mL/Hr) IV Continuous <Continuous>  dextrose 50% Injectable 25 Gram(s) IV Push once  dextrose 50% Injectable 25 Gram(s) IV Push once  dextrose 50% Injectable 12.5 Gram(s) IV Push once  diltiazem Injectable 20 milliGRAM(s) IV Push every 4 hours  epoetin dustin-epbx (RETACRIT) Injectable 78135 Unit(s) IV Push <User Schedule>  folic acid 1 milliGRAM(s) Oral daily  hydrALAZINE 25 milliGRAM(s) Oral three times a day  insulin lispro (ADMELOG) corrective regimen sliding scale   SubCutaneous three times a day before meals  labetalol 200 milliGRAM(s) Oral three times a day  lidocaine   4% Patch 1 Patch Transdermal daily  metoclopramide Injectable 10 milliGRAM(s) IV Push every 12 hours  pantoprazole  Injectable 40 milliGRAM(s) IV Push two times a day  piperacillin/tazobactam IVPB.. 3.375 Gram(s) IV Intermittent every 12 hours    MEDICATIONS  (PRN):  benzocaine/menthol Lozenge 1 Lozenge Oral every 4 hours PRN Sore Throat  dextrose Oral Gel 15 Gram(s) Oral once PRN Blood Glucose LESS THAN 70 milliGRAM(s)/deciliter  HYDROmorphone  Injectable 0.5 milliGRAM(s) IV Push every 4 hours PRN Moderate Pain (4 - 6)  HYDROmorphone  Injectable 1 milliGRAM(s) IV Push every 4 hours PRN Severe Pain (7 - 10)  labetalol Injectable 10 milliGRAM(s) IV Push every 2 hours PRN Systolic blood pressure >185  naloxone Injectable 0.1 milliGRAM(s) IV Push every 3 minutes PRN For ANY of the following changes in patient status:  A. RR LESS THAN 10 breaths per minute, B. Oxygen saturation LESS THAN 90%, C. Sedation score of 6  ondansetron Injectable 4 milliGRAM(s) IV Push every 6 hours PRN Nausea and/or Vomiting  ondansetron Injectable 4 milliGRAM(s) IV Push every 6 hours PRN Nausea  sodium chloride 0.9% lock flush 10 milliLiter(s) IV Push every 1 hour PRN Pre/post blood products, medications, blood draw, and to maintain line patency    Vital Signs Last 24 Hrs  T(F): 98.5 (09 Sep 2024 12:45), Max: 98.7 (08 Sep 2024 21:35)  HR: 96 (09 Sep 2024 12:50) (83 - 106)  BP: 222/152 (09 Sep 2024 12:50) (143/68 - 222/152)  RR: 18 (09 Sep 2024 11:28) (15 - 19)  SpO2: 100% (09 Sep 2024 11:28) (96% - 100%)  I&O's Summary    08 Sep 2024 07:01  -  09 Sep 2024 07:00  --------------------------------------------------------  IN: 1425 mL / OUT: 280 mL / NET: 1145 mL      PHYSICAL EXAM:  General: NAD, A/O x 3, pleasant seen during dialysis  ENT: No gross hearing impairment, Moist mucous membranes, no thrush  Neck: Supple, No JVD  Lungs: Clear to auscultation bilaterally, good air entry, non-labored breathing  Cardio: RRR, S1/S2, No murmur  Abdomen: Soft, Nontender, Nondistended; Bowel sounds present  Extremities: No calf tenderness, No cyanosis, No pitting edema  Psych: Appropriate mood and affect    LABS:                        10.3   10.83 )-----------( 175      ( 09 Sep 2024 10:32 )             32.6     09-09    133  |  98  |  25.5  ----------------------------<  102  3.4   |  24.0  |  6.65    Ca    7.1      09 Sep 2024 10:32  Mg     1.9     09-07    POCT Blood Glucose.: 127 mg/dL (09 Sep 2024 13:04)  POCT Blood Glucose.: 105 mg/dL (09 Sep 2024 08:40)  POCT Blood Glucose.: 134 mg/dL (08 Sep 2024 21:40)  POCT Blood Glucose.: 103 mg/dL (08 Sep 2024 18:05)      Urinalysis Basic - ( 09 Sep 2024 10:32 )    Color: x / Appearance: x / SG: x / pH: x  Gluc: 102 mg/dL / Ketone: x  / Bili: x / Urobili: x   Blood: x / Protein: x / Nitrite: x   Leuk Esterase: x / RBC: x / WBC x   Sq Epi: x / Non Sq Epi: x / Bacteria: x    RADIOLOGY & ADDITIONAL TESTS:    Care Discussed with Consultants/Other Providers:

## 2024-09-09 NOTE — PROGRESS NOTE ADULT - ASSESSMENT
62yo F pt w/ pmhx of ESRD on peritoneal dialysis 5x per week, HTN, HLD, DM presented to ED c/o worsening diffused abdominal pain with associated distention over the past week. Pt reports dialysis cath has not been draining for the last week. She attempts peritoneal dialysis but her unable to get any fluid return. She denies fever, chills, N/V/D, urinary sx, chest pain, SOB, or any other complaints.  (27 Aug 2024 03:04)  Has been constipated in the past    ESRD   Now w PD catheter complication - peritonitis   CT abd noted --> + SBO   s/p Ex-lap, FAREED,  SBR and PD cath removal  HD today will cont on MWF schedule --> will need eventual permacath   Abx as per ID     RO - Phoslo     HTN - BP not ideal will inc Labetalol from 200 to 300 mg TID    Anemia - cont  Epogen with HD, s/p transfusion two units prbc  w HD on 9/7, + FOBT  Cont folate  Ferritin 1100    Monitor labs will follow

## 2024-09-09 NOTE — CHART NOTE - NSCHARTNOTEFT_GEN_A_CORE
To Whom It May Concern,    Re: ADALID CASE    Patient was admitted under the care of [facility] in [place], [place] from 8/27/24 to today, 9/9/24. Pt continues to require hospitalization at this time.     If there are any questions, please contact me at [phone]. Thank you.    Signed,          Shanti Escobar PA-C

## 2024-09-10 LAB
GLUCOSE BLDC GLUCOMTR-MCNC: 114 MG/DL — HIGH (ref 70–99)
GLUCOSE BLDC GLUCOMTR-MCNC: 138 MG/DL — HIGH (ref 70–99)
GLUCOSE BLDC GLUCOMTR-MCNC: 159 MG/DL — HIGH (ref 70–99)
GLUCOSE BLDC GLUCOMTR-MCNC: 188 MG/DL — HIGH (ref 70–99)

## 2024-09-10 PROCEDURE — 99232 SBSQ HOSP IP/OBS MODERATE 35: CPT

## 2024-09-10 RX ORDER — HYDRALAZINE HCL 50 MG
100 TABLET ORAL THREE TIMES A DAY
Refills: 0 | Status: DISCONTINUED | OUTPATIENT
Start: 2024-09-10 | End: 2024-09-12

## 2024-09-10 RX ADMIN — Medication 40 MILLIGRAM(S): at 05:03

## 2024-09-10 RX ADMIN — Medication 1 PATCH: at 19:40

## 2024-09-10 RX ADMIN — Medication 1 PATCH: at 11:45

## 2024-09-10 RX ADMIN — Medication 100 MILLIGRAM(S): at 15:14

## 2024-09-10 RX ADMIN — Medication 3 MILLIGRAM(S): at 23:34

## 2024-09-10 RX ADMIN — Medication 1: at 11:45

## 2024-09-10 RX ADMIN — DILTIAZEM HYDROCHLORIDE 20 MILLIGRAM(S): 5 INJECTION INTRAVENOUS at 02:11

## 2024-09-10 RX ADMIN — DILTIAZEM HYDROCHLORIDE 20 MILLIGRAM(S): 5 INJECTION INTRAVENOUS at 06:03

## 2024-09-10 RX ADMIN — Medication 40 MILLIGRAM(S): at 17:57

## 2024-09-10 RX ADMIN — DILTIAZEM HYDROCHLORIDE 20 MILLIGRAM(S): 5 INJECTION INTRAVENOUS at 18:52

## 2024-09-10 RX ADMIN — PIPERACILLIN SODIUM AND TAZOBACTAM SODIUM 25 GRAM(S): 3; .375 INJECTION, POWDER, FOR SOLUTION INTRAVENOUS at 15:08

## 2024-09-10 RX ADMIN — Medication 1 PATCH: at 07:00

## 2024-09-10 RX ADMIN — Medication 1 PATCH: at 22:55

## 2024-09-10 RX ADMIN — PIPERACILLIN SODIUM AND TAZOBACTAM SODIUM 25 GRAM(S): 3; .375 INJECTION, POWDER, FOR SOLUTION INTRAVENOUS at 23:34

## 2024-09-10 RX ADMIN — Medication 600 MILLIGRAM(S): at 22:15

## 2024-09-10 RX ADMIN — DILTIAZEM HYDROCHLORIDE 20 MILLIGRAM(S): 5 INJECTION INTRAVENOUS at 15:14

## 2024-09-10 RX ADMIN — Medication 10 MILLIGRAM(S): at 10:48

## 2024-09-10 RX ADMIN — Medication 1 MILLIGRAM(S): at 11:45

## 2024-09-10 RX ADMIN — CHLORHEXIDINE GLUCONATE 1 APPLICATION(S): 40 SOLUTION TOPICAL at 11:44

## 2024-09-10 RX ADMIN — Medication 10 MILLIGRAM(S): at 17:57

## 2024-09-10 RX ADMIN — Medication 10 MILLIGRAM(S): at 05:03

## 2024-09-10 RX ADMIN — PIPERACILLIN SODIUM AND TAZOBACTAM SODIUM 25 GRAM(S): 3; .375 INJECTION, POWDER, FOR SOLUTION INTRAVENOUS at 00:46

## 2024-09-10 RX ADMIN — DILTIAZEM HYDROCHLORIDE 20 MILLIGRAM(S): 5 INJECTION INTRAVENOUS at 22:15

## 2024-09-10 RX ADMIN — Medication 600 MILLIGRAM(S): at 15:14

## 2024-09-10 RX ADMIN — Medication 100 MILLIGRAM(S): at 22:15

## 2024-09-10 RX ADMIN — Medication 300 MILLIGRAM(S): at 05:03

## 2024-09-10 RX ADMIN — Medication 50 MILLIGRAM(S): at 05:03

## 2024-09-10 RX ADMIN — DILTIAZEM HYDROCHLORIDE 20 MILLIGRAM(S): 5 INJECTION INTRAVENOUS at 11:44

## 2024-09-10 NOTE — PROGRESS NOTE ADULT - SUBJECTIVE AND OBJECTIVE BOX
NEPHROLOGY INTERVAL HPI/OVERNIGHT EVENTS:    Examined  No distress noted  Denies HA CP no SOB    MEDICATIONS  (STANDING):  albuterol    0.083%. 2.5 milliGRAM(s) Nebulizer once  benzocaine 20% Spray 1 Spray(s) Topical four times a day  chlorhexidine 2% Cloths 1 Application(s) Topical daily  cloNIDine Patch 0.3 mG/24Hr(s) 1 patch Transdermal every 7 days  dextrose 5%. 1000 milliLiter(s) (50 mL/Hr) IV Continuous <Continuous>  dextrose 5%. 1000 milliLiter(s) (100 mL/Hr) IV Continuous <Continuous>  dextrose 50% Injectable 12.5 Gram(s) IV Push once  dextrose 50% Injectable 25 Gram(s) IV Push once  dextrose 50% Injectable 25 Gram(s) IV Push once  diltiazem Injectable 20 milliGRAM(s) IV Push every 4 hours  epoetin dustin-epbx (RETACRIT) Injectable 08681 Unit(s) IV Push <User Schedule>  folic acid 1 milliGRAM(s) Oral daily  hydrALAZINE 100 milliGRAM(s) Oral three times a day  insulin lispro (ADMELOG) corrective regimen sliding scale   SubCutaneous three times a day before meals  labetalol 300 milliGRAM(s) Oral every 8 hours  labetalol 600 milliGRAM(s) Oral three times a day  lidocaine   4% Patch 1 Patch Transdermal daily  metoclopramide Injectable 10 milliGRAM(s) IV Push every 12 hours  pantoprazole  Injectable 40 milliGRAM(s) IV Push two times a day  piperacillin/tazobactam IVPB.. 3.375 Gram(s) IV Intermittent every 12 hours    MEDICATIONS  (PRN):  benzocaine/menthol Lozenge 1 Lozenge Oral every 4 hours PRN Sore Throat  dextrose Oral Gel 15 Gram(s) Oral once PRN Blood Glucose LESS THAN 70 milliGRAM(s)/deciliter  HYDROmorphone  Injectable 1 milliGRAM(s) IV Push every 4 hours PRN Severe Pain (7 - 10)  HYDROmorphone  Injectable 0.5 milliGRAM(s) IV Push every 4 hours PRN Moderate Pain (4 - 6)  labetalol Injectable 10 milliGRAM(s) IV Push every 2 hours PRN Systolic blood pressure >185  naloxone Injectable 0.1 milliGRAM(s) IV Push every 3 minutes PRN For ANY of the following changes in patient status:  A. RR LESS THAN 10 breaths per minute, B. Oxygen saturation LESS THAN 90%, C. Sedation score of 6  ondansetron Injectable 4 milliGRAM(s) IV Push every 6 hours PRN Nausea  ondansetron Injectable 4 milliGRAM(s) IV Push every 6 hours PRN Nausea and/or Vomiting  sodium chloride 0.9% lock flush 10 milliLiter(s) IV Push every 1 hour PRN Pre/post blood products, medications, blood draw, and to maintain line patency      Allergies    No Known Allergies    Intolerances        Vital Signs Last 24 Hrs  T(C): 36.8 (10 Sep 2024 12:00), Max: 37.2 (10 Sep 2024 00:02)  T(F): 98.3 (10 Sep 2024 12:00), Max: 98.9 (10 Sep 2024 00:02)  HR: 95 (10 Sep 2024 12:00) (78 - 99)  BP: 155/76 (10 Sep 2024 12:00) (155/76 - 227/106)  BP(mean): 111 (10 Sep 2024 06:00) (103 - 123)  RR: 18 (10 Sep 2024 12:00) (18 - 18)  SpO2: 100% (10 Sep 2024 12:00) (96% - 100%)    Parameters below as of 10 Sep 2024 12:00  Patient On (Oxygen Delivery Method): room air      Daily     Daily     PHYSICAL EXAM:  GENERAL: NAD  + NG tube , No JVD + RIJ raul cath neck site c/d/i  NERVOUS SYSTEM:  A/O x3  Lungs : No rales, No rhonchi,   HEART:  No murmur,  No rub, No gallops  ABDOMEN: dressed , dec BS , PD cath out       LABS:                        10.3   10.83 )-----------( 175      ( 09 Sep 2024 10:32 )             32.6     09-09    133<L>  |  98  |  25.5<H>  ----------------------------<  102<H>  3.4<L>   |  24.0  |  6.65<H>    Ca    7.1<L>      09 Sep 2024 10:32        Urinalysis Basic - ( 09 Sep 2024 10:32 )    Color: x / Appearance: x / SG: x / pH: x  Gluc: 102 mg/dL / Ketone: x  / Bili: x / Urobili: x   Blood: x / Protein: x / Nitrite: x   Leuk Esterase: x / RBC: x / WBC x   Sq Epi: x / Non Sq Epi: x / Bacteria: x              RADIOLOGY & ADDITIONAL TESTS:

## 2024-09-10 NOTE — PROGRESS NOTE ADULT - ASSESSMENT
62yo F pt w/ pmhx of ESRD on peritoneal dialysis 5x per week, HTN, HLD, DM presented to ED c/o worsening diffused abdominal pain with associated distention over the past week. Pt reports dialysis cath has not been draining for the last week. She attempts peritoneal dialysis but her unable to get any fluid return. She denies fever, chills, N/V/D, urinary sx, chest pain, SOB, or any other complaints.  (27 Aug 2024 03:04)  Has been constipated in the past    ESRD   Now w PD catheter complication - peritonitis   CT abd noted --> + SBO   s/p Ex-lap, FAREED,  SBR and PD cath removal  HD today will cont on MWF schedule --> will need eventual permacath noted cleared by ID to proceed  Abx as per ID     RO - Phoslo     HTN - BP not ideal  increased Labetalol from 200 to 300 mg TID on 9/9    Anemia - cont  Epogen with HD, s/p transfusion two units prbc  w HD on 9/7, + FOBT  Cont folate  Ferritin 1100    Monitor labs will follow

## 2024-09-10 NOTE — CONSULT NOTE ADULT - CONSULT REQUESTED DATE/TIME
05-Sep-2024 09:29
04-Sep-2024 17:12
28-Aug-2024 13:12
03-Sep-2024
28-Aug-2024 15:01
27-Aug-2024 16:10
29-Aug-2024 06:45
27-Aug-2024 14:33

## 2024-09-10 NOTE — PHYSICAL THERAPY INITIAL EVALUATION ADULT - GENERAL OBSERVATIONS, REHAB EVAL
Pt received in chair, + IV Loc, +Tele//BP monitoring + HD IJ cath, breathing on RA in NAD, in 0/10 pain, eager to go home, agreeable to PT evaluation

## 2024-09-10 NOTE — PHYSICAL THERAPY INITIAL EVALUATION ADULT - ADDITIONAL COMMENTS
pt reports living in private home with  who is able to assist prn. Pt has 2 VIVEK with handrail and no stairs inside. Independent prior to admission. Owns no DME

## 2024-09-10 NOTE — CONSULT NOTE ADULT - REASON FOR ADMISSION
ESRD needing HD

## 2024-09-10 NOTE — PHYSICAL THERAPY INITIAL EVALUATION ADULT - PERTINENT HX OF CURRENT PROBLEM, REHAB EVAL
63y Female w/ PMH of ESRD on peritoneal dialysis 5 x per week, HTN, HLD, and DM admitted for peritonitis and peritoneal dialysis catheter dysfunction, needing HD. Now found to have SBO s/p ex-lap. Strict NPO with NGT to LIS. Now with hypertensive Urgency being managed on IVP anti-htn meds.

## 2024-09-10 NOTE — CONSULT NOTE ADULT - PROVIDER SPECIALTY LIST ADULT
Cardiology
MICU
Intervent Radiology
Vascular Surgery
Infectious Disease
Gastroenterology
Nephrology
Surgery

## 2024-09-10 NOTE — PROGRESS NOTE ADULT - ASSESSMENT
63y Female w/ PMH of ESRD on peritoneal dialysis 5 x per week, HTN, HLD, and DM admitted for peritonitis and peritoneal dialysis catheter dysfunction, needing HD. Now found to have SBO s/p ex-lap. Strict NPO with NGT to LIS. Now with hypertensive Urgency being managed on IVP anti-htn meds.     PD catheter associated peritonitis  PD catheter malfunction  SBO - resolved  - 8/30/24- Exploratory laparotomy with lysis of adhesions. Suspected area of small bowel perforation resected, approximately 15cm. Side-to-side small bowel anastomosis. PD catheter removed.  - NPO, IVF at 50cc/hr   - Dilaudid IVP PRN pain scale  - Inc spirometry, Mobilize  - Peritoneal fluid culture with Achromobacter xylosoxidans  - s/p NGT removed    - Diet advanced to renal   - cont antibiotics until 9/13/24 per ID     GI bleed likely from prior surgery  - stable    ESRD on Peritoneal Dialysis, but PD catheter removed  Hypokalemia  Hypernatremia   - on HD (8/29) via Right IJ Shiley  - HD per renal  - IR consulted for permacath placement    Pericardial effusion on CTA chest:  - Appreciate cards recs  - Hemodyanmically stable, likely 2/2 uremia  - Fu echo-Normal biventricular function, small to mod pericardial effusion. Recs to cw HD for it     Hypertensive Urgency  -  oral hydralazine 25 mg TID, stop iv hydralaziine  - start labetalol 200 mg TID, stop iv standing labetalol  - cont with prn Labetalol 20mg IVP, Cardizem 20mg IVP q4h  - clonidine patch increased to 0.3mg/qweekly  - Hydralazine PRN     Urinary retention  - with lobo currently   - Urology follow up     DM2- controlled  - c/w insulin lispro    HLD  - c/w statin    DVT ppx: SCD    Disposition: BP control, permacath placement, then d/c to rehab.

## 2024-09-10 NOTE — CONSULT NOTE ADULT - CONSULT REASON
pre-op evaulation
Hypertensive Urgency
Concerns for Coffee Ground Emesis
ESRD
HD catheter placement
Peritonitis associated with peritoneal dialysis
tunnelled dialysis catheter
?Infected Peritoneal Dialysis Catheter

## 2024-09-10 NOTE — CONSULT NOTE ADULT - SUBJECTIVE AND OBJECTIVE BOX
63y Female w/ PMH of ESRD on peritoneal dialysis 5 x per week, HTN, HLD, and DM admitted for peritonitis and peritoneal dialysis catheter dysfunction. Now s/p exlap, FAREED, SBR and PD catheter removal. Now on dialysis MWF. IR consulted for tunnelled dialysis catheter placement.    Imaging reviewed, right temporary dialysis catheter in place.    Labs reviewed. Borderline leukocytosis, down trending. Cleared by ID for tunnelled catheter placement.    Patient currently afebrile, however, intermittently hypertensive, with hypertensive urgency today.    Plan:  -Will plan for tunnelled dialysis catheter placement tentatively tomorrow 9/11 pending IR schedule availability and blood pressure control overnight and tomorrow morning.  -Please make patient NPO at midnight.  -Hold AM anticoagulation  -Order AM CBC, BMP, PT/PTT/INR.

## 2024-09-10 NOTE — PROGRESS NOTE ADULT - SUBJECTIVE AND OBJECTIVE BOX
Soto Stanton MD  [facility]  Contact via Teams or text/call at [phone]    Patient is a 63y old  Female who presents with a chief complaint of ESRD needing HD (09 Sep 2024 13:26)    Feels good.  No issues.      Patient seen and examined at bedside. No overnight events reported.     ALLERGIES:  No Known Allergies    MEDICATIONS  (STANDING):  albuterol    0.083%. 2.5 milliGRAM(s) Nebulizer once  benzocaine 20% Spray 1 Spray(s) Topical four times a day  chlorhexidine 2% Cloths 1 Application(s) Topical daily  cloNIDine Patch 0.3 mG/24Hr(s) 1 patch Transdermal every 7 days  dextrose 5%. 1000 milliLiter(s) (50 mL/Hr) IV Continuous <Continuous>  dextrose 5%. 1000 milliLiter(s) (100 mL/Hr) IV Continuous <Continuous>  dextrose 50% Injectable 12.5 Gram(s) IV Push once  dextrose 50% Injectable 25 Gram(s) IV Push once  dextrose 50% Injectable 25 Gram(s) IV Push once  diltiazem Injectable 20 milliGRAM(s) IV Push every 4 hours  epoetin dustin-epbx (RETACRIT) Injectable 44831 Unit(s) IV Push <User Schedule>  folic acid 1 milliGRAM(s) Oral daily  hydrALAZINE 100 milliGRAM(s) Oral three times a day  insulin lispro (ADMELOG) corrective regimen sliding scale   SubCutaneous three times a day before meals  labetalol 600 milliGRAM(s) Oral three times a day  labetalol 300 milliGRAM(s) Oral every 8 hours  lidocaine   4% Patch 1 Patch Transdermal daily  metoclopramide Injectable 10 milliGRAM(s) IV Push every 12 hours  pantoprazole  Injectable 40 milliGRAM(s) IV Push two times a day  piperacillin/tazobactam IVPB.. 3.375 Gram(s) IV Intermittent every 12 hours    MEDICATIONS  (PRN):  benzocaine/menthol Lozenge 1 Lozenge Oral every 4 hours PRN Sore Throat  dextrose Oral Gel 15 Gram(s) Oral once PRN Blood Glucose LESS THAN 70 milliGRAM(s)/deciliter  HYDROmorphone  Injectable 1 milliGRAM(s) IV Push every 4 hours PRN Severe Pain (7 - 10)  HYDROmorphone  Injectable 0.5 milliGRAM(s) IV Push every 4 hours PRN Moderate Pain (4 - 6)  labetalol Injectable 10 milliGRAM(s) IV Push every 2 hours PRN Systolic blood pressure >185  naloxone Injectable 0.1 milliGRAM(s) IV Push every 3 minutes PRN For ANY of the following changes in patient status:  A. RR LESS THAN 10 breaths per minute, B. Oxygen saturation LESS THAN 90%, C. Sedation score of 6  ondansetron Injectable 4 milliGRAM(s) IV Push every 6 hours PRN Nausea  ondansetron Injectable 4 milliGRAM(s) IV Push every 6 hours PRN Nausea and/or Vomiting  sodium chloride 0.9% lock flush 10 milliLiter(s) IV Push every 1 hour PRN Pre/post blood products, medications, blood draw, and to maintain line patency    Vital Signs Last 24 Hrs  T(F): 98.3 (10 Sep 2024 12:00), Max: 98.9 (10 Sep 2024 00:02)  HR: 95 (10 Sep 2024 12:00) (78 - 99)  BP: 155/76 (10 Sep 2024 12:00) (145/115 - 227/106)  RR: 18 (10 Sep 2024 12:00) (18 - 18)  SpO2: 100% (10 Sep 2024 12:00) (96% - 100%)  I&O's Summary    09 Sep 2024 07:01  -  10 Sep 2024 07:00  --------------------------------------------------------  IN: 1420 mL / OUT: 1650 mL / NET: -230 mL    PHYSICAL EXAM:  General: NAD, A/O x 3  ENT: No gross hearing impairment, Moist mucous membranes, no thrush  Neck: Supple, No JVD  Lungs: Clear to auscultation bilaterally, good air entry, non-labored breathing  Cardio: RRR, S1/S2, No murmur  Abdomen: Soft, Nontender, Nondistended; Bowel sounds present  Extremities: No calf tenderness, No cyanosis, No pitting edema  Psych: Appropriate mood and affect    LABS:                        10.3   10.83 )-----------( 175      ( 09 Sep 2024 10:32 )             32.6     09-09    133  |  98  |  25.5  ----------------------------<  102  3.4   |  24.0  |  6.65    Ca    7.1      09 Sep 2024 10:32    POCT Blood Glucose.: 188 mg/dL (10 Sep 2024 11:38)  POCT Blood Glucose.: 114 mg/dL (10 Sep 2024 09:25)  POCT Blood Glucose.: 174 mg/dL (09 Sep 2024 21:07)  POCT Blood Glucose.: 131 mg/dL (09 Sep 2024 18:13)  POCT Blood Glucose.: 127 mg/dL (09 Sep 2024 13:04)    Urinalysis Basic - ( 09 Sep 2024 10:32 )    Color: x / Appearance: x / SG: x / pH: x  Gluc: 102 mg/dL / Ketone: x  / Bili: x / Urobili: x   Blood: x / Protein: x / Nitrite: x   Leuk Esterase: x / RBC: x / WBC x   Sq Epi: x / Non Sq Epi: x / Bacteria: x    RADIOLOGY & ADDITIONAL TESTS:    Care Discussed with Consultants/Other Providers:

## 2024-09-11 LAB
GLUCOSE BLDC GLUCOMTR-MCNC: 109 MG/DL — HIGH (ref 70–99)
GLUCOSE BLDC GLUCOMTR-MCNC: 110 MG/DL — HIGH (ref 70–99)
GLUCOSE BLDC GLUCOMTR-MCNC: 124 MG/DL — HIGH (ref 70–99)
GLUCOSE BLDC GLUCOMTR-MCNC: 193 MG/DL — HIGH (ref 70–99)

## 2024-09-11 PROCEDURE — 99232 SBSQ HOSP IP/OBS MODERATE 35: CPT

## 2024-09-11 PROCEDURE — 77001 FLUOROGUIDE FOR VEIN DEVICE: CPT | Mod: 26

## 2024-09-11 PROCEDURE — 76937 US GUIDE VASCULAR ACCESS: CPT | Mod: 26

## 2024-09-11 PROCEDURE — 36558 INSERT TUNNELED CV CATH: CPT | Mod: RT

## 2024-09-11 RX ORDER — CHLORHEXIDINE GLUCONATE 40 MG/ML
1 SOLUTION TOPICAL
Refills: 0 | Status: DISCONTINUED | OUTPATIENT
Start: 2024-09-11 | End: 2024-09-12

## 2024-09-11 RX ADMIN — Medication 1 PATCH: at 07:00

## 2024-09-11 RX ADMIN — Medication 1 PATCH: at 14:14

## 2024-09-11 RX ADMIN — Medication 1 PATCH: at 19:04

## 2024-09-11 RX ADMIN — Medication 10 MILLIGRAM(S): at 16:42

## 2024-09-11 RX ADMIN — Medication 100 MILLIGRAM(S): at 06:21

## 2024-09-11 RX ADMIN — Medication 600 MILLIGRAM(S): at 22:13

## 2024-09-11 RX ADMIN — Medication 40 MILLIGRAM(S): at 16:42

## 2024-09-11 RX ADMIN — DILTIAZEM HYDROCHLORIDE 20 MILLIGRAM(S): 5 INJECTION INTRAVENOUS at 06:20

## 2024-09-11 RX ADMIN — CHLORHEXIDINE GLUCONATE 1 APPLICATION(S): 40 SOLUTION TOPICAL at 13:59

## 2024-09-11 RX ADMIN — Medication 1 MILLIGRAM(S): at 13:58

## 2024-09-11 RX ADMIN — PIPERACILLIN SODIUM AND TAZOBACTAM SODIUM 25 GRAM(S): 3; .375 INJECTION, POWDER, FOR SOLUTION INTRAVENOUS at 13:59

## 2024-09-11 RX ADMIN — Medication 1 PATCH: at 13:58

## 2024-09-11 RX ADMIN — Medication 10 MILLIGRAM(S): at 16:41

## 2024-09-11 RX ADMIN — Medication 100 MILLIGRAM(S): at 16:43

## 2024-09-11 RX ADMIN — Medication 600 MILLIGRAM(S): at 06:21

## 2024-09-11 RX ADMIN — Medication 10 MILLIGRAM(S): at 06:21

## 2024-09-11 RX ADMIN — Medication 40 MILLIGRAM(S): at 06:21

## 2024-09-11 RX ADMIN — EPOETIN ALFA 10000 UNIT(S): 3000 SOLUTION INTRAVENOUS; SUBCUTANEOUS at 16:28

## 2024-09-11 RX ADMIN — Medication 600 MILLIGRAM(S): at 16:42

## 2024-09-11 RX ADMIN — Medication 100 MILLIGRAM(S): at 22:13

## 2024-09-11 NOTE — PROGRESS NOTE ADULT - SUBJECTIVE AND OBJECTIVE BOX
Soto Stanton MD  [facility]  Contact via Teams or text/call at [phone]    Patient is a 63y old  Female who presents with a chief complaint of ESRD needing HD (10 Sep 2024 15:26)    Feels well.  No acute issues.  Denies chest pain.      Patient seen and examined at bedside. No overnight events reported.     ALLERGIES:  No Known Allergies    MEDICATIONS  (STANDING):  albuterol    0.083%. 2.5 milliGRAM(s) Nebulizer once  benzocaine 20% Spray 1 Spray(s) Topical four times a day  chlorhexidine 2% Cloths 1 Application(s) Topical daily  chlorhexidine 4% Liquid 1 Application(s) Topical <User Schedule>  cloNIDine Patch 0.3 mG/24Hr(s) 1 patch Transdermal every 7 days  dextrose 5%. 1000 milliLiter(s) (100 mL/Hr) IV Continuous <Continuous>  dextrose 5%. 1000 milliLiter(s) (50 mL/Hr) IV Continuous <Continuous>  dextrose 50% Injectable 25 Gram(s) IV Push once  dextrose 50% Injectable 25 Gram(s) IV Push once  dextrose 50% Injectable 12.5 Gram(s) IV Push once  epoetin dustin-epbx (RETACRIT) Injectable 12784 Unit(s) IV Push <User Schedule>  folic acid 1 milliGRAM(s) Oral daily  hydrALAZINE 100 milliGRAM(s) Oral three times a day  insulin lispro (ADMELOG) corrective regimen sliding scale   SubCutaneous three times a day before meals  labetalol 600 milliGRAM(s) Oral three times a day  lidocaine   4% Patch 1 Patch Transdermal daily  metoclopramide Injectable 10 milliGRAM(s) IV Push every 12 hours  pantoprazole  Injectable 40 milliGRAM(s) IV Push two times a day  piperacillin/tazobactam IVPB.. 3.375 Gram(s) IV Intermittent every 12 hours    MEDICATIONS  (PRN):  benzocaine/menthol Lozenge 1 Lozenge Oral every 4 hours PRN Sore Throat  dextrose Oral Gel 15 Gram(s) Oral once PRN Blood Glucose LESS THAN 70 milliGRAM(s)/deciliter  HYDROmorphone  Injectable 1 milliGRAM(s) IV Push every 4 hours PRN Severe Pain (7 - 10)  labetalol Injectable 10 milliGRAM(s) IV Push every 2 hours PRN Systolic blood pressure >185  naloxone Injectable 0.1 milliGRAM(s) IV Push every 3 minutes PRN For ANY of the following changes in patient status:  A. RR LESS THAN 10 breaths per minute, B. Oxygen saturation LESS THAN 90%, C. Sedation score of 6  ondansetron Injectable 4 milliGRAM(s) IV Push every 6 hours PRN Nausea and/or Vomiting  sodium chloride 0.9% lock flush 10 milliLiter(s) IV Push every 1 hour PRN Pre/post blood products, medications, blood draw, and to maintain line patency    Vital Signs Last 24 Hrs  T(F): 98.4 (11 Sep 2024 07:32), Max: 98.4 (11 Sep 2024 07:32)  HR: 93 (11 Sep 2024 10:00) (91 - 103)  BP: 126/62 (11 Sep 2024 10:00) (116/66 - 169/84)  RR: 18 (11 Sep 2024 10:00) (14 - 18)  SpO2: 98% (11 Sep 2024 10:00) (98% - 100%)  I&O's Summary    10 Sep 2024 07:01  -  11 Sep 2024 07:00  --------------------------------------------------------  IN: 100 mL / OUT: 600 mL / NET: -500 mL      PHYSICAL EXAM:  General: NAD, A/O x 3  ENT: No gross hearing impairment, Moist mucous membranes, no thrush  Neck: Supple, No JVD  Lungs: Clear to auscultation bilaterally, good air entry, non-labored breathing  Cardio: RRR, S1/S2, No murmur  Abdomen: Soft, Nontender, Nondistended; Bowel sounds present  Extremities: No calf tenderness, No cyanosis, No pitting edema  Psych: Appropriate mood and affect    LABS:                        10.3   10.83 )-----------( 175      ( 09 Sep 2024 10:32 )             32.6     09-09    133  |  98  |  25.5  ----------------------------<  102  3.4   |  24.0  |  6.65    Ca    7.1      09 Sep 2024                     POCT Blood Glucose.: 110 mg/dL (11 Sep 2024 11:31)  POCT Blood Glucose.: 124 mg/dL (11 Sep 2024 09:20)  POCT Blood Glucose.: 159 mg/dL (10 Sep 2024 22:19)  POCT Blood Glucose.: 138 mg/dL (10 Sep 2024 17:56)      Urinalysis Basic - ( 09 Sep 2024 10:32 )    Color: x / Appearance: x / SG: x / pH: x  Gluc: 102 mg/dL / Ketone: x  / Bili: x / Urobili: x   Blood: x / Protein: x / Nitrite: x   Leuk Esterase: x / RBC: x / WBC x   Sq Epi: x / Non Sq Epi: x / Bacteria: x          RADIOLOGY & ADDITIONAL TESTS:    Care Discussed with Consultants/Other Providers:

## 2024-09-11 NOTE — CHART NOTE - NSCHARTNOTESELECT_GEN_ALL_CORE
Event Note
Hospitalist same day follow up
[facility] NP/Event Note
Event Note
Event Note
Nutrition Services

## 2024-09-11 NOTE — PROCEDURE NOTE - PROCEDURE FINDINGS AND DETAILS
19cm Tunnelled HD Catheter placed into Right IJV under US and fluoroscopic guidance. Flushed w heparin. Tip at cavoatrial junction, OK to use.      Please call Interventional Radiology with any questions, concerns, or issues.

## 2024-09-11 NOTE — CHART NOTE - NSCHARTNOTEFT_GEN_A_CORE
Source: Patient [ ]  Family [ ]   other [x] EMR    Current Diet: Diet, NPO after Midnight:      NPO Start Date: 10-Sep-2024,   NPO Start Time: 23:59 (09-10-24 @ 15:41)  Diet, Renal Restrictions:   For patients receiving Renal Replacement - No Protein Restr, No Conc K, No Conc Phos, Low Sodium  Consistent Carbohydrate {Evening Snack} (CSTCHOSN) (09-06-24 @ 14:27)    PO intake:  < 50% [ ]   50-75%  [ ]   %  [x]  other :    Source for PO intake [ ] Patient [ ] family [x] chart [ ] staff [ ] other    Current Weight:   9/9 56.1 kg  9/7 56.8 kg  9/5 56.3 kg  9/2 63.9 kg  8/31 58.2 kg  +1 L, R leg edema     Weight change %: 3.6% x 1.5 wks    Pertinent Medications: MEDICATIONS  (STANDING):  dextrose 5%. 1000 milliLiter(s) (100 mL/Hr) IV Continuous <Continuous>  folic acid 1 milliGRAM(s) Oral daily  insulin lispro (ADMELOG) corrective regimen sliding scale   SubCutaneous three times a day before meals  metoclopramide Injectable 10 milliGRAM(s) IV Push every 12 hours  pantoprazole  Injectable 40 milliGRAM(s) IV Push two times a day    MEDICATIONS  (PRN):  HYDROmorphone  Injectable 1 milliGRAM(s) IV Push every 4 hours PRN Severe Pain (7 - 10)  ondansetron Injectable 4 milliGRAM(s) IV Push every 6 hours PRN Nausea and/or Vomiting    Pertinent Labs: No new labs since 9/9     Skin: midline abd incision     Estimated Needs:   [x] no change since previous assessment    Clinical Course: 63y Female w/ PMH of ESRD on peritoneal dialysis 5 x per week, HTN, HLD, and DM admitted for peritonitis and peritoneal dialysis catheter dysfunction, needing HD. Now found to have SBO s/p ex-lap. Strict NPO with NGT to LIS. Now with hypertensive Urgency being managed on IVP anti-htn meds.     Current Nutrition Diagnosis: Pt has been identified with protein-calorie Malnutrition (acute, severe) related to inability to meet sufficient energy/protein needs in setting of SBO s/p resection with NGT for decompression, ESRD on PD as evidenced by pt meeting < 50% est needs > 5 days, +1 edema, >1-2% wt loss x 1.5 weeks.    Chart and events reviewed. Pt previously with NGT to LIS 2/2 SBO s/p ex lap. NGT now removed, pt was started on oral diet with improved appetite/po intake (% meal consumption documented on flow sheet). Pt is NPO today pending permacath placement with IR today and then planned to dc to rehab. No new labs to assess since 9/9. H/o constipation noted, last BM 9/10. RD remains available. Recommendations below.     Recommendations:   1. Add Nepro Shakes BID when diet re-instated to supplement protein  2. Rx: daily nephro-yasmany to replete losses from dialysis  3. Encourage HBV protein food options to optimize nutrition status    4. Monitor nutrition related labs (as able/available), weight and BM trends    Monitoring and Evaluation:   [ ] PO intake [ ] Tolerance to diet prescription [X] Weights  [X] Follow up per protocol [X] Labs:

## 2024-09-11 NOTE — PROGRESS NOTE ADULT - SUBJECTIVE AND OBJECTIVE BOX
Patient seen and examined    I&O's Summary    10 Sep 2024 07:01  -  11 Sep 2024 07:00  --------------------------------------------------------  IN: 100 mL / OUT: 600 mL / NET: -500 mL    11 Sep 2024 07:01  -  11 Sep 2024 19:32  --------------------------------------------------------  IN: 0 mL / OUT: 1500 mL / NET: -1500 mL        REVIEW OF SYSTEMS: Seen on HD     CONSTITUTIONAL: No F/C  RESPIRATORY: No cough,  No SOB  CARDIOVASCULAR: No CP/palpitations,    GASTROINTESTINAL:  abdominal pain much less ; no NVD   GENITOURINARY: No UTI sx  NEUROLOGICAL: No headaches, NO wk/numbness  MUSCULOSKELETAL:   EXTREMITIES : no swelling,    Vital Signs Last 24 Hrs  T(C): 36.8 (11 Sep 2024 17:00), Max: 36.9 (11 Sep 2024 07:32)  T(F): 98.3 (11 Sep 2024 17:00), Max: 98.4 (11 Sep 2024 07:32)  HR: 109 (11 Sep 2024 18:30) (69 - 109)  BP: 173/81 (11 Sep 2024 18:30) (113/77 - 202/102)  BP(mean): 109 (11 Sep 2024 06:00) (80 - 111)  RR: 18 (11 Sep 2024 17:00) (14 - 18)  SpO2: 99% (11 Sep 2024 17:00) (98% - 99%)    Parameters below as of 11 Sep 2024 17:00  Patient On (Oxygen Delivery Method): room air        PHYSICAL EXAM:    GENERAL: NAD,   EYES:  conjunctiva and sclera clear  NECK: Supple, No JVD/Bruit  NERVOUS SYSTEM:  A/O x3,   CHEST:  No rales or rhonchi  HEART:  RRR, No murmur  ABDOMEN: Soft, NT/ND BS+  EXTREMITIES:  No Edema;  SKIN: No rashes    No new labs    MEDICATIONS  (STANDING):  albuterol    0.083%.  benzocaine 20% Spray  benzocaine/menthol Lozenge PRN  chlorhexidine 2% Cloths  chlorhexidine 4% Liquid  cloNIDine Patch 0.3 mG/24Hr(s)  dextrose 5%.  dextrose 5%.  dextrose 50% Injectable  dextrose 50% Injectable  dextrose 50% Injectable  dextrose Oral Gel PRN  epoetin dustin-epbx (RETACRIT) Injectable  folic acid  hydrALAZINE  HYDROmorphone  Injectable PRN  insulin lispro (ADMELOG) corrective regimen sliding scale  labetalol  labetalol Injectable PRN  lidocaine   4% Patch  metoclopramide Injectable  naloxone Injectable PRN  ondansetron Injectable PRN  pantoprazole  Injectable  piperacillin/tazobactam IVPB..  sodium chloride 0.9% lock flush PRN

## 2024-09-11 NOTE — PROGRESS NOTE ADULT - ASSESSMENT
64yo F pt w/ pmhx of ESRD on peritoneal dialysis 5x per week, HTN, HLD, DM presented to ED c/o worsening diffused abdominal pain with associated distention over the past week. Pt reports dialysis cath has not been draining for the last week. She attempts peritoneal dialysis but her unable to get any fluid return. She denies fever, chills, N/V/D, urinary sx, chest pain, SOB, or any other complaints.  (27 Aug 2024 03:04)  Has been constipated in the past    ESRD   Now w PD catheter complication - peritonitis  but CT abd noted --> + SBO ; Passing flatus easily  s/p Ex-lap, FAREED,  SBR and PD cath removal  HD started -  MWF schedule s/pl permacath - Ledy HD easily  Abx as per ID     RO - Phoslo     HTN - BP not ideal  increased Labetalol from 200 to 300 mg TID on 9/9    Anemia - cont  Epogen with HD, s/p transfusion two units prbc  w HD on 9/7, + FOBT  Cont folate  Ferritin 1100    Monitor labs will follow

## 2024-09-11 NOTE — PROGRESS NOTE ADULT - ASSESSMENT
63y Female w/ PMH of ESRD on peritoneal dialysis 5 x per week, HTN, HLD, and DM admitted for peritonitis and peritoneal dialysis catheter dysfunction, needing HD. Now found to have SBO s/p ex-lap. Strict NPO with NGT to LIS. Now with hypertensive Urgency being managed on IVP anti-htn meds.     PD catheter associated peritonitis  PD catheter malfunction  SBO - resolved  - 8/30/24- Exploratory laparotomy with lysis of adhesions. Suspected area of small bowel perforation resected, approximately 15cm. Side-to-side small bowel anastomosis. PD catheter removed.  - NPO, IVF at 50cc/hr   - Dilaudid IVP PRN pain scale  - Inc spirometry, Mobilize  - Peritoneal fluid culture with Achromobacter xylosoxidans  - s/p NGT removed    - Diet advanced to renal   - cont antibiotics until 9/13/24 per ID     GI bleed likely from prior surgery  - stable    ESRD on Peritoneal Dialysis, but PD catheter removed  Hypokalemia  Hypernatremia   - s/p permacath placement 9/11/24   - HD per renal    Pericardial effusion on CTA chest:  - Appreciate cards recs  - Hemodyanmically stable, likely 2/2 uremia  - Fu echo-Normal biventricular function, small to mod pericardial effusion. Recs to cw HD for it     Hypertensive Urgency  -  oral hydralazine 50 mg, labetalol 600 mg tid   - BP appears more controlled now   - clonidine patch increased to 0.3mg/qweekly    Urinary retention  - with lobo currently   - Urology follow up     DM2- controlled  - c/w insulin lispro    HLD  - c/w statin    DVT ppx: SCD    Disposition: suspect d/c home tomorrow

## 2024-09-12 VITALS
DIASTOLIC BLOOD PRESSURE: 80 MMHG | OXYGEN SATURATION: 100 % | SYSTOLIC BLOOD PRESSURE: 158 MMHG | HEART RATE: 103 BPM | RESPIRATION RATE: 18 BRPM

## 2024-09-12 LAB
GLUCOSE BLDC GLUCOMTR-MCNC: 107 MG/DL — HIGH (ref 70–99)
GLUCOSE BLDC GLUCOMTR-MCNC: 152 MG/DL — HIGH (ref 70–99)

## 2024-09-12 PROCEDURE — 87070 CULTURE OTHR SPECIMN AEROBIC: CPT

## 2024-09-12 PROCEDURE — 83605 ASSAY OF LACTIC ACID: CPT

## 2024-09-12 PROCEDURE — 87077 CULTURE AEROBIC IDENTIFY: CPT

## 2024-09-12 PROCEDURE — 88307 TISSUE EXAM BY PATHOLOGIST: CPT

## 2024-09-12 PROCEDURE — 84466 ASSAY OF TRANSFERRIN: CPT

## 2024-09-12 PROCEDURE — 83690 ASSAY OF LIPASE: CPT

## 2024-09-12 PROCEDURE — 89051 BODY FLUID CELL COUNT: CPT

## 2024-09-12 PROCEDURE — 86704 HEP B CORE ANTIBODY TOTAL: CPT

## 2024-09-12 PROCEDURE — 84100 ASSAY OF PHOSPHORUS: CPT

## 2024-09-12 PROCEDURE — 71045 X-RAY EXAM CHEST 1 VIEW: CPT

## 2024-09-12 PROCEDURE — 88300 SURGICAL PATH GROSS: CPT

## 2024-09-12 PROCEDURE — 87075 CULTR BACTERIA EXCEPT BLOOD: CPT

## 2024-09-12 PROCEDURE — 85027 COMPLETE CBC AUTOMATED: CPT

## 2024-09-12 PROCEDURE — 87186 SC STD MICRODIL/AGAR DIL: CPT

## 2024-09-12 PROCEDURE — 96374 THER/PROPH/DIAG INJ IV PUSH: CPT

## 2024-09-12 PROCEDURE — 86923 COMPATIBILITY TEST ELECTRIC: CPT

## 2024-09-12 PROCEDURE — C1769: CPT

## 2024-09-12 PROCEDURE — 36430 TRANSFUSION BLD/BLD COMPNT: CPT

## 2024-09-12 PROCEDURE — 74018 RADEX ABDOMEN 1 VIEW: CPT

## 2024-09-12 PROCEDURE — 96375 TX/PRO/DX INJ NEW DRUG ADDON: CPT

## 2024-09-12 PROCEDURE — 83036 HEMOGLOBIN GLYCOSYLATED A1C: CPT

## 2024-09-12 PROCEDURE — 77001 FLUOROGUIDE FOR VEIN DEVICE: CPT

## 2024-09-12 PROCEDURE — C1750: CPT

## 2024-09-12 PROCEDURE — 86706 HEP B SURFACE ANTIBODY: CPT

## 2024-09-12 PROCEDURE — 85025 COMPLETE CBC W/AUTO DIFF WBC: CPT

## 2024-09-12 PROCEDURE — 87640 STAPH A DNA AMP PROBE: CPT

## 2024-09-12 PROCEDURE — 80053 COMPREHEN METABOLIC PANEL: CPT

## 2024-09-12 PROCEDURE — 85730 THROMBOPLASTIN TIME PARTIAL: CPT

## 2024-09-12 PROCEDURE — 83540 ASSAY OF IRON: CPT

## 2024-09-12 PROCEDURE — 80048 BASIC METABOLIC PNL TOTAL CA: CPT

## 2024-09-12 PROCEDURE — 99239 HOSP IP/OBS DSCHRG MGMT >30: CPT

## 2024-09-12 PROCEDURE — 86705 HEP B CORE ANTIBODY IGM: CPT

## 2024-09-12 PROCEDURE — P9016: CPT

## 2024-09-12 PROCEDURE — 87205 SMEAR GRAM STAIN: CPT

## 2024-09-12 PROCEDURE — 36415 COLL VENOUS BLD VENIPUNCTURE: CPT

## 2024-09-12 PROCEDURE — 76942 ECHO GUIDE FOR BIOPSY: CPT

## 2024-09-12 PROCEDURE — 83986 ASSAY PH BODY FLUID NOS: CPT

## 2024-09-12 PROCEDURE — C1889: CPT

## 2024-09-12 PROCEDURE — 87340 HEPATITIS B SURFACE AG IA: CPT

## 2024-09-12 PROCEDURE — 93306 TTE W/DOPPLER COMPLETE: CPT

## 2024-09-12 PROCEDURE — 85018 HEMOGLOBIN: CPT

## 2024-09-12 PROCEDURE — 87641 MR-STAPH DNA AMP PROBE: CPT

## 2024-09-12 PROCEDURE — 86901 BLOOD TYPING SEROLOGIC RH(D): CPT

## 2024-09-12 PROCEDURE — 83550 IRON BINDING TEST: CPT

## 2024-09-12 PROCEDURE — 93005 ELECTROCARDIOGRAM TRACING: CPT

## 2024-09-12 PROCEDURE — 74176 CT ABD & PELVIS W/O CONTRAST: CPT | Mod: MC

## 2024-09-12 PROCEDURE — 82962 GLUCOSE BLOOD TEST: CPT

## 2024-09-12 PROCEDURE — 85379 FIBRIN DEGRADATION QUANT: CPT

## 2024-09-12 PROCEDURE — 85610 PROTHROMBIN TIME: CPT

## 2024-09-12 PROCEDURE — 82728 ASSAY OF FERRITIN: CPT

## 2024-09-12 PROCEDURE — 86850 RBC ANTIBODY SCREEN: CPT

## 2024-09-12 PROCEDURE — 99285 EMERGENCY DEPT VISIT HI MDM: CPT

## 2024-09-12 PROCEDURE — 82271 OCCULT BLOOD OTHER SOURCES: CPT

## 2024-09-12 PROCEDURE — 80202 ASSAY OF VANCOMYCIN: CPT

## 2024-09-12 PROCEDURE — 71275 CT ANGIOGRAPHY CHEST: CPT | Mod: MC

## 2024-09-12 PROCEDURE — 85014 HEMATOCRIT: CPT

## 2024-09-12 PROCEDURE — 83735 ASSAY OF MAGNESIUM: CPT

## 2024-09-12 PROCEDURE — 87040 BLOOD CULTURE FOR BACTERIA: CPT

## 2024-09-12 PROCEDURE — 99261: CPT

## 2024-09-12 PROCEDURE — 86900 BLOOD TYPING SEROLOGIC ABO: CPT

## 2024-09-12 PROCEDURE — 87015 SPECIMEN INFECT AGNT CONCNTJ: CPT

## 2024-09-12 PROCEDURE — 86803 HEPATITIS C AB TEST: CPT

## 2024-09-12 RX ORDER — HYDRALAZINE HYDROCHLORIDE 50 MG/1
1 TABLET, FILM COATED ORAL
Qty: 90 | Refills: 0 | DISCHARGE
Start: 2024-09-12 | End: 2024-10-11

## 2024-09-12 RX ORDER — METOPROLOL TARTRATE 100 MG/1
1 TABLET ORAL
Refills: 0 | DISCHARGE

## 2024-09-12 RX ORDER — HYDRALAZINE HCL 50 MG
1 TABLET ORAL
Refills: 0 | DISCHARGE

## 2024-09-12 RX ORDER — CLONIDINE HYDROCHLORIDE 0.2 MG/1
1 TABLET ORAL
Qty: 4 | Refills: 2 | DISCHARGE
Start: 2024-09-12 | End: 2024-12-10

## 2024-09-12 RX ORDER — CLONIDINE HCL 0.2 MG
1 TABLET ORAL
Qty: 4 | Refills: 2
Start: 2024-09-12 | End: 2024-12-10

## 2024-09-12 RX ORDER — HYDRALAZINE HCL 50 MG
1 TABLET ORAL
Qty: 90 | Refills: 0
Start: 2024-09-12 | End: 2024-10-11

## 2024-09-12 RX ORDER — FOLIC ACID 1 MG
1 TABLET ORAL
Qty: 0 | Refills: 0 | DISCHARGE
Start: 2024-09-12

## 2024-09-12 RX ORDER — LABETALOL HCL 200 MG
2 TABLET ORAL
Qty: 180 | Refills: 0 | DISCHARGE
Start: 2024-09-12 | End: 2024-10-11

## 2024-09-12 RX ORDER — AMOXICILLIN AND CLAVULANATE POTASSIUM 250; 125 MG/1; MG/1
875 TABLET, FILM COATED ORAL
Qty: 3 | Refills: 0
Start: 2024-09-12 | End: 2024-09-13

## 2024-09-12 RX ORDER — DILTIAZEM HYDROCHLORIDE 5 MG/ML
1 INJECTION INTRAVENOUS
Refills: 0 | DISCHARGE

## 2024-09-12 RX ADMIN — Medication 1: at 11:58

## 2024-09-12 RX ADMIN — Medication 1 PATCH: at 01:40

## 2024-09-12 RX ADMIN — Medication 1 MILLIGRAM(S): at 11:58

## 2024-09-12 RX ADMIN — Medication 1 PATCH: at 07:00

## 2024-09-12 RX ADMIN — Medication 10 MILLIGRAM(S): at 06:23

## 2024-09-12 RX ADMIN — CHLORHEXIDINE GLUCONATE 1 APPLICATION(S): 40 SOLUTION TOPICAL at 06:23

## 2024-09-12 RX ADMIN — Medication 100 MILLIGRAM(S): at 12:39

## 2024-09-12 RX ADMIN — Medication 600 MILLIGRAM(S): at 06:23

## 2024-09-12 RX ADMIN — Medication 40 MILLIGRAM(S): at 06:23

## 2024-09-12 RX ADMIN — CHLORHEXIDINE GLUCONATE 1 APPLICATION(S): 40 SOLUTION TOPICAL at 11:59

## 2024-09-12 RX ADMIN — Medication 100 MILLIGRAM(S): at 06:23

## 2024-09-12 RX ADMIN — PIPERACILLIN SODIUM AND TAZOBACTAM SODIUM 25 GRAM(S): 3; .375 INJECTION, POWDER, FOR SOLUTION INTRAVENOUS at 00:50

## 2024-09-12 RX ADMIN — Medication 1 PATCH: at 11:57

## 2024-09-12 RX ADMIN — Medication 600 MILLIGRAM(S): at 12:39

## 2024-09-12 NOTE — PROGRESS NOTE ADULT - SUBJECTIVE AND OBJECTIVE BOX
Soto Stanton MD  [facility] Medicine  Contact via Teams or text/call at [phone]    Patient is a 63y old  Female who presents with a chief complaint of ESRD needing HD (12 Sep 2024 10:07)      Patient seen and examined at bedside. No overnight events reported.     ALLERGIES:  No Known Allergies    MEDICATIONS  (STANDING):  albuterol    0.083%. 2.5 milliGRAM(s) Nebulizer once  chlorhexidine 2% Cloths 1 Application(s) Topical daily  chlorhexidine 4% Liquid 1 Application(s) Topical <User Schedule>  cloNIDine Patch 0.3 mG/24Hr(s) 1 patch Transdermal every 7 days  dextrose 5%. 1000 milliLiter(s) (100 mL/Hr) IV Continuous <Continuous>  dextrose 5%. 1000 milliLiter(s) (50 mL/Hr) IV Continuous <Continuous>  dextrose 50% Injectable 25 Gram(s) IV Push once  dextrose 50% Injectable 25 Gram(s) IV Push once  dextrose 50% Injectable 12.5 Gram(s) IV Push once  epoetin dustin-epbx (RETACRIT) Injectable 11425 Unit(s) IV Push <User Schedule>  folic acid 1 milliGRAM(s) Oral daily  hydrALAZINE 100 milliGRAM(s) Oral three times a day  insulin lispro (ADMELOG) corrective regimen sliding scale   SubCutaneous three times a day before meals  labetalol 600 milliGRAM(s) Oral three times a day  lidocaine   4% Patch 1 Patch Transdermal daily  metoclopramide Injectable 10 milliGRAM(s) IV Push every 12 hours  pantoprazole  Injectable 40 milliGRAM(s) IV Push two times a day  piperacillin/tazobactam IVPB.. 3.375 Gram(s) IV Intermittent every 12 hours    MEDICATIONS  (PRN):  benzocaine/menthol Lozenge 1 Lozenge Oral every 4 hours PRN Sore Throat  dextrose Oral Gel 15 Gram(s) Oral once PRN Blood Glucose LESS THAN 70 milliGRAM(s)/deciliter  HYDROmorphone  Injectable 1 milliGRAM(s) IV Push every 4 hours PRN Severe Pain (7 - 10)  labetalol Injectable 10 milliGRAM(s) IV Push every 2 hours PRN Systolic blood pressure >185  naloxone Injectable 0.1 milliGRAM(s) IV Push every 3 minutes PRN For ANY of the following changes in patient status:  A. RR LESS THAN 10 breaths per minute, B. Oxygen saturation LESS THAN 90%, C. Sedation score of 6  ondansetron Injectable 4 milliGRAM(s) IV Push every 6 hours PRN Nausea and/or Vomiting  sodium chloride 0.9% lock flush 10 milliLiter(s) IV Push every 1 hour PRN Pre/post blood products, medications, blood draw, and to maintain line patency    Vital Signs Last 24 Hrs  T(F): 98.2 (12 Sep 2024 07:42), Max: 98.7 (12 Sep 2024 00:02)  HR: 101 (12 Sep 2024 08:00) (69 - 118)  BP: 121/66 (12 Sep 2024 08:00) (113/77 - 202/102)  RR: 18 (12 Sep 2024 08:00) (18 - 18)  SpO2: 98% (12 Sep 2024 08:00) (97% - 99%)  I&O's Summary    11 Sep 2024 07:01  -  12 Sep 2024 07:00  --------------------------------------------------------  IN: 400 mL / OUT: 1500 mL / NET: -1100 mL      PHYSICAL EXAM:  General: NAD, A/O x 3  ENT: No gross hearing impairment, Moist mucous membranes, no thrush  Neck: Supple, No JVD  Lungs: Clear to auscultation bilaterally, good air entry, non-labored breathing  Cardio: RRR, S1/S2, No murmur  Abdomen: Soft, Nontender, Nondistended; Bowel sounds present  Extremities: No calf tenderness, No cyanosis, No pitting edema  Psych: Appropriate mood and affect    LABS:                        10.3   10.83 )-----------( 175      ( 09 Sep 2024 10:32 )             32.6     09-09    133  |  98  |  25.5  ----------------------------<  102  3.4   |  24.0  |  6.65    Ca    7.1      09 Sep 2024 10:32    POCT Blood Glucose.: 107 mg/dL (12 Sep 2024 08:48)  POCT Blood Glucose.: 193 mg/dL (11 Sep 2024 22:21)  POCT Blood Glucose.: 109 mg/dL (11 Sep 2024 17:14)  POCT Blood Glucose.: 110 mg/dL (11 Sep 2024 11:31)    Urinalysis Basic - ( 09 Sep 2024 10:32 )    Color: x / Appearance: x / SG: x / pH: x  Gluc: 102 mg/dL / Ketone: x  / Bili: x / Urobili: x   Blood: x / Protein: x / Nitrite: x   Leuk Esterase: x / RBC: x / WBC x   Sq Epi: x / Non Sq Epi: x / Bacteria: x          RADIOLOGY & ADDITIONAL TESTS:    Care Discussed with Consultants/Other Providers:

## 2024-09-12 NOTE — PROGRESS NOTE ADULT - REASON FOR ADMISSION
ESRD needing HD

## 2024-09-12 NOTE — DISCHARGE NOTE PROVIDER - NSDCHOSPICE_GEN_A_CORE
----- Message from Rosaura Albright sent at 7/5/2017  4:07 PM CDT -----  PATIENT STATES THAT DR. KNUTSON WAS TO HAVE GIVEN HER A RX FOR TYLENOL #4??? DID NOT RECEIVE RX BEFORE LEAVING TODAY'S APPOINTMENT.  CAN YOU PLEASE CALL RX INTO [facility] ON [address].  THANKS KE   No

## 2024-09-12 NOTE — PROGRESS NOTE ADULT - ASSESSMENT
63y Female w/ PMH of ESRD on peritoneal dialysis 5 x per week, HTN, HLD, and DM admitted for peritonitis and peritoneal dialysis catheter dysfunction, needing HD. Now found to have SBO s/p ex-lap. Strict NPO with NGT to LIS. Now with hypertensive Urgency being managed on IVP anti-htn meds.     PD catheter associated peritonitis  PD catheter malfunction  SBO - resolved  - 8/30/24- Exploratory laparotomy with lysis of adhesions. Suspected area of small bowel perforation resected, approximately 15cm. Side-to-side small bowel anastomosis. PD catheter removed.  - NPO, IVF at 50cc/hr   - Dilaudid IVP PRN pain scale  - Inc spirometry, Mobilize  - Peritoneal fluid culture with Achromobacter xylosoxidans  - s/p NGT removed    - Diet advanced to renal   - cont antibiotics until 9/13/24 per ID - switch to augmentin through 9/13    GI bleed likely from prior surgery  - stable    ESRD on Peritoneal Dialysis, but PD catheter removed  Hypokalemia  Hypernatremia   - s/p permacath placement 9/11/24   - HD per renal    Pericardial effusion on CTA chest:  - Appreciate cards recs  - Hemodyanmically stable, likely 2/2 uremia  - Fu echo-Normal biventricular function, small to mod pericardial effusion. Recs to cw HD for it     Hypertensive Urgency  -  oral hydralazine 50 mg, labetalol 600 mg tid   - BP appears more controlled now   - clonidine patch increased to 0.3mg/qweekly    Urinary retention  - with lobo currently   - Urology follow up     DM2- controlled  - c/w insulin lispro    HLD  - c/w statin    DVT ppx: SCD    Disposition: d/c home today

## 2024-09-12 NOTE — DISCHARGE NOTE PROVIDER - NSDCCPCAREPLAN_GEN_ALL_CORE_FT
PRINCIPAL DISCHARGE DIAGNOSIS  Diagnosis: SBP (spontaneous bacterial peritonitis)  Assessment and Plan of Treatment: You were admitted for abdominal pain and found to have bacterial peritonitis.  Your Peritoneal dialysis catheter was removed and you were started on hemodialysis through a upper chest port.  Your infection improved and will complete antibiotics on 9/13/24.  You will continue dialysis through your new permacath.    Please make sure to follow up with your primary care physician as an outpatient.

## 2024-09-12 NOTE — PROGRESS NOTE ADULT - ASSESSMENT
64yo F pt w/ pmhx of ESRD on peritoneal dialysis 5x per week, HTN, HLD, DM presented to ED c/o worsening diffused abdominal pain with associated distention over the past week. Pt reports dialysis cath has not been draining for the last week. She attempts peritoneal dialysis but her unable to get any fluid return. She denies fever, chills, N/V/D, urinary sx, chest pain, SOB, or any other complaints.  (27 Aug 2024 03:04)  Has been constipated in the past    ESRD   Now w PD catheter complication - peritonitis  but CT abd noted --> + SBO ; Passing flatus easily and BM  s/p Ex-lap, FAREED,  SBR and PD cath removal  HD started -  MWF schedule s/pl permacath - Ledy HD easily - Has a chair at [facility] as per ID     RO - Phoslo     HTN - BP not ideal  increased Labetalol from 200 to 300 mg TID on 9/9    Anemia - cont  Epogen with HD, s/p transfusion two units prbc  w HD on 9/7, + FOBT  Cont folate  Ferritin 1100    Monitor labs will follow

## 2024-09-12 NOTE — PROGRESS NOTE ADULT - NUTRITIONAL ASSESSMENT
This patient has been assessed with a concern for Malnutrition and has been determined to have a diagnosis/diagnoses of Severe protein-calorie malnutrition.    This patient is being managed with:   Diet Renal Restrictions-  For patients receiving Renal Replacement - No Protein Restr No Conc K No Conc Phos Low Sodium  Consistent Carbohydrate {Evening Snack} (CSTCHOSN)  Entered: Sep  6 2024  2:27PM  
This patient has been assessed with a concern for Malnutrition and has been determined to have a diagnosis/diagnoses of Severe protein-calorie malnutrition.    This patient is being managed with:   Diet NPO-  Entered: Aug 28 2024  7:44PM  
This patient has been assessed with a concern for Malnutrition and has been determined to have a diagnosis/diagnoses of Severe protein-calorie malnutrition.    This patient is being managed with:   Diet NPO-  Entered: Aug 28 2024  7:44PM  
This patient has been assessed with a concern for Malnutrition and has been determined to have a diagnosis/diagnoses of Severe protein-calorie malnutrition.    This patient is being managed with:   Diet Renal Restrictions-  For patients receiving Renal Replacement - No Protein Restr No Conc K No Conc Phos Low Sodium  Consistent Carbohydrate {Evening Snack} (CSTCHOSN)  Entered: Sep  6 2024  2:27PM  
This patient has been assessed with a concern for Malnutrition and has been determined to have a diagnosis/diagnoses of Severe protein-calorie malnutrition.    This patient is being managed with:   Diet Renal Restrictions-  For patients receiving Renal Replacement - No Protein Restr No Conc K No Conc Phos Low Sodium  Consistent Carbohydrate {Evening Snack} (CSTCHOSN)  Entered: Sep  6 2024  2:27PM  
This patient has been assessed with a concern for Malnutrition and has been determined to have a diagnosis/diagnoses of Severe protein-calorie malnutrition.    This patient is being managed with:   Diet NPO-  Entered: Aug 28 2024  7:44PM  
This patient has been assessed with a concern for Malnutrition and has been determined to have a diagnosis/diagnoses of Severe protein-calorie malnutrition.    This patient is being managed with:   Diet Renal Restrictions-  For patients receiving Renal Replacement - No Protein Restr No Conc K No Conc Phos Low Sodium  Consistent Carbohydrate {Evening Snack} (CSTCHOSN)  Entered: Sep  6 2024  2:27PM  
This patient has been assessed with a concern for Malnutrition and has been determined to have a diagnosis/diagnoses of Severe protein-calorie malnutrition.    This patient is being managed with:   Diet Renal Restrictions-  For patients receiving Renal Replacement - No Protein Restr No Conc K No Conc Phos Low Sodium  Consistent Carbohydrate {Evening Snack} (CSTCHOSN)  Entered: Sep  6 2024  2:27PM  
This patient has been assessed with a concern for Malnutrition and has been determined to have a diagnosis/diagnoses of Severe protein-calorie malnutrition.    This patient is being managed with:   Diet NPO-  Entered: Aug 28 2024  7:44PM  
This patient has been assessed with a concern for Malnutrition and has been determined to have a diagnosis/diagnoses of Severe protein-calorie malnutrition.    This patient is being managed with:   Diet NPO-  Entered: Aug 28 2024  7:44PM  
This patient has been assessed with a concern for Malnutrition and has been determined to have a diagnosis/diagnoses of Severe protein-calorie malnutrition.    This patient is being managed with:   Diet Renal Restrictions-  For patients receiving Renal Replacement - No Protein Restr No Conc K No Conc Phos Low Sodium  Consistent Carbohydrate {Evening Snack} (CSTCHOSN)  Entered: Sep  6 2024  2:27PM  
This patient has been assessed with a concern for Malnutrition and has been determined to have a diagnosis/diagnoses of Severe protein-calorie malnutrition.    This patient is being managed with:   Diet Clear Liquid-  Entered: Sep  6 2024  7:25AM

## 2024-09-12 NOTE — PROGRESS NOTE ADULT - SUBJECTIVE AND OBJECTIVE BOX
Patient seen and examined    Feels fine, OOB/Ch  no c/o CP SOB NV   Abd pain controlled  No swelling feet    Vital Signs Last 24 Hrs  T(C): 36.8 (12 Sep 2024 07:42), Max: 37.1 (12 Sep 2024 00:02)  T(F): 98.2 (12 Sep 2024 07:42), Max: 98.7 (12 Sep 2024 00:02)  HR: 103 (12 Sep 2024 10:00) (69 - 118)  BP: 158/80 (12 Sep 2024 10:00) (113/77 - 202/102)  BP(mean): 112 (12 Sep 2024 06:00) (90 - 112)  RR: 18 (12 Sep 2024 10:00) (18 - 18)  SpO2: 100% (12 Sep 2024 10:00) (97% - 100%)    Parameters below as of 12 Sep 2024 10:00  Patient On (Oxygen Delivery Method): room air        PHYSICAL EXAM    GENERAL: NAD,   EYES:  conjunctiva and sclera clear  NECK: Supple, No JVD/Bruit  NERVOUS SYSTEM:  A/O x3,   CHEST:  No rales, No rhonchi  HEART:  RRR, No murmur  ABDOMEN: Soft, NT/ND BS+ Incision apposed  EXTREMITIES:  No Edema;  SKIN: No rashes    No new labs today

## 2024-09-12 NOTE — DISCHARGE NOTE PROVIDER - HOSPITAL COURSE
Hospital Course  HPI:  62yo F pt w/ pmhx of ESRD on peritoneal dialysis 5x per week, HTN, HLD, DM presented to ED c/o worsening diffused abdominal pain with associated distention over the past week. Pt reports dialysis cath has not been draining for the last week. She attempts peritoneal dialysis but her unable to get any fluid return. She denies fever, chills, N/V/D, urinary sx, chest pain, SOB, or any other complaints.  (27 Aug 2024 03:04)    You were admitted for abdominal pain and found to have bacterial peritonitis.  Your Peritoneal dialysis catheter was removed and you were started on hemodialysis through a upper chest port.  Your infection improved and will complete antibiotics on 9/13/24.  You will continue dialysis through your new permacath.      Please make sure to follow up with your primary care physician as an outpatient.     Discharging Provider:  Soto Stanton MD  Contact Info: [phone] - Please call with any questions or concerns.

## 2024-09-12 NOTE — DISCHARGE NOTE PROVIDER - NSDCMRMEDTOKEN_GEN_ALL_CORE_FT
amoxicillin-clavulanate 875 mg-125 mg oral tablet: 875 milligram(s) orally 2 times a day  calcium acetate: 667 milligram(s) orally 3 times a day with meals  cloNIDine 0.3 mg/24 hr transdermal film, extended release: 1 patch transdermal every 7 days  folic acid 1 mg oral tablet: 1 tab(s) orally once a day  hydrALAZINE 100 mg oral tablet: 1 tab(s) orally 3 times a day  Januvia 25 mg oral tablet: 1 tab(s) orally once a day  labetalol 300 mg oral tablet: 2 tab(s) orally 3 times a day  Nephro-Elizabeth oral tablet: 1 tab(s) orally once a day  rosuvastatin 20 mg oral tablet: 1 tab(s) orally once a day (at bedtime)  torsemide 20 mg oral tablet: 1 tab(s) orally once a day

## 2024-09-12 NOTE — DISCHARGE NOTE NURSING/CASE MANAGEMENT/SOCIAL WORK - NSDCFUADDAPPT_GEN_ALL_CORE_FT
[facility]. Phone: [phone], [address] [place] [address].  INTAKE APPT ON FRIDAY 9/13/24 AT 1:30PM.  ONGOING SEAT TIME IS 5:15AM ON MONDAYS, WEDNESDAYS, AND FRIDAYS.

## 2024-09-12 NOTE — PROGRESS NOTE ADULT - PROVIDER SPECIALTY LIST ADULT
Cardiology
Hospitalist
Infectious Disease
Internal Medicine
Nephrology
Surgery
Hospitalist
Internal Medicine
Nephrology
Surgery
Cardiology
Hospitalist
Infectious Disease
Infectious Disease
Internal Medicine
Nephrology
Pain Medicine
Pain Medicine
Surgery
Hospitalist
Infectious Disease
Infectious Disease
Internal Medicine
Nephrology
Surgery
Hospitalist
Surgery
Hospitalist
Hospitalist
Infectious Disease
Infectious Disease

## 2024-09-12 NOTE — DISCHARGE NOTE NURSING/CASE MANAGEMENT/SOCIAL WORK - PATIENT PORTAL LINK FT
You can access the FollowMyHealth Patient Portal offered by [facility] by registering at the following website: [email or web address]. By joining [facility]’s FollowMyHealth portal, you will also be able to view your health information using other applications (apps) compatible with our system.

## 2024-09-21 ENCOUNTER — EMERGENCY (EMERGENCY)
Facility: HOSPITAL | Age: 64
LOS: 1 days | Discharge: LEFT BEFORE DISCHARGE | End: 2024-09-21
Attending: STUDENT IN AN ORGANIZED HEALTH CARE EDUCATION/TRAINING PROGRAM
Payer: COMMERCIAL

## 2024-09-21 VITALS
TEMPERATURE: 98 F | HEIGHT: 69 IN | DIASTOLIC BLOOD PRESSURE: 101 MMHG | WEIGHT: 117.95 LBS | RESPIRATION RATE: 16 BRPM | SYSTOLIC BLOOD PRESSURE: 219 MMHG | OXYGEN SATURATION: 93 % | HEART RATE: 91 BPM

## 2024-09-21 VITALS
DIASTOLIC BLOOD PRESSURE: 91 MMHG | TEMPERATURE: 98 F | SYSTOLIC BLOOD PRESSURE: 193 MMHG | OXYGEN SATURATION: 98 % | HEART RATE: 96 BPM | RESPIRATION RATE: 12 BRPM

## 2024-09-21 DIAGNOSIS — Z98.891 HISTORY OF UTERINE SCAR FROM PREVIOUS SURGERY: Chronic | ICD-10-CM

## 2024-09-21 LAB
ALBUMIN SERPL ELPH-MCNC: 2.7 G/DL — LOW (ref 3.3–5.2)
ALP SERPL-CCNC: 110 U/L — SIGNIFICANT CHANGE UP (ref 40–120)
ALT FLD-CCNC: 11 U/L — SIGNIFICANT CHANGE UP
ANION GAP SERPL CALC-SCNC: 16 MMOL/L — SIGNIFICANT CHANGE UP (ref 5–17)
AST SERPL-CCNC: 22 U/L — SIGNIFICANT CHANGE UP
BASOPHILS # BLD AUTO: 0.03 K/UL — SIGNIFICANT CHANGE UP (ref 0–0.2)
BASOPHILS NFR BLD AUTO: 0.6 % — SIGNIFICANT CHANGE UP (ref 0–2)
BILIRUB SERPL-MCNC: 0.3 MG/DL — LOW (ref 0.4–2)
BUN SERPL-MCNC: 18.2 MG/DL — SIGNIFICANT CHANGE UP (ref 8–20)
CALCIUM SERPL-MCNC: 8.7 MG/DL — SIGNIFICANT CHANGE UP (ref 8.4–10.5)
CHLORIDE SERPL-SCNC: 94 MMOL/L — LOW (ref 96–108)
CO2 SERPL-SCNC: 27 MMOL/L — SIGNIFICANT CHANGE UP (ref 22–29)
CREAT SERPL-MCNC: 4.93 MG/DL — HIGH (ref 0.5–1.3)
EGFR: 9 ML/MIN/1.73M2 — LOW
EGFR: 9 ML/MIN/1.73M2 — LOW
EOSINOPHIL # BLD AUTO: 0.03 K/UL — SIGNIFICANT CHANGE UP (ref 0–0.5)
EOSINOPHIL NFR BLD AUTO: 0.6 % — SIGNIFICANT CHANGE UP (ref 0–6)
GLUCOSE SERPL-MCNC: 170 MG/DL — HIGH (ref 70–99)
HCT VFR BLD CALC: 25.8 % — LOW (ref 34.5–45)
HGB BLD-MCNC: 8.1 G/DL — LOW (ref 11.5–15.5)
IMM GRANULOCYTES NFR BLD AUTO: 0.4 % — SIGNIFICANT CHANGE UP (ref 0–0.9)
LYMPHOCYTES # BLD AUTO: 0.96 K/UL — LOW (ref 1–3.3)
LYMPHOCYTES # BLD AUTO: 20.2 % — SIGNIFICANT CHANGE UP (ref 13–44)
MCHC RBC-ENTMCNC: 26.1 PG — LOW (ref 27–34)
MCHC RBC-ENTMCNC: 31.4 GM/DL — LOW (ref 32–36)
MCV RBC AUTO: 83.2 FL — SIGNIFICANT CHANGE UP (ref 80–100)
MONOCYTES # BLD AUTO: 0.27 K/UL — SIGNIFICANT CHANGE UP (ref 0–0.9)
MONOCYTES NFR BLD AUTO: 5.7 % — SIGNIFICANT CHANGE UP (ref 2–14)
NEUTROPHILS # BLD AUTO: 3.44 K/UL — SIGNIFICANT CHANGE UP (ref 1.8–7.4)
NEUTROPHILS NFR BLD AUTO: 72.5 % — SIGNIFICANT CHANGE UP (ref 43–77)
PLATELET # BLD AUTO: 230 K/UL — SIGNIFICANT CHANGE UP (ref 150–400)
POTASSIUM SERPL-MCNC: 3.6 MMOL/L — SIGNIFICANT CHANGE UP (ref 3.5–5.3)
POTASSIUM SERPL-SCNC: 3.6 MMOL/L — SIGNIFICANT CHANGE UP (ref 3.5–5.3)
PROT SERPL-MCNC: 6.7 G/DL — SIGNIFICANT CHANGE UP (ref 6.6–8.7)
RBC # BLD: 3.1 M/UL — LOW (ref 3.8–5.2)
RBC # FLD: 16.5 % — HIGH (ref 10.3–14.5)
SODIUM SERPL-SCNC: 137 MMOL/L — SIGNIFICANT CHANGE UP (ref 135–145)
WBC # BLD: 4.75 K/UL — SIGNIFICANT CHANGE UP (ref 3.8–10.5)
WBC # FLD AUTO: 4.75 K/UL — SIGNIFICANT CHANGE UP (ref 3.8–10.5)

## 2024-09-21 PROCEDURE — 36415 COLL VENOUS BLD VENIPUNCTURE: CPT

## 2024-09-21 PROCEDURE — 71045 X-RAY EXAM CHEST 1 VIEW: CPT

## 2024-09-21 PROCEDURE — 96374 THER/PROPH/DIAG INJ IV PUSH: CPT

## 2024-09-21 PROCEDURE — 71045 X-RAY EXAM CHEST 1 VIEW: CPT | Mod: 26

## 2024-09-21 PROCEDURE — 70490 CT SOFT TISSUE NECK W/O DYE: CPT | Mod: MC

## 2024-09-21 PROCEDURE — 71250 CT THORAX DX C-: CPT | Mod: MC

## 2024-09-21 PROCEDURE — 74176 CT ABD & PELVIS W/O CONTRAST: CPT | Mod: MC

## 2024-09-21 PROCEDURE — 85025 COMPLETE CBC W/AUTO DIFF WBC: CPT

## 2024-09-21 PROCEDURE — 93010 ELECTROCARDIOGRAM REPORT: CPT

## 2024-09-21 PROCEDURE — 93005 ELECTROCARDIOGRAM TRACING: CPT

## 2024-09-21 PROCEDURE — 99285 EMERGENCY DEPT VISIT HI MDM: CPT | Mod: 25

## 2024-09-21 PROCEDURE — 96375 TX/PRO/DX INJ NEW DRUG ADDON: CPT

## 2024-09-21 PROCEDURE — 99285 EMERGENCY DEPT VISIT HI MDM: CPT

## 2024-09-21 PROCEDURE — 80053 COMPREHEN METABOLIC PANEL: CPT

## 2024-09-21 RX ORDER — LABETALOL HYDROCHLORIDE 200 MG/1
10 TABLET, FILM COATED ORAL ONCE
Refills: 0 | Status: COMPLETED | OUTPATIENT
Start: 2024-09-21 | End: 2024-09-21

## 2024-09-21 RX ORDER — ONDANSETRON HCL/PF 4 MG/2 ML
4 VIAL (ML) INJECTION ONCE
Refills: 0 | Status: COMPLETED | OUTPATIENT
Start: 2024-09-21 | End: 2024-09-21

## 2024-09-21 RX ORDER — DEXAMETHASONE 0.5 MG/1
6 TABLET ORAL ONCE
Refills: 0 | Status: COMPLETED | OUTPATIENT
Start: 2024-09-21 | End: 2024-09-21

## 2024-09-21 RX ORDER — CEFTRIAXONE 500 MG/1
1000 INJECTION, POWDER, FOR SOLUTION INTRAMUSCULAR; INTRAVENOUS ONCE
Refills: 0 | Status: DISCONTINUED | OUTPATIENT
Start: 2024-09-21 | End: 2024-09-21

## 2024-09-21 RX ORDER — CEFTRIAXONE 500 MG/1
1000 INJECTION, POWDER, FOR SOLUTION INTRAMUSCULAR; INTRAVENOUS ONCE
Refills: 0 | Status: COMPLETED | OUTPATIENT
Start: 2024-09-21 | End: 2024-09-21

## 2024-09-21 RX ORDER — AZITHROMYCIN 250 MG
500 CAPSULE ORAL ONCE
Refills: 0 | Status: COMPLETED | OUTPATIENT
Start: 2024-09-21 | End: 2024-09-21

## 2024-09-21 RX ADMIN — LABETALOL HYDROCHLORIDE 10 MILLIGRAM(S): 200 TABLET, FILM COATED ORAL at 21:27

## 2024-09-21 RX ADMIN — Medication 255 MILLIGRAM(S): at 23:52

## 2024-09-21 RX ADMIN — Medication 20 MILLIGRAM(S): at 21:28

## 2024-09-21 RX ADMIN — DEXAMETHASONE 6 MILLIGRAM(S): 0.5 TABLET ORAL at 21:28

## 2024-09-21 RX ADMIN — CEFTRIAXONE 1000 MILLIGRAM(S): 500 INJECTION, POWDER, FOR SOLUTION INTRAMUSCULAR; INTRAVENOUS at 23:52

## 2024-09-21 RX ADMIN — Medication 4 MILLIGRAM(S): at 22:23

## 2024-09-22 PROCEDURE — 71250 CT THORAX DX C-: CPT | Mod: 26,MC

## 2024-09-22 PROCEDURE — 74176 CT ABD & PELVIS W/O CONTRAST: CPT | Mod: 26,MC

## 2024-09-22 PROCEDURE — 70490 CT SOFT TISSUE NECK W/O DYE: CPT | Mod: 26,MC

## 2024-09-23 ENCOUNTER — INPATIENT (INPATIENT)
Facility: HOSPITAL | Age: 64
LOS: 2 days | Discharge: HOME | DRG: 305 | End: 2024-09-26
Attending: STUDENT IN AN ORGANIZED HEALTH CARE EDUCATION/TRAINING PROGRAM | Admitting: STUDENT IN AN ORGANIZED HEALTH CARE EDUCATION/TRAINING PROGRAM
Payer: COMMERCIAL

## 2024-09-23 VITALS
OXYGEN SATURATION: 72 % | HEART RATE: 100 BPM | DIASTOLIC BLOOD PRESSURE: 110 MMHG | SYSTOLIC BLOOD PRESSURE: 218 MMHG | HEIGHT: 69 IN | RESPIRATION RATE: 36 BRPM | TEMPERATURE: 98 F

## 2024-09-23 DIAGNOSIS — Z98.890 OTHER SPECIFIED POSTPROCEDURAL STATES: Chronic | ICD-10-CM

## 2024-09-23 DIAGNOSIS — E11.9 TYPE 2 DIABETES MELLITUS WITHOUT COMPLICATIONS: ICD-10-CM

## 2024-09-23 DIAGNOSIS — I10 ESSENTIAL (PRIMARY) HYPERTENSION: ICD-10-CM

## 2024-09-23 DIAGNOSIS — I16.1 HYPERTENSIVE EMERGENCY: ICD-10-CM

## 2024-09-23 DIAGNOSIS — Z98.891 HISTORY OF UTERINE SCAR FROM PREVIOUS SURGERY: Chronic | ICD-10-CM

## 2024-09-23 LAB
ALBUMIN SERPL ELPH-MCNC: 2.6 G/DL — LOW (ref 3.3–5.2)
ALP SERPL-CCNC: 105 U/L — SIGNIFICANT CHANGE UP (ref 40–120)
ALT FLD-CCNC: 12 U/L — SIGNIFICANT CHANGE UP
ANION GAP SERPL CALC-SCNC: 11 MMOL/L — SIGNIFICANT CHANGE UP (ref 5–17)
ANION GAP SERPL CALC-SCNC: 14 MMOL/L — SIGNIFICANT CHANGE UP (ref 5–17)
APTT BLD: 29.7 SEC — SIGNIFICANT CHANGE UP (ref 24.5–35.6)
AST SERPL-CCNC: 21 U/L — SIGNIFICANT CHANGE UP
BASOPHILS # BLD AUTO: 0.04 K/UL — SIGNIFICANT CHANGE UP (ref 0–0.2)
BASOPHILS # BLD AUTO: 0.05 K/UL — SIGNIFICANT CHANGE UP (ref 0–0.2)
BASOPHILS NFR BLD AUTO: 0.6 % — SIGNIFICANT CHANGE UP (ref 0–2)
BASOPHILS NFR BLD AUTO: 0.8 % — SIGNIFICANT CHANGE UP (ref 0–2)
BILIRUB SERPL-MCNC: 0.2 MG/DL — LOW (ref 0.4–2)
BLD GP AB SCN SERPL QL: SIGNIFICANT CHANGE UP
BUN SERPL-MCNC: 30.4 MG/DL — HIGH (ref 8–20)
BUN SERPL-MCNC: 9.2 MG/DL — SIGNIFICANT CHANGE UP (ref 8–20)
CALCIUM SERPL-MCNC: 8.2 MG/DL — LOW (ref 8.4–10.5)
CALCIUM SERPL-MCNC: 9 MG/DL — SIGNIFICANT CHANGE UP (ref 8.4–10.5)
CHLORIDE SERPL-SCNC: 91 MMOL/L — LOW (ref 96–108)
CHLORIDE SERPL-SCNC: 96 MMOL/L — SIGNIFICANT CHANGE UP (ref 96–108)
CO2 SERPL-SCNC: 28 MMOL/L — SIGNIFICANT CHANGE UP (ref 22–29)
CO2 SERPL-SCNC: 29 MMOL/L — SIGNIFICANT CHANGE UP (ref 22–29)
CREAT SERPL-MCNC: 3.33 MG/DL — HIGH (ref 0.5–1.3)
CREAT SERPL-MCNC: 7.09 MG/DL — HIGH (ref 0.5–1.3)
EGFR: 15 ML/MIN/1.73M2 — LOW
EGFR: 6 ML/MIN/1.73M2 — LOW
EOSINOPHIL # BLD AUTO: 0.03 K/UL — SIGNIFICANT CHANGE UP (ref 0–0.5)
EOSINOPHIL # BLD AUTO: 0.1 K/UL — SIGNIFICANT CHANGE UP (ref 0–0.5)
EOSINOPHIL NFR BLD AUTO: 0.5 % — SIGNIFICANT CHANGE UP (ref 0–6)
EOSINOPHIL NFR BLD AUTO: 1.6 % — SIGNIFICANT CHANGE UP (ref 0–6)
GLUCOSE BLDC GLUCOMTR-MCNC: 112 MG/DL — HIGH (ref 70–99)
GLUCOSE BLDC GLUCOMTR-MCNC: 130 MG/DL — HIGH (ref 70–99)
GLUCOSE BLDC GLUCOMTR-MCNC: 131 MG/DL — HIGH (ref 70–99)
GLUCOSE BLDC GLUCOMTR-MCNC: 98 MG/DL — SIGNIFICANT CHANGE UP (ref 70–99)
GLUCOSE SERPL-MCNC: 128 MG/DL — HIGH (ref 70–99)
GLUCOSE SERPL-MCNC: 187 MG/DL — HIGH (ref 70–99)
HCT VFR BLD CALC: 22.2 % — LOW (ref 34.5–45)
HCT VFR BLD CALC: 23.2 % — LOW (ref 34.5–45)
HCT VFR BLD CALC: 23.7 % — LOW (ref 34.5–45)
HCT VFR BLD CALC: 26.3 % — LOW (ref 34.5–45)
HGB BLD-MCNC: 6.9 G/DL — CRITICAL LOW (ref 11.5–15.5)
HGB BLD-MCNC: 7.4 G/DL — LOW (ref 11.5–15.5)
HGB BLD-MCNC: 7.5 G/DL — LOW (ref 11.5–15.5)
HGB BLD-MCNC: 8.2 G/DL — LOW (ref 11.5–15.5)
IMM GRANULOCYTES NFR BLD AUTO: 0.5 % — SIGNIFICANT CHANGE UP (ref 0–0.9)
IMM GRANULOCYTES NFR BLD AUTO: 0.5 % — SIGNIFICANT CHANGE UP (ref 0–0.9)
INR BLD: 1.06 RATIO — SIGNIFICANT CHANGE UP (ref 0.85–1.18)
LYMPHOCYTES # BLD AUTO: 0.99 K/UL — LOW (ref 1–3.3)
LYMPHOCYTES # BLD AUTO: 1.06 K/UL — SIGNIFICANT CHANGE UP (ref 1–3.3)
LYMPHOCYTES # BLD AUTO: 15.7 % — SIGNIFICANT CHANGE UP (ref 13–44)
LYMPHOCYTES # BLD AUTO: 16.4 % — SIGNIFICANT CHANGE UP (ref 13–44)
MAGNESIUM SERPL-MCNC: 1.7 MG/DL — LOW (ref 1.8–2.6)
MCHC RBC-ENTMCNC: 25.7 PG — LOW (ref 27–34)
MCHC RBC-ENTMCNC: 26.1 PG — LOW (ref 27–34)
MCHC RBC-ENTMCNC: 26.2 PG — LOW (ref 27–34)
MCHC RBC-ENTMCNC: 26.6 PG — LOW (ref 27–34)
MCHC RBC-ENTMCNC: 31.1 GM/DL — LOW (ref 32–36)
MCHC RBC-ENTMCNC: 31.2 GM/DL — LOW (ref 32–36)
MCHC RBC-ENTMCNC: 31.6 GM/DL — LOW (ref 32–36)
MCHC RBC-ENTMCNC: 31.9 GM/DL — LOW (ref 32–36)
MCV RBC AUTO: 82.5 FL — SIGNIFICANT CHANGE UP (ref 80–100)
MCV RBC AUTO: 82.6 FL — SIGNIFICANT CHANGE UP (ref 80–100)
MCV RBC AUTO: 83.5 FL — SIGNIFICANT CHANGE UP (ref 80–100)
MCV RBC AUTO: 84 FL — SIGNIFICANT CHANGE UP (ref 80–100)
MONOCYTES # BLD AUTO: 0.29 K/UL — SIGNIFICANT CHANGE UP (ref 0–0.9)
MONOCYTES # BLD AUTO: 0.38 K/UL — SIGNIFICANT CHANGE UP (ref 0–0.9)
MONOCYTES NFR BLD AUTO: 4.5 % — SIGNIFICANT CHANGE UP (ref 2–14)
MONOCYTES NFR BLD AUTO: 6 % — SIGNIFICANT CHANGE UP (ref 2–14)
MRSA PCR RESULT.: SIGNIFICANT CHANGE UP
NEUTROPHILS # BLD AUTO: 4.77 K/UL — SIGNIFICANT CHANGE UP (ref 1.8–7.4)
NEUTROPHILS # BLD AUTO: 5 K/UL — SIGNIFICANT CHANGE UP (ref 1.8–7.4)
NEUTROPHILS NFR BLD AUTO: 75.4 % — SIGNIFICANT CHANGE UP (ref 43–77)
NEUTROPHILS NFR BLD AUTO: 77.5 % — HIGH (ref 43–77)
PHOSPHATE SERPL-MCNC: 2.3 MG/DL — LOW (ref 2.4–4.7)
PLATELET # BLD AUTO: 252 K/UL — SIGNIFICANT CHANGE UP (ref 150–400)
PLATELET # BLD AUTO: 260 K/UL — SIGNIFICANT CHANGE UP (ref 150–400)
PLATELET # BLD AUTO: 264 K/UL — SIGNIFICANT CHANGE UP (ref 150–400)
PLATELET # BLD AUTO: 267 K/UL — SIGNIFICANT CHANGE UP (ref 150–400)
POTASSIUM SERPL-MCNC: 3.5 MMOL/L — SIGNIFICANT CHANGE UP (ref 3.5–5.3)
POTASSIUM SERPL-MCNC: 4.2 MMOL/L — SIGNIFICANT CHANGE UP (ref 3.5–5.3)
POTASSIUM SERPL-SCNC: 3.5 MMOL/L — SIGNIFICANT CHANGE UP (ref 3.5–5.3)
POTASSIUM SERPL-SCNC: 4.2 MMOL/L — SIGNIFICANT CHANGE UP (ref 3.5–5.3)
PROT SERPL-MCNC: 6.3 G/DL — LOW (ref 6.6–8.7)
PROTHROM AB SERPL-ACNC: 11.7 SEC — SIGNIFICANT CHANGE UP (ref 9.5–13)
RBC # BLD: 2.69 M/UL — LOW (ref 3.8–5.2)
RBC # BLD: 2.78 M/UL — LOW (ref 3.8–5.2)
RBC # BLD: 2.87 M/UL — LOW (ref 3.8–5.2)
RBC # BLD: 3.13 M/UL — LOW (ref 3.8–5.2)
RBC # FLD: 16.5 % — HIGH (ref 10.3–14.5)
RBC # FLD: 16.5 % — HIGH (ref 10.3–14.5)
RBC # FLD: 16.7 % — HIGH (ref 10.3–14.5)
RBC # FLD: 16.8 % — HIGH (ref 10.3–14.5)
S AUREUS DNA NOSE QL NAA+PROBE: SIGNIFICANT CHANGE UP
SODIUM SERPL-SCNC: 133 MMOL/L — LOW (ref 135–145)
SODIUM SERPL-SCNC: 135 MMOL/L — SIGNIFICANT CHANGE UP (ref 135–145)
TROPONIN T, HIGH SENSITIVITY RESULT: 65 NG/L — HIGH (ref 0–51)
WBC # BLD: 6.03 K/UL — SIGNIFICANT CHANGE UP (ref 3.8–10.5)
WBC # BLD: 6.31 K/UL — SIGNIFICANT CHANGE UP (ref 3.8–10.5)
WBC # BLD: 6.32 K/UL — SIGNIFICANT CHANGE UP (ref 3.8–10.5)
WBC # BLD: 6.45 K/UL — SIGNIFICANT CHANGE UP (ref 3.8–10.5)
WBC # FLD AUTO: 6.03 K/UL — SIGNIFICANT CHANGE UP (ref 3.8–10.5)
WBC # FLD AUTO: 6.31 K/UL — SIGNIFICANT CHANGE UP (ref 3.8–10.5)
WBC # FLD AUTO: 6.32 K/UL — SIGNIFICANT CHANGE UP (ref 3.8–10.5)
WBC # FLD AUTO: 6.45 K/UL — SIGNIFICANT CHANGE UP (ref 3.8–10.5)

## 2024-09-23 PROCEDURE — 99233 SBSQ HOSP IP/OBS HIGH 50: CPT

## 2024-09-23 PROCEDURE — 71045 X-RAY EXAM CHEST 1 VIEW: CPT | Mod: 26

## 2024-09-23 PROCEDURE — 99291 CRITICAL CARE FIRST HOUR: CPT

## 2024-09-23 PROCEDURE — 93010 ELECTROCARDIOGRAM REPORT: CPT

## 2024-09-23 RX ORDER — PANTOPRAZOLE SODIUM 40 MG/1
40 TABLET, DELAYED RELEASE ORAL DAILY
Refills: 0 | Status: DISCONTINUED | OUTPATIENT
Start: 2024-09-23 | End: 2024-09-26

## 2024-09-23 RX ORDER — ROSUVASTATIN CALCIUM 20 MG/1
20 TABLET, COATED ORAL AT BEDTIME
Refills: 0 | Status: DISCONTINUED | OUTPATIENT
Start: 2024-09-23 | End: 2024-09-26

## 2024-09-23 RX ORDER — FOLIC ACID 1 MG/1
1 TABLET ORAL DAILY
Refills: 0 | Status: DISCONTINUED | OUTPATIENT
Start: 2024-09-23 | End: 2024-09-26

## 2024-09-23 RX ORDER — ALCOHOL ANTISEPTIC PADS
12.5 PADS, MEDICATED (EA) TOPICAL ONCE
Refills: 0 | Status: DISCONTINUED | OUTPATIENT
Start: 2024-09-23 | End: 2024-09-26

## 2024-09-23 RX ORDER — ALCOHOL ANTISEPTIC PADS
25 PADS, MEDICATED (EA) TOPICAL ONCE
Refills: 0 | Status: DISCONTINUED | OUTPATIENT
Start: 2024-09-23 | End: 2024-09-26

## 2024-09-23 RX ORDER — NICARDIPINE HCL 25 MG/10ML
7.5 AMPUL (ML) INTRAVENOUS
Qty: 40 | Refills: 0 | Status: DISCONTINUED | OUTPATIENT
Start: 2024-09-23 | End: 2024-09-23

## 2024-09-23 RX ORDER — ALCOHOL ANTISEPTIC PADS
15 PADS, MEDICATED (EA) TOPICAL ONCE
Refills: 0 | Status: DISCONTINUED | OUTPATIENT
Start: 2024-09-23 | End: 2024-09-26

## 2024-09-23 RX ORDER — SODIUM CHLORIDE IRRIG SOLUTION 0.9 %
1000 SOLUTION, IRRIGATION IRRIGATION
Refills: 0 | Status: DISCONTINUED | OUTPATIENT
Start: 2024-09-23 | End: 2024-09-26

## 2024-09-23 RX ORDER — LABETALOL HYDROCHLORIDE 200 MG/1
10 TABLET, FILM COATED ORAL EVERY 4 HOURS
Refills: 0 | Status: DISCONTINUED | OUTPATIENT
Start: 2024-09-23 | End: 2024-09-23

## 2024-09-23 RX ORDER — LABETALOL HYDROCHLORIDE 200 MG/1
300 TABLET, FILM COATED ORAL THREE TIMES A DAY
Refills: 0 | Status: DISCONTINUED | OUTPATIENT
Start: 2024-09-23 | End: 2024-09-25

## 2024-09-23 RX ORDER — ONDANSETRON HCL/PF 4 MG/2 ML
8 VIAL (ML) INJECTION ONCE
Refills: 0 | Status: COMPLETED | OUTPATIENT
Start: 2024-09-23 | End: 2024-09-23

## 2024-09-23 RX ORDER — INFLUENZA VIRUS VACCINE 15; 15; 15; 15 UG/.5ML; UG/.5ML; UG/.5ML; UG/.5ML
0.5 SUSPENSION INTRAMUSCULAR ONCE
Refills: 0 | Status: DISCONTINUED | OUTPATIENT
Start: 2024-09-23 | End: 2024-09-26

## 2024-09-23 RX ORDER — FUROSEMIDE 10 MG/ML
80 INJECTION INTRAVENOUS ONCE
Refills: 0 | Status: COMPLETED | OUTPATIENT
Start: 2024-09-23 | End: 2024-09-23

## 2024-09-23 RX ORDER — NICARDIPINE HCL 25 MG/10ML
5 AMPUL (ML) INTRAVENOUS
Qty: 40 | Refills: 0 | Status: DISCONTINUED | OUTPATIENT
Start: 2024-09-23 | End: 2024-09-23

## 2024-09-23 RX ORDER — CHLORHEXIDINE GLUCONATE ORAL RINSE 1.2 MG/ML
1 SOLUTION DENTAL
Refills: 0 | Status: DISCONTINUED | OUTPATIENT
Start: 2024-09-23 | End: 2024-09-26

## 2024-09-23 RX ORDER — INSULIN LISPRO 100/ML
VIAL (ML) SUBCUTANEOUS EVERY 6 HOURS
Refills: 0 | Status: DISCONTINUED | OUTPATIENT
Start: 2024-09-23 | End: 2024-09-26

## 2024-09-23 RX ORDER — HYDRALAZINE HYDROCHLORIDE 100 MG/1
100 TABLET ORAL EVERY 8 HOURS
Refills: 0 | Status: DISCONTINUED | OUTPATIENT
Start: 2024-09-23 | End: 2024-09-26

## 2024-09-23 RX ORDER — ASPIRIN 325 MG
325 TABLET ORAL ONCE
Refills: 0 | Status: COMPLETED | OUTPATIENT
Start: 2024-09-23 | End: 2024-09-23

## 2024-09-23 RX ORDER — GLUCAGON INJECTION, SOLUTION 0.5 MG/.1ML
1 INJECTION, SOLUTION SUBCUTANEOUS ONCE
Refills: 0 | Status: DISCONTINUED | OUTPATIENT
Start: 2024-09-23 | End: 2024-09-26

## 2024-09-23 RX ORDER — ALTEPLASE 2.2 MG/2ML
2 INJECTION, POWDER, LYOPHILIZED, FOR SOLUTION INTRAVENOUS ONCE
Refills: 0 | Status: COMPLETED | OUTPATIENT
Start: 2024-09-23 | End: 2024-09-23

## 2024-09-23 RX ORDER — ERYTHROPOIETIN 10000 [IU]/ML
10000 INJECTION, SOLUTION INTRAVENOUS; SUBCUTANEOUS
Refills: 0 | Status: DISCONTINUED | OUTPATIENT
Start: 2024-09-23 | End: 2024-09-26

## 2024-09-23 RX ADMIN — Medication 5000 UNIT(S): at 17:42

## 2024-09-23 RX ADMIN — HYDRALAZINE HYDROCHLORIDE 100 MILLIGRAM(S): 100 TABLET ORAL at 16:05

## 2024-09-23 RX ADMIN — FUROSEMIDE 80 MILLIGRAM(S): 10 INJECTION INTRAVENOUS at 03:38

## 2024-09-23 RX ADMIN — CHLORHEXIDINE GLUCONATE ORAL RINSE 1 APPLICATION(S): 1.2 SOLUTION DENTAL at 09:24

## 2024-09-23 RX ADMIN — LABETALOL HYDROCHLORIDE 300 MILLIGRAM(S): 200 TABLET, FILM COATED ORAL at 21:01

## 2024-09-23 RX ADMIN — Medication 60 MICROGRAM(S)/MIN: at 03:28

## 2024-09-23 RX ADMIN — Medication 60 MICROGRAM(S)/MIN: at 09:24

## 2024-09-23 RX ADMIN — PANTOPRAZOLE SODIUM 40 MILLIGRAM(S): 40 TABLET, DELAYED RELEASE ORAL at 17:42

## 2024-09-23 RX ADMIN — ERYTHROPOIETIN 10000 UNIT(S): 10000 INJECTION, SOLUTION INTRAVENOUS; SUBCUTANEOUS at 15:25

## 2024-09-23 RX ADMIN — LABETALOL HYDROCHLORIDE 300 MILLIGRAM(S): 200 TABLET, FILM COATED ORAL at 16:05

## 2024-09-23 RX ADMIN — ALTEPLASE 2 MILLIGRAM(S): 2.2 INJECTION, POWDER, LYOPHILIZED, FOR SOLUTION INTRAVENOUS at 15:25

## 2024-09-23 RX ADMIN — HYDRALAZINE HYDROCHLORIDE 100 MILLIGRAM(S): 100 TABLET ORAL at 21:01

## 2024-09-23 RX ADMIN — Medication 325 MILLIGRAM(S): at 06:37

## 2024-09-23 RX ADMIN — FOLIC ACID 1 MILLIGRAM(S): 1 TABLET ORAL at 16:05

## 2024-09-23 RX ADMIN — ROSUVASTATIN CALCIUM 20 MILLIGRAM(S): 20 TABLET, COATED ORAL at 21:01

## 2024-09-23 RX ADMIN — Medication 8 MILLIGRAM(S): at 03:57

## 2024-09-23 RX ADMIN — Medication 5000 UNIT(S): at 09:27

## 2024-09-23 NOTE — CHART NOTE - NSCHARTNOTEFT_GEN_A_CORE
MICU Transfer Note    Transfer from: MICU    Transfer to: (  ) Medicine    (X) Telemetry     (   ) RCU        (    ) Palliative         (   ) Stroke Unit          (   )   Accepting physican:    MICU COURSE:  63 yo f pmhx ESRD (failed PD) now on  conventional HD via right chest wall permacath (last session friday), HTN on multiple antihypertensives, DM biba from home with sob.  Per patient she was not feeling well last night, woke up suddenly this morning with acute sob prompting 911 call.  In ED patient 220s/110s, sob, and hypoxic.  Patient placed on NIPPV and nitroglycerine gtt and given IVP lasix for bp control. Patient seen at bedside, currently off NIPPV on 4L NC, on nitro gtt at 200mcg/min to transition to Cardene gtt with goal bp 160-180systolic/100s.  Patient endorses she has been taking her medication regularly.  Admitted to MICU for blood pressure management and emergent hemodialysis session.        ASSESSMENT & PLAN:   63 yo f pmhx ESRD (failed PD) now on  conventional HD via right chest wall permacath (last session friday), HTN on multiple antihypertensives, DM admitted with     1. HTN urgency  2. Acute hypoxic respiratory failure    NEURO: No active issues  CV: Restarted home antihypertensive regimen. Titrated off of Cardene gtt.   RESP: NC for spo2 >92%, NIPPV as needed  RENAL: ESRD on HD M,W,F.  Tolerated HD session with 2.5L removed. HD dose meds, trend bun/cr, u/o and electrolytes  GI: Renal Diet, Protonix for dyspepsia  ENDO: ISS for glycemic control   ID: no active infectious process  HEME: heparin for vte ppx   DISPO: full code           For Followup:  Repeat labs post HD pending      Vital Signs Last 24 Hrs  T(C): 36.7 (23 Sep 2024 15:40), Max: 37.1 (23 Sep 2024 12:00)  T(F): 98 (23 Sep 2024 15:40), Max: 98.7 (23 Sep 2024 12:00)  HR: 99 (23 Sep 2024 17:15) (97 - 106)  BP: 142/75 (23 Sep 2024 17:15) (137/73 - 218/110)  BP(mean): 96 (23 Sep 2024 17:15) (93 - 133)  RR: 19 (23 Sep 2024 17:15) (17 - 36)  SpO2: 99% (23 Sep 2024 17:15) (72% - 100%)    Parameters below as of 23 Sep 2024 16:00  Patient On (Oxygen Delivery Method): nasal cannula  O2 Flow (L/min): 6    I&O's Summary    23 Sep 2024 07:01  -  23 Sep 2024 18:02  --------------------------------------------------------  IN: 303.8 mL / OUT: 2500 mL / NET: -2196.2 mL        MEDICATIONS  (STANDING):  chlorhexidine 2% Cloths 1 Application(s) Topical <User Schedule>  dextrose 5%. 1000 milliLiter(s) (50 mL/Hr) IV Continuous <Continuous>  dextrose 5%. 1000 milliLiter(s) (100 mL/Hr) IV Continuous <Continuous>  dextrose 50% Injectable 12.5 Gram(s) IV Push once  dextrose 50% Injectable 25 Gram(s) IV Push once  dextrose 50% Injectable 25 Gram(s) IV Push once  epoetin dustin-epbx (RETACRIT) Injectable 04615 Unit(s) IV Push <User Schedule>  folic acid 1 milliGRAM(s) Oral daily  glucagon  Injectable 1 milliGRAM(s) IntraMuscular once  heparin   Injectable 5000 Unit(s) SubCutaneous every 8 hours  hydrALAZINE 100 milliGRAM(s) Oral every 8 hours  influenza   Vaccine 0.5 milliLiter(s) IntraMuscular once  insulin lispro (ADMELOG) corrective regimen sliding scale   SubCutaneous every 6 hours  labetalol 300 milliGRAM(s) Oral three times a day  pantoprazole    Tablet 40 milliGRAM(s) Oral daily    MEDICATIONS  (PRN):  dextrose Oral Gel 15 Gram(s) Oral once PRN Blood Glucose LESS THAN 70 milliGRAM(s)/deciliter        LABS                                            7.5                   Neurophils% (auto):   75.4   (09-23 @ 17:45):    6.32 )-----------(252          Lymphocytes% (auto):  15.7                                          23.7                   Eosinphils% (auto):   1.6      Manual%: Neutrophils x    ; Lymphocytes x    ; Eosinophils x    ; Bands%: x    ; Blasts x                                    133    |  91     |  30.4                Calcium: 9.0   / iCa: x      (09-23 @ 03:14)    ----------------------------<  187       Magnesium: x                                4.2     |  28.0   |  7.09             Phosphorous: x          ( 09-23 @ 03:14 )   PT: 11.7 sec;   INR: 1.06 ratio  aPTT: 29.7 sec

## 2024-09-23 NOTE — ED ADULT TRIAGE NOTE - CHIEF COMPLAINT QUOTE
pt arrived in wheel chair with family at side endorsing having to burp to breath sat found to be 72% on RA and elevated BP charge called and pt brought to CC1 currently lethargic in triage

## 2024-09-23 NOTE — ED PROVIDER NOTE - CLINICAL SUMMARY MEDICAL DECISION MAKING FREE TEXT BOX
Patient presented with hypertensive emergency CHF like symptoms flash pulmonary edema grossly hypoxic requiring immediate resuscitation to prevent life-threatening decompensation IV push nitroglycerin high dose nitroglycerin drip to reduce afterload showed marked improvement in work of breathing on noninvasive positive pressure ventilation and afterload reduction was able to wean off BiPAP after multiple bedside reassessments however still markedly hypertensive on high dose nitroglycerin I spoke with MICU recommendations implemented I spoke with patient's nephrology group agreed to need for emergent dialysis we will admit to ICU for emergent dialysis further hemodynamic treatment patient and patient's  at bedside agree to plan of care

## 2024-09-23 NOTE — ED ADULT NURSE NOTE - NSFALLOOBATTEMPT_ED_ALL_ED
----- Message from Navin Funes MD sent at 2/5/2019 10:48 AM CST -----  Pls tell pt that her liver, blood count, cholesterol are normal.  The triglycerides are a little elevated.   The kidneys are okay, but she has a little protein in her urine.   No

## 2024-09-23 NOTE — PATIENT PROFILE ADULT - VISION (WITH CORRECTIVE LENSES IF THE PATIENT USUALLY WEARS THEM):
"[facility]  CHIEF COMPLAINT   It was my pleasure to see Estrada Oseguera who is a 78 year old male for follow-up of Elevated PSA.      HPI   Estrada Oseguera is a very pleasant 78 year old male     Initially seen 4/7/21:  \"Estrada Oseguera is a 78 year old male who is being seen for evaluation of Elevated PSA      Has been having significant LUTS worsening over the past 6-12 months  Slow and weak stream  Nocturia x3/night  Having daytime frequency   Having some urgency   He is having to do some abdominal straining  No prior acute urinary retention, UTI, or gross hematuria      Also with some Elevated PSA      Father had low grade prostate cancer found at time of death at age 87 from a stroke\"    4/21/21:  Started on tamsulosin 0.4mg (Flomax) at last visit  Follow up to discuss MRI  Notes a slight change with tamsulosin 0.4mg (Flomax)     5/19/21:  Follow-up today to discuss his biopsy results  He did well after the biopsy with no issues  His wife joins him for this call    TODAY 10/15/21:  Follow-up today to discuss his recent PSA  He has been doing well with no change in symptoms    PHYSICAL EXAM  Patient is a 78 year old  male   Vitals: Height 1.753 m (5' 9\"), weight 101.6 kg (224 lb).  Body mass index is 33.08 kg/m .  General Appearance Adult:   Alert, no acute distress, oriented  HENT: throat/mouth:normal, good dentition  Lungs: no respiratory distress, or pursed lip breathing  Heart: No obvious jugular venous distension present  Abdomen: obesely - distended  Musculoskeltal: extremities normal, no peripheral edema  Skin: no suspicious lesions or rashes  Neuro: Alert, oriented, speech and mentation normal  Psych: affect and mood normal  Gait: Normal     Component PSA   Latest Ref Rng & Units 0.00 - 4.00 ug/L   9/21/2018 5.22 (H)   3/13/2019 5.28 (H)   3/16/2020 4.90 (H)   3/18/2021 5.38 (H)   9/20/2021 6.10 (H)     PATHOLOGY:  MR-FUSION URONAV 5/12/21:  FINAL DIAGNOSIS:   A.  Prostate gland, left lateral " base, needle biopsy:   - Negative for malignancy.   - Mild chronic inflammation.     B.  Prostate gland, left lateral mid, needle biopsy:   - Negative for malignancy.   - Glandular atrophy.     C.  Prostate gland, left lateral apex, needle biopsy:   - Negative for malignancy.   - Glandular atrophy.     D.  Prostate gland, left base, needle biopsy:   - Prostatic acinar adenocarcinoma.      - Point Clear score  6 (3+3):      - WHO Grade Group - 1      - Proportion of tissue involved by tumor:  Approximately 33%      - Number of tissue cores involved by tumor:  1 (out of 1)      - Angiolymphatic invasion:  Not identified      - Perineural invasion:  Not identified     E.  Prostate gland, left mid, needle biopsy:   - Prostatic acinar adenocarcinoma.      - Lesley score  6 (3+3):      - WHO Grade Group - 1      - Proportion of tissue involved by tumor:  Approximately 33%      - Number of tissue cores involved by tumor:  1 (out of 1)      - Angiolymphatic invasion:  Not identified      - Perineural invasion:  Not identified     F.  Prostate gland, left apex, needle biopsy:]   - Negative for malignancy.     G.  Prostate gland, right lateral base, needle biopsy:   - Negative for malignancy.   - Glandular atrophy.     H.  Prostate gland, right lateral mid, needle biopsy:   - A minute focus of atypical small acinar proliferation, suspicious for   malignancy.   - Glandular atrophy.     I.  Prostate gland, right lateral apex, needle biopsy:   - Negative for malignancy.   - Glandular atrophy.     J.  Prostate gland, right base, needle biopsy:   - Negative for malignancy.   - Glandular atrophy.     K.  Prostate gland, right mid, needle biopsy:   - Negative for malignancy.   - Glandular atrophy.     L.  Prostate gland, right apex, needle biopsy:   - Negative for malignancy.   - Glandular atrophy.     M.  Prostate gland., right sided lesion, needle biopsies x 3:   - Prostatic acinar adenocarcinoma.      - Lesley score:  6 (3+3)      " - WHO Grade Group - 1      - Proportion of tissue involved by tumor:  approximately 40%      - Number of tissue cores involved by tumor: 3 (out of 3)      - Angiolymphatic invasion:  Not identified      - Perineural invasion:  Not identified       IMAGING:  All pertinent imaging reviewed:    All imaging studies reviewed by me.  I personally reviewed these imaging films.  A formal report from radiology will follow.    MRI PROSTATE 4/15/21:  \"FINDINGS:  Size: 6.1 x 4.8 x 4.7 cm; 72 grams  Hemorrhage: Absent   Peripheral zone: Heterogeneous on T2-weighted images. Suspicious  lesions as detailed below.  Transition zone: Enlarged with BPH changes. Transition zone nodules  which are circumscribed or mostly encapsulated without diffusion  restriction.  PI-RADS 2.  No highly suspicious nodules.     Lesion(s) in rank order of severity (highest score- to lowest score,  then by size)      Lesion 1:  Location: Left apex peripheral zone at the 5 o'clock position relative  to the urethra. Series 4 image 36.   Additional prostate regions involved: None   Size: 6  mm  T2 description: Rounded moderate hypointensity  T2 numerical assessment: 3  DWI description: Focal hypointensity on ADC and hyperintensity on DWI  DWI numerical assessment: 3  DCE assessment: Positive    Prostate margin: Capsular abutment<6 mm with smooth contour   Lesion overall PI-RADS category: 4     Neurovascular bundles: No neurovascular bundle involvement by  malignancy.    Seminal vesicles: No seminal vesicle involvement by malignancy.   Lymph nodes: No lymph node involvement   Bones: No suspicious lesions   Other pelvic organs: Prominent fat in bilateral inguinal canal, left  more than right. Circumferential apparent urinary bladder wall  thickening with trabeculation/sacculations, likely representing post  chronic urinary outflow obstructive change.                                                         IMPRESSION:     1. Based on the most suspicious " abnormality, this exam is  characterized as PIRADS 4 - Clinically significant cancer is likely to  be present.  The most suspicious abnormality is located at the left  apex peripheral zone, 5:00 position and there is minimal capsular  abutment with no convincing evidence of extraprostatic extension.  2. No suspicious adenopathy or evidence of pelvic metastases.    ASSESSMENT and PLAN  78-year-old man with elevated PSA and LUTS, now with an MRI showing a PI-RADS 4 lesion and a prostate volume of 72 g.  Status post an MR fusion biopsy with Lesley 3+3 = 6 prostate cancer in 3/3 of the target lesions as well as 2/12 of the template biopsy    DISCUSSION PROSTATE CANCER     The natural history of this disease was explained to the patient at length and the treatment options discussed including radical prostatectomy by open or robotic-assisted laparoscopic approach, external-beam radiotherapy, brachytherapy, active surveillance and watchful waiting, including the probability of success and complications associated with these approaches.    With respect to watchful waiting, we discussed the difficulties of estimating the extent of disease preoperatively, the risk of cancer progression, and risk that salvage might not be possible with progression. However, the favorable 10-year outcomes of active surveillance in appropriately selected patients was conveyed.  We discussed that active surveillance has become the treatment of choice for men with low volume and low grade, Lesley 3+3 = 6, prostate cancer.  We reviewed his PSA history and trend and that his most recent PSA from September demonstrates a slight increase to 6.1.  Overall, he remains a good candidate for active surveillance and I recommend a PSA recheck in 6 months.  We discussed that if his PSA should rise further we would then plan for a repeat MRI and likely repeat biopsy.    We discussed that other treatment options would include surgery or radiation, however  given his low volume of low-grade prostate cancer I would not recommend either modality at this time.    Plan:  -Follow-up in 6 months with a PSA prior.  This could be a virtual visit      LUTS  -He notes some improvement with voiding following initiation of tamsulosin 0.4 mg daily  -We discussed that his MRI did demonstrate prostatomegaly with a volume of 72 ml  -He is doing well and this remains stable    Chart documentation with Dragon Voice recognition Software. Although reviewed after completion, some words and grammatical errors may remain.     Time spent: 15 minutes spent on the date of the encounter doing chart review, history and exam, documentation and further activities as noted above.    Jono Billy MD   Urology  [facility]  [facility] Phone: [phone]  [facility] Phone: [phone]        Estrada is a 78 year old who is being evaluated via a billable video visit.      How would you like to obtain your AVS? MyChart  If the video visit is dropped, the invitation should be resent by: THIS IS HIS HOME NUMBER: [phone]  Will anyone else be joining your video visit? No        Video-Visit Details    Type of service:  Video Visit    Video Start Time: 4:45 PM    Video End Time:4:55 PM    Originating Location (pt. Location): Home    Distant Location (provider location):  [facility]     Platform used for Video Visit: Oriense     Normal vision: sees adequately in most situations; can see medication labels, newsprint

## 2024-09-23 NOTE — ED ADULT NURSE NOTE - NSFALLUNIVINTERV_ED_ALL_ED
Bed/Stretcher in lowest position, wheels locked, appropriate side rails in place/Call bell, personal items and telephone in reach/Instruct patient to call for assistance before getting out of bed/chair/stretcher/Non-slip footwear applied when patient is off stretcher/[place] to call system/Physically safe environment - no spills, clutter or unnecessary equipment/Purposeful proactive rounding/Room/bathroom lighting operational, light cord in reach

## 2024-09-23 NOTE — ED PROVIDER NOTE - OBJECTIVE STATEMENT
63 y/o F with PMH of HTN, HLD, and DM2, ESRD (on Dialysis M/W/F) presents to the ED c/o SOB. Patient unable to communicate clearly due to respiratory distress.  is at bedside providing history. Patient was previously seen in the ED 2 days ago for similar concerns but patient elected to leave and not stay for inpatient admission. Per , patient has been lethargic since her previous ED visit and tonight she began to feel SOB. Patient is satting 86% on RA in triage. Per  patient has no loss of appetite and is experiencing increased belching. Patient's most recent dialysis session was last Friday. She has a scheduled session at 5AM today that she will be unable to make. Unable to obtain complete ROS due to respiratory distress.

## 2024-09-23 NOTE — ED PROVIDER NOTE - PHYSICAL EXAMINATION
General: Awake, alert, lying in bed in NAD  HEENT: Normocephalic, atraumatic. No scleral icterus or conjunctival injection. EOMI.  Neck:. Soft and supple.  Cardiac: RRR, S1/S2 present  Resp: Symmetric chest expansion with inspiration. No accessory muscle use  Abd: Soft, non-tender, non-distended. No guarding, rebound, or rigidity.  Skin: No rashes, abrasions, or lacerations.  Extremities: Palpable DP pulses bilaterally. No LE edema.  Neuro: AO x 4. Moves all extremities symmetrically. Motor strength and sensation grossly intact.  Psych: Appropriate mood and affect General: Awake, alert, lying in bed in acute distress  HEENT: Normocephalic, atraumatic. No scleral icterus or conjunctival injection. EOMI.  Neck: + right EJ access in place   Cardiac: RRR, S1/S2 present  Resp: +rhonhci  No accessory muscle use  Abd: Soft, non-tender, non-distended. No guarding, rebound, or rigidity.  Skin: No rashes, abrasions, or lacerations.  Extremities: Palpable DP pulses bilaterally. No LE edema.  Neuro: AO x 4. Moves all extremities symmetrically. Motor strength and sensation grossly intact.  Psych: Appropriate mood and affect

## 2024-09-23 NOTE — ED ADULT NURSE REASSESSMENT NOTE - NS ED NURSE REASSESS COMMENT FT1
Patient resting in bed, awake, alert and oriented X4, remains hypertensive, denies CP, cardiac monitor in progress, O2 2 lts in progress, Nitro infusion at 30 mcg/hr, resp even/unlabored.

## 2024-09-23 NOTE — ED PROVIDER NOTE - CARE PLAN
Principal Discharge DX:	Hypertensive emergency  Secondary Diagnosis:	Acute respiratory failure with hypoxia   1

## 2024-09-23 NOTE — PROGRESS NOTE ADULT - ASSESSMENT
63y Female w/ PMH of ESRD on hd,was on peritoneal dialysis with  and peritoneal dialysis catheter dysfunction,recent peritonitis (recent admission dc 09/12),Pericardial effusion   HTN, HLD, and DM, GIB presented to ed with  sob. found to have  220s/110s, sob, and hypoxic .s/p iv lasix,nitro gtt. admitted to micu with acute hypoxic respiratory failure sec fluid over load from esrd required bipap,,HTN urgency s/p Cardene drip,pt underwent HD. Currently off bipap,placed on nc. off cardene drip bp stable. will down grade to medicine.    Acute hypoxic respiratory failure sec to volume over load from ESRD  -Off bipap  -on nc now  -s/p HD today  -f/u nephro rec about hd  -titrate of nc as tolerate  -nebs      Hypertensive Urgency  -  s/p cardene drip  - bp stable now   -continue hydralazine,labetalol  - BP appears more controlled now   - clonidine patch increased to 0.3mg/qweekly      DM2-  - issc  -monitor bg  -hold oral januvia     HLD  - c/w statin    anemia with recent GIB  -Monitor cbc    DVT ppx: SCD( recent gib/anemia)    plan of care dw MICU Team.dw pt

## 2024-09-23 NOTE — ED PROVIDER NOTE - NSICDXPASTSURGICALHX_GEN_ALL_CORE_FT
PAST SURGICAL HISTORY:  S/P arteriovenous (AV) fistula creation fistula is still maturing (fistula not available for dialysis use yet)    S/P  section

## 2024-09-23 NOTE — ED PROVIDER NOTE - ATTENDING CONTRIBUTION TO CARE
I have personally provided _60__ minutes of critical care time exclusive of time spent on separately billable procedures. Time includes review of laboratory data, radiology results, discussion with consultants, and monitoring for potential decompensation. Interventions were performed as documented above   I performed a face to face bedside interview with patient regarding history of present illness, review of symptoms and past medical history. I completed an independent physical exam.  I have discussed patient's plan of care with resident.   I agree with note as stated above including HISTORY OF PRESENT ILLNESS, HIV, PAST MEDICAL/SURGICAL/FAMILY/SOCIAL HISTORY, ALLERGIES AND HOME MEDICATIONS, REVIEW OF SYSTEMS, PHYSICAL EXAM, MEDICAL DECISION MAKING and any PROGRESS NOTES during the time I functioned as the attending physician for this patient unless otherwise noted. My brief assessment is as follows:   General: Awake, alert, lying in bed in acute distress  HEENT: Normocephalic, atraumatic. No scleral icterus or conjunctival injection. EOMI.  Neck: + right EJ access in place   Cardiac: RRR, S1/S2 present  Resp: +rhonhci  No accessory muscle use  Abd: Soft, non-tender, non-distended. No guarding, rebound, or rigidity.  Skin: No rashes, abrasions, or lacerations.  Extremities: Palpable DP pulses bilaterally. No LE edema.  Neuro: AO x 4. Moves all extremities symmetrically. Motor strength and sensation grossly intact.  Psych: Appropriate mood and affect

## 2024-09-23 NOTE — ED PROVIDER NOTE - NSICDXPASTMEDICALHX_GEN_ALL_CORE_FT
PAST MEDICAL HISTORY:  DM (diabetes mellitus)     ESRD on dialysis dialyssis M/W/F    HLD (hyperlipidemia)     HTN (hypertension)     Overactive thyroid gland

## 2024-09-23 NOTE — H&P ADULT - HISTORY OF PRESENT ILLNESS
65 yo f pmhx ESRD (failed PD) now on  conventional HD via right chest wall permacath (last session friday), HTN on multiple antihypertensives, DM biba from home with sob.  Per patient she was not feeling well last night, woke up suddenly this morning with acute sob prompting 911 call.  In ED patient 220s/110s, sob, and hypoxic.  Patient placed on NIPPV and nitroglycerine gtt and given IVP lasix for bp control. Patient seen at bedside, currently off NIPPV on 4L NC, on nitro gtt at 200mcg/min with bp 190-200/100s.  Patient endorses she has been taking her medication regularly.  Dr. Lowe's group contacted.  Admit to MICU.

## 2024-09-23 NOTE — ED ADULT NURSE NOTE - ED STAT RN HANDOFF DETAILS
aware of plan of care, emergent dialysis, ntg drip at 300, patient improved with respiratory status and vitals.

## 2024-09-23 NOTE — CONSULT NOTE ADULT - ASSESSMENT
ESRD - Pulm edema   S/P PD catheter removal due to  peritonitis   CT abd noted --> + SBO   POst op from 9/1/24 -->  Ex-lap, FAREED,  SBR and PD cath removal  Permacath placed 9/11/24   HD usually in [facility] [place] MWF  Will arrange HD today - see orders     Anemia - Epogen with HD   Check iron stores   Add folate     RO -Check phos     HTN - Reassess post fluid removal with HD

## 2024-09-23 NOTE — ED ADULT NURSE NOTE - OBJECTIVE STATEMENT
patient is a 63 y/o/f , hx of esrd m/w/f presents with hypoxia, tachnpea, requriing immediate intervention on arrival to the Emergency Department, pateint placed on bipap and treated emergently for hypertensive urgency, congestive heart failure

## 2024-09-23 NOTE — H&P ADULT - ASSESSMENT
63 yo f pmhx ESRD (failed PD) now on  conventional HD via right chest wall permacath (last session friday), HTN on multiple antihypertensives, DM admitted with     1. HTN urgency  2. Acute hypoxic respiratory failure    NEURO: No active issues  CV: Actively titrating nitro gtt for goal -180s, Restart home antihypertensive regimen,  RESP: NC for spo2 >92%, NIPPV as needed  RENAL: ESRD on HD, planned for HD this morning.  Dr. Lowe contacted by ED attending.  HD dose meds, trend bun/cr, u/o and electrolytes   GI: npo except meds  ENDO: ISS for glycemic control   ID: no active infectious process  HEME: heparin for vte ppx   DISPO: full code       DATE OF DOCUMENTATION EQUIVALENT TO DATE OF SERVICES RENDERED

## 2024-09-23 NOTE — PATIENT PROFILE ADULT - FALL HARM RISK - RISK INTERVENTIONS

## 2024-09-23 NOTE — PROGRESS NOTE ADULT - SUBJECTIVE AND OBJECTIVE BOX
Patient is a 64y old  Female who presents with a chief complaint of HTN Urgency (23 Sep 2024 10:57)    pt is sob but feeling much better. denies cp,cough,fever,chill  REVIEW OF SYSTEMS: All systems are reviewed and found to be negative except above    MEDICATIONS  (STANDING):  chlorhexidine 2% Cloths 1 Application(s) Topical <User Schedule>  dextrose 5%. 1000 milliLiter(s) (50 mL/Hr) IV Continuous <Continuous>  dextrose 5%. 1000 milliLiter(s) (100 mL/Hr) IV Continuous <Continuous>  dextrose 50% Injectable 12.5 Gram(s) IV Push once  dextrose 50% Injectable 25 Gram(s) IV Push once  dextrose 50% Injectable 25 Gram(s) IV Push once  epoetin dustin-epbx (RETACRIT) Injectable 88832 Unit(s) IV Push <User Schedule>  folic acid 1 milliGRAM(s) Oral daily  glucagon  Injectable 1 milliGRAM(s) IntraMuscular once  heparin   Injectable 5000 Unit(s) SubCutaneous every 8 hours  hydrALAZINE 100 milliGRAM(s) Oral every 8 hours  influenza   Vaccine 0.5 milliLiter(s) IntraMuscular once  insulin lispro (ADMELOG) corrective regimen sliding scale   SubCutaneous every 6 hours  labetalol 300 milliGRAM(s) Oral three times a day  pantoprazole    Tablet 40 milliGRAM(s) Oral daily    MEDICATIONS  (PRN):  dextrose Oral Gel 15 Gram(s) Oral once PRN Blood Glucose LESS THAN 70 milliGRAM(s)/deciliter      CAPILLARY BLOOD GLUCOSE      POCT Blood Glucose.: 131 mg/dL (23 Sep 2024 17:44)  POCT Blood Glucose.: 98 mg/dL (23 Sep 2024 12:54)  POCT Blood Glucose.: 130 mg/dL (23 Sep 2024 09:06)    I&O's Summary    23 Sep 2024 07:01  -  23 Sep 2024 18:04  --------------------------------------------------------  IN: 303.8 mL / OUT: 2500 mL / NET: -2196.2 mL        PHYSICAL EXAM:  Vital Signs Last 24 Hrs  T(C): 36.7 (23 Sep 2024 15:40), Max: 37.1 (23 Sep 2024 12:00)  T(F): 98 (23 Sep 2024 15:40), Max: 98.7 (23 Sep 2024 12:00)  HR: 99 (23 Sep 2024 17:15) (97 - 106)  BP: 142/75 (23 Sep 2024 17:15) (137/73 - 218/110)  BP(mean): 96 (23 Sep 2024 17:15) (93 - 133)  RR: 19 (23 Sep 2024 17:15) (17 - 36)  SpO2: 99% (23 Sep 2024 17:15) (72% - 100%)    Parameters below as of 23 Sep 2024 16:00  Patient On (Oxygen Delivery Method): nasal cannula  O2 Flow (L/min): 6      CONSTITUTIONAL: NAD,  EYES: PERRLA; conjunctiva and sclera clear  ENMT: Moist oral mucosa,   RESPIRATORY: Normal respiratory effort; crackles to auscultation bilaterally  CARDIOVASCULAR: Regular rate and rhythm, normal S1 and S2, no murmur   EXTS: No lower extremity edema; Peripheral pulses are 2+ bilaterally  ABDOMEN: Nontender to palpation, normoactive bowel sounds, no rebound/guarding;   MUSCLOSKELETAL no joint swelling or tenderness to palpation  PSYCH: coop  NEUROLOGY: A+O to person, place, and time; CN 2-12 are intact and symmetric; no gross sensory deficits;       LABS:                        7.5    6.32  )-----------( 252      ( 23 Sep 2024 17:45 )             23.7     09-23    133[L]  |  91[L]  |  30.4[H]  ----------------------------<  187[H]  4.2   |  28.0  |  7.09[H]    Ca    9.0      23 Sep 2024 03:14    TPro  6.7  /  Alb  2.7[L]  /  TBili  0.3[L]  /  DBili  x   /  AST  22  /  ALT  11  /  AlkPhos  110  09-21    PT/INR - ( 23 Sep 2024 03:14 )   PT: 11.7 sec;   INR: 1.06 ratio         PTT - ( 23 Sep 2024 03:14 )  PTT:29.7 sec      Urinalysis Basic - ( 23 Sep 2024 03:14 )    Color: x / Appearance: x / SG: x / pH: x  Gluc: 187 mg/dL / Ketone: x  / Bili: x / Urobili: x   Blood: x / Protein: x / Nitrite: x   Leuk Esterase: x / RBC: x / WBC x   Sq Epi: x / Non Sq Epi: x / Bacteria: x          RADIOLOGY & ADDITIONAL TESTS:  Results Reviewed:   < from: TTE W or WO Ultrasound Enhancing Agent (09.04.24 @ 20:14) >  CONCLUSIONS:      1. Mild left ventricular hypertrophy.   2. Left ventricular cavity is small. Left ventricular systolic function is hyperdynamic with an ejection fraction visually estimated at >75 %.   3. The left ventricular diastolic function is indeterminate. Analysis of left ventricular diastolic function and filling pressure is made challenging by the presence of tachycardia.   4. Normal right ventricular cavity size and normal right ventricular systolic function.   5. Leftatrium is normal in size.   6. The right atrium is normal in size.   7. Thickened mitral valve leaflets.   8. Estimated pulmonary artery systolic pressure is 29 mmHg, normal pulmonary artery pressure.   9. Small-moderate pericardial effusion posterior to the LV apex, small pericardial effusion noted adjacent to the posterior left ventricle and small pericardial effusion noted adjacent to the lateral left ventricle with no evidence of hemodynamic compromise (or echocardiographic evidence of cardiac tamponade).  10. The inferior vena cava is normal in size measuring 1.35 cm in diameter, (normal <2.1cm) with normal inspiratory collapse (normal >50%) consistent with normal right atrial pressure (~3, range 0-5mmHg).  11. Aortic valve anatomy cannot be determined with normal systolic excursion. Fibrocalcific aortic valve sclerosis without stenosis.    < end of copied text >

## 2024-09-23 NOTE — CONSULT NOTE ADULT - SUBJECTIVE AND OBJECTIVE BOX
Patient is a 64y old  Female who presents with a chief complaint of HTN Urgency (23 Sep 2024 06:39)      HPI:  63 yo f pmhx ESRD (failed PD) now on  conventional HD via right chest wall permacath (last session friday), HTN on multiple antihypertensives, DM biba from home with sob.  Per patient she was not feeling well last night, woke up suddenly this morning with acute sob prompting 911 call.  In ED patient 220s/110s, sob, and hypoxic.  Patient placed on NIPPV and nitroglycerine gtt and given IVP lasix for bp control. Patient seen at bedside, currently off NIPPV on 4L NC, on nitro gtt at 200mcg/min with bp 190-200/100s.  Patient endorses she has been taking her medication regularly.  Dr. Lowe's group contacted.  Admit to MICU. (23 Sep 2024 06:39)    S/P PD catheter removal due to  peritonitis   CT abd noted --> + SBO   POst op from 24 -->  Ex-lap, FAREED,  SBR and PD cath removal  + permacath   Abx as per ID   BP under better control  Off NIV           PAST MEDICAL & SURGICAL HISTORY:  HTN (hypertension)      DM (diabetes mellitus)      HLD (hyperlipidemia)      Overactive thyroid gland      ESRD on dialysis  dialyssis M/W/F      S/P  section      S/P arteriovenous (AV) fistula creation  fistula is still maturing (fistula not available for dialysis use yet)          FAMILY HISTORY:  Family history of breast cancer in female (Aunt)    Family history of prostate cancer (Uncle)    Family history of hypertension (Father, Mother)        Social History:    MEDICATIONS  (STANDING):  chlorhexidine 2% Cloths 1 Application(s) Topical <User Schedule>  dextrose 5%. 1000 milliLiter(s) (50 mL/Hr) IV Continuous <Continuous>  dextrose 5%. 1000 milliLiter(s) (100 mL/Hr) IV Continuous <Continuous>  dextrose 50% Injectable 12.5 Gram(s) IV Push once  dextrose 50% Injectable 25 Gram(s) IV Push once  dextrose 50% Injectable 25 Gram(s) IV Push once  glucagon  Injectable 1 milliGRAM(s) IntraMuscular once  heparin   Injectable 5000 Unit(s) SubCutaneous every 8 hours  hydrALAZINE 100 milliGRAM(s) Oral every 8 hours  influenza   Vaccine 0.5 milliLiter(s) IntraMuscular once  insulin lispro (ADMELOG) corrective regimen sliding scale   SubCutaneous every 6 hours  labetalol 300 milliGRAM(s) Oral three times a day  niCARdipine Infusion 7.5 mG/Hr (37.5 mL/Hr) IV Continuous <Continuous>    MEDICATIONS  (PRN):  dextrose Oral Gel 15 Gram(s) Oral once PRN Blood Glucose LESS THAN 70 milliGRAM(s)/deciliter  Home Medications:   * Patient Currently Takes Medications as of 12-Sep-2024 10:16 documented in Structured Notes  · 	labetalol 300 mg oral tablet: 2 tab(s) orally 3 times a day  · 	cloNIDine 0.3 mg/24 hr transdermal film, extended release: 1 patch transdermal every 7 days  · 	amoxicillin-clavulanate 875 mg-125 mg oral tablet: 875 milligram(s) orally 2 times a day  · 	hydrALAZINE 100 mg oral tablet: 1 tab(s) orally 3 times a day  · 	folic acid 1 mg oral tablet: 1 tab(s) orally once a day  · 	Nephro-Elizabeth oral tablet: 1 tab(s) orally once a day  · 	torsemide 20 mg oral tablet: 1 tab(s) orally once a day  · 	calcium acetate: 667 milligram(s) orally 3 times a day with meals  · 	rosuvastatin 20 mg oral tablet: 1 tab(s) orally once a day (at bedtime)  · 	Januvia 25 mg oral tablet: 1 tab(s) orally once a day      Allergies    No Known Allergies    Intolerances            Vital Signs Last 24 Hrs  T(C): 36.4 (23 Sep 2024 08:30), Max: 36.8 (23 Sep 2024 02:53)  T(F): 97.5 (23 Sep 2024 08:30), Max: 98.2 (23 Sep 2024 02:53)  HR: 104 (23 Sep 2024 10:30) (97 - 104)  BP: 149/81 (23 Sep 2024 10:30) (149/81 - 218/110)  BP(mean): 101 (23 Sep 2024 10:30) (101 - 133)  RR: 18 (23 Sep 2024 10:30) (17 - 36)  SpO2: 92% (23 Sep 2024 10:30) (72% - 99%)    Parameters below as of 23 Sep 2024 09:10    O2 Flow (L/min): 6    Daily Height in cm: 142.24 (23 Sep 2024 08:00)    Daily   I&O's Detail    I&O's Summary      PHYSICAL EXAM:    GENERAL: NAD, Feels better   HEAD:  Atraumatic, Normocephalic  NECK: Supple, No JVD, Normal thyroid , R permacath +   NERVOUS SYSTEM:  Alert & Oriented X3,  CHEST/LUNG: EAE , Bibasilar rhonchi   HEART: Regular rate and rhythm; No gallop or rub   ABDOMEN: Soft, Nontender,   EXTREMITIES:  dec edema       LABS:                        8.2    6.45  )-----------( 264      ( 23 Sep 2024 03:14 )             26.3     09-    133[L]  |  91[L]  |  30.4[H]  ----------------------------<  187[H]  4.2   |  28.0  |  7.09[H]    Ca    9.0      23 Sep 2024 03:14    TPro  6.7  /  Alb  2.7[L]  /  TBili  0.3[L]  /  DBili  x   /  AST  22  /  ALT  11  /  AlkPhos  110  09-21    PT/INR - ( 23 Sep 2024 03:14 )   PT: 11.7 sec;   INR: 1.06 ratio         PTT - ( 23 Sep 2024 03:14 )  PTT:29.7 sec  Urinalysis Basic - ( 23 Sep 2024 03:14 )    Color: x / Appearance: x / SG: x / pH: x  Gluc: 187 mg/dL / Ketone: x  / Bili: x / Urobili: x   Blood: x / Protein: x / Nitrite: x   Leuk Esterase: x / RBC: x / WBC x   Sq Epi: x / Non Sq Epi: x / Bacteria: x          < from: CT Chest No Cont (24 @ 01:42) >    ACC: [id number] EXAM:  CT ABDOMEN AND PELVIS   ORDERED BY: GANESH BRIDGES     ACC: [id number] EXAM:  CT CHEST   ORDERED BY: DAIANA POSEY     PROCEDURE DATE:  2024          INTERPRETATION:  CLINICAL INFORMATION: Hypoxia and shortness of breath.   Nausea and vomiting.    COMPARISON: CTA chest 2024 and CT abdomen pelvis 2024    CONTRAST/COMPLICATIONS:  IV Contrast: NONE  Oral Contrast: NONE  Complications: None reported at time of study completion    PROCEDURE:  CT of the Chest, Abdomen and Pelvis was performed.  Sagittal and coronal reformats were performed.    FINDINGS:  CHEST:  LUNGS AND LARGE AIRWAYS: Patent central airways. Bilateral interlobular   septal thickening and diffuse groundglass opacities with lingular and   bilateral lower lobe confluent consolidations..  PLEURA: Small bilateral pleural effusions..  VESSELS: Right internal jugular central venous catheter with tip in the   cava atrial junction.. Aortic and coronary artery calcifications.  HEART: Cardiomegaly. Small pericardial effusion.  MEDIASTINUM AND TERE: No lymphadenopathy.  CHEST WALL AND LOWER NECK: Within normal limits.    ABDOMEN AND PELVIS:  LIVER: Within normal limits.  BILE DUCTS: Normal caliber.  GALLBLADDER: Within normal limits.  SPLEEN: Within normal limits.  PANCREAS: Within normal limits.  ADRENALS: Within normal limits.  KIDNEYS/URETERS: Atrophic kidneys.    BLADDER: Within normal limits.  REPRODUCTIVE ORGANS: Uterus and adnexa within normal limits.    BOWEL: Evaluation of the bowel is limited by lack of IV and oral   contrast. Questionable thickening versus under distention of the left   abdominal jejunal loops. Left lower quadrant small bowel anastomosis.   Diverticulosis coli. No bowel obstruction. Appendix is normal.  PERITONEUM/RETROPERITONEUM: Within normal limits.  VESSELS: Atherosclerotic changes.  LYMPH NODES: No lymphadenopathy.  ABDOMINAL WALL: Within normal limits.  BONES: Degenerative changes.    IMPRESSION:  Diffuse pulmonary edema. Bilateral lower lobe and lingular confluent   consolidative opacities may represent atelectasis or pneumonia.    Small pericardial effusion.    Thickening versus under distention of left abdominal jejunal loops.   Evaluation is limited by lack of IV and oral contrast. Correlate for   symptoms of enteritis.    --- End of Report ---            BRIELLE GARZA MD; Attending Radiologist  This document has been electronically signed. Sep 22 2024  2:24AM    < end of copied text >      RADIOLOGY & ADDITIONAL TESTS:  < from: CT Abdomen and Pelvis No Cont (24 @ 01:42) >    ACC: [id number] EXAM:  CT ABDOMEN AND PELVIS   ORDERED BY: GANESH BRIDGES     ACC: [id number] EXAM:  CT CHEST   ORDERED BY: DAIANA POSEY     PROCEDURE DATE:  2024          INTERPRETATION:  CLINICAL INFORMATION: Hypoxia and shortness of breath.   Nausea and vomiting.    COMPARISON: CTA chest 2024 and CT abdomen pelvis 2024    CONTRAST/COMPLICATIONS:  IV Contrast: NONE  Oral Contrast: NONE  Complications: None reported at time of study completion    PROCEDURE:  CT of the Chest, Abdomen and Pelvis was performed.  Sagittal and coronal reformats were performed.    FINDINGS:  CHEST:  LUNGS AND LARGE AIRWAYS: Patent central airways. Bilateral interlobular   septal thickening and diffuse groundglass opacities with lingular and   bilateral lower lobe confluent consolidations..  PLEURA: Small bilateral pleural effusions..  VESSELS: Right internal jugular central venous catheter with tip in the   cava atrial junction.. Aortic and coronary artery calcifications.  HEART: Cardiomegaly. Small pericardial effusion.  MEDIASTINUM AND TERE: No lymphadenopathy.  CHEST WALL AND LOWER NECK: Within normal limits.    ABDOMEN AND PELVIS:  LIVER: Within normal limits.  BILE DUCTS: Normal caliber.  GALLBLADDER: Within normal limits.  SPLEEN: Within normal limits.  PANCREAS: Within normal limits.  ADRENALS: Within normal limits.  KIDNEYS/URETERS: Atrophic kidneys.    BLADDER: Within normal limits.  REPRODUCTIVE ORGANS: Uterus and adnexa within normal limits.    BOWEL: Evaluation of the bowel is limited by lack of IV and oral   contrast. Questionable thickening versus under distention of the left   abdominal jejunal loops. Left lower quadrant small bowel anastomosis.   Diverticulosis coli. No bowel obstruction. Appendix is normal.  PERITONEUM/RETROPERITONEUM: Within normal limits.  VESSELS: Atherosclerotic changes.  LYMPH NODES: No lymphadenopathy.  ABDOMINAL WALL: Within normal limits.  BONES: Degenerative changes.    IMPRESSION:  Diffuse pulmonary edema. Bilateral lower lobe and lingular confluent   consolidative opacities may represent atelectasis or pneumonia.    Small pericardial effusion.    Thickening versus under distention of left abdominal jejunal loops.   Evaluation is limited by lack of IV and oral contrast. Correlate for   symptoms of enteritis.    --- End of Report ---            BRIELLE GARZA MD; Attending Radiologist  This document has been electronically signed. Sep 22 2024  2:24AM    < end of copied text >

## 2024-09-23 NOTE — H&P ADULT - NSHPPHYSICALEXAM_GEN_ALL_CORE
GENERAL: Pleasant adult female, sitting in bed  HEENT: NC/AT, NC in place  CV: rrr  RESP: coarse b/l   ABD: soft, nontender, nondistended, +bs  : not examined  MSK: appropriate for age  EXT: +1piting edema  SKIN: warm  NEURO: A&O x3

## 2024-09-24 LAB
ANION GAP SERPL CALC-SCNC: 10 MMOL/L — SIGNIFICANT CHANGE UP (ref 5–17)
BUN SERPL-MCNC: 13.8 MG/DL — SIGNIFICANT CHANGE UP (ref 8–20)
CALCIUM SERPL-MCNC: 8 MG/DL — LOW (ref 8.4–10.5)
CHLORIDE SERPL-SCNC: 97 MMOL/L — SIGNIFICANT CHANGE UP (ref 96–108)
CO2 SERPL-SCNC: 27 MMOL/L — SIGNIFICANT CHANGE UP (ref 22–29)
CREAT SERPL-MCNC: 4.22 MG/DL — HIGH (ref 0.5–1.3)
EGFR: 11 ML/MIN/1.73M2 — LOW
GLUCOSE BLDC GLUCOMTR-MCNC: 126 MG/DL — HIGH (ref 70–99)
GLUCOSE BLDC GLUCOMTR-MCNC: 128 MG/DL — HIGH (ref 70–99)
GLUCOSE BLDC GLUCOMTR-MCNC: 159 MG/DL — HIGH (ref 70–99)
GLUCOSE BLDC GLUCOMTR-MCNC: 161 MG/DL — HIGH (ref 70–99)
GLUCOSE SERPL-MCNC: 95 MG/DL — SIGNIFICANT CHANGE UP (ref 70–99)
HCT VFR BLD CALC: 25.1 % — LOW (ref 34.5–45)
HGB BLD-MCNC: 7.6 G/DL — LOW (ref 11.5–15.5)
MAGNESIUM SERPL-MCNC: 1.8 MG/DL — SIGNIFICANT CHANGE UP (ref 1.6–2.6)
MCHC RBC-ENTMCNC: 25.9 PG — LOW (ref 27–34)
MCHC RBC-ENTMCNC: 30.3 GM/DL — LOW (ref 32–36)
MCV RBC AUTO: 85.4 FL — SIGNIFICANT CHANGE UP (ref 80–100)
PHOSPHATE SERPL-MCNC: 3 MG/DL — SIGNIFICANT CHANGE UP (ref 2.4–4.7)
PLATELET # BLD AUTO: 267 K/UL — SIGNIFICANT CHANGE UP (ref 150–400)
POTASSIUM SERPL-MCNC: 3.8 MMOL/L — SIGNIFICANT CHANGE UP (ref 3.5–5.3)
POTASSIUM SERPL-SCNC: 3.8 MMOL/L — SIGNIFICANT CHANGE UP (ref 3.5–5.3)
RBC # BLD: 2.94 M/UL — LOW (ref 3.8–5.2)
RBC # FLD: 17 % — HIGH (ref 10.3–14.5)
SODIUM SERPL-SCNC: 134 MMOL/L — LOW (ref 135–145)
WBC # BLD: 6.12 K/UL — SIGNIFICANT CHANGE UP (ref 3.8–10.5)
WBC # FLD AUTO: 6.12 K/UL — SIGNIFICANT CHANGE UP (ref 3.8–10.5)

## 2024-09-24 PROCEDURE — 99233 SBSQ HOSP IP/OBS HIGH 50: CPT | Mod: GC

## 2024-09-24 RX ORDER — DIPHENHYDRAMINE HCL 12.5MG/5ML
25 LIQUID (ML) ORAL ONCE
Refills: 0 | Status: COMPLETED | OUTPATIENT
Start: 2024-09-24 | End: 2024-09-24

## 2024-09-24 RX ADMIN — ROSUVASTATIN CALCIUM 20 MILLIGRAM(S): 20 TABLET, COATED ORAL at 23:04

## 2024-09-24 RX ADMIN — PANTOPRAZOLE SODIUM 40 MILLIGRAM(S): 40 TABLET, DELAYED RELEASE ORAL at 13:07

## 2024-09-24 RX ADMIN — LABETALOL HYDROCHLORIDE 300 MILLIGRAM(S): 200 TABLET, FILM COATED ORAL at 21:57

## 2024-09-24 RX ADMIN — HYDRALAZINE HYDROCHLORIDE 100 MILLIGRAM(S): 100 TABLET ORAL at 13:07

## 2024-09-24 RX ADMIN — FOLIC ACID 1 MILLIGRAM(S): 1 TABLET ORAL at 13:07

## 2024-09-24 RX ADMIN — CHLORHEXIDINE GLUCONATE ORAL RINSE 1 APPLICATION(S): 1.2 SOLUTION DENTAL at 05:39

## 2024-09-24 RX ADMIN — LABETALOL HYDROCHLORIDE 300 MILLIGRAM(S): 200 TABLET, FILM COATED ORAL at 05:38

## 2024-09-24 RX ADMIN — HYDRALAZINE HYDROCHLORIDE 100 MILLIGRAM(S): 100 TABLET ORAL at 21:57

## 2024-09-24 RX ADMIN — Medication 2: at 13:11

## 2024-09-24 RX ADMIN — Medication 5000 UNIT(S): at 13:07

## 2024-09-24 RX ADMIN — HYDRALAZINE HYDROCHLORIDE 100 MILLIGRAM(S): 100 TABLET ORAL at 05:38

## 2024-09-24 RX ADMIN — Medication 2: at 21:56

## 2024-09-24 RX ADMIN — Medication 25 MILLIGRAM(S): at 21:57

## 2024-09-24 RX ADMIN — LABETALOL HYDROCHLORIDE 300 MILLIGRAM(S): 200 TABLET, FILM COATED ORAL at 13:08

## 2024-09-24 NOTE — PHYSICAL THERAPY INITIAL EVALUATION ADULT - PERTINENT HX OF CURRENT PROBLEM, REHAB EVAL
63F w/ PMH of ESRD on HD, failed PD, htn, hld, DM, GIB, pericardial effusions presenting after CKD exacerbation led to volume overload induced acute hypoxic respiratory distress and hypertensive urgency. Admitted MICU s/p cardene drip, HD, bipap.

## 2024-09-24 NOTE — PROGRESS NOTE ADULT - ASSESSMENT
ESRD - Resolved Pulm edema   S/P PD catheter removal due to  peritonitis   CT abd noted --> + SBO   POst op from 9/1/24 -->  Ex-lap, FAREED,  SBR and PD cath removal  Permacath placed 9/11/24   HD usually in [facility] MWF  HD tomorrow     Anemia - Epogen with HD   Added folate     RO - phos OK     HTN - better s post fluid removal with HD

## 2024-09-24 NOTE — DISCHARGE NOTE PROVIDER - NSDCMRMEDTOKEN_GEN_ALL_CORE_FT
amoxicillin-clavulanate 875 mg-125 mg oral tablet: 875 milligram(s) orally 2 times a day  calcium acetate: 667 milligram(s) orally 3 times a day with meals  cloNIDine 0.3 mg/24 hr transdermal film, extended release: 1 patch transdermal every 7 days  folic acid 1 mg oral tablet: 1 tab(s) orally once a day  hydrALAZINE 100 mg oral tablet: 1 tab(s) orally 3 times a day  Januvia 25 mg oral tablet: 1 tab(s) orally once a day  labetalol 300 mg oral tablet: 2 tab(s) orally 3 times a day  Nephro-Elizabeth oral tablet: 1 tab(s) orally once a day  rosuvastatin 20 mg oral tablet: 1 tab(s) orally once a day (at bedtime)  torsemide 20 mg oral tablet: 1 tab(s) orally once a day   calcium acetate: 667 milligram(s) orally 3 times a day with meals  cloNIDine 0.3 mg/24 hr transdermal film, extended release: 1 patch transdermal every 7 days  folic acid 1 mg oral tablet: 1 tab(s) orally once a day  hydrALAZINE 100 mg oral tablet: 1 tab(s) orally 3 times a day  Januvia 25 mg oral tablet: 1 tab(s) orally once a day  labetalol 300 mg oral tablet: 2 tab(s) orally 3 times a day  Nephro-Elizabeth oral tablet: 1 tab(s) orally once a day  rosuvastatin 20 mg oral tablet: 1 tab(s) orally once a day (at bedtime)  torsemide 20 mg oral tablet: 1 tab(s) orally once a day

## 2024-09-24 NOTE — DISCHARGE NOTE PROVIDER - ATTENDING DISCHARGE PHYSICAL EXAMINATION:
PHYSICAL EXAM:  Constitutional: Alert, interactive, comfortable, NAD on 3L NC weaned off at bedside during interview  Head and Face: Atraumatic, head and face were normal in appearance  Eyes: Sclera and conjunctiva were normal, pupils were equal in size, round, eyelids normal  ENT: Ears and nose were normal in appearance  Neck: Appearance was normal, neck was supple, No JVD  Pulmonary: Clear to auscultation bilaterally, no rales/crackles, or wheezing  Heart: Regular rate and rhythm, no murmurs, gallops, or pericardial rubs  Abdominal: Soft, nontender, nondistended. No appreciable hepatosplenomegaly. Bowel sounds normal. Abdominal scars midline and on left lower abdomen.  Skin: Normal color and intact without appreciable rash or abnormal skin lesion  Extremities: Warm without edema. No clubbing.  Pulse: 2+ radial pulse PHYSICAL EXAM:  Constitutional: Alert, interactive, comfortable, NAD on 2L  Head and Face: Atraumatic, head and face were normal in appearance  Eyes: Sclera and conjunctiva were normal, pupils were equal in size, round, eyelids normal  ENT: Ears and nose were normal in appearance  Neck: Appearance was normal, neck was supple, No JVD  Pulmonary: Clear to auscultation bilaterally, no rales/crackles, or wheezing  Heart: Regular rate and rhythm, no murmurs, gallops, or pericardial rubs  Abdominal: Soft, nontender, nondistended. No appreciable hepatosplenomegaly. Bowel sounds normal. Abdominal scars midline and on left lower abdomen.  Skin: Normal color and intact without appreciable rash or abnormal skin lesion  Extremities: Warm without edema. No clubbing.  Pulse: 2+ radial pulse

## 2024-09-24 NOTE — DISCHARGE NOTE PROVIDER - NSDCCPCAREPLAN_GEN_ALL_CORE_FT
PRINCIPAL DISCHARGE DIAGNOSIS  Diagnosis: Hypertensive emergency  Assessment and Plan of Treatment: You presented with elevated blood pressure that required you to be on a drip in order to have management.      SECONDARY DISCHARGE DIAGNOSES  Diagnosis: Acute respiratory failure with hypoxia  Assessment and Plan of Treatment:      PRINCIPAL DISCHARGE DIAGNOSIS  Diagnosis: Hypertensive emergency  Assessment and Plan of Treatment: You presented with elevated blood pressure that required you to be on a drip in order to have management. You were in the MICU. When you were stable to be off cardene, you were transferred to the floors. You were on hydralazine and labetalol. Please continue these outpatient and follow up with your cardiologist.      SECONDARY DISCHARGE DIAGNOSES  Diagnosis: Acute respiratory failure with hypoxia  Assessment and Plan of Treatment: You had acute hypoxic respiratory failure likely due to your end stage renal disease (ESRD), which caused you to be in volume overload. You received hemodialysis and had stable electrolytes. You came off the BIPAP, onto the nasal cannula, until you were weaned off onto room air. You have improved greatly since your admission in the MICU. Please follow up with your nephrologist and pulmonologist.

## 2024-09-24 NOTE — PROGRESS NOTE ADULT - SUBJECTIVE AND OBJECTIVE BOX
NEPHROLOGY INTERVAL HPI/OVERNIGHT EVENTS:    feels better   BP better with fluid removal with HD     MEDICATIONS  (STANDING):  chlorhexidine 2% Cloths 1 Application(s) Topical <User Schedule>  dextrose 5%. 1000 milliLiter(s) (50 mL/Hr) IV Continuous <Continuous>  dextrose 5%. 1000 milliLiter(s) (100 mL/Hr) IV Continuous <Continuous>  dextrose 50% Injectable 25 Gram(s) IV Push once  dextrose 50% Injectable 12.5 Gram(s) IV Push once  dextrose 50% Injectable 25 Gram(s) IV Push once  epoetin dustin-epbx (RETACRIT) Injectable 49650 Unit(s) IV Push <User Schedule>  folic acid 1 milliGRAM(s) Oral daily  glucagon  Injectable 1 milliGRAM(s) IntraMuscular once  heparin   Injectable 5000 Unit(s) SubCutaneous every 8 hours  hydrALAZINE 100 milliGRAM(s) Oral every 8 hours  influenza   Vaccine 0.5 milliLiter(s) IntraMuscular once  insulin lispro (ADMELOG) corrective regimen sliding scale   SubCutaneous every 6 hours  labetalol 300 milliGRAM(s) Oral three times a day  pantoprazole    Tablet 40 milliGRAM(s) Oral daily  rosuvastatin 20 milliGRAM(s) Oral at bedtime    MEDICATIONS  (PRN):  dextrose Oral Gel 15 Gram(s) Oral once PRN Blood Glucose LESS THAN 70 milliGRAM(s)/deciliter      Allergies    No Known Allergies    Intolerances            Vital Signs Last 24 Hrs  T(C): 36.9 (24 Sep 2024 08:03), Max: 37.3 (23 Sep 2024 19:51)  T(F): 98.4 (24 Sep 2024 08:03), Max: 99.1 (23 Sep 2024 19:51)  HR: 99 (24 Sep 2024 15:00) (80 - 106)  BP: 150/76 (24 Sep 2024 15:00) (137/73 - 187/90)  BP(mean): 102 (23 Sep 2024 23:43) (93 - 119)  RR: 18 (24 Sep 2024 08:03) (14 - 24)  SpO2: 96% (24 Sep 2024 08:03) (83% - 100%)    Parameters below as of 24 Sep 2024 08:03  Patient On (Oxygen Delivery Method): nasal cannula  O2 Flow (L/min): 2    Daily     Daily   I&O's Detail    23 Sep 2024 07:01  -  24 Sep 2024 07:00  --------------------------------------------------------  IN:    NiCARdipine: 73.8 mL    Oral Fluid: 230 mL  Total IN: 303.8 mL    OUT:    Other (mL): 2500 mL  Total OUT: 2500 mL    Total NET: -2196.2 mL        I&O's Summary    23 Sep 2024 07:01  -  24 Sep 2024 07:00  --------------------------------------------------------  IN: 303.8 mL / OUT: 2500 mL / NET: -2196.2 mL        PHYSICAL EXAM:    GENERAL: NAD, Feels better   HEAD:  Atraumatic, Normocephalic  NECK: Supple, No JVD, Normal thyroid , R permacath +   NERVOUS SYSTEM:  Alert & Oriented X3,  CHEST/LUNG: EAE , no wheeze   HEART: Regular rate and rhythm; No gallop or rub   ABDOMEN: Soft, Nontender,   EXTREMITIES:  dec edema       LABS:                        7.6    6.12  )-----------( 267      ( 24 Sep 2024 05:56 )             25.1     09-24    134[L]  |  97  |  13.8  ----------------------------<  95  3.8   |  27.0  |  4.22[H]    Ca    8.0[L]      24 Sep 2024 05:56  Phos  3.0     09-24  Mg     1.8     09-24    TPro  6.3[L]  /  Alb  2.6[L]  /  TBili  0.2[L]  /  DBili  x   /  AST  21  /  ALT  12  /  AlkPhos  105  09-23    PT/INR - ( 23 Sep 2024 03:14 )   PT: 11.7 sec;   INR: 1.06 ratio         PTT - ( 23 Sep 2024 03:14 )  PTT:29.7 sec  Urinalysis Basic - ( 24 Sep 2024 05:56 )    Color: x / Appearance: x / SG: x / pH: x  Gluc: 95 mg/dL / Ketone: x  / Bili: x / Urobili: x   Blood: x / Protein: x / Nitrite: x   Leuk Esterase: x / RBC: x / WBC x   Sq Epi: x / Non Sq Epi: x / Bacteria: x      Magnesium: 1.8 mg/dL (09-24 @ 05:56)  Phosphorus: 3.0 mg/dL (09-24 @ 05:56)  Magnesium: 1.7 mg/dL (09-23 @ 17:45)  Phosphorus: 2.3 mg/dL (09-23 @ 17:45)          RADIOLOGY & ADDITIONAL TESTS:

## 2024-09-24 NOTE — PROGRESS NOTE ADULT - SUBJECTIVE AND OBJECTIVE BOX
SUBJECTIVE:    Chief Complaint: Patient is a 64y old  Female who presents with a chief complaint of HTN Urgency (23 Sep 2024 18:03)    63F w/ PMH of ESRD on HD, failed PD, htn, hld, DM, GIB, pericardial effusions presenting after CKD exacerbation led to volume overload induced acute hypoxic respiratory distress and hypertensive urgency. Admitted MICU s/p cardene drip, HD, bipap. Downgraded to medicine as stable, dispo pending HD recs per nephrology.    INTERVAL HPI/LAST 24HRS EVENTS: Patient seen and examined at bedside at AM. No clinically acute event overnight.   Denies any chest pain, palpitations, SOB, PND, orthopnea, leg edema, N/V/D, or any other complaints.     MEDICATIONS  (STANDING):  chlorhexidine 2% Cloths 1 Application(s) Topical <User Schedule>  dextrose 5%. 1000 milliLiter(s) (50 mL/Hr) IV Continuous <Continuous>  dextrose 5%. 1000 milliLiter(s) (100 mL/Hr) IV Continuous <Continuous>  dextrose 50% Injectable 12.5 Gram(s) IV Push once  dextrose 50% Injectable 25 Gram(s) IV Push once  dextrose 50% Injectable 25 Gram(s) IV Push once  epoetin dustin-epbx (RETACRIT) Injectable 23858 Unit(s) IV Push <User Schedule>  folic acid 1 milliGRAM(s) Oral daily  glucagon  Injectable 1 milliGRAM(s) IntraMuscular once  heparin   Injectable 5000 Unit(s) SubCutaneous every 8 hours  hydrALAZINE 100 milliGRAM(s) Oral every 8 hours  influenza   Vaccine 0.5 milliLiter(s) IntraMuscular once  insulin lispro (ADMELOG) corrective regimen sliding scale   SubCutaneous every 6 hours  labetalol 300 milliGRAM(s) Oral three times a day  pantoprazole    Tablet 40 milliGRAM(s) Oral daily  rosuvastatin 20 milliGRAM(s) Oral at bedtime    MEDICATIONS  (PRN):  dextrose Oral Gel 15 Gram(s) Oral once PRN Blood Glucose LESS THAN 70 milliGRAM(s)/deciliter      Allergies    No Known Allergies    Intolerances        REVIEW OF SYSTEMS:  CONSTITUTIONAL: No fever, weight loss, or fatigue  RESPIRATORY: No cough, wheezing, chills or hemoptysis; No shortness of breath  CARDIOVASCULAR: No chest pain, palpitations, dizziness, or leg swelling  GASTROINTESTINAL: No abdominal or epigastric pain. No nausea, vomiting, or hematemesis; No diarrhea or constipation. No melena or hematochezia.  NEUROLOGICAL: No headaches, loss of strength, numbness, or tremors  MUSCULOSKELETAL: No joint pain or swelling; No muscle, back, or extremity pain    OBJECTIVE:    Vital Signs Last 24 Hrs  T(C): 36.9 (24 Sep 2024 08:03), Max: 37.3 (23 Sep 2024 19:51)  T(F): 98.4 (24 Sep 2024 08:03), Max: 99.1 (23 Sep 2024 19:51)  HR: 95 (24 Sep 2024 08:03) (80 - 106)  BP: 150/74 (24 Sep 2024 08:03) (137/73 - 191/99)  BP(mean): 102 (23 Sep 2024 23:43) (93 - 127)  RR: 18 (24 Sep 2024 08:03) (14 - 24)  SpO2: 96% (24 Sep 2024 08:03) (83% - 100%)    Parameters below as of 24 Sep 2024 08:03  Patient On (Oxygen Delivery Method): nasal cannula  O2 Flow (L/min): 2      PHYSICAL EXAM:  Constitutional: Alert, interactive, comfortable, NAD on 3L NC weaned off at bedside during interview  Head and Face: Atraumatic, head and face were normal in appearance  Eyes: Sclera and conjunctiva were normal, pupils were equal in size, round, eyelids normal  ENT: Ears and nose were normal in appearance  Neck: Appearance was normal, neck was supple, No JVD  Pulmonary: Clear to auscultation bilaterally, no rales/crackles, or wheezing  Heart: Regular rate and rhythm, no murmurs, gallops, or pericardial rubs  Abdominal: Soft, nontender, nondistended. No appreciable hepatosplenomegaly. Bowel sounds normal  Skin: Normal color and intact without appreciable rash or abnormal skin lesion  Extremities: Warm without edema. No clubbing.  Pulse: 2+ radial pulse  Neuro: Oriented to person, place, and time    Lab/ Imaging:    LABS:                        7.6    6.12  )-----------( 267      ( 24 Sep 2024 05:56 )             25.1     09-24    134[L]  |  97  |  13.8  ----------------------------<  95  3.8   |  27.0  |  4.22[H]    Ca    8.0[L]      24 Sep 2024 05:56  Phos  3.0     09-24  Mg     1.8     09-24    TPro  6.3[L]  /  Alb  2.6[L]  /  TBili  0.2[L]  /  DBili  x   /  AST  21  /  ALT  12  /  AlkPhos  105  09-23    PT/INR - ( 23 Sep 2024 03:14 )   PT: 11.7 sec;   INR: 1.06 ratio         PTT - ( 23 Sep 2024 03:14 )  PTT:29.7 sec  Urinalysis Basic - ( 24 Sep 2024 05:56 )    Color: x / Appearance: x / SG: x / pH: x  Gluc: 95 mg/dL / Ketone: x  / Bili: x / Urobili: x   Blood: x / Protein: x / Nitrite: x   Leuk Esterase: x / RBC: x / WBC x   Sq Epi: x / Non Sq Epi: x / Bacteria: x      CAPILLARY BLOOD GLUCOSE      POCT Blood Glucose.: 126 mg/dL (24 Sep 2024 08:35)  POCT Blood Glucose.: 112 mg/dL (23 Sep 2024 21:00)  POCT Blood Glucose.: 131 mg/dL (23 Sep 2024 17:44)  POCT Blood Glucose.: 98 mg/dL (23 Sep 2024 12:54)          Imaging Personally Reviewed:  [ ] YES  [ ] NO    Consultant(s) Notes Reviewed:  [ ] YES  [ ] NO    Care Discussed with Consultants/Other Providers [ ] YES  [ ] NO    Plan of Care discussed with Housestaff [ ]YES [ ] NO SUBJECTIVE:    Chief Complaint: Patient is a 64y old  Female who presents with a chief complaint of HTN Urgency (23 Sep 2024 18:03)    63F w/ PMH of ESRD on HD, failed PD, htn, hld, DM, GIB, pericardial effusions presenting after CKD exacerbation led to volume overload induced acute hypoxic respiratory distress and hypertensive urgency. Admitted MICU s/p cardene drip, HD, bipap. Downgraded to medicine as stable, dispo pending HD recs per nephrology.    INTERVAL HPI/LAST 24HRS EVENTS: Patient seen and examined at bedside at AM. No clinically acute event overnight.   Denies any chest pain, palpitations, SOB, PND, orthopnea, leg edema, N/V/D, or any other complaints.     MEDICATIONS  (STANDING):  chlorhexidine 2% Cloths 1 Application(s) Topical <User Schedule>  dextrose 5%. 1000 milliLiter(s) (50 mL/Hr) IV Continuous <Continuous>  dextrose 5%. 1000 milliLiter(s) (100 mL/Hr) IV Continuous <Continuous>  dextrose 50% Injectable 12.5 Gram(s) IV Push once  dextrose 50% Injectable 25 Gram(s) IV Push once  dextrose 50% Injectable 25 Gram(s) IV Push once  epoetin dustin-epbx (RETACRIT) Injectable 11620 Unit(s) IV Push <User Schedule>  folic acid 1 milliGRAM(s) Oral daily  glucagon  Injectable 1 milliGRAM(s) IntraMuscular once  heparin   Injectable 5000 Unit(s) SubCutaneous every 8 hours  hydrALAZINE 100 milliGRAM(s) Oral every 8 hours  influenza   Vaccine 0.5 milliLiter(s) IntraMuscular once  insulin lispro (ADMELOG) corrective regimen sliding scale   SubCutaneous every 6 hours  labetalol 300 milliGRAM(s) Oral three times a day  pantoprazole    Tablet 40 milliGRAM(s) Oral daily  rosuvastatin 20 milliGRAM(s) Oral at bedtime    MEDICATIONS  (PRN):  dextrose Oral Gel 15 Gram(s) Oral once PRN Blood Glucose LESS THAN 70 milliGRAM(s)/deciliter      Allergies    No Known Allergies    Intolerances        REVIEW OF SYSTEMS:  CONSTITUTIONAL: No fever, weight loss, or fatigue  RESPIRATORY: No cough, wheezing, chills or hemoptysis; No shortness of breath  CARDIOVASCULAR: No chest pain, palpitations, dizziness, or leg swelling  GASTROINTESTINAL: No abdominal or epigastric pain. No nausea, vomiting, or hematemesis; No diarrhea or constipation. No melena or hematochezia.  NEUROLOGICAL: No headaches, loss of strength, numbness, or tremors  MUSCULOSKELETAL: No joint pain or swelling; No muscle, back, or extremity pain    OBJECTIVE:    Vital Signs Last 24 Hrs  T(C): 36.9 (24 Sep 2024 08:03), Max: 37.3 (23 Sep 2024 19:51)  T(F): 98.4 (24 Sep 2024 08:03), Max: 99.1 (23 Sep 2024 19:51)  HR: 95 (24 Sep 2024 08:03) (80 - 106)  BP: 150/74 (24 Sep 2024 08:03) (137/73 - 191/99)  BP(mean): 102 (23 Sep 2024 23:43) (93 - 127)  RR: 18 (24 Sep 2024 08:03) (14 - 24)  SpO2: 96% (24 Sep 2024 08:03) (83% - 100%)    Parameters below as of 24 Sep 2024 08:03  Patient On (Oxygen Delivery Method): nasal cannula  O2 Flow (L/min): 2      PHYSICAL EXAM:  Constitutional: Alert, interactive, comfortable, NAD on 3L NC weaned off at bedside during interview  Head and Face: Atraumatic, head and face were normal in appearance  Eyes: Sclera and conjunctiva were normal, pupils were equal in size, round, eyelids normal  ENT: Ears and nose were normal in appearance  Neck: Appearance was normal, neck was supple, No JVD  Pulmonary: Clear to auscultation bilaterally, no rales/crackles, or wheezing  Heart: Regular rate and rhythm, no murmurs, gallops, or pericardial rubs  Abdominal: Soft, nontender, nondistended. No appreciable hepatosplenomegaly. Bowel sounds normal. Abdominal scars midline and on left lower abdomen.  Skin: Normal color and intact without appreciable rash or abnormal skin lesion  Extremities: Warm without edema. No clubbing.  Pulse: 2+ radial pulse  Neuro: Oriented to person, place, and time    Lab/ Imaging:    LABS:                        7.6    6.12  )-----------( 267      ( 24 Sep 2024 05:56 )             25.1     09-24    134[L]  |  97  |  13.8  ----------------------------<  95  3.8   |  27.0  |  4.22[H]    Ca    8.0[L]      24 Sep 2024 05:56  Phos  3.0     09-24  Mg     1.8     09-24    TPro  6.3[L]  /  Alb  2.6[L]  /  TBili  0.2[L]  /  DBili  x   /  AST  21  /  ALT  12  /  AlkPhos  105  09-23    PT/INR - ( 23 Sep 2024 03:14 )   PT: 11.7 sec;   INR: 1.06 ratio         PTT - ( 23 Sep 2024 03:14 )  PTT:29.7 sec  Urinalysis Basic - ( 24 Sep 2024 05:56 )    Color: x / Appearance: x / SG: x / pH: x  Gluc: 95 mg/dL / Ketone: x  / Bili: x / Urobili: x   Blood: x / Protein: x / Nitrite: x   Leuk Esterase: x / RBC: x / WBC x   Sq Epi: x / Non Sq Epi: x / Bacteria: x      CAPILLARY BLOOD GLUCOSE      POCT Blood Glucose.: 126 mg/dL (24 Sep 2024 08:35)  POCT Blood Glucose.: 112 mg/dL (23 Sep 2024 21:00)  POCT Blood Glucose.: 131 mg/dL (23 Sep 2024 17:44)  POCT Blood Glucose.: 98 mg/dL (23 Sep 2024 12:54)          Imaging Personally Reviewed:  [ ] YES  [ ] NO    Consultant(s) Notes Reviewed:  [ ] YES  [ ] NO    Care Discussed with Consultants/Other Providers [ ] YES  [ ] NO    Plan of Care discussed with Housestaff [ ]YES [ ] NO SUBJECTIVE: Pt seen at bedside, doing well. No overnight events. She is complaining of LE swelling, but feels it is improved from yesterday. She is complaining of itching over her healing abdominal incision. She denies SOB, chest pain, palpitations, abdominal pain, fevers, chills.     Chief Complaint: Patient is a 64y old  Female who presents with a chief complaint of HTN Urgency (23 Sep 2024 18:03)    63F w/ PMH of ESRD on HD, failed PD, htn, hld, DM, GIB, pericardial effusions presenting after CKD exacerbation led to volume overload induced acute hypoxic respiratory distress and hypertensive urgency. Admitted MICU s/p cardene drip, HD, bipap. Downgraded to medicine as stable, dispo pending HD recs per nephrology.    INTERVAL HPI/LAST 24HRS EVENTS: Patient seen and examined at bedside at AM. No clinically acute event overnight.   Denies any chest pain, palpitations, SOB, PND, orthopnea, leg edema, N/V/D, or any other complaints.     MEDICATIONS  (STANDING):  chlorhexidine 2% Cloths 1 Application(s) Topical <User Schedule>  dextrose 5%. 1000 milliLiter(s) (50 mL/Hr) IV Continuous <Continuous>  dextrose 5%. 1000 milliLiter(s) (100 mL/Hr) IV Continuous <Continuous>  dextrose 50% Injectable 12.5 Gram(s) IV Push once  dextrose 50% Injectable 25 Gram(s) IV Push once  dextrose 50% Injectable 25 Gram(s) IV Push once  epoetin dustin-epbx (RETACRIT) Injectable 85104 Unit(s) IV Push <User Schedule>  folic acid 1 milliGRAM(s) Oral daily  glucagon  Injectable 1 milliGRAM(s) IntraMuscular once  heparin   Injectable 5000 Unit(s) SubCutaneous every 8 hours  hydrALAZINE 100 milliGRAM(s) Oral every 8 hours  influenza   Vaccine 0.5 milliLiter(s) IntraMuscular once  insulin lispro (ADMELOG) corrective regimen sliding scale   SubCutaneous every 6 hours  labetalol 300 milliGRAM(s) Oral three times a day  pantoprazole    Tablet 40 milliGRAM(s) Oral daily  rosuvastatin 20 milliGRAM(s) Oral at bedtime    MEDICATIONS  (PRN):  dextrose Oral Gel 15 Gram(s) Oral once PRN Blood Glucose LESS THAN 70 milliGRAM(s)/deciliter      Allergies    No Known Allergies    Intolerances        REVIEW OF SYSTEMS:  CONSTITUTIONAL: No fever, weight loss, or fatigue  RESPIRATORY: No cough, wheezing, chills or hemoptysis; No shortness of breath  CARDIOVASCULAR: No chest pain, palpitations, dizziness, or leg swelling  GASTROINTESTINAL: No abdominal or epigastric pain. No nausea, vomiting, or hematemesis; No diarrhea or constipation. No melena or hematochezia.  NEUROLOGICAL: No headaches, loss of strength, numbness, or tremors  MUSCULOSKELETAL: No joint pain or swelling; No muscle, back, or extremity pain    OBJECTIVE:    Vital Signs Last 24 Hrs  T(C): 36.9 (24 Sep 2024 08:03), Max: 37.3 (23 Sep 2024 19:51)  T(F): 98.4 (24 Sep 2024 08:03), Max: 99.1 (23 Sep 2024 19:51)  HR: 95 (24 Sep 2024 08:03) (80 - 106)  BP: 150/74 (24 Sep 2024 08:03) (137/73 - 191/99)  BP(mean): 102 (23 Sep 2024 23:43) (93 - 127)  RR: 18 (24 Sep 2024 08:03) (14 - 24)  SpO2: 96% (24 Sep 2024 08:03) (83% - 100%)    Parameters below as of 24 Sep 2024 08:03  Patient On (Oxygen Delivery Method): nasal cannula  O2 Flow (L/min): 2      PHYSICAL EXAM:  Constitutional: Alert, interactive, comfortable, NAD on 3L NC weaned off at bedside during interview  Head and Face: Atraumatic, head and face were normal in appearance  Eyes: Sclera and conjunctiva were normal, pupils were equal in size, round, eyelids normal  ENT: Ears and nose were normal in appearance  Neck: Appearance was normal, neck was supple, No JVD  Pulmonary: Clear to auscultation bilaterally, no rales/crackles, or wheezing  Heart: Regular rate and rhythm, no murmurs, gallops, or pericardial rubs  Abdominal: Soft, nontender, nondistended. No appreciable hepatosplenomegaly. Bowel sounds normal. Abdominal scars midline and on left lower abdomen.  Skin: Normal color and intact without appreciable rash or abnormal skin lesion  Extremities: Warm without edema. No clubbing.  Pulse: 2+ radial pulse  Neuro: Oriented to person, place, and time    Lab/ Imaging:    LABS:                        7.6    6.12  )-----------( 267      ( 24 Sep 2024 05:56 )             25.1     09-24    134[L]  |  97  |  13.8  ----------------------------<  95  3.8   |  27.0  |  4.22[H]    Ca    8.0[L]      24 Sep 2024 05:56  Phos  3.0     09-24  Mg     1.8     09-24    TPro  6.3[L]  /  Alb  2.6[L]  /  TBili  0.2[L]  /  DBili  x   /  AST  21  /  ALT  12  /  AlkPhos  105  09-23    PT/INR - ( 23 Sep 2024 03:14 )   PT: 11.7 sec;   INR: 1.06 ratio         PTT - ( 23 Sep 2024 03:14 )  PTT:29.7 sec  Urinalysis Basic - ( 24 Sep 2024 05:56 )    Color: x / Appearance: x / SG: x / pH: x  Gluc: 95 mg/dL / Ketone: x  / Bili: x / Urobili: x   Blood: x / Protein: x / Nitrite: x   Leuk Esterase: x / RBC: x / WBC x   Sq Epi: x / Non Sq Epi: x / Bacteria: x      CAPILLARY BLOOD GLUCOSE      POCT Blood Glucose.: 126 mg/dL (24 Sep 2024 08:35)  POCT Blood Glucose.: 112 mg/dL (23 Sep 2024 21:00)  POCT Blood Glucose.: 131 mg/dL (23 Sep 2024 17:44)  POCT Blood Glucose.: 98 mg/dL (23 Sep 2024 12:54)          Imaging Personally Reviewed:  [ ] YES  [ ] NO    Consultant(s) Notes Reviewed:  [ ] YES  [ ] NO    Care Discussed with Consultants/Other Providers [ ] YES  [ ] NO    Plan of Care discussed with Housestaff [ ]YES [ ] NO SUBJECTIVE: Pt seen at bedside, doing well. No overnight events. She is complaining of LE swelling, but feels it is improved from yesterday. She is complaining of itching over her healing abdominal incision. She denies SOB, chest pain, palpitations, abdominal pain, fevers, chills.     Chief Complaint: Patient is a 64y old  Female who presents with a chief complaint of HTN Emergency(23 Sep 2024 18:03)    63F w/ PMH of ESRD on HD, failed PD, htn, hld, DM, GIB, pericardial effusions presenting after CKD exacerbation led to volume overload induced acute hypoxic respiratory distress and hypertensive urgency. Admitted MICU s/p cardene drip, HD, bipap. Downgraded to medicine as stable, dispo pending HD recs per nephrology.    INTERVAL HPI/LAST 24HRS EVENTS: Patient seen and examined at bedside at AM. No clinically acute event overnight.   Denies any chest pain, palpitations, SOB, PND, orthopnea, leg edema, N/V/D, or any other complaints.     MEDICATIONS  (STANDING):  chlorhexidine 2% Cloths 1 Application(s) Topical <User Schedule>  dextrose 5%. 1000 milliLiter(s) (50 mL/Hr) IV Continuous <Continuous>  dextrose 5%. 1000 milliLiter(s) (100 mL/Hr) IV Continuous <Continuous>  dextrose 50% Injectable 12.5 Gram(s) IV Push once  dextrose 50% Injectable 25 Gram(s) IV Push once  dextrose 50% Injectable 25 Gram(s) IV Push once  epoetin dustin-epbx (RETACRIT) Injectable 61797 Unit(s) IV Push <User Schedule>  folic acid 1 milliGRAM(s) Oral daily  glucagon  Injectable 1 milliGRAM(s) IntraMuscular once  heparin   Injectable 5000 Unit(s) SubCutaneous every 8 hours  hydrALAZINE 100 milliGRAM(s) Oral every 8 hours  influenza   Vaccine 0.5 milliLiter(s) IntraMuscular once  insulin lispro (ADMELOG) corrective regimen sliding scale   SubCutaneous every 6 hours  labetalol 300 milliGRAM(s) Oral three times a day  pantoprazole    Tablet 40 milliGRAM(s) Oral daily  rosuvastatin 20 milliGRAM(s) Oral at bedtime    MEDICATIONS  (PRN):  dextrose Oral Gel 15 Gram(s) Oral once PRN Blood Glucose LESS THAN 70 milliGRAM(s)/deciliter      Allergies    No Known Allergies    Intolerances        REVIEW OF SYSTEMS:  CONSTITUTIONAL: No fever, weight loss, or fatigue  RESPIRATORY: No cough, wheezing, chills or hemoptysis; No shortness of breath  CARDIOVASCULAR: No chest pain, palpitations, dizziness, or leg swelling  GASTROINTESTINAL: No abdominal or epigastric pain. No nausea, vomiting, or hematemesis; No diarrhea or constipation. No melena or hematochezia.  NEUROLOGICAL: No headaches, loss of strength, numbness, or tremors  MUSCULOSKELETAL: No joint pain or swelling; No muscle, back, or extremity pain    OBJECTIVE:    Vital Signs Last 24 Hrs  T(C): 36.9 (24 Sep 2024 08:03), Max: 37.3 (23 Sep 2024 19:51)  T(F): 98.4 (24 Sep 2024 08:03), Max: 99.1 (23 Sep 2024 19:51)  HR: 95 (24 Sep 2024 08:03) (80 - 106)  BP: 150/74 (24 Sep 2024 08:03) (137/73 - 191/99)  BP(mean): 102 (23 Sep 2024 23:43) (93 - 127)  RR: 18 (24 Sep 2024 08:03) (14 - 24)  SpO2: 96% (24 Sep 2024 08:03) (83% - 100%)    Parameters below as of 24 Sep 2024 08:03  Patient On (Oxygen Delivery Method): nasal cannula  O2 Flow (L/min): 2      PHYSICAL EXAM:  Constitutional: Alert, interactive, comfortable, NAD on 3L NC weaned off at bedside during interview  Head and Face: Atraumatic, head and face were normal in appearance  Eyes: Sclera and conjunctiva were normal, pupils were equal in size, round, eyelids normal  ENT: Ears and nose were normal in appearance  Neck: Appearance was normal, neck was supple, No JVD  Pulmonary: Clear to auscultation bilaterally, no rales/crackles, or wheezing  Heart: Regular rate and rhythm, no murmurs, gallops, or pericardial rubs  Abdominal: Soft, nontender, nondistended. No appreciable hepatosplenomegaly. Bowel sounds normal. Abdominal scars midline and on left lower abdomen.  Skin: Normal color and intact without appreciable rash or abnormal skin lesion  Extremities: Warm without edema. No clubbing.  Pulse: 2+ radial pulse  Neuro: Oriented to person, place, and time    Lab/ Imaging:    LABS:                        7.6    6.12  )-----------( 267      ( 24 Sep 2024 05:56 )             25.1     09-24    134[L]  |  97  |  13.8  ----------------------------<  95  3.8   |  27.0  |  4.22[H]    Ca    8.0[L]      24 Sep 2024 05:56  Phos  3.0     09-24  Mg     1.8     09-24    TPro  6.3[L]  /  Alb  2.6[L]  /  TBili  0.2[L]  /  DBili  x   /  AST  21  /  ALT  12  /  AlkPhos  105  09-23    PT/INR - ( 23 Sep 2024 03:14 )   PT: 11.7 sec;   INR: 1.06 ratio         PTT - ( 23 Sep 2024 03:14 )  PTT:29.7 sec  Urinalysis Basic - ( 24 Sep 2024 05:56 )    Color: x / Appearance: x / SG: x / pH: x  Gluc: 95 mg/dL / Ketone: x  / Bili: x / Urobili: x   Blood: x / Protein: x / Nitrite: x   Leuk Esterase: x / RBC: x / WBC x   Sq Epi: x / Non Sq Epi: x / Bacteria: x      CAPILLARY BLOOD GLUCOSE      POCT Blood Glucose.: 126 mg/dL (24 Sep 2024 08:35)  POCT Blood Glucose.: 112 mg/dL (23 Sep 2024 21:00)  POCT Blood Glucose.: 131 mg/dL (23 Sep 2024 17:44)  POCT Blood Glucose.: 98 mg/dL (23 Sep 2024 12:54)          Imaging Personally Reviewed:  [ ] YES  [ ] NO    Consultant(s) Notes Reviewed:  [ ] YES  [ ] NO    Care Discussed with Consultants/Other Providers [ ] YES  [ ] NO    Plan of Care discussed with Housestaff [ ]YES [ ] NO

## 2024-09-24 NOTE — PHYSICAL THERAPY INITIAL EVALUATION ADULT - ADDITIONAL COMMENTS
Pt AxOX4 states she lives at home with  with 2STE and 0 inside. Pt was independent PTA without use of DME.

## 2024-09-24 NOTE — DISCHARGE NOTE PROVIDER - CARE PROVIDER_API CALL
PCP, PCP  Phone: (   )    -  Fax: (   )    -  Follow Up Time:    Rahat Finch  Nephrology  [address] [place] [address]  Phone: [phone]  Fax: [phone]  Follow Up Time: 1 week

## 2024-09-24 NOTE — DISCHARGE NOTE PROVIDER - HOSPITAL COURSE
63F w/ PMH of ESRD on HD, failed PD, htn, hld, DM, GIB, pericardial effusions presenting after CKD exacerbation led to volume overload induced acute hypoxic respiratory distress and hypertensive urgency. Admitted MICU s/p cardene drip, HD, bipap. Downgraded to medicine as stable, dispo pending HD recs per nephrology.    SUBJECTIVE:    Chief Complaint: Patient is a 64y old  Female who presents with a chief complaint of HTN Urgency (24 Sep 2024 10:29)      INTERVAL HPI/LAST 24HRS EVENTS: Patient seen and examined at bedside at AM. No clinically acute event overnight.   Denies any chest pain, palpitations, SOB, PND, orthopnea, leg edema, N/V/D, or any other complaints.     MEDICATIONS  (STANDING):  chlorhexidine 2% Cloths 1 Application(s) Topical <User Schedule>  dextrose 5%. 1000 milliLiter(s) (50 mL/Hr) IV Continuous <Continuous>  dextrose 5%. 1000 milliLiter(s) (100 mL/Hr) IV Continuous <Continuous>  dextrose 50% Injectable 12.5 Gram(s) IV Push once  dextrose 50% Injectable 25 Gram(s) IV Push once  dextrose 50% Injectable 25 Gram(s) IV Push once  epoetin dustin-epbx (RETACRIT) Injectable 31753 Unit(s) IV Push <User Schedule>  folic acid 1 milliGRAM(s) Oral daily  glucagon  Injectable 1 milliGRAM(s) IntraMuscular once  heparin   Injectable 5000 Unit(s) SubCutaneous every 8 hours  hydrALAZINE 100 milliGRAM(s) Oral every 8 hours  influenza   Vaccine 0.5 milliLiter(s) IntraMuscular once  insulin lispro (ADMELOG) corrective regimen sliding scale   SubCutaneous every 6 hours  labetalol 300 milliGRAM(s) Oral three times a day  pantoprazole    Tablet 40 milliGRAM(s) Oral daily  rosuvastatin 20 milliGRAM(s) Oral at bedtime    MEDICATIONS  (PRN):  dextrose Oral Gel 15 Gram(s) Oral once PRN Blood Glucose LESS THAN 70 milliGRAM(s)/deciliter      Allergies    No Known Allergies    Intolerances        REVIEW OF SYSTEMS:  CONSTITUTIONAL: No fever, weight loss, or fatigue  RESPIRATORY: No cough, wheezing, chills or hemoptysis; No shortness of breath  CARDIOVASCULAR: No chest pain, palpitations, dizziness, or leg swelling  GASTROINTESTINAL: No abdominal or epigastric pain. No nausea, vomiting, or hematemesis; No diarrhea or constipation. No melena or hematochezia.  NEUROLOGICAL: No headaches, loss of strength, numbness, or tremors  MUSCULOSKELETAL: No joint pain or swelling; No muscle, back, or extremity pain    OBJECTIVE:    Vital Signs Last 24 Hrs  T(C): 36.9 (24 Sep 2024 08:03), Max: 37.3 (23 Sep 2024 19:51)  T(F): 98.4 (24 Sep 2024 08:03), Max: 99.1 (23 Sep 2024 19:51)  HR: 95 (24 Sep 2024 08:03) (80 - 106)  BP: 150/74 (24 Sep 2024 08:03) (137/73 - 191/99)  BP(mean): 102 (23 Sep 2024 23:43) (93 - 127)  RR: 18 (24 Sep 2024 08:03) (14 - 24)  SpO2: 96% (24 Sep 2024 08:03) (83% - 100%)    Parameters below as of 24 Sep 2024 08:03  Patient On (Oxygen Delivery Method): nasal cannula  O2 Flow (L/min): 2      PHYSICAL EXAM:  Constitutional: Alert, interactive, comfortable, NAD  Head and Face: Atraumatic, head and face were normal in appearance  Eyes: Sclera and conjunctiva were normal, pupils were equal in size, round, eyelids normal  ENT: Ears and nose were normal in appearance  Neck: Appearance was normal, neck was supple, No JVD  Pulmonary: Clear to auscultation bilaterally, no rales/crackles, or wheezing  Heart: Regular rate and rhythm, no murmurs, gallops, or pericardial rubs  Abdominal: Soft, nontender, nondistended. No appreciable hepatosplenomegaly. Bowel sounds normal  Skin: Normal color and intact without appreciable rash or abnormal skin lesion  Extremities: Warm without edema. No clubbing.  Pulse: 2+ radial pulse  Neuro: Oriented to person, place, and time    Lab/ Imaging:    LABS:                        7.6    6.12  )-----------( 267      ( 24 Sep 2024 05:56 )             25.1     09-24    134[L]  |  97  |  13.8  ----------------------------<  95  3.8   |  27.0  |  4.22[H]    Ca    8.0[L]      24 Sep 2024 05:56  Phos  3.0     09-24  Mg     1.8     09-24    TPro  6.3[L]  /  Alb  2.6[L]  /  TBili  0.2[L]  /  DBili  x   /  AST  21  /  ALT  12  /  AlkPhos  105  09-23    PT/INR - ( 23 Sep 2024 03:14 )   PT: 11.7 sec;   INR: 1.06 ratio 63F w/ PMH of ESRD on HD, failed PD, htn, hld, DM, GIB, pericardial effusions presenting after CKD exacerbation led to volume overload induced acute hypoxic respiratory distressstable, dispo pending HD recs per nephrology. 63F w/ PMH of ESRD on HD, failed PD w/ peritonitis, htn, hld, DM, GIB, pericardial effusions who was admitted to the MICU for acute hypoxemic respiratory failure 2/2 pulm edema in setting of ESRD and hypertensive urgency in setting of non-compliance. She was started on Cardene drip, placed on BIPAP and given HD by Nephrology while in MICU. She was transitioned off BIPAP to NC and later downgraded to Medicine after being weaned off Cardene. Her home BP regimen was continued with improvement in BP. She was transfused 1 unit pRBC for anemia of renal disease. She is now asymptomatic. She qualified for home O2. Pt no longer requires inpatient hospitalization and is stable for discharge w/ outpt follow up.

## 2024-09-24 NOTE — PROGRESS NOTE ADULT - ASSESSMENT
63F w/ PMH of ESRD on HD, failed PD, htn, hld, DM, GIB, pericardial effusions presenting after CKD exacerbation led to volume overload induced acute hypoxic respiratory distress and hypertensive urgency. Admitted MICU s/p cardene drip, HD, bipap. Downgraded to medicine as stable, dispo pending HD recs per nephrology.    Acute hypoxic respiratory failure 2/2 ESRD volume overload  - off MICU / bipap / NC, on RA, sat well  - s/p HD, f/u nephro recs if needs HD  - otherwise, stable for dc     Hypertensive Urgency  -  s/p cardene drip in MICU, stable BP now  - cnt hydralazine & labetalol, clonidine patch increased to 0.3mg/qweekly    DM2  - issc  -monitor bg  -hold oral januvia     HLD  - c/w statin    anemia with recent GIB  -Monitor cbc    DVT ppx: SCD (recent gib/anemia)    Plan of care: dc 24-48h 63F w/ PMH of ESRD on HD, failed PD, htn, hld, DM, GIB, pericardial effusions presenting after CKD exacerbation led to volume overload induced acute hypoxic respiratory distress and hypertensive urgency. Admitted MICU s/p cardene drip, HD, bipap. Downgraded to medicine as stable, dispo pending HD recs per nephrology.    Acute hypoxic respiratory failure 2/2 ESRD volume overload  - off MICU / bipap / NC, on RA, sat well  - s/p HD, f/u nephro recs if needs HD  - Supplemental oxygen PRN, currently on 1-2L. Will attempt to wean off O2    Hypertensive Urgency  - s/p cardene drip in MICU, stable BP now  - cnt hydralazine & labetalol, clonidine patch increased to 0.3mg/qweekly  - BP improved    DM2  - issc  - monitor bg  - hold oral januvia     HLD  - c/w statin    anemia with recent GIB  -Monitor cbc    DVT ppx: SCD (recent gib/anemia)    Plan of care: dc 24-48h

## 2024-09-24 NOTE — DISCHARGE NOTE PROVIDER - PROVIDER TOKENS
FREE:[LAST:[PCP],FIRST:[PCP],PHONE:[(   )    -],FAX:[(   )    -]] PROVIDER:[TOKEN:[[id number]],FOLLOWUP:[1 week]]

## 2024-09-25 LAB
ANION GAP SERPL CALC-SCNC: 11 MMOL/L — SIGNIFICANT CHANGE UP (ref 5–17)
BUN SERPL-MCNC: 24.2 MG/DL — HIGH (ref 8–20)
CALCIUM SERPL-MCNC: 8 MG/DL — LOW (ref 8.4–10.5)
CHLORIDE SERPL-SCNC: 95 MMOL/L — LOW (ref 96–108)
CO2 SERPL-SCNC: 26 MMOL/L — SIGNIFICANT CHANGE UP (ref 22–29)
CREAT SERPL-MCNC: 6.2 MG/DL — HIGH (ref 0.5–1.3)
EGFR: 7 ML/MIN/1.73M2 — LOW
GLUCOSE BLDC GLUCOMTR-MCNC: 119 MG/DL — HIGH (ref 70–99)
GLUCOSE BLDC GLUCOMTR-MCNC: 190 MG/DL — HIGH (ref 70–99)
GLUCOSE BLDC GLUCOMTR-MCNC: 97 MG/DL — SIGNIFICANT CHANGE UP (ref 70–99)
GLUCOSE SERPL-MCNC: 102 MG/DL — HIGH (ref 70–99)
HCT VFR BLD CALC: 23.6 % — LOW (ref 34.5–45)
HGB BLD-MCNC: 7.1 G/DL — LOW (ref 11.5–15.5)
MCHC RBC-ENTMCNC: 25.9 PG — LOW (ref 27–34)
MCHC RBC-ENTMCNC: 30.1 GM/DL — LOW (ref 32–36)
MCV RBC AUTO: 86.1 FL — SIGNIFICANT CHANGE UP (ref 80–100)
PLATELET # BLD AUTO: 249 K/UL — SIGNIFICANT CHANGE UP (ref 150–400)
POTASSIUM SERPL-MCNC: 3.5 MMOL/L — SIGNIFICANT CHANGE UP (ref 3.5–5.3)
POTASSIUM SERPL-SCNC: 3.5 MMOL/L — SIGNIFICANT CHANGE UP (ref 3.5–5.3)
RBC # BLD: 2.74 M/UL — LOW (ref 3.8–5.2)
RBC # FLD: 16.9 % — HIGH (ref 10.3–14.5)
SODIUM SERPL-SCNC: 132 MMOL/L — LOW (ref 135–145)
WBC # BLD: 7.38 K/UL — SIGNIFICANT CHANGE UP (ref 3.8–10.5)
WBC # FLD AUTO: 7.38 K/UL — SIGNIFICANT CHANGE UP (ref 3.8–10.5)

## 2024-09-25 PROCEDURE — 99233 SBSQ HOSP IP/OBS HIGH 50: CPT | Mod: GC

## 2024-09-25 RX ORDER — LABETALOL HYDROCHLORIDE 200 MG/1
300 TABLET, FILM COATED ORAL ONCE
Refills: 0 | Status: COMPLETED | OUTPATIENT
Start: 2024-09-25 | End: 2024-09-25

## 2024-09-25 RX ORDER — LABETALOL HYDROCHLORIDE 200 MG/1
100 TABLET, FILM COATED ORAL ONCE
Refills: 0 | Status: DISCONTINUED | OUTPATIENT
Start: 2024-09-25 | End: 2024-09-25

## 2024-09-25 RX ORDER — LABETALOL HYDROCHLORIDE 200 MG/1
600 TABLET, FILM COATED ORAL EVERY 8 HOURS
Refills: 0 | Status: DISCONTINUED | OUTPATIENT
Start: 2024-09-25 | End: 2024-09-25

## 2024-09-25 RX ORDER — CLONIDINE HYDROCHLORIDE 0.2 MG/1
0.2 TABLET ORAL ONCE
Refills: 0 | Status: COMPLETED | OUTPATIENT
Start: 2024-09-25 | End: 2024-09-25

## 2024-09-25 RX ORDER — LABETALOL HYDROCHLORIDE 200 MG/1
600 TABLET, FILM COATED ORAL THREE TIMES A DAY
Refills: 0 | Status: DISCONTINUED | OUTPATIENT
Start: 2024-09-25 | End: 2024-09-26

## 2024-09-25 RX ORDER — LABETALOL HYDROCHLORIDE 200 MG/1
10 TABLET, FILM COATED ORAL ONCE
Refills: 0 | Status: COMPLETED | OUTPATIENT
Start: 2024-09-25 | End: 2024-09-25

## 2024-09-25 RX ORDER — 5-HYDROXYTRYPTOPHAN (5-HTP) 100 MG
5 TABLET,DISINTEGRATING ORAL ONCE
Refills: 0 | Status: COMPLETED | OUTPATIENT
Start: 2024-09-25 | End: 2024-09-25

## 2024-09-25 RX ADMIN — CHLORHEXIDINE GLUCONATE ORAL RINSE 1 APPLICATION(S): 1.2 SOLUTION DENTAL at 05:24

## 2024-09-25 RX ADMIN — Medication 2: at 18:18

## 2024-09-25 RX ADMIN — LABETALOL HYDROCHLORIDE 600 MILLIGRAM(S): 200 TABLET, FILM COATED ORAL at 11:58

## 2024-09-25 RX ADMIN — LABETALOL HYDROCHLORIDE 300 MILLIGRAM(S): 200 TABLET, FILM COATED ORAL at 05:24

## 2024-09-25 RX ADMIN — LABETALOL HYDROCHLORIDE 10 MILLIGRAM(S): 200 TABLET, FILM COATED ORAL at 11:07

## 2024-09-25 RX ADMIN — CLONIDINE HYDROCHLORIDE 0.2 MILLIGRAM(S): 0.2 TABLET ORAL at 10:24

## 2024-09-25 RX ADMIN — ROSUVASTATIN CALCIUM 20 MILLIGRAM(S): 20 TABLET, COATED ORAL at 22:19

## 2024-09-25 RX ADMIN — LABETALOL HYDROCHLORIDE 300 MILLIGRAM(S): 200 TABLET, FILM COATED ORAL at 02:30

## 2024-09-25 RX ADMIN — HYDRALAZINE HYDROCHLORIDE 100 MILLIGRAM(S): 100 TABLET ORAL at 05:24

## 2024-09-25 RX ADMIN — HYDRALAZINE HYDROCHLORIDE 100 MILLIGRAM(S): 100 TABLET ORAL at 15:08

## 2024-09-25 RX ADMIN — LABETALOL HYDROCHLORIDE 600 MILLIGRAM(S): 200 TABLET, FILM COATED ORAL at 22:19

## 2024-09-25 RX ADMIN — HYDRALAZINE HYDROCHLORIDE 100 MILLIGRAM(S): 100 TABLET ORAL at 22:19

## 2024-09-25 RX ADMIN — FOLIC ACID 1 MILLIGRAM(S): 1 TABLET ORAL at 11:58

## 2024-09-25 RX ADMIN — Medication 5 MILLIGRAM(S): at 02:30

## 2024-09-25 RX ADMIN — PANTOPRAZOLE SODIUM 40 MILLIGRAM(S): 40 TABLET, DELAYED RELEASE ORAL at 12:07

## 2024-09-25 RX ADMIN — Medication 5000 UNIT(S): at 22:19

## 2024-09-25 NOTE — DIETITIAN INITIAL EVALUATION ADULT - ADD RECOMMEND
Nepro BID to optimize po intake and provide an additional 425 kcal, 19.1g protein per serving   Encourage diet compliance  Encourage po intake, monitor diet tolerance, and provide assistance at meals as needed.   Rx: Nephrovite daily, vitamin C (500mg daily) to aid in wound healing.

## 2024-09-25 NOTE — DIETITIAN INITIAL EVALUATION ADULT - OTHER INFO
64F w/ PMH of ESRD on HD, failed PD, htn, hld, DM, GIB, pericardial effusions presenting after CKD exacerbation led to volume overload induced acute hypoxic respiratory distress and hypertensive urgency. Admitted MICU s/p cardene drip, HD, bipap. Downgraded to medicine with HD pending today 9/25.

## 2024-09-25 NOTE — DIETITIAN INITIAL EVALUATION ADULT - PERTINENT LABORATORY DATA
09-25    132[L]  |  95[L]  |  24.2[H]  ----------------------------<  102[H]  3.5   |  26.0  |  6.20[H]    Ca    8.0[L]      25 Sep 2024 06:13  Phos  3.0     09-24  Mg     1.8     09-24    TPro  6.3[L]  /  Alb  2.6[L]  /  TBili  0.2[L]  /  DBili  x   /  AST  21  /  ALT  12  /  AlkPhos  105  09-23  POCT Blood Glucose.: 119 mg/dL (09-25-24 @ 12:00)  A1C with Estimated Average Glucose Result: 7.0 % (08-28-24 @ 04:38)

## 2024-09-25 NOTE — PROGRESS NOTE ADULT - ASSESSMENT
64F w/ PMH of ESRD on HD, failed PD, htn, hld, DM, GIB, pericardial effusions presenting after CKD exacerbation led to volume overload induced acute hypoxic respiratory distress and hypertensive urgency. Admitted MICU s/p cardene drip, HD, bipap. Downgraded to medicine with HD pending today 9/25.    Acute hypoxic respiratory failure 2/2 ESRD volume overload  - off MICU / bipap / NC, on RA, sat well  - received Permacath 9/11 after PD trial 9/1  - s/p HD, needs another session today  - Supplemental oxygen PRN, currently on 1-2L. Attempts to wean off O2 yesterday not successful, pt desaturates to 82 on RA.    Hypertensive Urgency  - s/p cardene drip in MICU  - cnt hydralazine (was on nitro drip & labetalol 10mg IV q4hr)  - clonidine patch increased to 0.3mg/qweekly  - BP overnight 197/97 s/p labetalol 300 mg stat  - BP chronically high, will maintain on current regimen: hydralazine 100mg TID, labetalol 300mg TID)    DM2  - issc  - monitor bg  - hold oral januvia     HLD  - c/w statin    anemia with recent GIB  -Monitor cbc    DVT ppx: SCD (recent gib/anemia)    Plan of care: dc 24-48h 64F w/ PMH of ESRD on HD, failed PD, htn, hld, DM, GIB, pericardial effusions presenting after CKD exacerbation led to volume overload induced acute hypoxic respiratory distress and hypertensive urgency. Admitted MICU s/p cardene drip, HD, bipap. Downgraded to medicine with HD pending today 9/25.    Acute hypoxic respiratory failure 2/2 ESRD volume overload  - off MICU / bipap / NC, on RA, sat well  - received Permacath 9/11 after PD trial 9/1  - s/p HD, needs another session today  - Supplemental oxygen PRN, currently on 1-2L. Attempts to wean off O2 yesterday not successful, pt desaturates to 82 on RA.    CKD on HD  - pending HD today  - Cr worsening      Hypertensive Urgency  - s/p cardene drip in MICU  - cnt hydralazine (was on nitro drip & labetalol 10mg IV q4hr)  - clonidine patch increased to 0.3mg/qweekly  - BP overnight 197/97 s/p labetalol 300 mg stat  - BP chronically high, will maintain on current regimen: hydralazine 100mg TID, labetalol 300mg TID)    DM2  - issc  - monitor bg  - hold oral januvia     HLD  - c/w statin    anemia with recent GIB  -Monitor cbc    DVT ppx: SCD (recent gib/anemia)    Plan of care: dc 24-48h 64F w/ PMH of ESRD on HD, failed PD, htn, hld, DM, GIB, pericardial effusions presenting after CKD exacerbation led to volume overload induced acute hypoxic respiratory distress and hypertensive urgency. Admitted MICU s/p cardene drip, HD, bipap. Downgraded to medicine with HD scheduled today 9/25.    Acute hypoxic respiratory failure 2/2 ESRD volume overload  - off MICU / bipap / NC, on RA, sat well  - received Permacath 9/11 after PD trial 9/1  - s/p HD, needs another session today  - Supplemental oxygen PRN, currently on 1-2L. Attempts to wean off O2 yesterday not successful, pt desaturates to 82 on RA.  - If patient cannot be weaned from NC, consider candidate for home O2    CKD on HD  - pending HD today  - Cr worsening  - renal restricted diet       Hypertensive Urgency  - s/p cardene drip in MICU  - cnt hydralazine (was on nitro drip & labetalol 10mg IV q4hr)  - clonidine patch increased to 0.3mg/qweekly  - BP overnight 197/97 s/p labetalol 300 mg stat  - BP chronically high, will maintain on current regimen: hydralazine 100mg TID, labetalol 300mg TID)    Diarrhea:   - GI PCR    DM2  - issc  - monitor bg  - hold oral januvia     HLD  - c/w statin    anemia with recent GIB  -Monitor cbc  -1 unit pRBCs given    DVT ppx: SCD, heparin 5000U q8    Plan of care: dc 24-48 hours pending O2 weaning, BP control, and GI PCR.

## 2024-09-25 NOTE — PROGRESS NOTE ADULT - SUBJECTIVE AND OBJECTIVE BOX
NEPHROLOGY INTERVAL HPI/OVERNIGHT EVENTS:  pt seen earlier at HD  c/o weakness  occ nausea    MEDICATIONS  (STANDING):  chlorhexidine 2% Cloths 1 Application(s) Topical <User Schedule>  dextrose 5%. 1000 milliLiter(s) (50 mL/Hr) IV Continuous <Continuous>  dextrose 5%. 1000 milliLiter(s) (100 mL/Hr) IV Continuous <Continuous>  dextrose 50% Injectable 12.5 Gram(s) IV Push once  dextrose 50% Injectable 25 Gram(s) IV Push once  dextrose 50% Injectable 25 Gram(s) IV Push once  epoetin dustin-epbx (RETACRIT) Injectable 96416 Unit(s) IV Push <User Schedule>  folic acid 1 milliGRAM(s) Oral daily  glucagon  Injectable 1 milliGRAM(s) IntraMuscular once  heparin   Injectable 5000 Unit(s) SubCutaneous every 8 hours  hydrALAZINE 100 milliGRAM(s) Oral every 8 hours  influenza   Vaccine 0.5 milliLiter(s) IntraMuscular once  insulin lispro (ADMELOG) corrective regimen sliding scale   SubCutaneous every 6 hours  labetalol 300 milliGRAM(s) Oral three times a day  pantoprazole    Tablet 40 milliGRAM(s) Oral daily  rosuvastatin 20 milliGRAM(s) Oral at bedtime    MEDICATIONS  (PRN):  dextrose Oral Gel 15 Gram(s) Oral once PRN Blood Glucose LESS THAN 70 milliGRAM(s)/deciliter      Allergies    No Known Allergies        Vital Signs Last 24 Hrs  T(C): 36.4 (25 Sep 2024 07:50), Max: 37.4 (24 Sep 2024 18:00)  T(F): 97.5 (25 Sep 2024 07:50), Max: 99.3 (24 Sep 2024 18:00)  HR: 93 (25 Sep 2024 07:50) (87 - 103)  BP: 169/95 (25 Sep 2024 07:50) (150/76 - 197/96)  BP(mean): --  RR: 18 (25 Sep 2024 07:50) (18 - 20)  SpO2: 98% (25 Sep 2024 07:50) (95% - 98%)    Parameters below as of 25 Sep 2024 07:50  Patient On (Oxygen Delivery Method): nasal cannula  O2 Flow (L/min): 2      PHYSICAL EXAM:  GENERAL: Fatigued, weak  HEENT: No periorbital edema  NECK: Supple, No JVD, R permacath +   NERVOUS SYSTEM:  Alert & Oriented X3  CHEST/LUNG: Clear, diminished BS  HEART: Regular rate and rhythm; No rub  ABDOMEN: Soft, Nontender, +BS  EXTREMITIES:  + LE dependent edema improved      LABS:                        7.1    7.38  )-----------( 249      ( 25 Sep 2024 06:13 )             23.6     09-25    132[L]  |  95[L]  |  24.2[H]  ----------------------------<  102[H]  3.5   |  26.0  |  6.20[H]    Ca    8.0[L]      25 Sep 2024 06:13  Phos  3.0     09-24  Mg     1.8     09-24    TPro  6.3[L]  /  Alb  2.6[L]  /  TBili  0.2[L]  /  DBili  x   /  AST  21  /  ALT  12  /  AlkPhos  105  09-23      Urinalysis Basic - ( 25 Sep 2024 06:13 )    Color: x / Appearance: x / SG: x / pH: x  Gluc: 102 mg/dL / Ketone: x  / Bili: x / Urobili: x   Blood: x / Protein: x / Nitrite: x   Leuk Esterase: x / RBC: x / WBC x   Sq Epi: x / Non Sq Epi: x / Bacteria: x          RADIOLOGY & ADDITIONAL TESTS:  < from: Xray Chest 1 View AP/PA. (09.23.24 @ 03:19) >  ACC: [id number] EXAM:  XR CHEST AP OR PA 1V   ORDERED BY: DIAZ NICOLAS     PROCEDURE DATE:  09/23/2024          INTERPRETATION:  EXAM: XR CHEST    INDICATION: sob XXR    COMPARISON: September 21, 2024    IMPRESSION: Increased perihilar interstitial markings bilaterally with   bilateral effusions. Cardiomegaly. Double lumen dialysis catheter as   before. Findings compatible with congestive heart failure. Regional   osseous structures appropriate for age    < end of copied text >

## 2024-09-25 NOTE — PROGRESS NOTE ADULT - SUBJECTIVE AND OBJECTIVE BOX
SUBJECTIVE:    Chief Complaint: Patient is a 64y old  Female who presents with a chief complaint of HTN Urgency (24 Sep 2024 15:47)    64F w/ PMH of ESRD on HD, failed PD, htn, hld, DM, GIB, pericardial effusions presenting after CKD exacerbation led to volume overload induced acute hypoxic respiratory distress and hypertensive urgency. Admitted MICU s/p cardene drip, HD, bipap. Downgraded to medicine with HD pending today 9/25.    INTERVAL HPI/LAST 24HRS EVENTS: Patient seen and examined at bedside at AM. Had high BP (197/97) that came down hours after labetalol 300mg stat.  Denies any chest pain, palpitations, SOB, PND, orthopnea, leg edema, N/V/D, or any other complaints. Pending HD session today per nephro recs.    MEDICATIONS  (STANDING):  chlorhexidine 2% Cloths 1 Application(s) Topical <User Schedule>  dextrose 5%. 1000 milliLiter(s) (50 mL/Hr) IV Continuous <Continuous>  dextrose 5%. 1000 milliLiter(s) (100 mL/Hr) IV Continuous <Continuous>  dextrose 50% Injectable 12.5 Gram(s) IV Push once  dextrose 50% Injectable 25 Gram(s) IV Push once  dextrose 50% Injectable 25 Gram(s) IV Push once  epoetin dustin-epbx (RETACRIT) Injectable 12215 Unit(s) IV Push <User Schedule>  folic acid 1 milliGRAM(s) Oral daily  glucagon  Injectable 1 milliGRAM(s) IntraMuscular once  heparin   Injectable 5000 Unit(s) SubCutaneous every 8 hours  hydrALAZINE 100 milliGRAM(s) Oral every 8 hours  influenza   Vaccine 0.5 milliLiter(s) IntraMuscular once  insulin lispro (ADMELOG) corrective regimen sliding scale   SubCutaneous every 6 hours  labetalol 300 milliGRAM(s) Oral three times a day  pantoprazole    Tablet 40 milliGRAM(s) Oral daily  rosuvastatin 20 milliGRAM(s) Oral at bedtime    MEDICATIONS  (PRN):  dextrose Oral Gel 15 Gram(s) Oral once PRN Blood Glucose LESS THAN 70 milliGRAM(s)/deciliter      Allergies    No Known Allergies    Intolerances        REVIEW OF SYSTEMS:  CONSTITUTIONAL: No fever, weight loss, or fatigue  RESPIRATORY: No cough, wheezing, chills or hemoptysis; No shortness of breath  CARDIOVASCULAR: No chest pain, palpitations, dizziness, or leg swelling  GASTROINTESTINAL: No abdominal or epigastric pain. No nausea, vomiting, or hematemesis; No diarrhea or constipation. No melena or hematochezia.  NEUROLOGICAL: No headaches, loss of strength, numbness, or tremors  MUSCULOSKELETAL: No joint pain or swelling; No muscle, back, or extremity pain    OBJECTIVE:    Vital Signs Last 24 Hrs  T(C): 36.9 (25 Sep 2024 04:15), Max: 37.4 (24 Sep 2024 18:00)  T(F): 98.4 (25 Sep 2024 04:15), Max: 99.3 (24 Sep 2024 18:00)  HR: 87 (25 Sep 2024 06:46) (87 - 103)  BP: 173/87 (25 Sep 2024 06:46) (150/74 - 197/96)  BP(mean): --  RR: 18 (25 Sep 2024 04:15) (18 - 20)  SpO2: 98% (25 Sep 2024 04:15) (95% - 98%)    Parameters below as of 25 Sep 2024 04:15  Patient On (Oxygen Delivery Method): nasal cannula  O2 Flow (L/min): 2      PHYSICAL EXAM:  Constitutional: Alert, interactive, comfortable, NAD  Head and Face: Atraumatic, head and face were normal in appearance  Eyes: Sclera and conjunctiva were normal, pupils were equal in size, round, eyelids normal  ENT: Ears and nose were normal in appearance  Neck: Appearance was normal, neck was supple, No JVD  Pulmonary: Clear to auscultation bilaterally, no rales/crackles, or wheezing  Heart: Regular rate and rhythm, no murmurs, gallops, or pericardial rubs  Abdominal: Soft, nontender, nondistended. No appreciable hepatosplenomegaly. Bowel sounds normal  Skin: Normal color and intact without appreciable rash or abnormal skin lesion  Extremities: Warm without edema. No clubbing.  Pulse: 2+ radial pulse  Neuro: Oriented to person, place, and time    Lab/ Imaging:    LABS:                        7.6    6.12  )-----------( 267      ( 24 Sep 2024 05:56 )             25.1     09-24    134[L]  |  97  |  13.8  ----------------------------<  95  3.8   |  27.0  |  4.22[H]    Ca    8.0[L]      24 Sep 2024 05:56  Phos  3.0     09-24  Mg     1.8     09-24    TPro  6.3[L]  /  Alb  2.6[L]  /  TBili  0.2[L]  /  DBili  x   /  AST  21  /  ALT  12  /  AlkPhos  105  09-23      Urinalysis Basic - ( 24 Sep 2024 05:56 )    Color: x / Appearance: x / SG: x / pH: x  Gluc: 95 mg/dL / Ketone: x  / Bili: x / Urobili: x   Blood: x / Protein: x / Nitrite: x   Leuk Esterase: x / RBC: x / WBC x   Sq Epi: x / Non Sq Epi: x / Bacteria: x      CAPILLARY BLOOD GLUCOSE      POCT Blood Glucose.: 161 mg/dL (24 Sep 2024 21:55)  POCT Blood Glucose.: 128 mg/dL (24 Sep 2024 18:27)  POCT Blood Glucose.: 159 mg/dL (24 Sep 2024 13:10)  POCT Blood Glucose.: 126 mg/dL (24 Sep 2024 08:35)          Imaging Personally Reviewed:  [ ] YES  [ ] NO    Consultant(s) Notes Reviewed:  [ ] YES  [ ] NO    Care Discussed with Consultants/Other Providers [ ] YES  [ ] NO    Plan of Care discussed with Housestaff [ ]YES [ ] NO SUBJECTIVE:    Chief Complaint: Patient is a 64y old  Female who presents with a chief complaint of HTN Urgency (24 Sep 2024 15:47)    64F w/ PMH of ESRD on HD, failed PD, htn, hld, DM, GIB, pericardial effusions presenting after CKD exacerbation led to volume overload induced acute hypoxic respiratory distress and hypertensive urgency. Admitted MICU s/p cardene drip, HD, bipap. Downgraded to medicine with HD pending today 9/25.    INTERVAL HPI/LAST 24HRS EVENTS: Patient seen and examined at bedside at AM. Had high BP (197/97) that came down hours after labetalol 300mg stat. States she feels nauseous like she is about to vomit. She had chills overnight and ~10 episodes of diarrhea.   Denies any chest pain, palpitations, SOB, PND, orthopnea, leg edema, or any other complaints. Pending HD session today per nephro recs.    MEDICATIONS  (STANDING):  chlorhexidine 2% Cloths 1 Application(s) Topical <User Schedule>  dextrose 5%. 1000 milliLiter(s) (50 mL/Hr) IV Continuous <Continuous>  dextrose 5%. 1000 milliLiter(s) (100 mL/Hr) IV Continuous <Continuous>  dextrose 50% Injectable 12.5 Gram(s) IV Push once  dextrose 50% Injectable 25 Gram(s) IV Push once  dextrose 50% Injectable 25 Gram(s) IV Push once  epoetin dustin-epbx (RETACRIT) Injectable 58888 Unit(s) IV Push <User Schedule>  folic acid 1 milliGRAM(s) Oral daily  glucagon  Injectable 1 milliGRAM(s) IntraMuscular once  heparin   Injectable 5000 Unit(s) SubCutaneous every 8 hours  hydrALAZINE 100 milliGRAM(s) Oral every 8 hours  influenza   Vaccine 0.5 milliLiter(s) IntraMuscular once  insulin lispro (ADMELOG) corrective regimen sliding scale   SubCutaneous every 6 hours  labetalol 300 milliGRAM(s) Oral three times a day  pantoprazole    Tablet 40 milliGRAM(s) Oral daily  rosuvastatin 20 milliGRAM(s) Oral at bedtime    MEDICATIONS  (PRN):  dextrose Oral Gel 15 Gram(s) Oral once PRN Blood Glucose LESS THAN 70 milliGRAM(s)/deciliter      Allergies    No Known Allergies    Intolerances        REVIEW OF SYSTEMS:  CONSTITUTIONAL: No fever, weight loss, or fatigue, +chills  RESPIRATORY: No cough, wheezing, chills or hemoptysis; No shortness of breath  CARDIOVASCULAR: No chest pain, palpitations, dizziness, or leg swelling  GASTROINTESTINAL: No abdominal or epigastric pain. No vomiting, or hematemesis; +diarrhea, +nausea  NEUROLOGICAL: No headaches, loss of strength, numbness, or tremors  MUSCULOSKELETAL: No joint pain or swelling; No muscle, back, or extremity pain    OBJECTIVE:    Vital Signs Last 24 Hrs  T(C): 36.9 (25 Sep 2024 04:15), Max: 37.4 (24 Sep 2024 18:00)  T(F): 98.4 (25 Sep 2024 04:15), Max: 99.3 (24 Sep 2024 18:00)  HR: 87 (25 Sep 2024 06:46) (87 - 103)  BP: 173/87 (25 Sep 2024 06:46) (150/74 - 197/96)  BP(mean): --  RR: 18 (25 Sep 2024 04:15) (18 - 20)  SpO2: 98% (25 Sep 2024 04:15) (95% - 98%)    Parameters below as of 25 Sep 2024 04:15  Patient On (Oxygen Delivery Method): nasal cannula  O2 Flow (L/min): 2      PHYSICAL EXAM:  Constitutional: Alert, interactive, comfortable, NAD, on 2LNC  Head and Face: Atraumatic, head and face were normal in appearance  Eyes: Sclera and conjunctiva were normal, pupils were equal in size, round, eyelids normal  ENT: Ears and nose were normal in appearance  Neck: Appearance was normal, neck was supple, No JVD  Pulmonary: Clear to auscultation bilaterally, no rales/crackles, or wheezing  Heart: Regular rate and rhythm, no murmurs, gallops, or pericardial rubs  Abdominal: Soft, nontender, nondistended. No appreciable hepatosplenomegaly. Bowel sounds normal, midline ex-lap scar noted  Skin: Normal color and intact without appreciable rash or abnormal skin lesion  Extremities: Warm without edema. No clubbing.  Pulse: 2+ radial pulse  Neuro: Oriented to person, place, and time    Lab/ Imaging:    LABS:                        7.6    6.12  )-----------( 267      ( 24 Sep 2024 05:56 )             25.1     09-24    134[L]  |  97  |  13.8  ----------------------------<  95  3.8   |  27.0  |  4.22[H]    Ca    8.0[L]      24 Sep 2024 05:56  Phos  3.0     09-24  Mg     1.8     09-24    TPro  6.3[L]  /  Alb  2.6[L]  /  TBili  0.2[L]  /  DBili  x   /  AST  21  /  ALT  12  /  AlkPhos  105  09-23      Urinalysis Basic - ( 24 Sep 2024 05:56 )    Color: x / Appearance: x / SG: x / pH: x  Gluc: 95 mg/dL / Ketone: x  / Bili: x / Urobili: x   Blood: x / Protein: x / Nitrite: x   Leuk Esterase: x / RBC: x / WBC x   Sq Epi: x / Non Sq Epi: x / Bacteria: x      CAPILLARY BLOOD GLUCOSE      POCT Blood Glucose.: 161 mg/dL (24 Sep 2024 21:55)  POCT Blood Glucose.: 128 mg/dL (24 Sep 2024 18:27)  POCT Blood Glucose.: 159 mg/dL (24 Sep 2024 13:10)  POCT Blood Glucose.: 126 mg/dL (24 Sep 2024 08:35)          Imaging Personally Reviewed:  [ ] YES  [ ] NO    Consultant(s) Notes Reviewed:  [ ] YES  [ ] NO    Care Discussed with Consultants/Other Providers [ ] YES  [ ] NO    Plan of Care discussed with Housestaff [ ]YES [ ] NO

## 2024-09-25 NOTE — DIETITIAN INITIAL EVALUATION ADULT - ORAL INTAKE PTA/DIET HISTORY
Spoke with pt who reports no weight changes noted with fair to good po intake at this time. Stage 2 coccyx, and inner b/l glute noted. Provided pt with dialysis diet education packet at bedside.

## 2024-09-25 NOTE — PROGRESS NOTE ADULT - ASSESSMENT
ESRD: s/p PD catheter removal due to  peritonitis PTA and started on HD  Poorly controlled HTN and fluid overload  - HD today with UF as tolerates  - adjust BP meds as needed    Anemia:  - Epogen with HD   - PRBCs today    RO: phos OK   - low phos diet  - monitor off binders

## 2024-09-25 NOTE — DIETITIAN INITIAL EVALUATION ADULT - NSFNSPHYEXAMSKINFT_GEN_A_CORE
Pressure Injury 1: COCCYX INNNER B/L GLUTE, Stage II  Pressure Injury 2: none, none  Pressure Injury 3: none, none  Pressure Injury 4: none, none  Pressure Injury 5: none, none  Pressure Injury 6: none, none  Pressure Injury 7: none, none  Pressure Injury 8: none, none  Pressure Injury 9: none, none  Pressure Injury 10: none, none  Pressure Injury 11: none, none

## 2024-09-26 VITALS
SYSTOLIC BLOOD PRESSURE: 165 MMHG | RESPIRATION RATE: 18 BRPM | HEART RATE: 96 BPM | TEMPERATURE: 99 F | OXYGEN SATURATION: 97 % | DIASTOLIC BLOOD PRESSURE: 75 MMHG

## 2024-09-26 LAB
ALBUMIN SERPL ELPH-MCNC: 2.5 G/DL — LOW (ref 3.3–5.2)
ALP SERPL-CCNC: 111 U/L — SIGNIFICANT CHANGE UP (ref 40–120)
ALT FLD-CCNC: 9 U/L — SIGNIFICANT CHANGE UP
ANION GAP SERPL CALC-SCNC: 11 MMOL/L — SIGNIFICANT CHANGE UP (ref 5–17)
AST SERPL-CCNC: 12 U/L — SIGNIFICANT CHANGE UP
BASOPHILS # BLD AUTO: 0.08 K/UL — SIGNIFICANT CHANGE UP (ref 0–0.2)
BASOPHILS NFR BLD AUTO: 1.1 % — SIGNIFICANT CHANGE UP (ref 0–2)
BILIRUB SERPL-MCNC: 0.3 MG/DL — LOW (ref 0.4–2)
BUN SERPL-MCNC: 19 MG/DL — SIGNIFICANT CHANGE UP (ref 8–20)
CALCIUM SERPL-MCNC: 8.4 MG/DL — SIGNIFICANT CHANGE UP (ref 8.4–10.5)
CHLORIDE SERPL-SCNC: 100 MMOL/L — SIGNIFICANT CHANGE UP (ref 96–108)
CO2 SERPL-SCNC: 24 MMOL/L — SIGNIFICANT CHANGE UP (ref 22–29)
CREAT SERPL-MCNC: 4.19 MG/DL — HIGH (ref 0.5–1.3)
EGFR: 11 ML/MIN/1.73M2 — LOW
EOSINOPHIL # BLD AUTO: 0.25 K/UL — SIGNIFICANT CHANGE UP (ref 0–0.5)
EOSINOPHIL NFR BLD AUTO: 3.4 % — SIGNIFICANT CHANGE UP (ref 0–6)
GLUCOSE BLDC GLUCOMTR-MCNC: 101 MG/DL — HIGH (ref 70–99)
GLUCOSE BLDC GLUCOMTR-MCNC: 115 MG/DL — HIGH (ref 70–99)
GLUCOSE BLDC GLUCOMTR-MCNC: 122 MG/DL — HIGH (ref 70–99)
GLUCOSE BLDC GLUCOMTR-MCNC: 149 MG/DL — HIGH (ref 70–99)
GLUCOSE BLDC GLUCOMTR-MCNC: 234 MG/DL — HIGH (ref 70–99)
GLUCOSE SERPL-MCNC: 114 MG/DL — HIGH (ref 70–99)
HCT VFR BLD CALC: 27.6 % — LOW (ref 34.5–45)
HGB BLD-MCNC: 8.7 G/DL — LOW (ref 11.5–15.5)
IMM GRANULOCYTES NFR BLD AUTO: 0.7 % — SIGNIFICANT CHANGE UP (ref 0–0.9)
LYMPHOCYTES # BLD AUTO: 1.76 K/UL — SIGNIFICANT CHANGE UP (ref 1–3.3)
LYMPHOCYTES # BLD AUTO: 23.8 % — SIGNIFICANT CHANGE UP (ref 13–44)
MAGNESIUM SERPL-MCNC: 1.7 MG/DL — SIGNIFICANT CHANGE UP (ref 1.6–2.6)
MCHC RBC-ENTMCNC: 26.9 PG — LOW (ref 27–34)
MCHC RBC-ENTMCNC: 31.5 GM/DL — LOW (ref 32–36)
MCV RBC AUTO: 85.2 FL — SIGNIFICANT CHANGE UP (ref 80–100)
MONOCYTES # BLD AUTO: 0.47 K/UL — SIGNIFICANT CHANGE UP (ref 0–0.9)
MONOCYTES NFR BLD AUTO: 6.4 % — SIGNIFICANT CHANGE UP (ref 2–14)
NEUTROPHILS # BLD AUTO: 4.78 K/UL — SIGNIFICANT CHANGE UP (ref 1.8–7.4)
NEUTROPHILS NFR BLD AUTO: 64.6 % — SIGNIFICANT CHANGE UP (ref 43–77)
PHOSPHATE SERPL-MCNC: 3.2 MG/DL — SIGNIFICANT CHANGE UP (ref 2.4–4.7)
PLATELET # BLD AUTO: 290 K/UL — SIGNIFICANT CHANGE UP (ref 150–400)
POTASSIUM SERPL-MCNC: 4.2 MMOL/L — SIGNIFICANT CHANGE UP (ref 3.5–5.3)
POTASSIUM SERPL-SCNC: 4.2 MMOL/L — SIGNIFICANT CHANGE UP (ref 3.5–5.3)
PROT SERPL-MCNC: 6.1 G/DL — LOW (ref 6.6–8.7)
RBC # BLD: 3.24 M/UL — LOW (ref 3.8–5.2)
RBC # FLD: 16.4 % — HIGH (ref 10.3–14.5)
SODIUM SERPL-SCNC: 135 MMOL/L — SIGNIFICANT CHANGE UP (ref 135–145)
WBC # BLD: 7.39 K/UL — SIGNIFICANT CHANGE UP (ref 3.8–10.5)
WBC # FLD AUTO: 7.39 K/UL — SIGNIFICANT CHANGE UP (ref 3.8–10.5)

## 2024-09-26 PROCEDURE — 80053 COMPREHEN METABOLIC PANEL: CPT

## 2024-09-26 PROCEDURE — 36430 TRANSFUSION BLD/BLD COMPNT: CPT

## 2024-09-26 PROCEDURE — 83735 ASSAY OF MAGNESIUM: CPT

## 2024-09-26 PROCEDURE — 96375 TX/PRO/DX INJ NEW DRUG ADDON: CPT

## 2024-09-26 PROCEDURE — 85730 THROMBOPLASTIN TIME PARTIAL: CPT

## 2024-09-26 PROCEDURE — 87640 STAPH A DNA AMP PROBE: CPT

## 2024-09-26 PROCEDURE — 86850 RBC ANTIBODY SCREEN: CPT

## 2024-09-26 PROCEDURE — 84100 ASSAY OF PHOSPHORUS: CPT

## 2024-09-26 PROCEDURE — 86900 BLOOD TYPING SEROLOGIC ABO: CPT

## 2024-09-26 PROCEDURE — 80048 BASIC METABOLIC PNL TOTAL CA: CPT

## 2024-09-26 PROCEDURE — 94760 N-INVAS EAR/PLS OXIMETRY 1: CPT

## 2024-09-26 PROCEDURE — 86901 BLOOD TYPING SEROLOGIC RH(D): CPT

## 2024-09-26 PROCEDURE — 82962 GLUCOSE BLOOD TEST: CPT

## 2024-09-26 PROCEDURE — 99261: CPT

## 2024-09-26 PROCEDURE — 96374 THER/PROPH/DIAG INJ IV PUSH: CPT

## 2024-09-26 PROCEDURE — 36415 COLL VENOUS BLD VENIPUNCTURE: CPT

## 2024-09-26 PROCEDURE — 85027 COMPLETE CBC AUTOMATED: CPT

## 2024-09-26 PROCEDURE — 99239 HOSP IP/OBS DSCHRG MGMT >30: CPT | Mod: GC

## 2024-09-26 PROCEDURE — 99291 CRITICAL CARE FIRST HOUR: CPT

## 2024-09-26 PROCEDURE — 85025 COMPLETE CBC W/AUTO DIFF WBC: CPT

## 2024-09-26 PROCEDURE — 86923 COMPATIBILITY TEST ELECTRIC: CPT

## 2024-09-26 PROCEDURE — 94660 CPAP INITIATION&MGMT: CPT

## 2024-09-26 PROCEDURE — 85610 PROTHROMBIN TIME: CPT

## 2024-09-26 PROCEDURE — 93005 ELECTROCARDIOGRAM TRACING: CPT

## 2024-09-26 PROCEDURE — 87641 MR-STAPH DNA AMP PROBE: CPT

## 2024-09-26 PROCEDURE — 71045 X-RAY EXAM CHEST 1 VIEW: CPT

## 2024-09-26 PROCEDURE — 84484 ASSAY OF TROPONIN QUANT: CPT

## 2024-09-26 PROCEDURE — P9016: CPT

## 2024-09-26 RX ORDER — ONDANSETRON HCL/PF 4 MG/2 ML
4 VIAL (ML) INJECTION ONCE
Refills: 0 | Status: COMPLETED | OUTPATIENT
Start: 2024-09-26 | End: 2024-09-26

## 2024-09-26 RX ADMIN — Medication 5000 UNIT(S): at 05:46

## 2024-09-26 RX ADMIN — PANTOPRAZOLE SODIUM 40 MILLIGRAM(S): 40 TABLET, DELAYED RELEASE ORAL at 13:25

## 2024-09-26 RX ADMIN — Medication 4 MILLIGRAM(S): at 09:31

## 2024-09-26 RX ADMIN — Medication 5000 UNIT(S): at 13:27

## 2024-09-26 RX ADMIN — HYDRALAZINE HYDROCHLORIDE 100 MILLIGRAM(S): 100 TABLET ORAL at 05:46

## 2024-09-26 RX ADMIN — FOLIC ACID 1 MILLIGRAM(S): 1 TABLET ORAL at 13:25

## 2024-09-26 RX ADMIN — HYDRALAZINE HYDROCHLORIDE 100 MILLIGRAM(S): 100 TABLET ORAL at 13:26

## 2024-09-26 RX ADMIN — LABETALOL HYDROCHLORIDE 600 MILLIGRAM(S): 200 TABLET, FILM COATED ORAL at 13:37

## 2024-09-26 RX ADMIN — CHLORHEXIDINE GLUCONATE ORAL RINSE 1 APPLICATION(S): 1.2 SOLUTION DENTAL at 05:53

## 2024-09-26 RX ADMIN — Medication 4: at 13:24

## 2024-09-26 RX ADMIN — LABETALOL HYDROCHLORIDE 600 MILLIGRAM(S): 200 TABLET, FILM COATED ORAL at 05:46

## 2024-09-26 NOTE — DISCHARGE NOTE NURSING/CASE MANAGEMENT/SOCIAL WORK - PATIENT PORTAL LINK FT
You can access the FollowMyHealth Patient Portal offered by [facility] by registering at the following website: [email or web address]. By joining [employer]’s FollowMyHealth portal, you will also be able to view your health information using other applications (apps) compatible with our system.

## 2024-09-26 NOTE — PROGRESS NOTE ADULT - SUBJECTIVE AND OBJECTIVE BOX
NEPHROLOGY INTERVAL HPI/OVERNIGHT EVENTS:  overnight uneventful  tolerated HD and PRBCs yesterday    MEDICATIONS  (STANDING):  chlorhexidine 2% Cloths 1 Application(s) Topical <User Schedule>  dextrose 5%. 1000 milliLiter(s) (50 mL/Hr) IV Continuous <Continuous>  dextrose 5%. 1000 milliLiter(s) (100 mL/Hr) IV Continuous <Continuous>  dextrose 50% Injectable 12.5 Gram(s) IV Push once  dextrose 50% Injectable 25 Gram(s) IV Push once  dextrose 50% Injectable 25 Gram(s) IV Push once  epoetin dustin-epbx (RETACRIT) Injectable 69183 Unit(s) IV Push <User Schedule>  folic acid 1 milliGRAM(s) Oral daily  glucagon  Injectable 1 milliGRAM(s) IntraMuscular once  heparin   Injectable 5000 Unit(s) SubCutaneous every 8 hours  hydrALAZINE 100 milliGRAM(s) Oral every 8 hours  influenza   Vaccine 0.5 milliLiter(s) IntraMuscular once  insulin lispro (ADMELOG) corrective regimen sliding scale   SubCutaneous every 6 hours  labetalol 600 milliGRAM(s) Oral three times a day  pantoprazole    Tablet 40 milliGRAM(s) Oral daily  rosuvastatin 20 milliGRAM(s) Oral at bedtime    MEDICATIONS  (PRN):  dextrose Oral Gel 15 Gram(s) Oral once PRN Blood Glucose LESS THAN 70 milliGRAM(s)/deciliter      Allergies    No Known Allergies          Vital Signs Last 24 Hrs  T(C): 36.8 (26 Sep 2024 04:34), Max: 37.3 (25 Sep 2024 11:30)  T(F): 98.3 (26 Sep 2024 04:34), Max: 99.1 (25 Sep 2024 11:30)  HR: 94 (26 Sep 2024 04:34) (87 - 100)  BP: 177/83 (26 Sep 2024 04:34) (160/78 - 213/111)  BP(mean): 117 (25 Sep 2024 21:00) (117 - 117)  RR: 18 (26 Sep 2024 04:34) (18 - 19)  SpO2: 93% (26 Sep 2024 04:34) (92% - 100%)    Parameters below as of 26 Sep 2024 04:34  Patient On (Oxygen Delivery Method): nasal cannula, 2        PHYSICAL EXAM:  GENERAL: Frail, languid  HEENT: No periorbital edema  NECK: Supple, No JVD, R permacath +   NERVOUS SYSTEM:  Alert & Oriented X3  CHEST/LUNG: Clear, diminished BS  HEART: Regular rate and rhythm; No rub  ABDOMEN: Soft, Nontender, +BS  EXTREMITIES:  + less LE dependent edema       LABS:                        7.1    7.38  )-----------( 249      ( 25 Sep 2024 06:13 )             23.6     09-25    132[L]  |  95[L]  |  24.2[H]  ----------------------------<  102[H]  3.5   |  26.0  |  6.20[H]    Ca    8.0[L]      25 Sep 2024 06:13        Urinalysis Basic - ( 25 Sep 2024 06:13 )    Color: x / Appearance: x / SG: x / pH: x  Gluc: 102 mg/dL / Ketone: x  / Bili: x / Urobili: x   Blood: x / Protein: x / Nitrite: x   Leuk Esterase: x / RBC: x / WBC x   Sq Epi: x / Non Sq Epi: x / Bacteria: x          RADIOLOGY & ADDITIONAL TESTS:

## 2024-09-26 NOTE — PROGRESS NOTE ADULT - SUBJECTIVE AND OBJECTIVE BOX
Patient is a 64y old  Female who presents with a chief complaint of HTN Urgency (26 Sep 2024 06:41)      INTERVAL HPI/OVERNIGHT EVENTS: Patient was seen and examined at bedside. No overnight events occurred. Denies fever, chills, headache, SOB, chest pain, abdominal pain, n/v/d/c.      MEDICATIONS  (STANDING):  chlorhexidine 2% Cloths 1 Application(s) Topical <User Schedule>  dextrose 5%. 1000 milliLiter(s) (50 mL/Hr) IV Continuous <Continuous>  dextrose 5%. 1000 milliLiter(s) (100 mL/Hr) IV Continuous <Continuous>  dextrose 50% Injectable 12.5 Gram(s) IV Push once  dextrose 50% Injectable 25 Gram(s) IV Push once  dextrose 50% Injectable 25 Gram(s) IV Push once  epoetin dustin-epbx (RETACRIT) Injectable 40765 Unit(s) IV Push <User Schedule>  folic acid 1 milliGRAM(s) Oral daily  glucagon  Injectable 1 milliGRAM(s) IntraMuscular once  heparin   Injectable 5000 Unit(s) SubCutaneous every 8 hours  hydrALAZINE 100 milliGRAM(s) Oral every 8 hours  influenza   Vaccine 0.5 milliLiter(s) IntraMuscular once  insulin lispro (ADMELOG) corrective regimen sliding scale   SubCutaneous every 6 hours  labetalol 600 milliGRAM(s) Oral three times a day  pantoprazole    Tablet 40 milliGRAM(s) Oral daily  rosuvastatin 20 milliGRAM(s) Oral at bedtime    MEDICATIONS  (PRN):  dextrose Oral Gel 15 Gram(s) Oral once PRN Blood Glucose LESS THAN 70 milliGRAM(s)/deciliter      Allergies    No Known Allergies    Intolerances        REVIEW OF SYSTEMS:  CONSTITUTIONAL: No fever or chills  HEENT:  No headache, no sore throat  RESPIRATORY: No cough, wheezing, or shortness of breath  CARDIOVASCULAR: No chest pain, palpitations  GASTROINTESTINAL: No abd pain, nausea, vomiting, or diarrhea  GENITOURINARY: No dysuria, frequency, or hematuria  NEUROLOGICAL: no focal weakness or dizziness  MUSCULOSKELETAL: no myalgias     Vital Signs Last 24 Hrs  T(C): 36.8 (26 Sep 2024 04:34), Max: 37.3 (25 Sep 2024 11:30)  T(F): 98.3 (26 Sep 2024 04:34), Max: 99.1 (25 Sep 2024 11:30)  HR: 94 (26 Sep 2024 04:34) (93 - 100)  BP: 177/83 (26 Sep 2024 04:34) (160/78 - 213/111)  BP(mean): 117 (25 Sep 2024 21:00) (117 - 117)  RR: 18 (26 Sep 2024 04:34) (18 - 19)  SpO2: 93% (26 Sep 2024 04:34) (92% - 100%)    Parameters below as of 26 Sep 2024 04:34  Patient On (Oxygen Delivery Method): nasal cannula, 2        PHYSICAL EXAM:  GENERAL: NAD  HEENT:  anicteric, moist mucous membranes  CHEST/LUNG:  CTA b/l, no rales, wheezes, or rhonchi  HEART:  RRR, S1, S2  ABDOMEN:  BS+, soft, nontender, nondistended  EXTREMITIES: no edema, cyanosis, or calf tenderness  NERVOUS SYSTEM: answers questions and follows commands appropriately    LABS:    CBC Full  -  ( 25 Sep 2024 06:13 )  WBC Count : 7.38 K/uL  Hemoglobin : 7.1 g/dL  Hematocrit : 23.6 %  Platelet Count - Automated : 249 K/uL  Mean Cell Volume : 86.1 fl  Mean Cell Hemoglobin : 25.9 pg  Mean Cell Hemoglobin Concentration : 30.1 gm/dL  Auto Neutrophil # : x  Auto Lymphocyte # : x  Auto Monocyte # : x  Auto Eosinophil # : x  Auto Basophil # : x  Auto Neutrophil % : x  Auto Lymphocyte % : x  Auto Monocyte % : x  Auto Eosinophil % : x  Auto Basophil % : x      Ca    8.0        25 Sep 2024 06:13        Urinalysis Basic - ( 25 Sep 2024 06:13 )    Color: x / Appearance: x / SG: x / pH: x  Gluc: 102 mg/dL / Ketone: x  / Bili: x / Urobili: x   Blood: x / Protein: x / Nitrite: x   Leuk Esterase: x / RBC: x / WBC x   Sq Epi: x / Non Sq Epi: x / Bacteria: x      CAPILLARY BLOOD GLUCOSE      POCT Blood Glucose.: 115 mg/dL (26 Sep 2024 05:44)  POCT Blood Glucose.: 149 mg/dL (26 Sep 2024 01:04)  POCT Blood Glucose.: 190 mg/dL (25 Sep 2024 17:37)  POCT Blood Glucose.: 119 mg/dL (25 Sep 2024 12:00)  POCT Blood Glucose.: 97 mg/dL (25 Sep 2024 08:29)          RADIOLOGY & ADDITIONAL TESTS:    Personally reviewed.     Consultant(s) Notes Reviewed:  [x] YES  [ ] NO     Patient is a 64y old  Female who presents with a chief complaint of HTN Urgency (26 Sep 2024 06:41)    64F w/ PMH of ESRD on HD, failed PD, htn, hld, DM, GIB, pericardial effusions presenting after CKD exacerbation led to volume overload induced acute hypoxic respiratory distress and hypertensive urgency. Admitted MICU s/p cardene drip, HD, bipap. Downgraded to medicine with HD pending today 9/25.      INTERVAL HPI/OVERNIGHT EVENTS: Patient was seen and examined at bedside. No overnight events occurred. Denies fever, chills, headache, SOB, chest pain, abdominal pain, n/v/d/c.      MEDICATIONS  (STANDING):  chlorhexidine 2% Cloths 1 Application(s) Topical <User Schedule>  dextrose 5%. 1000 milliLiter(s) (50 mL/Hr) IV Continuous <Continuous>  dextrose 5%. 1000 milliLiter(s) (100 mL/Hr) IV Continuous <Continuous>  dextrose 50% Injectable 12.5 Gram(s) IV Push once  dextrose 50% Injectable 25 Gram(s) IV Push once  dextrose 50% Injectable 25 Gram(s) IV Push once  epoetin dustin-epbx (RETACRIT) Injectable 99513 Unit(s) IV Push <User Schedule>  folic acid 1 milliGRAM(s) Oral daily  glucagon  Injectable 1 milliGRAM(s) IntraMuscular once  heparin   Injectable 5000 Unit(s) SubCutaneous every 8 hours  hydrALAZINE 100 milliGRAM(s) Oral every 8 hours  influenza   Vaccine 0.5 milliLiter(s) IntraMuscular once  insulin lispro (ADMELOG) corrective regimen sliding scale   SubCutaneous every 6 hours  labetalol 600 milliGRAM(s) Oral three times a day  pantoprazole    Tablet 40 milliGRAM(s) Oral daily  rosuvastatin 20 milliGRAM(s) Oral at bedtime    MEDICATIONS  (PRN):  dextrose Oral Gel 15 Gram(s) Oral once PRN Blood Glucose LESS THAN 70 milliGRAM(s)/deciliter      Allergies    No Known Allergies    Intolerances        REVIEW OF SYSTEMS:  CONSTITUTIONAL: No fever or chills  HEENT:  No headache, no sore throat  RESPIRATORY: No cough, wheezing, or shortness of breath  CARDIOVASCULAR: No chest pain, palpitations  GASTROINTESTINAL: No abd pain, nausea, vomiting, or diarrhea  GENITOURINARY: No dysuria, frequency, or hematuria  NEUROLOGICAL: no focal weakness or dizziness  MUSCULOSKELETAL: no myalgias     Vital Signs Last 24 Hrs  T(C): 36.8 (26 Sep 2024 04:34), Max: 37.3 (25 Sep 2024 11:30)  T(F): 98.3 (26 Sep 2024 04:34), Max: 99.1 (25 Sep 2024 11:30)  HR: 94 (26 Sep 2024 04:34) (93 - 100)  BP: 177/83 (26 Sep 2024 04:34) (160/78 - 213/111)  BP(mean): 117 (25 Sep 2024 21:00) (117 - 117)  RR: 18 (26 Sep 2024 04:34) (18 - 19)  SpO2: 93% (26 Sep 2024 04:34) (92% - 100%)    Parameters below as of 26 Sep 2024 04:34  Patient On (Oxygen Delivery Method): nasal cannula, 2        PHYSICAL EXAM:  GENERAL: NAD  HEENT:  anicteric, moist mucous membranes  CHEST/LUNG:  CTA b/l, no rales, wheezes, or rhonchi  HEART:  RRR, S1, S2  ABDOMEN:  BS+, soft, nontender, nondistended  EXTREMITIES: no edema, cyanosis, or calf tenderness  NERVOUS SYSTEM: answers questions and follows commands appropriately    LABS:    CBC Full  -  ( 25 Sep 2024 06:13 )  WBC Count : 7.38 K/uL  Hemoglobin : 7.1 g/dL  Hematocrit : 23.6 %  Platelet Count - Automated : 249 K/uL  Mean Cell Volume : 86.1 fl  Mean Cell Hemoglobin : 25.9 pg  Mean Cell Hemoglobin Concentration : 30.1 gm/dL  Auto Neutrophil # : x  Auto Lymphocyte # : x  Auto Monocyte # : x  Auto Eosinophil # : x  Auto Basophil # : x  Auto Neutrophil % : x  Auto Lymphocyte % : x  Auto Monocyte % : x  Auto Eosinophil % : x  Auto Basophil % : x      Ca    8.0        25 Sep 2024 06:13        Urinalysis Basic - ( 25 Sep 2024 06:13 )    Color: x / Appearance: x / SG: x / pH: x  Gluc: 102 mg/dL / Ketone: x  / Bili: x / Urobili: x   Blood: x / Protein: x / Nitrite: x   Leuk Esterase: x / RBC: x / WBC x   Sq Epi: x / Non Sq Epi: x / Bacteria: x      CAPILLARY BLOOD GLUCOSE      POCT Blood Glucose.: 115 mg/dL (26 Sep 2024 05:44)  POCT Blood Glucose.: 149 mg/dL (26 Sep 2024 01:04)  POCT Blood Glucose.: 190 mg/dL (25 Sep 2024 17:37)  POCT Blood Glucose.: 119 mg/dL (25 Sep 2024 12:00)  POCT Blood Glucose.: 97 mg/dL (25 Sep 2024 08:29)          RADIOLOGY & ADDITIONAL TESTS:    Personally reviewed.     Consultant(s) Notes Reviewed:  [x] YES  [ ] NO     Patient is a 64y old  Female who presents with a chief complaint of HTN Urgency (26 Sep 2024 06:41)    64F w/ PMH of ESRD on HD, failed PD, htn, hld, DM, GIB, pericardial effusions presenting after CKD exacerbation led to volume overload induced acute hypoxic respiratory distress and hypertensive urgency. Admitted MICU s/p cardene drip, HD, bipap. Downgraded to medicine with HD pending today 9/25.      INTERVAL HPI/OVERNIGHT EVENTS: Patient was seen and examined at bedside. No overnight events occurred. She had a dialysis session yesterday and said she felt better afterwards and was able to sleep for the first time afterwards. She said she is currently feeling nauseous and has still been having constant diarrhea that is dark brown in color. Denies fever, chills, headache, SOB, chest pain, abdominal pain.       MEDICATIONS  (STANDING):  chlorhexidine 2% Cloths 1 Application(s) Topical <User Schedule>  dextrose 5%. 1000 milliLiter(s) (50 mL/Hr) IV Continuous <Continuous>  dextrose 5%. 1000 milliLiter(s) (100 mL/Hr) IV Continuous <Continuous>  dextrose 50% Injectable 12.5 Gram(s) IV Push once  dextrose 50% Injectable 25 Gram(s) IV Push once  dextrose 50% Injectable 25 Gram(s) IV Push once  epoetin dustin-epbx (RETACRIT) Injectable 65963 Unit(s) IV Push <User Schedule>  folic acid 1 milliGRAM(s) Oral daily  glucagon  Injectable 1 milliGRAM(s) IntraMuscular once  heparin   Injectable 5000 Unit(s) SubCutaneous every 8 hours  hydrALAZINE 100 milliGRAM(s) Oral every 8 hours  influenza   Vaccine 0.5 milliLiter(s) IntraMuscular once  insulin lispro (ADMELOG) corrective regimen sliding scale   SubCutaneous every 6 hours  labetalol 600 milliGRAM(s) Oral three times a day  pantoprazole    Tablet 40 milliGRAM(s) Oral daily  rosuvastatin 20 milliGRAM(s) Oral at bedtime    MEDICATIONS  (PRN):  dextrose Oral Gel 15 Gram(s) Oral once PRN Blood Glucose LESS THAN 70 milliGRAM(s)/deciliter      Allergies    No Known Allergies    Intolerances        REVIEW OF SYSTEMS:  CONSTITUTIONAL: No fever or chills  HEENT:  No headache, no sore throat  RESPIRATORY: No cough, wheezing, or shortness of breath  CARDIOVASCULAR: No chest pain, palpitations  GASTROINTESTINAL: No abd pain, vomiting, + nausea, + diarrhea  GENITOURINARY: No dysuria, frequency, or hematuria  NEUROLOGICAL: no focal weakness or dizziness  MUSCULOSKELETAL: no myalgias     Vital Signs Last 24 Hrs  T(C): 36.8 (26 Sep 2024 04:34), Max: 37.3 (25 Sep 2024 11:30)  T(F): 98.3 (26 Sep 2024 04:34), Max: 99.1 (25 Sep 2024 11:30)  HR: 94 (26 Sep 2024 04:34) (93 - 100)  BP: 177/83 (26 Sep 2024 04:34) (160/78 - 213/111)  BP(mean): 117 (25 Sep 2024 21:00) (117 - 117)  RR: 18 (26 Sep 2024 04:34) (18 - 19)  SpO2: 93% (26 Sep 2024 04:34) (92% - 100%)    Parameters below as of 26 Sep 2024 04:34  Patient On (Oxygen Delivery Method): nasal cannula, 2        PHYSICAL EXAM:  GENERAL: NAD, on 2LNC, sitting up in bed  HEENT:  anicteric, moist mucous membranes  CHEST/LUNG:  CTA b/l, no rales, wheezes, or rhonchi  HEART:  RRR, S1, S2  ABDOMEN:  BS+, soft, nontender, nondistended, midline ex-lap scar noted  EXTREMITIES: no edema, cyanosis, or calf tenderness  NERVOUS SYSTEM: answers questions and follows commands appropriately    LABS:    CBC Full  -  ( 25 Sep 2024 06:13 )  WBC Count : 7.38 K/uL  Hemoglobin : 7.1 g/dL  Hematocrit : 23.6 %  Platelet Count - Automated : 249 K/uL  Mean Cell Volume : 86.1 fl  Mean Cell Hemoglobin : 25.9 pg  Mean Cell Hemoglobin Concentration : 30.1 gm/dL  Auto Neutrophil # : x  Auto Lymphocyte # : x  Auto Monocyte # : x  Auto Eosinophil # : x  Auto Basophil # : x  Auto Neutrophil % : x  Auto Lymphocyte % : x  Auto Monocyte % : x  Auto Eosinophil % : x  Auto Basophil % : x      Ca    8.0        25 Sep 2024 06:13        Urinalysis Basic - ( 25 Sep 2024 06:13 )    Color: x / Appearance: x / SG: x / pH: x  Gluc: 102 mg/dL / Ketone: x  / Bili: x / Urobili: x   Blood: x / Protein: x / Nitrite: x   Leuk Esterase: x / RBC: x / WBC x   Sq Epi: x / Non Sq Epi: x / Bacteria: x      CAPILLARY BLOOD GLUCOSE      POCT Blood Glucose.: 115 mg/dL (26 Sep 2024 05:44)  POCT Blood Glucose.: 149 mg/dL (26 Sep 2024 01:04)  POCT Blood Glucose.: 190 mg/dL (25 Sep 2024 17:37)  POCT Blood Glucose.: 119 mg/dL (25 Sep 2024 12:00)  POCT Blood Glucose.: 97 mg/dL (25 Sep 2024 08:29)          RADIOLOGY & ADDITIONAL TESTS:    Personally reviewed.     Consultant(s) Notes Reviewed:  [x] YES  [ ] NO     Patient is a 64y old  Female who presents with a chief complaint of HTN Urgency (26 Sep 2024 06:41)    64F w/ PMH of ESRD on HD, failed PD, htn, hld, DM, GIB, pericardial effusions presenting after CKD exacerbation led to volume overload induced acute hypoxic respiratory distress and hypertensive urgency. Admitted MICU s/p cardene drip, HD, bipap. Received 2 sessions of HD.    INTERVAL HPI/OVERNIGHT EVENTS: Patient was seen and examined at bedside. No overnight events occurred. She had a dialysis session yesterday and said she felt better afterwards and was able to sleep for the first time afterwards. She said she is currently feeling nauseous and has still been having constant diarrhea that is dark brown in color. Denies fever, chills, headache, SOB, chest pain, abdominal pain.       MEDICATIONS  (STANDING):  chlorhexidine 2% Cloths 1 Application(s) Topical <User Schedule>  dextrose 5%. 1000 milliLiter(s) (50 mL/Hr) IV Continuous <Continuous>  dextrose 5%. 1000 milliLiter(s) (100 mL/Hr) IV Continuous <Continuous>  dextrose 50% Injectable 12.5 Gram(s) IV Push once  dextrose 50% Injectable 25 Gram(s) IV Push once  dextrose 50% Injectable 25 Gram(s) IV Push once  epoetin dustin-epbx (RETACRIT) Injectable 60432 Unit(s) IV Push <User Schedule>  folic acid 1 milliGRAM(s) Oral daily  glucagon  Injectable 1 milliGRAM(s) IntraMuscular once  heparin   Injectable 5000 Unit(s) SubCutaneous every 8 hours  hydrALAZINE 100 milliGRAM(s) Oral every 8 hours  influenza   Vaccine 0.5 milliLiter(s) IntraMuscular once  insulin lispro (ADMELOG) corrective regimen sliding scale   SubCutaneous every 6 hours  labetalol 600 milliGRAM(s) Oral three times a day  pantoprazole    Tablet 40 milliGRAM(s) Oral daily  rosuvastatin 20 milliGRAM(s) Oral at bedtime    MEDICATIONS  (PRN):  dextrose Oral Gel 15 Gram(s) Oral once PRN Blood Glucose LESS THAN 70 milliGRAM(s)/deciliter      Allergies    No Known Allergies    Intolerances        REVIEW OF SYSTEMS:  CONSTITUTIONAL: No fever or chills  HEENT:  No headache, no sore throat  RESPIRATORY: No cough, wheezing, or shortness of breath  CARDIOVASCULAR: No chest pain, palpitations  GASTROINTESTINAL: No abd pain, vomiting, + nausea, + diarrhea  GENITOURINARY: No dysuria, frequency, or hematuria  NEUROLOGICAL: no focal weakness or dizziness  MUSCULOSKELETAL: no myalgias     Vital Signs Last 24 Hrs  T(C): 36.8 (26 Sep 2024 04:34), Max: 37.3 (25 Sep 2024 11:30)  T(F): 98.3 (26 Sep 2024 04:34), Max: 99.1 (25 Sep 2024 11:30)  HR: 94 (26 Sep 2024 04:34) (93 - 100)  BP: 177/83 (26 Sep 2024 04:34) (160/78 - 213/111)  BP(mean): 117 (25 Sep 2024 21:00) (117 - 117)  RR: 18 (26 Sep 2024 04:34) (18 - 19)  SpO2: 93% (26 Sep 2024 04:34) (92% - 100%)    Parameters below as of 26 Sep 2024 04:34  Patient On (Oxygen Delivery Method): nasal cannula, 2        PHYSICAL EXAM:  GENERAL: NAD, on 2LNC, sitting up in bed  HEENT:  anicteric, moist mucous membranes  CHEST/LUNG:  CTA b/l, no rales, wheezes, or rhonchi  HEART:  RRR, S1, S2  ABDOMEN:  BS+, soft, nontender, nondistended, midline ex-lap scar noted  EXTREMITIES: no edema, cyanosis, or calf tenderness  NERVOUS SYSTEM: answers questions and follows commands appropriately    LABS:    CBC Full  -  ( 25 Sep 2024 06:13 )  WBC Count : 7.38 K/uL  Hemoglobin : 7.1 g/dL  Hematocrit : 23.6 %  Platelet Count - Automated : 249 K/uL  Mean Cell Volume : 86.1 fl  Mean Cell Hemoglobin : 25.9 pg  Mean Cell Hemoglobin Concentration : 30.1 gm/dL  Auto Neutrophil # : x  Auto Lymphocyte # : x  Auto Monocyte # : x  Auto Eosinophil # : x  Auto Basophil # : x  Auto Neutrophil % : x  Auto Lymphocyte % : x  Auto Monocyte % : x  Auto Eosinophil % : x  Auto Basophil % : x      Ca    8.0        25 Sep 2024 06:13        Urinalysis Basic - ( 25 Sep 2024 06:13 )    Color: x / Appearance: x / SG: x / pH: x  Gluc: 102 mg/dL / Ketone: x  / Bili: x / Urobili: x   Blood: x / Protein: x / Nitrite: x   Leuk Esterase: x / RBC: x / WBC x   Sq Epi: x / Non Sq Epi: x / Bacteria: x      CAPILLARY BLOOD GLUCOSE      POCT Blood Glucose.: 115 mg/dL (26 Sep 2024 05:44)  POCT Blood Glucose.: 149 mg/dL (26 Sep 2024 01:04)  POCT Blood Glucose.: 190 mg/dL (25 Sep 2024 17:37)  POCT Blood Glucose.: 119 mg/dL (25 Sep 2024 12:00)  POCT Blood Glucose.: 97 mg/dL (25 Sep 2024 08:29)          RADIOLOGY & ADDITIONAL TESTS:    Personally reviewed.     Consultant(s) Notes Reviewed:  [x] YES  [ ] NO

## 2024-09-26 NOTE — PROGRESS NOTE ADULT - ASSESSMENT
64F w/ PMH of ESRD on HD, failed PD, htn, hld, DM, GIB, pericardial effusions presenting after CKD exacerbation led to volume overload induced acute hypoxic respiratory distress and hypertensive urgency. Admitted MICU s/p cardene drip, HD, bipap. Downgraded to medicine, s/p 2 sessions HD. Pending home O2 / dispo.    Acute hypoxic respiratory failure 2/2 ESRD volume overload  - off MICU / bipap / NC, on RA, sat well  - received Permacath 9/11 after PD trial 9/1 s/p 2 HD  - Supplemental oxygen PRN, currently on 1-2L. Attempts to wean off O2 yesterday not successful, pt desaturates to 82 on RA.  - If patient cannot be weaned from NC, consider candidate for home O2    CKD on HD  - s/p HD x2  - Cr improved after HD  - renal restricted diet       Hypertensive Urgency  - s/p cardene drip in MICU  - cnt hydralazine (was on nitro drip & labetalol 10mg IV q4hr)  - clonidine patch increased to 0.3mg/qweekly  - BP overnight 197/97 s/p labetalol 300 mg stat  - BP chronically high, will maintain on current regimen: hydralazine 100mg TID, labetalol 300mg TID)    Diarrhea:   - GI PCR    DM2  - issc  - monitor bg  - hold oral januvia     HLD  - c/w statin    anemia with recent GIB  -Monitor cbc  -1 unit pRBCs given    DVT ppx: SCD, heparin 5000U q8    Plan of care: dc 24-48 hours pending O2 weaning, BP control, and GI PCR. 64F w/ PMH of ESRD on HD, failed PD, htn, hld, DM, GIB, pericardial effusions presenting after CKD exacerbation led to volume overload induced acute hypoxic respiratory distress and hypertensive urgency. Admitted MICU s/p cardene drip, HD, bipap. Downgraded to medicine, s/p 2 sessions HD. Nephro recommends another HD session with ultrafiltration tomorrow as pt has been having poorly controlled HTN and fluid overload.     Acute hypoxic respiratory failure 2/2 ESRD volume overload  - off MICU / bipap / NC, on RA, sat well  - received Permacath 9/11 after PD trial 9/1 s/p 2 HD  - Supplemental oxygen PRN, currently on 1-2L. Attempts to wean off O2 not successful, pt desaturates to 82 on RA.  - If patient cannot be weaned from NC, consider candidate for home O2    CKD on HD  - s/p HD x2  - Nephro recs additional HD session wit UF tomorrow due to poorly controlled HTN and fluid overload  - Cr improved after HD  - renal restricted diet       Hypertensive Urgency  - s/p cardene drip in MICU  - cnt hydralazine (was on nitro drip & labetalol 10mg IV q4hr)  - clonidine patch increased to 0.3mg/qweekly  - BP overnight 197/97 s/p labetalol 300 mg stat  - BP chronically high, will maintain on current regimen: hydralazine 100mg TID, labetalol 300mg TID    Diarrhea:   - GI PCR    DM2  - issc  - monitor bg  - hold oral januvia     HLD  - c/w statin    anemia with recent GIB  -Monitor cbc  -1 unit pRBCs given    DVT ppx: SCD, heparin 5000U q8    Plan of care: dc 24-48 hours pending O2 weaning, BP control, and GI PCR. 64F w/ PMH of ESRD on HD, failed PD, htn, hld, DM, GIB, pericardial effusions presenting after CKD exacerbation led to volume overload induced acute hypoxic respiratory distress and hypertensive urgency. Admitted MICU s/p cardene drip, HD, bipap. Downgraded to medicine, s/p 2 sessions HD. Nephro recommends another HD session with ultrafiltration tomorrow as pt has been having poorly controlled HTN and fluid overload.     Acute hypoxic respiratory failure 2/2 ESRD volume overload  - off MICU / bipap / NC on 3L, weaning down  - received Permacath 9/11 after PD trial 9/1 s/p 2 HD  - Supplemental oxygen PRN, currently on 1-2L. Attempts to wean off O2 not successful, pt desaturates to 82 on RA.  - If patient cannot be weaned from NC, consider candidate for home O2    CKD on HD  - s/p HD x2  - Nephro recs additional HD session wit UF tomorrow due to poorly controlled HTN and fluid overload  - Cr improved after HD  - renal restricted diet       Hypertensive Urgency  - s/p cardene drip in MICU  - cnt hydralazine (was on nitro drip & labetalol 10mg IV q4hr)  - clonidine patch increased to 0.3mg/qweekly  - BP overnight 197/97 s/p labetalol 300 mg stat  - BP chronically high, will maintain on current regimen: hydralazine 100mg TID, labetalol 300mg TID    Diarrhea:   - GI PCR    DM2  - issc  - monitor bg  - hold oral januvia     HLD  - c/w statin    anemia with recent GIB  -Monitor cbc  -1 unit pRBCs given    DVT ppx: SCD, heparin 5000U q8    Plan of care: dc 24-48 hours pending O2 weaning, BP control, and GI PCR.

## 2024-09-26 NOTE — PROGRESS NOTE ADULT - ATTENDING COMMENTS
63y Female w/ PMH of ESRD on HD was on peritoneal dialysis with and peritoneal dialysis catheter dysfunction, recent peritonitis (recent admission dc 09/12), Pericardial effusion, HTN, HLD, and DM, GIB presented to ED with shortness of breath. Pt was admitted to micu with acute hypoxic respiratory failure sec fluid over load from HTN urgency from esrd requiring bipap. Pt s/p Cardene drip, Nephrology consulted and pt underwent HD. Pt weaned off BIPAP to NC and then downgraded from MICU to Medicine.    Pt seen and examined at bedside. No acute events overnight. Feeling much better. Remains on 1-2L NC. Will attempt to wean. Nephro following for HD. Potential discharge within 24-48 hours once weaned off O2.
Patient is a 63y Female w/ PMH of ESRD on HD was on peritoneal dialysis with and peritoneal dialysis catheter dysfunction, recent peritonitis (recent admission dc 09/12), Pericardial effusion, HTN, HLD, and DM, GIB presented to ED with shortness of breath. Pt was admitted to micu with acute hypoxic respiratory failure sec fluid over load from HTN urgency from esrd requiring bipap. Pt s/p Cardene drip, Nephrology consulted and pt underwent HD. Pt weaned off BIPAP to NC and then downgraded from MICU to Medicine.     Pt seen and examined at bedside. Patient w/ diarrhea overnight. BP remains uncontrolled. Labetalol increased to home dose of 600mg TID. For HD today. Continues to require O2, may require home O2. Possibly d/c home in 1-2 days.
Patient is a 63y Female w/ PMH of ESRD on HD was on peritoneal dialysis with and peritoneal dialysis catheter dysfunction, recent peritonitis (recent admission dc 09/12), Pericardial effusion, HTN, HLD, and DM, GIB presented to ED with shortness of breath. Pt was admitted to micu with acute hypoxic respiratory failure sec fluid over load from HTN urgency from esrd requiring bipap. Pt s/p Cardene drip, Nephrology consulted and pt underwent HD. Pt weaned off BIPAP to NC and then downgraded from MICU to Medicine.     Pt seen and examined at bedside. No acute events overnight. No new complaints. Feeling much better. Will assess for home O2. Potential d/c today

## 2024-09-26 NOTE — PROGRESS NOTE ADULT - ASSESSMENT
ESRD: recently s/p PD catheter removal (due to peritonitis) and transitioned to HD as outpatient  Poorly controlled HTN and fluid overload==> clinically improved with more aggressive UF  - HD tomorrow with UF as tolerates  - adjust BP meds as needed to optimize    Anemia:  - Epogen with HD   - PRBCs today    RO: phos OK   - low phos diet  - monitor off binders ESRD: recently s/p PD catheter removal (due to peritonitis) and transitioned to HD as outpatient  Poorly controlled HTN and fluid overload==> clinically improved with more aggressive UF  - HD tomorrow with UF as tolerates  - adjust BP meds as needed to optimize    Anemia:  - Epogen with HD   - PRBCs today  - await repeat Hgb    RO: phos OK   - low phos diet  - monitor off binders

## 2024-11-09 ENCOUNTER — INPATIENT (INPATIENT)
Facility: HOSPITAL | Age: 64
LOS: 3 days | Discharge: HOME | DRG: 189 | End: 2024-11-13
Attending: STUDENT IN AN ORGANIZED HEALTH CARE EDUCATION/TRAINING PROGRAM | Admitting: INTERNAL MEDICINE
Payer: COMMERCIAL

## 2024-11-09 VITALS
SYSTOLIC BLOOD PRESSURE: 213 MMHG | RESPIRATION RATE: 17 BRPM | HEIGHT: 56 IN | OXYGEN SATURATION: 96 % | TEMPERATURE: 98 F | HEART RATE: 90 BPM | DIASTOLIC BLOOD PRESSURE: 104 MMHG | WEIGHT: 130.07 LBS

## 2024-11-09 DIAGNOSIS — Z98.890 OTHER SPECIFIED POSTPROCEDURAL STATES: Chronic | ICD-10-CM

## 2024-11-09 DIAGNOSIS — Z98.891 HISTORY OF UTERINE SCAR FROM PREVIOUS SURGERY: Chronic | ICD-10-CM

## 2024-11-09 PROCEDURE — 99291 CRITICAL CARE FIRST HOUR: CPT

## 2024-11-09 PROCEDURE — 99292 CRITICAL CARE ADDL 30 MIN: CPT

## 2024-11-09 PROCEDURE — 93010 ELECTROCARDIOGRAM REPORT: CPT

## 2024-11-09 NOTE — ED ADULT TRIAGE NOTE - CHIEF COMPLAINT QUOTE
Pt c/o sternal chest tightness onset today radiating to back, down her side and abdomen, w/vomiting today.  States taking acid reflux med which caused her to vomit.  States having fistula placed in your left arm last week for dialysis.  Currently pain 5/10.  Hx of HTN, DM, biopsy done for breast Ca.  EKG done in triage.

## 2024-11-10 DIAGNOSIS — J96.01 ACUTE RESPIRATORY FAILURE WITH HYPOXIA: ICD-10-CM

## 2024-11-10 PROBLEM — N18.6 END STAGE RENAL DISEASE: Chronic | Status: ACTIVE | Noted: 2024-09-23

## 2024-11-10 LAB
ALBUMIN SERPL ELPH-MCNC: 3 G/DL — LOW (ref 3.3–5.2)
ALBUMIN SERPL ELPH-MCNC: 3.3 G/DL — SIGNIFICANT CHANGE UP (ref 3.3–5.2)
ALP SERPL-CCNC: 116 U/L — SIGNIFICANT CHANGE UP (ref 40–120)
ALP SERPL-CCNC: 138 U/L — HIGH (ref 40–120)
ALT FLD-CCNC: 14 U/L — SIGNIFICANT CHANGE UP
ALT FLD-CCNC: 15 U/L — SIGNIFICANT CHANGE UP
ANION GAP SERPL CALC-SCNC: 13 MMOL/L — SIGNIFICANT CHANGE UP (ref 5–17)
ANION GAP SERPL CALC-SCNC: 16 MMOL/L — SIGNIFICANT CHANGE UP (ref 5–17)
ANION GAP SERPL CALC-SCNC: 16 MMOL/L — SIGNIFICANT CHANGE UP (ref 5–17)
AST SERPL-CCNC: 23 U/L — SIGNIFICANT CHANGE UP
AST SERPL-CCNC: 32 U/L — HIGH
BASOPHILS # BLD AUTO: 0.08 K/UL — SIGNIFICANT CHANGE UP (ref 0–0.2)
BASOPHILS NFR BLD AUTO: 1.1 % — SIGNIFICANT CHANGE UP (ref 0–2)
BILIRUB SERPL-MCNC: 0.2 MG/DL — LOW (ref 0.4–2)
BILIRUB SERPL-MCNC: 0.3 MG/DL — LOW (ref 0.4–2)
BUN SERPL-MCNC: 16.3 MG/DL — SIGNIFICANT CHANGE UP (ref 8–20)
BUN SERPL-MCNC: 38.2 MG/DL — HIGH (ref 8–20)
BUN SERPL-MCNC: 43.4 MG/DL — HIGH (ref 8–20)
CALCIUM SERPL-MCNC: 8.7 MG/DL — SIGNIFICANT CHANGE UP (ref 8.4–10.5)
CALCIUM SERPL-MCNC: 9.3 MG/DL — SIGNIFICANT CHANGE UP (ref 8.4–10.5)
CALCIUM SERPL-MCNC: 9.8 MG/DL — SIGNIFICANT CHANGE UP (ref 8.4–10.5)
CHLORIDE SERPL-SCNC: 92 MMOL/L — LOW (ref 96–108)
CHLORIDE SERPL-SCNC: 94 MMOL/L — LOW (ref 96–108)
CHLORIDE SERPL-SCNC: 97 MMOL/L — SIGNIFICANT CHANGE UP (ref 96–108)
CK SERPL-CCNC: 36 U/L — SIGNIFICANT CHANGE UP (ref 25–170)
CO2 SERPL-SCNC: 24 MMOL/L — SIGNIFICANT CHANGE UP (ref 22–29)
CO2 SERPL-SCNC: 26 MMOL/L — SIGNIFICANT CHANGE UP (ref 22–29)
CO2 SERPL-SCNC: 28 MMOL/L — SIGNIFICANT CHANGE UP (ref 22–29)
CREAT SERPL-MCNC: 4.09 MG/DL — HIGH (ref 0.5–1.3)
CREAT SERPL-MCNC: 7.21 MG/DL — HIGH (ref 0.5–1.3)
CREAT SERPL-MCNC: 8.44 MG/DL — HIGH (ref 0.5–1.3)
EGFR: 12 ML/MIN/1.73M2 — LOW
EGFR: 5 ML/MIN/1.73M2 — LOW
EGFR: 6 ML/MIN/1.73M2 — LOW
EOSINOPHIL # BLD AUTO: 0.29 K/UL — SIGNIFICANT CHANGE UP (ref 0–0.5)
EOSINOPHIL NFR BLD AUTO: 3.8 % — SIGNIFICANT CHANGE UP (ref 0–6)
GAS PNL BLDA: SIGNIFICANT CHANGE UP
GLUCOSE BLDC GLUCOMTR-MCNC: 123 MG/DL — HIGH (ref 70–99)
GLUCOSE BLDC GLUCOMTR-MCNC: 130 MG/DL — HIGH (ref 70–99)
GLUCOSE BLDC GLUCOMTR-MCNC: 141 MG/DL — HIGH (ref 70–99)
GLUCOSE BLDC GLUCOMTR-MCNC: 158 MG/DL — HIGH (ref 70–99)
GLUCOSE SERPL-MCNC: 113 MG/DL — HIGH (ref 70–99)
GLUCOSE SERPL-MCNC: 131 MG/DL — HIGH (ref 70–99)
GLUCOSE SERPL-MCNC: 171 MG/DL — HIGH (ref 70–99)
HCT VFR BLD CALC: 33.3 % — LOW (ref 34.5–45)
HGB BLD-MCNC: 10.8 G/DL — LOW (ref 11.5–15.5)
IMM GRANULOCYTES NFR BLD AUTO: 0.4 % — SIGNIFICANT CHANGE UP (ref 0–0.9)
LIDOCAIN IGE QN: 83 U/L — HIGH (ref 22–51)
LYMPHOCYTES # BLD AUTO: 1.74 K/UL — SIGNIFICANT CHANGE UP (ref 1–3.3)
LYMPHOCYTES # BLD AUTO: 23.1 % — SIGNIFICANT CHANGE UP (ref 13–44)
MAGNESIUM SERPL-MCNC: 2 MG/DL — SIGNIFICANT CHANGE UP (ref 1.6–2.6)
MCHC RBC-ENTMCNC: 26.8 PG — LOW (ref 27–34)
MCHC RBC-ENTMCNC: 32.4 G/DL — SIGNIFICANT CHANGE UP (ref 32–36)
MCV RBC AUTO: 82.6 FL — SIGNIFICANT CHANGE UP (ref 80–100)
MONOCYTES # BLD AUTO: 0.47 K/UL — SIGNIFICANT CHANGE UP (ref 0–0.9)
MONOCYTES NFR BLD AUTO: 6.2 % — SIGNIFICANT CHANGE UP (ref 2–14)
MRSA PCR RESULT.: SIGNIFICANT CHANGE UP
NEUTROPHILS # BLD AUTO: 4.93 K/UL — SIGNIFICANT CHANGE UP (ref 1.8–7.4)
NEUTROPHILS NFR BLD AUTO: 65.4 % — SIGNIFICANT CHANGE UP (ref 43–77)
PHOSPHATE SERPL-MCNC: 3.9 MG/DL — SIGNIFICANT CHANGE UP (ref 2.4–4.7)
PLATELET # BLD AUTO: 262 K/UL — SIGNIFICANT CHANGE UP (ref 150–400)
POTASSIUM SERPL-MCNC: 3.5 MMOL/L — SIGNIFICANT CHANGE UP (ref 3.5–5.3)
POTASSIUM SERPL-MCNC: 3.6 MMOL/L — SIGNIFICANT CHANGE UP (ref 3.5–5.3)
POTASSIUM SERPL-MCNC: 4.4 MMOL/L — SIGNIFICANT CHANGE UP (ref 3.5–5.3)
POTASSIUM SERPL-SCNC: 3.5 MMOL/L — SIGNIFICANT CHANGE UP (ref 3.5–5.3)
POTASSIUM SERPL-SCNC: 3.6 MMOL/L — SIGNIFICANT CHANGE UP (ref 3.5–5.3)
POTASSIUM SERPL-SCNC: 4.4 MMOL/L — SIGNIFICANT CHANGE UP (ref 3.5–5.3)
PROT SERPL-MCNC: 5.9 G/DL — LOW (ref 6.6–8.7)
PROT SERPL-MCNC: 6.4 G/DL — LOW (ref 6.6–8.7)
RAPID RVP RESULT: SIGNIFICANT CHANGE UP
RBC # BLD: 4.03 M/UL — SIGNIFICANT CHANGE UP (ref 3.8–5.2)
RBC # FLD: 16.9 % — HIGH (ref 10.3–14.5)
S AUREUS DNA NOSE QL NAA+PROBE: SIGNIFICANT CHANGE UP
SARS-COV-2 RNA SPEC QL NAA+PROBE: SIGNIFICANT CHANGE UP
SODIUM SERPL-SCNC: 131 MMOL/L — LOW (ref 135–145)
SODIUM SERPL-SCNC: 136 MMOL/L — SIGNIFICANT CHANGE UP (ref 135–145)
SODIUM SERPL-SCNC: 139 MMOL/L — SIGNIFICANT CHANGE UP (ref 135–145)
TROPONIN T, HIGH SENSITIVITY RESULT: 72 NG/L — HIGH (ref 0–51)
TROPONIN T, HIGH SENSITIVITY RESULT: 76 NG/L — HIGH (ref 0–51)
TROPONIN T, HIGH SENSITIVITY RESULT: 83 NG/L — HIGH (ref 0–51)
WBC # BLD: 7.54 K/UL — SIGNIFICANT CHANGE UP (ref 3.8–10.5)
WBC # FLD AUTO: 7.54 K/UL — SIGNIFICANT CHANGE UP (ref 3.8–10.5)

## 2024-11-10 PROCEDURE — 93010 ELECTROCARDIOGRAM REPORT: CPT

## 2024-11-10 PROCEDURE — 74174 CTA ABD&PLVS W/CONTRAST: CPT | Mod: 26,MC

## 2024-11-10 PROCEDURE — 93010 ELECTROCARDIOGRAM REPORT: CPT | Mod: 76

## 2024-11-10 PROCEDURE — 71045 X-RAY EXAM CHEST 1 VIEW: CPT | Mod: 26,77

## 2024-11-10 PROCEDURE — 71275 CT ANGIOGRAPHY CHEST: CPT | Mod: 26,MC

## 2024-11-10 PROCEDURE — 71045 X-RAY EXAM CHEST 1 VIEW: CPT | Mod: 26

## 2024-11-10 RX ORDER — TORSEMIDE 100 MG/1
20 TABLET ORAL DAILY
Refills: 0 | Status: DISCONTINUED | OUTPATIENT
Start: 2024-11-10 | End: 2024-11-13

## 2024-11-10 RX ORDER — PANTOPRAZOLE SODIUM 40 MG/1
40 TABLET, DELAYED RELEASE ORAL DAILY
Refills: 0 | Status: DISCONTINUED | OUTPATIENT
Start: 2024-11-10 | End: 2024-11-11

## 2024-11-10 RX ORDER — ACETAMINOPHEN 500 MG
1000 TABLET ORAL ONCE
Refills: 0 | Status: COMPLETED | OUTPATIENT
Start: 2024-11-10 | End: 2024-11-10

## 2024-11-10 RX ORDER — INFLUENZ VIR VAC TV P-SURF2003 15MCG/.5ML
0.5 SYRINGE (ML) INTRAMUSCULAR ONCE
Refills: 0 | Status: DISCONTINUED | OUTPATIENT
Start: 2024-11-10 | End: 2024-11-13

## 2024-11-10 RX ORDER — LORAZEPAM 2 MG
0.5 TABLET ORAL ONCE
Refills: 0 | Status: DISCONTINUED | OUTPATIENT
Start: 2024-11-10 | End: 2024-11-10

## 2024-11-10 RX ORDER — ONDANSETRON HYDROCHLORIDE 2 MG/ML
4 INJECTION, SOLUTION INTRAMUSCULAR; INTRAVENOUS ONCE
Refills: 0 | Status: DISCONTINUED | OUTPATIENT
Start: 2024-11-10 | End: 2024-11-10

## 2024-11-10 RX ORDER — MAGNESIUM, ALUMINUM HYDROXIDE 200-200 MG
30 TABLET,CHEWABLE ORAL ONCE
Refills: 0 | Status: COMPLETED | OUTPATIENT
Start: 2024-11-10 | End: 2024-11-10

## 2024-11-10 RX ORDER — GLUCAGON INJECTION, SOLUTION 1 MG/.2ML
1 INJECTION, SOLUTION SUBCUTANEOUS ONCE
Refills: 0 | Status: DISCONTINUED | OUTPATIENT
Start: 2024-11-10 | End: 2024-11-13

## 2024-11-10 RX ORDER — ONDANSETRON HYDROCHLORIDE 2 MG/ML
4 INJECTION, SOLUTION INTRAMUSCULAR; INTRAVENOUS ONCE
Refills: 0 | Status: COMPLETED | OUTPATIENT
Start: 2024-11-10 | End: 2024-11-10

## 2024-11-10 RX ORDER — LABETALOL HCL 200 MG
10 TABLET ORAL ONCE
Refills: 0 | Status: COMPLETED | OUTPATIENT
Start: 2024-11-10 | End: 2024-11-10

## 2024-11-10 RX ORDER — HYDROMORPHONE HCL/0.9% NACL/PF 6 MG/30 ML
0.5 PATIENT CONTROLLED ANALGESIA SYRINGE INTRAVENOUS ONCE
Refills: 0 | Status: DISCONTINUED | OUTPATIENT
Start: 2024-11-10 | End: 2024-11-10

## 2024-11-10 RX ORDER — HYDRALAZINE HYDROCHLORIDE 50 MG/1
100 TABLET, FILM COATED ORAL EVERY 8 HOURS
Refills: 0 | Status: DISCONTINUED | OUTPATIENT
Start: 2024-11-10 | End: 2024-11-13

## 2024-11-10 RX ORDER — ACETAMINOPHEN 500 MG
650 TABLET ORAL EVERY 6 HOURS
Refills: 0 | Status: DISCONTINUED | OUTPATIENT
Start: 2024-11-10 | End: 2024-11-13

## 2024-11-10 RX ORDER — INSULIN LISPRO 100/ML
VIAL (ML) SUBCUTANEOUS EVERY 6 HOURS
Refills: 0 | Status: DISCONTINUED | OUTPATIENT
Start: 2024-11-10 | End: 2024-11-10

## 2024-11-10 RX ORDER — LORAZEPAM 2 MG
1 TABLET ORAL ONCE
Refills: 0 | Status: DISCONTINUED | OUTPATIENT
Start: 2024-11-10 | End: 2024-11-10

## 2024-11-10 RX ORDER — HYDRALAZINE HYDROCHLORIDE 50 MG/1
10 TABLET, FILM COATED ORAL EVERY 4 HOURS
Refills: 0 | Status: DISCONTINUED | OUTPATIENT
Start: 2024-11-10 | End: 2024-11-13

## 2024-11-10 RX ORDER — NITROGLYCERIN 0.6MG/HR
20 PATCH, TRANSDERMAL 24 HOURS TRANSDERMAL
Qty: 50 | Refills: 0 | Status: DISCONTINUED | OUTPATIENT
Start: 2024-11-10 | End: 2024-11-11

## 2024-11-10 RX ORDER — CALCIUM ACETATE 667 MG/1
667 CAPSULE ORAL
Refills: 0 | Status: DISCONTINUED | OUTPATIENT
Start: 2024-11-10 | End: 2024-11-13

## 2024-11-10 RX ORDER — ISOSORBIDE MONONITRATE 60 MG/1
60 TABLET, EXTENDED RELEASE ORAL DAILY
Refills: 0 | Status: DISCONTINUED | OUTPATIENT
Start: 2024-11-10 | End: 2024-11-10

## 2024-11-10 RX ORDER — CLONIDINE HYDROCHLORIDE 0.2 MG/1
1 TABLET ORAL
Refills: 0 | Status: DISCONTINUED | OUTPATIENT
Start: 2024-11-10 | End: 2024-11-13

## 2024-11-10 RX ORDER — ISOSORBIDE MONONITRATE 60 MG/1
60 TABLET, EXTENDED RELEASE ORAL DAILY
Refills: 0 | Status: DISCONTINUED | OUTPATIENT
Start: 2024-11-10 | End: 2024-11-13

## 2024-11-10 RX ORDER — LABETALOL HCL 200 MG
600 TABLET ORAL THREE TIMES A DAY
Refills: 0 | Status: DISCONTINUED | OUTPATIENT
Start: 2024-11-10 | End: 2024-11-13

## 2024-11-10 RX ORDER — CHLORHEXIDINE GLUCONATE 40 MG/ML
1 SOLUTION TOPICAL
Refills: 0 | Status: DISCONTINUED | OUTPATIENT
Start: 2024-11-10 | End: 2024-11-11

## 2024-11-10 RX ORDER — HEPARIN SODIUM 10000 [USP'U]/ML
5000 INJECTION INTRAVENOUS; SUBCUTANEOUS EVERY 8 HOURS
Refills: 0 | Status: DISCONTINUED | OUTPATIENT
Start: 2024-11-10 | End: 2024-11-13

## 2024-11-10 RX ORDER — INSULIN LISPRO 100/ML
VIAL (ML) SUBCUTANEOUS
Refills: 0 | Status: DISCONTINUED | OUTPATIENT
Start: 2024-11-10 | End: 2024-11-13

## 2024-11-10 RX ADMIN — Medication 6 MICROGRAM(S)/MIN: at 05:38

## 2024-11-10 RX ADMIN — CALCIUM ACETATE 667 MILLIGRAM(S): 667 CAPSULE ORAL at 10:14

## 2024-11-10 RX ADMIN — CALCIUM ACETATE 667 MILLIGRAM(S): 667 CAPSULE ORAL at 18:07

## 2024-11-10 RX ADMIN — CLONIDINE HYDROCHLORIDE 1 PATCH: 0.2 TABLET ORAL at 10:09

## 2024-11-10 RX ADMIN — Medication 0.5 MILLIGRAM(S): at 14:00

## 2024-11-10 RX ADMIN — PANTOPRAZOLE SODIUM 40 MILLIGRAM(S): 40 TABLET, DELAYED RELEASE ORAL at 11:42

## 2024-11-10 RX ADMIN — Medication 6 MICROGRAM(S)/MIN: at 06:48

## 2024-11-10 RX ADMIN — Medication 0.5 MILLIGRAM(S): at 13:08

## 2024-11-10 RX ADMIN — Medication 10 MILLIGRAM(S): at 03:23

## 2024-11-10 RX ADMIN — Medication 0.5 MILLIGRAM(S): at 03:22

## 2024-11-10 RX ADMIN — CLONIDINE HYDROCHLORIDE 1 PATCH: 0.2 TABLET ORAL at 19:26

## 2024-11-10 RX ADMIN — ISOSORBIDE MONONITRATE 60 MILLIGRAM(S): 60 TABLET, EXTENDED RELEASE ORAL at 18:08

## 2024-11-10 RX ADMIN — HYDRALAZINE HYDROCHLORIDE 100 MILLIGRAM(S): 50 TABLET, FILM COATED ORAL at 10:08

## 2024-11-10 RX ADMIN — HEPARIN SODIUM 5000 UNIT(S): 10000 INJECTION INTRAVENOUS; SUBCUTANEOUS at 06:47

## 2024-11-10 RX ADMIN — Medication 600 MILLIGRAM(S): at 21:40

## 2024-11-10 RX ADMIN — Medication 6 MICROGRAM(S)/MIN: at 13:43

## 2024-11-10 RX ADMIN — TORSEMIDE 20 MILLIGRAM(S): 100 TABLET ORAL at 10:07

## 2024-11-10 RX ADMIN — HEPARIN SODIUM 5000 UNIT(S): 10000 INJECTION INTRAVENOUS; SUBCUTANEOUS at 13:23

## 2024-11-10 RX ADMIN — ONDANSETRON HYDROCHLORIDE 4 MILLIGRAM(S): 2 INJECTION, SOLUTION INTRAMUSCULAR; INTRAVENOUS at 13:23

## 2024-11-10 RX ADMIN — Medication 400 MILLIGRAM(S): at 03:11

## 2024-11-10 RX ADMIN — CHLORHEXIDINE GLUCONATE 1 APPLICATION(S): 40 SOLUTION TOPICAL at 06:47

## 2024-11-10 RX ADMIN — HYDRALAZINE HYDROCHLORIDE 100 MILLIGRAM(S): 50 TABLET, FILM COATED ORAL at 21:40

## 2024-11-10 RX ADMIN — Medication 1 MILLIGRAM(S): at 03:41

## 2024-11-10 RX ADMIN — Medication 600 MILLIGRAM(S): at 13:24

## 2024-11-10 RX ADMIN — Medication 2: at 06:47

## 2024-11-10 RX ADMIN — HEPARIN SODIUM 5000 UNIT(S): 10000 INJECTION INTRAVENOUS; SUBCUTANEOUS at 21:44

## 2024-11-10 NOTE — H&P ADULT - MENTAL STATUS
position. 5. Repeat with the other leg. Repeat 2 to 4 times with each leg. 6. To get more stretch, put your other leg flat on the floor while pulling your knee to your chest.    Curl-ups    1. Lie on the floor on your back with your knees bent at a 90-degree angle. Your feet should be flat on the floor, about 12 inches from your buttocks. 2. Cross your arms over your chest. If this bothers your neck, try putting your hands behind your neck (not your head), with your elbows spread apart. 3. Slowly tighten your belly muscles and raise your shoulder blades off the floor. 4. Keep your head in line with your body, and do not press your chin to your chest.  5. Hold this position for 1 or 2 seconds, then slowly lower yourself back down to the floor. 6. Repeat 8 to 12 times. Pelvic tilt exercise    1. Lie on your back with your knees bent. 2. \"Brace\" your stomach. This means to tighten your muscles by pulling in and imagining your belly button moving toward your spine. You should feel like your back is pressing to the floor and your hips and pelvis are rocking back. 3. Hold for about 6 seconds while you breathe smoothly. 4. Repeat 8 to 12 times. Heel dig bridging    1. Lie on your back with both knees bent and your ankles bent so that only your heels are digging into the floor. Your knees should be bent about 90 degrees. 2. Then push your heels into the floor, squeeze your buttocks, and lift your hips off the floor until your shoulders, hips, and knees are all in a straight line. 3. Hold for about 6 seconds as you continue to breathe normally, and then slowly lower your hips back down to the floor and rest for up to 10 seconds. 4. Do 8 to 12 repetitions. Hamstring stretch in doorway    1. Lie on your back in a doorway, with one leg through the open door. 2. Slide your leg up the wall to straighten your knee. You should feel a gentle stretch down the back of your leg.   3. Hold the stretch for at least
sedated, will arouse somewhat to tactile stimuli, nonverbal at this time, follows some very simple commands

## 2024-11-10 NOTE — H&P ADULT - HISTORY OF PRESENT ILLNESS
63 y/o F with a h/o ESRD on HD (right chest wall permacath, immature LUE AVF), HTN, HLD, DM2, pericardial effusion, presents to the ED with complaints of chest/back discomfort and dyspnea that first began about 3 days ago. This coincides with her eating Ooodles of Noodles. Last hemodialysis session was Friday. Her  reports compliance with medications. She was started on BiPAP for hypoxemia and respiratory distress. CT chest reveals pulmonary edema and bilateral pleural effusions. She was sedated with IV benzo due to severe anxiety. Severe refractory hypertension (SBP reported as high as 230).

## 2024-11-10 NOTE — PROGRESS NOTE ADULT - SUBJECTIVE AND OBJECTIVE BOX
Date of entry of this note is equal to the date of services rendered      BRIEF HOSPITAL COURSE: 63 y/o F with a h/o ESRD on HD (right chest wall permacath, immature LUE AVF), HTN, HLD, DM2, pericardial effusion, presents to the ED with complaints of chest/back discomfort and dyspnea that first began about 3 days ago. This coincides with her eating Ooodles of Noodles. Last hemodialysis session was Friday. Her  reports compliance with medications. She was started on BiPAP for hypoxemia and respiratory distress. CT chest reveals pulmonary edema and bilateral pleural effusions. She was sedated with IV benzo due to severe anxiety. Severe refractory hypertension (SBP reported as high as 230).    Events last 24 hours: Nitro gtt up- titrated. BiPAP titrated from 60% to 40%    PAST MEDICAL & SURGICAL HISTORY:  HTN (hypertension)      DM (diabetes mellitus)      HLD (hyperlipidemia)      Overactive thyroid gland      ESRD on dialysis  dialyssis M/W/F      S/P  section      S/P arteriovenous (AV) fistula creation  fistula is still maturing (fistula not available for dialysis use yet)          Review of Systems:  Unable to obtain      Medications:    cloNIDine Patch 0.3 mG/24Hr(s) 1 patch Transdermal every 7 days  hydrALAZINE 100 milliGRAM(s) Oral every 8 hours  labetalol 600 milliGRAM(s) Oral three times a day  nitroglycerin  Infusion 20 MICROgram(s)/Min IV Continuous <Continuous>  torsemide 20 milliGRAM(s) Oral daily          heparin   Injectable 5000 Unit(s) SubCutaneous every 8 hours    pantoprazole  Injectable 40 milliGRAM(s) IV Push daily      dextrose 50% Injectable 25 Gram(s) IV Push once  dextrose 50% Injectable 12.5 Gram(s) IV Push once  dextrose 50% Injectable 25 Gram(s) IV Push once  dextrose Oral Gel 15 Gram(s) Oral once PRN  glucagon  Injectable 1 milliGRAM(s) IntraMuscular once  insulin lispro (ADMELOG) corrective regimen sliding scale   SubCutaneous every 6 hours    calcium acetate 667 milliGRAM(s) Oral three times a day with meals  dextrose 5%. 1000 milliLiter(s) IV Continuous <Continuous>  dextrose 5%. 1000 milliLiter(s) IV Continuous <Continuous>    influenza   Vaccine 0.5 milliLiter(s) IntraMuscular once    chlorhexidine 2% Cloths 1 Application(s) Topical <User Schedule>            ICU Vital Signs Last 24 Hrs  T(C): 36.8 (2024 23:42), Max: 36.8 (2024 23:42)  T(F): 98.3 (2024 23:42), Max: 98.3 (2024 23:42)  HR: 85 (10 Nov 2024 09:45) (82 - 90)  BP: 168/88 (10 Nov 2024 09:45) (127/96 - 224/93)  BP(mean): 112 (10 Nov 2024 09:45) (110 - 131)  ABP: --  ABP(mean): --  RR: 19 (10 Nov 2024 09:45) (15 - 36)  SpO2: 96% (10 Nov 2024 09:45) (67% - 100%)    O2 Parameters below as of 10 Nov 2024 06:00  Patient On (Oxygen Delivery Method): BiPAP/CPAP            ABG - ( 10 Nov 2024 04:22 )  pH, Arterial: 7.400 pH, Blood: x     /  pCO2: 49    /  pO2: 121   / HCO3: 30    / Base Excess: 5.6   /  SaO2: 99.2                I&O's Detail    10 Nov 2024 07:01  -  10 Nov 2024 10:02  --------------------------------------------------------  IN:    Nitroglycerin: 91.5 mL  Total IN: 91.5 mL    OUT:  Total OUT: 0 mL    Total NET: 91.5 mL            LABS:                        10.8   7.54  )-----------( 262      ( 10 Nov 2024 02:20 )             33.3     1110    139  |  97  |  38.2[H]  ----------------------------<  171[H]  3.5   |  26.0  |  7.21[H]    Ca    9.8      10 Nov 2024 02:20    TPro  6.4[L]  /  Alb  3.3  /  TBili  0.3[L]  /  DBili  x   /  AST  23  /  ALT  15  /  AlkPhos  138[H]  11-10          CAPILLARY BLOOD GLUCOSE      POCT Blood Glucose.: 158 mg/dL (10 Nov 2024 06:22)      Urinalysis Basic - ( 10 Nov 2024 02:20 )    Color: x / Appearance: x / SG: x / pH: x  Gluc: 171 mg/dL / Ketone: x  / Bili: x / Urobili: x   Blood: x / Protein: x / Nitrite: x   Leuk Esterase: x / RBC: x / WBC x   Sq Epi: x / Non Sq Epi: x / Bacteria: x      CULTURES:  Rapid RVP Result: NotDetec (11-10 @ 05:40)      Physical Examination:    General: respiratory distress on bipap     HEENT: Pupils equal, reactive to light.  Symmetric.    PULM: Clear to auscultation bilaterally, distant breath sounds at bases    CVS: Regular rate and rhythm    ABD: Soft, nondistended, nontender    SKIN: Warm and well perfused, no rashes noted.    NEURO: Alert, oriented, interactive, nonfocal

## 2024-11-10 NOTE — PROGRESS NOTE ADULT - NS PANP COMMENT GEN_ALL_CORE FT
64F PMH ESRD on HD via R chest wall Permacath (recent LUE AVF placed 10/2024) who presented 11/10/24 with SOB, chest discomfort. Last HD Friday 11/8/24. She was found in ED with flash pulmonary edema requiring NIPPV and placed on nitro gtt. Nephrology consulted, and plan for emergent HD today and again tomorrow 11/11/24. She was weaned off BPAP prior to initiation of HD, breathing comfortably on NC. Will wean off nitro gtt after HD. DVT PPx. C/w care in ICU.      Mark Foster MD, [facility]  , Pulmonary & Critical Care Medicine  [facility]  Pulmonary and Sleep Medicine at [place]  [address]  [place] [address]  T: [phone]  F: [phone]

## 2024-11-10 NOTE — PROGRESS NOTE ADULT - ASSESSMENT
63 y/o F with a h/o ESRD on HD (right chest wall permacath, immature LUE AVF), HTN, HLD, DM2, pericardial effusion, with:    # Acute hypoxemic respiratory failure  # Acute pulmonary edema  # Bilateral pleural effusions  # Hypertensive emergency    Plan    Neuro: Mentating well   Pulm: On BiPAP 12/6 on 60%, down titrated to 40%. Re- eval showing improved respiratory status. Will transition off BiPAP to NC.   CV: HTN emergency started on Nitro gtt. Actively increasing to SBP of 140-160. Will start home PO regimen STAT to aid in weening off IV anti- hypertensive  Renal: Nephrologist on call contacted. Will dialyze this afternoon.   GI: Renal diet. Stop IV PPI  ID: Monitor off IV abx  Heme: SQH for dvt ppx    Discussed with Dr. Foster    ___44 (110 minutes total of critical care time)_ minutes of critical care time spent providing medical care for patient's acute illness/conditions that impairs at least one vital organ system and/or poses a high risk of imminent or life threatening deterioration in the patient's condition. It includes time spent evaluating and treating the patient's acute illness as well as time spent reviewing labs, radiology, discussing goals of care with patient and/or patient's family, and discussing the case with a multidisciplinary team in an effort to prevent further life threatening deterioration or end organ damage. This time is independent of any procedures performed.

## 2024-11-10 NOTE — ED PROVIDER NOTE - PROGRESS NOTE DETAILS
Neno BRYAN: Given the significant and immediate threats to this patient based on initial presentation, the benefits of emergency contrast-enhanced CT imaging without obtaining GFR/creatinine serum level results greatly outweigh the potential risk of harm due to contrast-induced nephropathy.

## 2024-11-10 NOTE — ED PROVIDER NOTE - ATTENDING CONTRIBUTION TO CARE
Dr. Fong: I personally saw the patient with the resident and performed a substantive portion of the visit. I performed a face to face bedside interview with patient regarding history of present illness, review of symptoms and past medical history. I completed an independent physical exam and all aspects of medical decision making. I have discussed patient's plan of care with resident. I agree with note as stated above, having amended the EMR as needed to reflect my findings. This includes HISTORY OF PRESENT ILLNESS, HIV, PAST MEDICAL/SURGICAL/FAMILY/SOCIAL HISTORY, ALLERGIES AND HOME MEDICATIONS, ROS, PHYSICAL EXAM, MEDICAL DECISION MAKING and any PROGRESS NOTES during the time I functioned as the attending physician for this patient.  SEE BELOW  Patient was critically ill with a high probability of imminent or life threatening deterioration.  I have performed direct patient care (not related to procedure), additional history taking, interpretation of diagnostic studies, documentation, consultation with other physicians, telephone consultation with the patient's family.   I have personally and independently provided the amount of critical care time documented below excluding time spent on separate procedures.  critical care time: 90 mins  ---------  Karon Fong MD, Attending  65 y/o F with a h/o ESRD on HD (right chest wall permacath, immature LUE AVF), HTN, HLD, DM2, pericardial effusion, presents to the ED with complaints of right chest pain radiating to the back and right flank that began suddenly earlier today. Last full session of hemodialysis session was Friday. Her  reports compliance with medications. Pt hypertensive, tachycardic, in distress in the ED.     Karon Fong MD Attending  GEN: Patient awake, mild distress from pain.   HEENT: normocephalic, atraumatic, EOMI, no scleral icterus  CARDIAC: + tachycardic   PULM: + diminished breath sounds b/l no wheeze, + tachypneic, increased wob   ABD: soft NT, ND, no rebound no guarding  MSK: Moving all extremities  NEURO: A&Ox3, no focal neurological deficits, CN 2-12 grossly intact  SKIN: warm, dry, no rash.    ddx includes, but is not limited to the following: PE, aortic dissection, pleural effusion, pulmonary edema, pericardial effusion.  plan: priority CTA, screening labs. analgesia, low threshold for nitro infusion, BIPAP, micu.   update: pt tachycardia but afebrile without leukocytosis, large pleural effusion and pericardial effusion on CT are the more likley cause of on pt's tachycardia. Pt not septic at this time. Pt placed on BIPAP due to increase wob and CT findings. MICU consulted.            General: Awake, alert, sitting up in bed uncomfortable, in pain, respiratory distress  HEENT: Normocephalic, atraumatic. No scleral icterus or conjunctival injection. EOMI. Moist mucous membranes. Oropharynx clear.   Neck:. Soft and supple.  Cardiac: RRR, Peripheral pulses 2+ and symmetric. No LE edema.  Resp: Lungs CTAB. +accessory muscle use  Abd: Soft, non-tender, non-distended. No guarding, rebound, or rigidity.  Back: Spine midline and non-tender.   Skin: No rashes, abrasions, or lacerations. right chest wall permacath, immature LUE AVF  Neuro: AO x 4. Moves all extremities symmetrically. Motor strength and sensation grossly intact.    Priority CT called for patient's worsening symptoms on multiple reassessments and worsening distress. After CT scans, brought to  for BIPAP.    She was started on BiPAP for hypoxemia and respiratory distress. CT chest reveals pulmonary edema and bilateral pleural effusions. She was sedated with IV benzo due to severe anxiety. Severe refractory hypertension (SBP reported as high as 230).

## 2024-11-10 NOTE — PATIENT PROFILE ADULT - FALL HARM RISK - RISK INTERVENTIONS

## 2024-11-10 NOTE — CONSULT NOTE ADULT - ASSESSMENT
63 y/o F with a h/o ESRD on HD (right chest wall permacath, immature LUE AVF), HTN, HLD, DM2, pericardial effusion, presents to the ED with complaints of chest/back discomfort and dyspnea     ESRD on HD typically MWF  Now in ICU w resp failure/ acute pulmonary edema/ B/L pleural effusions  Pt goes to [facility] in [place] was last there Fri she tells me- > requires urgent HD today  Will arrange HD again tomorrow  Consent in chart    Was requiring  BiPAP for hypoxemia and respiratory distress--> now off Bipap but still has CESAR    Severe refractory HTN on Nitro ggt--> some improvement will cont to adjust meds after UF removal w HD    Discussed w ICU team, will follow

## 2024-11-10 NOTE — CONSULT NOTE ADULT - SUBJECTIVE AND OBJECTIVE BOX
HPI:  63 y/o F with a h/o ESRD on HD (right chest wall permacath, immature LUE AVF), HTN, HLD, DM2, pericardial effusion, presents to the ED with complaints of chest/back discomfort and dyspnea that first began about 3 days ago. This coincides with her eating Ooodles of Noodles. Last hemodialysis session was Friday. Her  reports compliance with medications. She was started on BiPAP for hypoxemia and respiratory distress. CT chest reveals pulmonary edema and bilateral pleural effusions. She was sedated with IV benzo due to severe anxiety. Severe refractory hypertension (SBP reported as high as 230). On my exam BP better but still high feels weak + "on and off" CP + CESAR denies HA N/V/D    PAST MEDICAL & SURGICAL HISTORY:  HTN (hypertension)      DM (diabetes mellitus)      HLD (hyperlipidemia)      Overactive thyroid gland      ESRD on dialysis  dialyssis M/W/F      S/P  section      S/P arteriovenous (AV) fistula creation  fistula is still maturing (fistula not available for dialysis use yet)    FAMILY HISTORY:  Family history of breast cancer in female (Aunt)    Family history of prostate cancer (Uncle)    Family history of hypertension (Father, Mother)    NC    Social History:Non smoker    MEDICATIONS  (STANDING):  calcium acetate 667 milliGRAM(s) Oral three times a day with meals  chlorhexidine 2% Cloths 1 Application(s) Topical <User Schedule>  cloNIDine Patch 0.3 mG/24Hr(s) 1 patch Transdermal every 7 days  dextrose 5%. 1000 milliLiter(s) (50 mL/Hr) IV Continuous <Continuous>  dextrose 5%. 1000 milliLiter(s) (100 mL/Hr) IV Continuous <Continuous>  dextrose 50% Injectable 25 Gram(s) IV Push once  dextrose 50% Injectable 25 Gram(s) IV Push once  dextrose 50% Injectable 12.5 Gram(s) IV Push once  glucagon  Injectable 1 milliGRAM(s) IntraMuscular once  heparin   Injectable 5000 Unit(s) SubCutaneous every 8 hours  hydrALAZINE 100 milliGRAM(s) Oral every 8 hours  influenza   Vaccine 0.5 milliLiter(s) IntraMuscular once  insulin lispro (ADMELOG) corrective regimen sliding scale   SubCutaneous every 6 hours  labetalol 600 milliGRAM(s) Oral three times a day  nitroglycerin  Infusion 20 MICROgram(s)/Min (6 mL/Hr) IV Continuous <Continuous>  pantoprazole  Injectable 40 milliGRAM(s) IV Push daily  torsemide 20 milliGRAM(s) Oral daily    MEDICATIONS  (PRN):  dextrose Oral Gel 15 Gram(s) Oral once PRN Blood Glucose LESS THAN 70 milliGRAM(s)/deciliter   Meds reviewed    Vital Signs Last 24 Hrs  T(C): 36.8 (2024 23:42), Max: 36.8 (2024 23:42)  T(F): 98.3 (2024 23:42), Max: 98.3 (2024 23:42)  HR: 88 (10 Nov 2024 10:17) (82 - 90)  BP: 171/126 (10 Nov 2024 10:17) (127/96 - 224/93)  BP(mean): 137 (10 Nov 2024 10:17) (110 - 137)  RR: 24 (10 Nov 2024 10:17) (15 - 36)  SpO2: 97% (10 Nov 2024 10:17) (67% - 100%)    Parameters below as of 10 Nov 2024 06:00  Patient On (Oxygen Delivery Method): BiPAP/CPAP      Daily Height in cm: 144.78 (10 Nov 2024 06:00)    Daily     PHYSICAL EXAM:    GENERAL: appears chronically ill, NAD  HEAD:  Atraumatic, Normocephalic  EYES: EOMI  NECK: Supple  NERVOUS SYSTEM:  Alert & Oriented X3  CHEST/LUNG: dec bs bilaterally  HEART: Regular rate and rhythm  ABDOMEN: Soft, Nontender, Nondistended; +BS  EXTREMITIES:  trace  edema, LUE AVF not ready for use maturing      LABS:                        10.8   7.54  )-----------( 262      ( 10 Nov 2024 02:20 )             33.3     11-10    139  |  97  |  38.2[H]  ----------------------------<  171[H]  3.5   |  26.0  |  7.21[H]    Ca    9.8      10 Nov 2024 02:20    TPro  6.4[L]  /  Alb  3.3  /  TBili  0.3[L]  /  DBili  x   /  AST  23  /  ALT  15  /  AlkPhos  138[H]  11-10      Urinalysis Basic - ( 10 Nov 2024 02:20 )    Color: x / Appearance: x / SG: x / pH: x  Gluc: 171 mg/dL / Ketone: x  / Bili: x / Urobili: x   Blood: x / Protein: x / Nitrite: x   Leuk Esterase: x / RBC: x / WBC x   Sq Epi: x / Non Sq Epi: x / Bacteria: x        ABG - ( 10 Nov 2024 04:22 )  pH, Arterial: 7.400 pH, Blood: x     /  pCO2: 49    /  pO2: 121   / HCO3: 30    / Base Excess: 5.6   /  SaO2: 99.2                  RADIOLOGY & ADDITIONAL TESTS:

## 2024-11-10 NOTE — ED PROVIDER NOTE - CLINICAL SUMMARY MEDICAL DECISION MAKING FREE TEXT BOX
63 y/o F with a h/o ESRD on HD (right chest wall permacath, immature LUE AVF), HTN, HLD, DM2, pericardial effusion, presents to the ED with complaints of chest pain radiating to the back and right flank that began suddenly earlier today. BPs >200/100. Last hemodialysis session was Friday. Her  reports compliance with medications.     General: Awake, alert, sitting up in bed uncomfortable, in pain, respiratory distress  HEENT: Normocephalic, atraumatic. No scleral icterus or conjunctival injection. EOMI. Moist mucous membranes. Oropharynx clear.   Neck:. Soft and supple.  Cardiac: RRR, Peripheral pulses 2+ and symmetric. No LE edema.  Resp: Lungs CTAB. +accessory muscle use  Abd: Soft, non-tender, non-distended. No guarding, rebound, or rigidity.  Back: Spine midline and non-tender.   Skin: No rashes, abrasions, or lacerations. right chest wall permacath, immature LUE AVF  Neuro: AO x 4. Moves all extremities symmetrically. Motor strength and sensation grossly intact.    Priority CT called for patient's worsening symptoms on multiple reassessments and worsening distress. After CT scans, brought to CC for BIPAP.    She was started on BiPAP for hypoxemia and respiratory distress. CT chest reveals pulmonary edema and bilateral pleural effusions. She was sedated with IV benzo due to severe anxiety. Severe refractory hypertension (SBP reported as high as 230).

## 2024-11-10 NOTE — ED ADULT NURSE REASSESSMENT NOTE - NS ED NURSE REASSESS COMMENT FT1
plan of care assumed @ 0330 from robina turner RN. upon RN assessment, pt sitting in tripod position, diaphoretic, hypertensive, and diaphoretic. transported to critical care area. pt began to take nasal canula off states "it too hot I have to take it off". during event, oxygen dropped to 67%. abdoulaye BRYAN called to bedside. ativan given as per EMR, and RN able to redirect pt. RT connect pt to bipap and respiratory distress improved. pending MICU consult.  nile @ bedside updated on plan of care.
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
## 2024-11-10 NOTE — ED ADULT NURSE REASSESSMENT NOTE - NSFALLRISKINTERV_ED_ALL_ED
Communicate fall risk and risk factors to all staff, patient, and family/Provide visual cue: yellow wristband, yellow gown, etc/Reinforce activity limits and safety measures with patient and family/Use of alarms - bed, stretcher, chair and/or video monitoring/Call bell, personal items and telephone in reach/Instruct patient to call for assistance before getting out of bed/chair/stretcher/Non-slip footwear applied when patient is off stretcher/[place] to call system/Physically safe environment - no spills, clutter or unnecessary equipment/Purposeful Proactive Rounding/Room/bathroom lighting operational, light cord in reach

## 2024-11-10 NOTE — ED ADULT NURSE REASSESSMENT NOTE - NEURO ASSESSMENT
- - - [place] Vanhooser  [address] [address]      2017      : 2000    Dear Ms. Petersen:    We have been trying to reach you without success.  Please call the office at [phone].    Thank you for your timely response.    Sincerely,         [facility]

## 2024-11-10 NOTE — ED PROVIDER NOTE - CARE PLAN
1 Principal Discharge DX:	Acute hypoxic respiratory failure   Principal Discharge DX:	Acute hypoxic respiratory failure  Secondary Diagnosis:	Pleural effusion  Secondary Diagnosis:	Hypertensive emergency

## 2024-11-10 NOTE — ED ADULT NURSE NOTE - OBJECTIVE STATEMENT
Patient presents with c/o chest pain since earlier in day.  Pt is A&Ox4, ESRD on H/D MWF, no missed sessions, compliant with medication per pt.  Pt states A/V fistula placed on 10/28 to LUE and has not felt the same since.  Bruit/trill present, no drainage/redness noted.  Pt placed on cardiac monitor/.  Negative JVD/diaphoresis, respirations even and unlabored.

## 2024-11-10 NOTE — ED CLERICAL - DIVISION
Pt presents to ED complaining of RLQ abdominal pain that has been worsening over the course of 2 days. Pt denies fever, nausea, vomiting, or any other sx at this time. Pt uses OC and does not think she is pregnant. Upon exam, there is RLQ abdominal tenderness. No guarding, rebound, or mass. Good BS. WBC is 11.10. We will review CT abd/pelvis.     I supervised care provided by the midlevel provider.    We have discussed this patient's history, physical exam, and treatment plan.   I have reviewed the note and personally saw and examined the patient and agree with the plan of care.    Documentation assistance provided by rocio Pitts for Live Diego.  Information recorded by the scribe was done at my direction and has been verified and validated by me.       Karla Pitts  10/27/17 1814       Live Diego MD  10/27/17 1815     [facility]
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
## 2024-11-10 NOTE — PATIENT PROFILE ADULT - FUNCTIONAL ASSESSMENT - BASIC MOBILITY 6.
3-calculated by average/Not able to assess (calculate score using [facility] averaging method)

## 2024-11-11 ENCOUNTER — RESULT REVIEW (OUTPATIENT)
Age: 64
End: 2024-11-11

## 2024-11-11 LAB
A1C WITH ESTIMATED AVERAGE GLUCOSE RESULT: 4.9 % — SIGNIFICANT CHANGE UP (ref 4–5.6)
ALBUMIN SERPL ELPH-MCNC: 3.1 G/DL — LOW (ref 3.3–5.2)
ALP SERPL-CCNC: 108 U/L — SIGNIFICANT CHANGE UP (ref 40–120)
ALT FLD-CCNC: 13 U/L — SIGNIFICANT CHANGE UP
ANION GAP SERPL CALC-SCNC: 14 MMOL/L — SIGNIFICANT CHANGE UP (ref 5–17)
AST SERPL-CCNC: 19 U/L — SIGNIFICANT CHANGE UP
BASOPHILS # BLD AUTO: 0.05 K/UL — SIGNIFICANT CHANGE UP (ref 0–0.2)
BASOPHILS NFR BLD AUTO: 0.7 % — SIGNIFICANT CHANGE UP (ref 0–2)
BILIRUB SERPL-MCNC: 0.2 MG/DL — LOW (ref 0.4–2)
BUN SERPL-MCNC: 18 MG/DL — SIGNIFICANT CHANGE UP (ref 8–20)
CALCIUM SERPL-MCNC: 8.9 MG/DL — SIGNIFICANT CHANGE UP (ref 8.4–10.5)
CHLORIDE SERPL-SCNC: 96 MMOL/L — SIGNIFICANT CHANGE UP (ref 96–108)
CO2 SERPL-SCNC: 25 MMOL/L — SIGNIFICANT CHANGE UP (ref 22–29)
CREAT SERPL-MCNC: 4.78 MG/DL — HIGH (ref 0.5–1.3)
EGFR: 10 ML/MIN/1.73M2 — LOW
EOSINOPHIL # BLD AUTO: 0.32 K/UL — SIGNIFICANT CHANGE UP (ref 0–0.5)
EOSINOPHIL NFR BLD AUTO: 4.2 % — SIGNIFICANT CHANGE UP (ref 0–6)
ESTIMATED AVERAGE GLUCOSE: 94 MG/DL — SIGNIFICANT CHANGE UP (ref 68–114)
GLUCOSE BLDC GLUCOMTR-MCNC: 105 MG/DL — HIGH (ref 70–99)
GLUCOSE BLDC GLUCOMTR-MCNC: 114 MG/DL — HIGH (ref 70–99)
GLUCOSE BLDC GLUCOMTR-MCNC: 116 MG/DL — HIGH (ref 70–99)
GLUCOSE SERPL-MCNC: 112 MG/DL — HIGH (ref 70–99)
HCT VFR BLD CALC: 28.9 % — LOW (ref 34.5–45)
HGB BLD-MCNC: 9 G/DL — LOW (ref 11.5–15.5)
IMM GRANULOCYTES NFR BLD AUTO: 0.3 % — SIGNIFICANT CHANGE UP (ref 0–0.9)
LYMPHOCYTES # BLD AUTO: 1.72 K/UL — SIGNIFICANT CHANGE UP (ref 1–3.3)
LYMPHOCYTES # BLD AUTO: 22.5 % — SIGNIFICANT CHANGE UP (ref 13–44)
MAGNESIUM SERPL-MCNC: 2 MG/DL — SIGNIFICANT CHANGE UP (ref 1.6–2.6)
MCHC RBC-ENTMCNC: 26.2 PG — LOW (ref 27–34)
MCHC RBC-ENTMCNC: 31.1 G/DL — LOW (ref 32–36)
MCV RBC AUTO: 84.3 FL — SIGNIFICANT CHANGE UP (ref 80–100)
MONOCYTES # BLD AUTO: 0.55 K/UL — SIGNIFICANT CHANGE UP (ref 0–0.9)
MONOCYTES NFR BLD AUTO: 7.2 % — SIGNIFICANT CHANGE UP (ref 2–14)
NEUTROPHILS # BLD AUTO: 4.97 K/UL — SIGNIFICANT CHANGE UP (ref 1.8–7.4)
NEUTROPHILS NFR BLD AUTO: 65.1 % — SIGNIFICANT CHANGE UP (ref 43–77)
PHOSPHATE SERPL-MCNC: 4.3 MG/DL — SIGNIFICANT CHANGE UP (ref 2.4–4.7)
PLATELET # BLD AUTO: 253 K/UL — SIGNIFICANT CHANGE UP (ref 150–400)
POTASSIUM SERPL-MCNC: 4.4 MMOL/L — SIGNIFICANT CHANGE UP (ref 3.5–5.3)
POTASSIUM SERPL-SCNC: 4.4 MMOL/L — SIGNIFICANT CHANGE UP (ref 3.5–5.3)
PROT SERPL-MCNC: 5.6 G/DL — LOW (ref 6.6–8.7)
RBC # BLD: 3.43 M/UL — LOW (ref 3.8–5.2)
RBC # FLD: 17.2 % — HIGH (ref 10.3–14.5)
SODIUM SERPL-SCNC: 135 MMOL/L — SIGNIFICANT CHANGE UP (ref 135–145)
WBC # BLD: 7.63 K/UL — SIGNIFICANT CHANGE UP (ref 3.8–10.5)
WBC # FLD AUTO: 7.63 K/UL — SIGNIFICANT CHANGE UP (ref 3.8–10.5)

## 2024-11-11 PROCEDURE — 93306 TTE W/DOPPLER COMPLETE: CPT | Mod: 26

## 2024-11-11 PROCEDURE — 99233 SBSQ HOSP IP/OBS HIGH 50: CPT | Mod: GC

## 2024-11-11 PROCEDURE — 99223 1ST HOSP IP/OBS HIGH 75: CPT | Mod: GC

## 2024-11-11 RX ORDER — ACETAMINOPHEN 500 MG
700 TABLET ORAL ONCE
Refills: 0 | Status: COMPLETED | OUTPATIENT
Start: 2024-11-11 | End: 2024-11-11

## 2024-11-11 RX ORDER — LACTULOSE 10 G/15 ML
10 SOLUTION, ORAL ORAL DAILY
Refills: 0 | Status: DISCONTINUED | OUTPATIENT
Start: 2024-11-11 | End: 2024-11-13

## 2024-11-11 RX ORDER — PANTOPRAZOLE SODIUM 40 MG/1
40 TABLET, DELAYED RELEASE ORAL
Refills: 0 | Status: DISCONTINUED | OUTPATIENT
Start: 2024-11-11 | End: 2024-11-13

## 2024-11-11 RX ADMIN — Medication 600 MILLIGRAM(S): at 21:25

## 2024-11-11 RX ADMIN — PANTOPRAZOLE SODIUM 40 MILLIGRAM(S): 40 TABLET, DELAYED RELEASE ORAL at 12:32

## 2024-11-11 RX ADMIN — CHLORHEXIDINE GLUCONATE 1 APPLICATION(S): 40 SOLUTION TOPICAL at 05:34

## 2024-11-11 RX ADMIN — CALCIUM ACETATE 667 MILLIGRAM(S): 667 CAPSULE ORAL at 12:31

## 2024-11-11 RX ADMIN — CALCIUM ACETATE 667 MILLIGRAM(S): 667 CAPSULE ORAL at 10:33

## 2024-11-11 RX ADMIN — HYDRALAZINE HYDROCHLORIDE 100 MILLIGRAM(S): 50 TABLET, FILM COATED ORAL at 21:24

## 2024-11-11 RX ADMIN — Medication 6 MICROGRAM(S)/MIN: at 05:31

## 2024-11-11 RX ADMIN — Medication 600 MILLIGRAM(S): at 14:27

## 2024-11-11 RX ADMIN — CLONIDINE HYDROCHLORIDE 1 PATCH: 0.2 TABLET ORAL at 08:19

## 2024-11-11 RX ADMIN — Medication 280 MILLIGRAM(S): at 16:04

## 2024-11-11 RX ADMIN — TORSEMIDE 20 MILLIGRAM(S): 100 TABLET ORAL at 05:31

## 2024-11-11 RX ADMIN — HEPARIN SODIUM 5000 UNIT(S): 10000 INJECTION INTRAVENOUS; SUBCUTANEOUS at 14:28

## 2024-11-11 RX ADMIN — CALCIUM ACETATE 667 MILLIGRAM(S): 667 CAPSULE ORAL at 17:46

## 2024-11-11 RX ADMIN — Medication 700 MILLIGRAM(S): at 00:00

## 2024-11-11 RX ADMIN — HYDRALAZINE HYDROCHLORIDE 100 MILLIGRAM(S): 50 TABLET, FILM COATED ORAL at 14:28

## 2024-11-11 RX ADMIN — HEPARIN SODIUM 5000 UNIT(S): 10000 INJECTION INTRAVENOUS; SUBCUTANEOUS at 05:31

## 2024-11-11 RX ADMIN — Medication 600 MILLIGRAM(S): at 05:31

## 2024-11-11 RX ADMIN — ISOSORBIDE MONONITRATE 60 MILLIGRAM(S): 60 TABLET, EXTENDED RELEASE ORAL at 12:31

## 2024-11-11 RX ADMIN — HYDRALAZINE HYDROCHLORIDE 100 MILLIGRAM(S): 50 TABLET, FILM COATED ORAL at 05:31

## 2024-11-11 RX ADMIN — CLONIDINE HYDROCHLORIDE 1 PATCH: 0.2 TABLET ORAL at 18:19

## 2024-11-11 NOTE — PROGRESS NOTE ADULT - ASSESSMENT
64F with PMHx of ESRD on HD (R permecath and immature LUE AVF), HTN, HLD, DM2, presented with chest and back discomfort and dyspnea. As per , pt is compliant with medications. CT chest noted pulmonary edema and B/L pleural effusions. Found to be hypoxemic with respiratory distress and started on BiPAP, now weaned off to 3L NC. Also found to be hypertensive emergency s/p nitro gtt. Now stable for downgrade to medicine.     PLAN  Acute hypoxemic respiratory failure 2/2 to B/L pleural effusions and flash pulmonary edema   - s/p BiPAP, now on 3L NC  - CT chest/A.P noted Mod pericardial effusion, small to mod B/L pleural effusions and pulm edema. Large rectal stool burden.   - c/w torsemide 20mg QD  - RVP, COVID-19 PCR negative  - TTE pending    Hypertensive emergency/HTN  - s/p Nitro gtt  - c/w Clonidine patch 0.3  - c/w Hydralazine 100mg po TID w/ 10mg IVP Q4 PRN  - c/w Imdur 60mg QD  - c/w Labetalol 600mg TID  - Renal diet    ESRD on HD  - Access R permecath and immature LUE AVF  - Outpatient schedule: [facility]  - HD center: [facility]   - Last HD Yesterday and plan for HD today (11/11/24)  - c/w Phoslo 667mg TID  - Nephrology following     Anemia of chronic disease  - Baseline Hbg 10-11  - H/H stable     HLD    DM2  - A1C 11/11/24: 4.9  - Held Januvia 25mg QD while inpatient  - ISS + Accucheck    DVT ppx: Heparin SQ  Diet: Renal diet  Dispo:   64F with former smoker, PMHx of ESRD on HD (R permecath and immature LUE AVF), HTN, HLD, DM2, GERD, presented with chest and back discomfort and dyspnea. CT chest noted pulmonary edema and B/L pleural effusions. Found to be hypoxemic with respiratory distress and started on BiPAP, now weaned off to 3L NC. Also found to be hypertensive emergency s/p nitro gtt. Now stable for downgrade to medicine.     PLAN  Acute hypoxemic respiratory failure 2/2 to B/L pleural effusions and flash pulmonary edema, improving   - s/p BiPAP, now on 3L NC  - On 2L NC home supplemental O2 PRN since last addmission  - CT chest/A.P noted Mod pericardial effusion, small to mod B/L pleural effusions and pulm edema  - c/w torsemide 20mg QD  - RVP, COVID-19 PCR negative  - TTE pending    Hypertensive emergency/HTN  - s/p Nitro gtt  - c/w Clonidine patch 0.3  - c/w Hydralazine 100mg po TID w/ 10mg IVP Q4 PRN  - c/w Imdur 60mg QD  - c/w Labetalol 600mg TID  - Renal diet    ESRD on HD  - Access R permecath and immature LUE AVF  - Outpatient schedule: [facility]  - HD center: [facility]   - Last HD Yesterday and plan for HD today (11/11/24)  - c/w Phoslo 667mg TID  - Nephrology following     Anemia of chronic disease  - Baseline Hbg 10-11  - H/H stable     HLD  - resumed atorvastatin     DM2  - A1C 11/11/24: 4.9  - Held Januvia 25mg QD while inpatient  - ISS + Accucheck    GERD  - was on IV protonix, switched to po 40mg QD    Constipation  - CT with Large rectal stool burden  - Pt had BM yesterday and today  - Resumed home lactulose     DVT ppx: Heparin SQ  Diet: Renal diet  Dispo:  64F former smoker, with PMHx of ESRD on HD (R permecath and immature LUE AVF), failed PD due to peritonitis, HTN, HLD, DM2, GIB, GERD, recent admission 9/2024 to MICU for AHRF 2/2 to pulm edema, presented with chest and back discomfort and dyspnea. CT chest noted pulmonary edema and B/L pleural effusions. Found to be hypoxemic with respiratory distress and started on BiPAP, now weaned off to 3L NC. Also found to be hypertensive emergency s/p nitro gtt. Now stable for downgrade to medicine.     PLAN  Acute hypoxemic respiratory failure 2/2 to B/L pleural effusions and flash pulmonary edema, improving   - s/p BiPAP, now on 3L NC  - On 2L NC home supplemental O2 PRN since last admission due to unable to wean O2  - CT chest/A/P noted Mod pericardial effusion, small to mod B/L pleural effusions and pulm edema  - c/w Torsemide 20mg QD  - Monitor I/Os  - RVP, COVID-19 PCR negative  - TTE pending    Hypertensive emergency/HTN  - s/p Nitro gtt  - c/w Clonidine patch 0.3  - c/w Hydralazine 100mg po TID w/ 10mg IVP Q4 PRN  - c/w Imdur 60mg QD  - c/w Labetalol 600mg TID  - Renal diet    ESRD on HD  - Access R permecath and immature LUE AVF  - Outpatient schedule: [facility]  - HD center: [facility]   - Last HD Yesterday and plan for HD today (11/11/24)  - c/w Phoslo 667mg TID  - Nephrology following     Anemia of chronic disease  - Baseline Hbg 10-11  - H/H stable  - No signs of active bleed  - Monitor CBC    HLD  - pt is on Atorvastatin but unsure of dose. Tried calling emergency w/o success     DM2  - A1C 11/11/24: 4.9  - Held Januvia 25mg QD while inpatient  - ISS + Accu-check    GERD  - was on IV Protonix switched to po 40mg QD    Constipation  - CT with Large rectal stool burden  - Pt had BM yesterday and today  - Resumed home lactulose     DVT ppx: Heparin SQ  Diet: Renal diet  Dispo: Acute, wean O2 to home 2L. Possible DC in 24-48hrs 64F former smoker, with PMHx of ESRD on HD (R permecath and immature LUE AVF), failed PD due to peritonitis, HTN, HLD, DM2, GIB, GERD, recent admission 9/2024 to MICU for AHRF 2/2 to pulm edema, presented with chest and back discomfort and dyspnea. CT chest noted pulmonary edema and B/L pleural effusions. Found to be hypoxemic with respiratory distress and started on BiPAP, now weaned off to 3L NC. Also found to be hypertensive emergency s/p nitro gtt. Nephrology consulted for HD, received HD yesterday 11/10 and today 11/11. Now stable for downgrade to medicine.     PLAN  Acute hypoxemic respiratory failure 2/2 to B/L pleural effusions and flash pulmonary edema, improving   - s/p BiPAP, now on 3L NC  - On 2L NC home supplemental O2 PRN since last admission due to unable to wean O2  - CT chest/A/P noted Mod pericardial effusion, small to mod B/L pleural effusions and pulm edema  - c/w Torsemide 20mg QD  - Monitor I/Os  - RVP, COVID-19 PCR negative  - TTE pending    Pericardial effusion, stable   - CT as above, similar to prior     Hypertensive emergency/HTN  - s/p Nitro gtt  - c/w Clonidine patch 0.3  - c/w Hydralazine 100mg po TID w/ 10mg IVP Q4 PRN  - c/w Imdur 60mg QD  - c/w Labetalol 600mg TID  - Renal diet    ESRD on HD  - Access R permecath and immature LUE AVF  - Outpatient schedule: [facility]  - HD center: [facility]   - Last HD Yesterday and plan for HD today (11/11/24)  - c/w Phoslo 667mg TID  - Nephrology following     Anemia of chronic disease  - Baseline Hbg 10-11  - H/H stable  - No signs of active bleed  - Monitor CBC    HLD  - pt is on Atorvastatin but unsure of dose. Tried calling emergency w/o success     DM2  - A1C 11/11/24: 4.9  - Held Januvia 25mg QD while inpatient  - ISS + Accu-check    GERD  - was on IV Protonix switched to po 40mg QD    Constipation  - CT with Large rectal stool burden  - Pt had BM yesterday and today  - Resumed home lactulose     DVT ppx: Heparin SQ  Diet: Renal diet  Dispo: Acute, wean O2 to home 2L. Possible DC in 24-48hrs 64F former smoker, with PMHx of ESRD on HD (R permecath and immature LUE AVF), failed PD due to peritonitis, HTN, HLD, DM2, GIB, GERD, recent admission 9/2024 to MICU for AHRF 2/2 to pulm edema, presented with chest and back discomfort and dyspnea. CT chest noted pulmonary edema and B/L pleural effusions. Found to be hypoxemic with respiratory distress and started on BiPAP, now weaned off to 3L NC. Also found to be hypertensive emergency s/p nitro gtt. Nephrology consulted for HD, received HD yesterday 11/10 and today 11/11. Now stable for downgrade to medicine.     PLAN  Acute hypoxemic respiratory failure 2/2 to B/L pleural effusions and flash pulmonary edema, improving   - s/p BiPAP, now on 3L NC  - On 2L NC home supplemental O2 PRN since last admission due to unable to wean O2  - CT chest/A/P noted Mod pericardial effusion, small to mod B/L pleural effusions and pulm edema  - c/w Torsemide 20mg QD  - Monitor I/Os  - RVP, COVID-19 PCR negative  - TTE pending    Pericardial effusion, stable   - CT as above, similar to prior     Hypertensive emergency/HTN  - s/p Nitro gtt  - c/w Clonidine patch 0.3  - c/w Hydralazine 100mg po TID w/ 10mg IVP Q4 PRN  - c/w Imdur 60mg QD  - c/w Labetalol 600mg TID  - Renal diet    ESRD on HD  - failed PD due to peritonitis  - Access R permecath and immature LUE AVF  - Outpatient schedule: [facility]  - HD center: [facility]   - Last HD Yesterday and plan for HD today (11/11/24)  - c/w Phoslo 667mg TID  - Nephrology following     Anemia of chronic disease  - Baseline Hbg 10-11  - H/H stable  - No signs of active bleed  - Monitor CBC    HLD  - pt is on Atorvastatin but unsure of dose. Tried calling emergency w/o success     DM2  - A1C 11/11/24: 4.9  - Held Januvia 25mg QD while inpatient  - ISS + Accu-check    GERD  - was on IV Protonix switched to po 40mg QD    Constipation  - CT with Large rectal stool burden  - Pt had BM yesterday and today  - Resumed home lactulose     DVT ppx: Heparin SQ  Diet: Renal diet  Dispo: Acute, wean O2 to home 2L. Possible DC in 24-48hrs

## 2024-11-11 NOTE — PROGRESS NOTE ADULT - SUBJECTIVE AND OBJECTIVE BOX
NEPHROLOGY INTERVAL HPI/OVERNIGHT EVENTS:    Examined earlier on HD tolerating ok    MEDICATIONS  (STANDING):  acetaminophen   IVPB .. 700 milliGRAM(s) IV Intermittent once  calcium acetate 667 milliGRAM(s) Oral three times a day with meals  cloNIDine Patch 0.3 mG/24Hr(s) 1 patch Transdermal every 7 days  dextrose 5%. 1000 milliLiter(s) (50 mL/Hr) IV Continuous <Continuous>  dextrose 5%. 1000 milliLiter(s) (100 mL/Hr) IV Continuous <Continuous>  dextrose 50% Injectable 25 Gram(s) IV Push once  dextrose 50% Injectable 25 Gram(s) IV Push once  dextrose 50% Injectable 12.5 Gram(s) IV Push once  glucagon  Injectable 1 milliGRAM(s) IntraMuscular once  heparin   Injectable 5000 Unit(s) SubCutaneous every 8 hours  hydrALAZINE 100 milliGRAM(s) Oral every 8 hours  influenza   Vaccine 0.5 milliLiter(s) IntraMuscular once  insulin lispro (ADMELOG) corrective regimen sliding scale   SubCutaneous Before meals and at bedtime  isosorbide   mononitrate ER Tablet (IMDUR) 60 milliGRAM(s) Oral daily  labetalol 600 milliGRAM(s) Oral three times a day  lactulose Syrup 10 Gram(s) Oral daily  pantoprazole    Tablet 40 milliGRAM(s) Oral before breakfast  torsemide 20 milliGRAM(s) Oral daily    MEDICATIONS  (PRN):  acetaminophen     Tablet .. 650 milliGRAM(s) Oral every 6 hours PRN Temp greater or equal to 38C (100.4F)  dextrose Oral Gel 15 Gram(s) Oral once PRN Blood Glucose LESS THAN 70 milliGRAM(s)/deciliter  hydrALAZINE Injectable 10 milliGRAM(s) IV Push every 4 hours PRN SBP>160      Allergies    No Known Allergies    Intolerances        Vital Signs Last 24 Hrs  T(C): 36.8 (2024 12:05), Max: 37.4 (10 Nov 2024 17:30)  T(F): 98.2 (2024 12:05), Max: 99.4 (10 Nov 2024 17:30)  HR: 99 (2024 14:00) (91 - 105)  BP: 133/69 (2024 14:00) (105/59 - 162/86)  BP(mean): 85 (2024 14:00) (73 - 115)  RR: 15 (2024 14:00) (13 - 32)  SpO2: 100% (2024 14:00) (97% - 100%)    Parameters below as of 2024 12:05  Patient On (Oxygen Delivery Method): nasal cannula  O2 Flow (L/min): 3    Daily     Daily Weight in k.3 (2024 12:05)    PHYSICAL EXAM:  GENERAL: appears chronically ill, NAD  HEAD:  Atraumatic, Normocephalic  EYES: EOMI  NECK: Supple  NERVOUS SYSTEM:  Alert & Oriented X3  CHEST/LUNG: dec bs bilaterally  HEART: Regular rate and rhythm  ABDOMEN: Soft, Nontender, Nondistended; +BS  EXTREMITIES:  trace  edema, LUE AVF not ready for use maturing    LABS:                        9.0    7.63  )-----------( 253      ( 2024 05:04 )             28.9         135  |  96  |  18.0  ----------------------------<  112[H]  4.4   |  25.0  |  4.78[H]    Ca    8.9      2024 05:04  Phos  4.3       Mg     2.0         TPro  5.6[L]  /  Alb  3.1[L]  /  TBili  0.2[L]  /  DBili  x   /  AST  19  /  ALT  13  /  AlkPhos  108  11-11      Urinalysis Basic - ( 2024 05:04 )    Color: x / Appearance: x / SG: x / pH: x  Gluc: 112 mg/dL / Ketone: x  / Bili: x / Urobili: x   Blood: x / Protein: x / Nitrite: x   Leuk Esterase: x / RBC: x / WBC x   Sq Epi: x / Non Sq Epi: x / Bacteria: x      Magnesium: 2.0 mg/dL ( @ 05:04)  Phosphorus: 4.3 mg/dL ( @ 05:04)  Magnesium: 2.0 mg/dL (11-10 @ 23:10)  Phosphorus: 3.9 mg/dL (11-10 @ 23:10)    ABG - ( 10 Nov 2024 04:22 )  pH, Arterial: 7.400 pH, Blood: x     /  pCO2: 49    /  pO2: 121   / HCO3: 30    / Base Excess: 5.6   /  SaO2: 99.2                  RADIOLOGY & ADDITIONAL TESTS:

## 2024-11-11 NOTE — PROGRESS NOTE ADULT - SUBJECTIVE AND OBJECTIVE BOX
BRIEF HOSPITAL COURSE: 63 y/o F with a h/o ESRD on HD (right chest wall permacath, immature LUE AVF), HTN, HLD, DM2, pericardial effusion, presented to the ED with complaints of chest/back discomfort and dyspnea that first began about 3 days ago. This coincides with her eating [employer]oodles of [employer]oodles. Last hemodialysis session was Friday. Her  reports compliance with medications. She was started on BiPAP for hypoxemia and respiratory distress. CT chest revealed pulmonary edema and bilateral pleural effusions. She was sedated with IV benzo due to severe anxiety. Severe refractory hypertension (SBP reported as high as 230).    Events last 24 hours: Weaned off Bipap. Continues on nitro gtt.     REASON FOR CONSULT: ***    CONSULT REQUESTED BY: ***    Patient is a 64y old  Female who presents with a chief complaint of Acute hypoxemic respiratory failure (10 Nov 2024 10:38)      HPI: ***    Events last 24 hours: ***    PAST MEDICAL & SURGICAL HISTORY:  HTN (hypertension)    DM (diabetes mellitus)    HLD (hyperlipidemia)    Overactive thyroid gland    ESRD on dialysis  dialyssis M/W/F    S/P  section    S/P arteriovenous (AV) fistula creation  fistula is still maturing (fistula not available for dialysis use yet)      Allergies    No Known Allergies        Medications:    cloNIDine Patch 0.3 mG/24Hr(s) 1 patch Transdermal every 7 days  hydrALAZINE 100 milliGRAM(s) Oral every 8 hours  hydrALAZINE Injectable 10 milliGRAM(s) IV Push every 4 hours PRN  isosorbide   mononitrate ER Tablet (IMDUR) 60 milliGRAM(s) Oral daily  labetalol 600 milliGRAM(s) Oral three times a day  nitroglycerin  Infusion 20 MICROgram(s)/Min IV Continuous <Continuous>  torsemide 20 milliGRAM(s) Oral daily    acetaminophen     Tablet .. 650 milliGRAM(s) Oral every 6 hours PRN    heparin   Injectable 5000 Unit(s) SubCutaneous every 8 hours  pantoprazole  Injectable 40 milliGRAM(s) IV Push daily    dextrose 50% Injectable 25 Gram(s) IV Push once  dextrose 50% Injectable 12.5 Gram(s) IV Push once  dextrose 50% Injectable 25 Gram(s) IV Push once  dextrose Oral Gel 15 Gram(s) Oral once PRN  glucagon  Injectable 1 milliGRAM(s) IntraMuscular once  insulin lispro (ADMELOG) corrective regimen sliding scale   SubCutaneous Before meals and at bedtime    calcium acetate 667 milliGRAM(s) Oral three times a day with meals  dextrose 5%. 1000 milliLiter(s) IV Continuous <Continuous>  dextrose 5%. 1000 milliLiter(s) IV Continuous <Continuous>    influenza   Vaccine 0.5 milliLiter(s) IntraMuscular once    chlorhexidine 2% Cloths 1 Application(s) Topical <User Schedule>      ICU Vital Signs Last 24 Hrs  T(C): 37.1 (2024 04:03), Max: 37.4 (10 Nov 2024 17:30)  T(F): 98.7 (2024 04:03), Max: 99.4 (10 Nov 2024 17:30)  HR: 92 (:) (83 - 108)  BP: 121/72 (:) (108/76 - 191/94)  BP(mean): 88 (:) (86 - 137)  ABP: --  ABP(mean): --  RR: 29 (:) (14 - 32)  SpO2: 100% (:) (94% - 100%)    O2 Parameters below as of 2024 04:00  Patient On (Oxygen Delivery Method): nasal cannula  O2 Flow (L/min): 3      Physical Examination:    General: No acute distress.  Alert, oriented, interactive, nonfocal    HEENT: Pupils equal, reactive to light.  Symmetric.    PULM: Clear to auscultation bilaterally, no significant sputum production    CVS: Regular rate and rhythm, no murmurs, rubs, or gallops    ABD: Soft, nondistended, nontender, normoactive bowel sounds, no masses    EXT: No edema, nontender    SKIN: Warm and well perfused, no rashes noted.    ABG - ( 10 Nov 2024 04:22 )  pH, Arterial: 7.400 pH, Blood: x     /  pCO2: 49    /  pO2: 121   / HCO3: 30    / Base Excess: 5.6   /  SaO2: 99.2                I&O's Detail    10 Nov 2024 07:01  -  2024 07:00  --------------------------------------------------------  IN:    Nitroglycerin: 447.5 mL    Oral Fluid: 440 mL  Total IN: 887.5 mL    OUT:    Other (mL): 2300 mL  Total OUT: 2300 mL    Total NET: -1412.5 mL            LABS:                        9.0    7.63  )-----------( 253      ( 2024 05:04 )             28.9         135  |  96  |  18.0  ----------------------------<  112[H]  4.4   |  25.0  |  4.78[H]    Ca    8.9      2024 05:04  Phos  4.3       Mg     2.0         TPro  5.6[L]  /  Alb  3.1[L]  /  TBili  0.2[L]  /  DBili  x   /  AST  19  /  ALT  13  /  AlkPhos  108            CAPILLARY BLOOD GLUCOSE      POCT Blood Glucose.: 105 mg/dL (2024 07:58)      Urinalysis Basic - ( 2024 05:04 )    Color: x / Appearance: x / SG: x / pH: x  Gluc: 112 mg/dL / Ketone: x  / Bili: x / Urobili: x   Blood: x / Protein: x / Nitrite: x   Leuk Esterase: x / RBC: x / WBC x   Sq Epi: x / Non Sq Epi: x / Bacteria: x     BRIEF HOSPITAL COURSE: 65 y/o F with a h/o ESRD on HD (right chest wall permacath, immature LUE AVF), HTN, HLD, DM2, pericardial effusion, presented to the ED with complaints of chest/back discomfort and dyspnea that first began about 3 days ago. This coincides with her eating [employer]les [employer]ood[employer]. Last hemodialysis session was Friday. Her  reports compliance with medications. She was started on BiPAP for hypoxemia and respiratory distress. CT chest revealed pulmonary edema and bilateral pleural effusions. She was sedated with IV benzo due to severe anxiety. Severe refractory hypertension (SBP reported as high as 230).    Events last 24 hours: Weaned off Bipap. Continues on nitro gtt.       PAST MEDICAL & SURGICAL HISTORY:  HTN (hypertension)    DM (diabetes mellitus)    HLD (hyperlipidemia)    Overactive thyroid gland    ESRD on dialysis  dialyssis M/W/F    S/P  section    S/P arteriovenous (AV) fistula creation  fistula is still maturing (fistula not available for dialysis use yet)      Allergies    No Known Allergies        Medications:    cloNIDine Patch 0.3 mG/24Hr(s) 1 patch Transdermal every 7 days  hydrALAZINE 100 milliGRAM(s) Oral every 8 hours  hydrALAZINE Injectable 10 milliGRAM(s) IV Push every 4 hours PRN  isosorbide   mononitrate ER Tablet (IMDUR) 60 milliGRAM(s) Oral daily  labetalol 600 milliGRAM(s) Oral three times a day  nitroglycerin  Infusion 20 MICROgram(s)/Min IV Continuous <Continuous>  torsemide 20 milliGRAM(s) Oral daily    acetaminophen     Tablet .. 650 milliGRAM(s) Oral every 6 hours PRN    heparin   Injectable 5000 Unit(s) SubCutaneous every 8 hours  pantoprazole  Injectable 40 milliGRAM(s) IV Push daily    dextrose 50% Injectable 25 Gram(s) IV Push once  dextrose 50% Injectable 12.5 Gram(s) IV Push once  dextrose 50% Injectable 25 Gram(s) IV Push once  dextrose Oral Gel 15 Gram(s) Oral once PRN  glucagon  Injectable 1 milliGRAM(s) IntraMuscular once  insulin lispro (ADMELOG) corrective regimen sliding scale   SubCutaneous Before meals and at bedtime    calcium acetate 667 milliGRAM(s) Oral three times a day with meals  dextrose 5%. 1000 milliLiter(s) IV Continuous <Continuous>  dextrose 5%. 1000 milliLiter(s) IV Continuous <Continuous>    influenza   Vaccine 0.5 milliLiter(s) IntraMuscular once    chlorhexidine 2% Cloths 1 Application(s) Topical <User Schedule>      ICU Vital Signs Last 24 Hrs  T(C): 37.1 (2024 04:03), Max: 37.4 (10 Nov 2024 17:30)  T(F): 98.7 (2024 04:03), Max: 99.4 (10 Nov 2024 17:30)  HR: 92 (:) (83 - 108)  BP: 121/72 (:) (108/76 - 191/94)  BP(mean): 88 (:) (86 - 137)  ABP: --  ABP(mean): --  RR: 29 (:) (14 - 32)  SpO2: 100% (:) (94% - 100%)    O2 Parameters below as of :00  Patient On (Oxygen Delivery Method): nasal cannula  O2 Flow (L/min): 3      Physical Examination:    General: No acute distress.  Alert, oriented, interactive, nonfocal    HEENT: Pupils equal, reactive to light.  Symmetric.    PULM: Clear to auscultation bilaterally, no significant sputum production    CVS: Regular rate and rhythm, no murmurs, rubs, or gallops    ABD: Soft, nondistended, nontender, normoactive bowel sounds, no masses    EXT: No edema, nontender    SKIN: Warm and well perfused, no rashes noted.    ABG - ( 10 Nov 2024 04:22 )  pH, Arterial: 7.400 pH, Blood: x     /  pCO2: 49    /  pO2: 121   / HCO3: 30    / Base Excess: 5.6   /  SaO2: 99.2                I&O's Detail    10 Nov 2024 07:01  -  2024 07:00  --------------------------------------------------------  IN:    Nitroglycerin: 447.5 mL    Oral Fluid: 440 mL  Total IN: 887.5 mL    OUT:    Other (mL): 2300 mL  Total OUT: 2300 mL    Total NET: -1412.5 mL            LABS:                        9.0    7.63  )-----------( 253      ( 2024 05:04 )             28.9         135  |  96  |  18.0  ----------------------------<  112[H]  4.4   |  25.0  |  4.78[H]    Ca    8.9      2024 05:04  Phos  4.3       Mg     2.0         TPro  5.6[L]  /  Alb  3.1[L]  /  TBili  0.2[L]  /  DBili  x   /  AST  19  /  ALT  13  /  AlkPhos  108            CAPILLARY BLOOD GLUCOSE      POCT Blood Glucose.: 105 mg/dL (2024 07:58)      Urinalysis Basic - ( 2024 05:04 )    Color: x / Appearance: x / SG: x / pH: x  Gluc: 112 mg/dL / Ketone: x  / Bili: x / Urobili: x   Blood: x / Protein: x / Nitrite: x   Leuk Esterase: x / RBC: x / WBC x   Sq Epi: x / Non Sq Epi: x / Bacteria: x     BRIEF HOSPITAL COURSE: 65 y/o F with a h/o ESRD on HD (right chest wall permacath, immature LUE AVF), HTN, HLD, DM2, pericardial effusion, presented to the ED with complaints of chest/back discomfort and dyspnea that first began about 3 days ago. This coincides with her eating [employer]les [employer]. Last hemodialysis session was Friday. Her  reports compliance with medications. She was started on BiPAP for hypoxemia and respiratory distress. CT chest revealed pulmonary edema and bilateral pleural effusions. She was sedated with IV benzo due to severe anxiety. Severe refractory hypertension (SBP reported as high as 230).    Events last 24 hours: Weaned off Bipap. Continues on nitro gtt. Feeling better this morning, denies CP, SOB.      PAST MEDICAL & SURGICAL HISTORY:  HTN (hypertension)    DM (diabetes mellitus)    HLD (hyperlipidemia)    Overactive thyroid gland    ESRD on dialysis  dialyssis M/W/F    S/P  section    S/P arteriovenous (AV) fistula creation  fistula is still maturing (fistula not available for dialysis use yet)      Allergies    No Known Allergies        Medications:    cloNIDine Patch 0.3 mG/24Hr(s) 1 patch Transdermal every 7 days  hydrALAZINE 100 milliGRAM(s) Oral every 8 hours  hydrALAZINE Injectable 10 milliGRAM(s) IV Push every 4 hours PRN  isosorbide   mononitrate ER Tablet (IMDUR) 60 milliGRAM(s) Oral daily  labetalol 600 milliGRAM(s) Oral three times a day  nitroglycerin  Infusion 20 MICROgram(s)/Min IV Continuous <Continuous>  torsemide 20 milliGRAM(s) Oral daily    acetaminophen     Tablet .. 650 milliGRAM(s) Oral every 6 hours PRN    heparin   Injectable 5000 Unit(s) SubCutaneous every 8 hours  pantoprazole  Injectable 40 milliGRAM(s) IV Push daily    dextrose 50% Injectable 25 Gram(s) IV Push once  dextrose 50% Injectable 12.5 Gram(s) IV Push once  dextrose 50% Injectable 25 Gram(s) IV Push once  dextrose Oral Gel 15 Gram(s) Oral once PRN  glucagon  Injectable 1 milliGRAM(s) IntraMuscular once  insulin lispro (ADMELOG) corrective regimen sliding scale   SubCutaneous Before meals and at bedtime    calcium acetate 667 milliGRAM(s) Oral three times a day with meals  dextrose 5%. 1000 milliLiter(s) IV Continuous <Continuous>  dextrose 5%. 1000 milliLiter(s) IV Continuous <Continuous>    influenza   Vaccine 0.5 milliLiter(s) IntraMuscular once    chlorhexidine 2% Cloths 1 Application(s) Topical <User Schedule>      ICU Vital Signs Last 24 Hrs  T(C): 37.1 (2024 04:03), Max: 37.4 (10 Nov 2024 17:30)  T(F): 98.7 (:03), Max: 99.4 (10 Nov 2024 17:30)  HR: 92 (:) (83 - 108)  BP: 121/72 (:) (108/76 - 191/94)  BP(mean): 88 (:) (86 - 137)  ABP: --  ABP(mean): --  RR: 29 (:) (14 - 32)  SpO2: 100% (:) (94% - 100%)    O2 Parameters below as of :00  Patient On (Oxygen Delivery Method): nasal cannula  O2 Flow (L/min): 3      Physical Examination:    General: No acute distress.  Alert, oriented, interactive, nonfocal  HEENT: Pupils equal,  Symmetric.  PULM: bilateral rales, diminished breath sounds at bases  CVS: Regular rate and rhythm, no murmurs, rubs, or gallops  ABD: Soft, nondistended, nontender, normoactive bowel sounds, no masses  EXT: No edema, nontender  SKIN: Warm and well perfused, no rashes noted.    ABG - ( 10 Nov 2024 04:22 )  pH, Arterial: 7.400 pH, Blood: x     /  pCO2: 49    /  pO2: 121   / HCO3: 30    / Base Excess: 5.6   /  SaO2: 99.2          I&O's Detail    10 Nov 2024 07:01  -  2024 07:00  --------------------------------------------------------  IN:    Nitroglycerin: 447.5 mL    Oral Fluid: 440 mL  Total IN: 887.5 mL    OUT:    Other (mL): 2300 mL  Total OUT: 2300 mL    Total NET: -1412.5 mL            LABS:                        9.0    7.63  )-----------( 253      ( 2024 05:04 )             28.9         135  |  96  |  18.0  ----------------------------<  112[H]  4.4   |  25.0  |  4.78[H]    Ca    8.9      2024 05:04  Phos  4.3       Mg     2.0         TPro  5.6[L]  /  Alb  3.1[L]  /  TBili  0.2[L]  /  DBili  x   /  AST  19  /  ALT  13  /  AlkPhos  108            CAPILLARY BLOOD GLUCOSE      POCT Blood Glucose.: 105 mg/dL (2024 07:58)      Urinalysis Basic - ( 2024 05:04 )    Color: x / Appearance: x / SG: x / pH: x  Gluc: 112 mg/dL / Ketone: x  / Bili: x / Urobili: x   Blood: x / Protein: x / Nitrite: x   Leuk Esterase: x / RBC: x / WBC x   Sq Epi: x / Non Sq Epi: x / Bacteria: x     BRIEF HOSPITAL COURSE: 65 y/o F with a h/o ESRD on HD (right chest wall permacath, immature LUE AVF), HTN, HLD, DM2, pericardial effusion, presented to the ED with complaints of chest/back discomfort and dyspnea that first began about 3 days ago. This coincides with her eating [employer]oodles of [employer]oodles. Last hemodialysis session was Friday. Her  reports compliance with medications. She was started on BiPAP for hypoxemia and respiratory distress. CT chest revealed pulmonary edema and bilateral pleural effusions. She was sedated with IV benzo due to severe anxiety. Severe refractory hypertension (SBP reported as high as 230).    Events last 24 hours: Emergent HD yesterday. Weaned off Bipap. Continues on nitro gtt. Feeling better this morning, denies CP, SOB.      PAST MEDICAL & SURGICAL HISTORY:  HTN (hypertension)    DM (diabetes mellitus)    HLD (hyperlipidemia)    Overactive thyroid gland    ESRD on dialysis  dialyssis M/W/F    S/P  section    S/P arteriovenous (AV) fistula creation  fistula is still maturing (fistula not available for dialysis use yet)      Allergies    No Known Allergies        Medications:    cloNIDine Patch 0.3 mG/24Hr(s) 1 patch Transdermal every 7 days  hydrALAZINE 100 milliGRAM(s) Oral every 8 hours  hydrALAZINE Injectable 10 milliGRAM(s) IV Push every 4 hours PRN  isosorbide   mononitrate ER Tablet (IMDUR) 60 milliGRAM(s) Oral daily  labetalol 600 milliGRAM(s) Oral three times a day  nitroglycerin  Infusion 20 MICROgram(s)/Min IV Continuous <Continuous>  torsemide 20 milliGRAM(s) Oral daily    acetaminophen     Tablet .. 650 milliGRAM(s) Oral every 6 hours PRN    heparin   Injectable 5000 Unit(s) SubCutaneous every 8 hours  pantoprazole  Injectable 40 milliGRAM(s) IV Push daily    dextrose 50% Injectable 25 Gram(s) IV Push once  dextrose 50% Injectable 12.5 Gram(s) IV Push once  dextrose 50% Injectable 25 Gram(s) IV Push once  dextrose Oral Gel 15 Gram(s) Oral once PRN  glucagon  Injectable 1 milliGRAM(s) IntraMuscular once  insulin lispro (ADMELOG) corrective regimen sliding scale   SubCutaneous Before meals and at bedtime    calcium acetate 667 milliGRAM(s) Oral three times a day with meals  dextrose 5%. 1000 milliLiter(s) IV Continuous <Continuous>  dextrose 5%. 1000 milliLiter(s) IV Continuous <Continuous>    influenza   Vaccine 0.5 milliLiter(s) IntraMuscular once    chlorhexidine 2% Cloths 1 Application(s) Topical <User Schedule>      ICU Vital Signs Last 24 Hrs  T(C): 37.1 (2024 04:03), Max: 37.4 (10 Nov 2024 17:30)  T(F): 98.7 (2024 04:03), Max: 99.4 (10 Nov 2024 17:30)  HR: 92 (:) (83 - 108)  BP: 121/72 (:) (108/76 - 191/94)  BP(mean): 88 (:) (86 - 137)  ABP: --  ABP(mean): --  RR: 29 (:) (14 - 32)  SpO2: 100% (:) (94% - 100%)    O2 Parameters below as of :00  Patient On (Oxygen Delivery Method): nasal cannula  O2 Flow (L/min): 3      Physical Examination:    General: No acute distress.  Alert, oriented, interactive, nonfocal  HEENT: Pupils equal,  Symmetric.  PULM: bilateral rales, diminished breath sounds at bases  CVS: Regular rate and rhythm, no murmurs, rubs, or gallops  ABD: Soft, nondistended, nontender, normoactive bowel sounds, no masses  EXT: No edema, nontender  SKIN: Warm and well perfused, no rashes noted.    ABG - ( 10 Nov 2024 04:22 )  pH, Arterial: 7.400 pH, Blood: x     /  pCO2: 49    /  pO2: 121   / HCO3: 30    / Base Excess: 5.6   /  SaO2: 99.2          I&O's Detail    10 Nov 2024 07:01  -  2024 07:00  --------------------------------------------------------  IN:    Nitroglycerin: 447.5 mL    Oral Fluid: 440 mL  Total IN: 887.5 mL    OUT:    Other (mL): 2300 mL  Total OUT: 2300 mL    Total NET: -1412.5 mL            LABS:                        9.0    7.63  )-----------( 253      ( 2024 05:04 )             28.9         135  |  96  |  18.0  ----------------------------<  112[H]  4.4   |  25.0  |  4.78[H]    Ca    8.9      2024 05:04  Phos  4.3       Mg     2.0         TPro  5.6[L]  /  Alb  3.1[L]  /  TBili  0.2[L]  /  DBili  x   /  AST  19  /  ALT  13  /  AlkPhos  108            CAPILLARY BLOOD GLUCOSE      POCT Blood Glucose.: 105 mg/dL (2024 07:58)      Urinalysis Basic - ( 2024 05:04 )    Color: x / Appearance: x / SG: x / pH: x  Gluc: 112 mg/dL / Ketone: x  / Bili: x / Urobili: x   Blood: x / Protein: x / Nitrite: x   Leuk Esterase: x / RBC: x / WBC x   Sq Epi: x / Non Sq Epi: x / Bacteria: x

## 2024-11-11 NOTE — PROGRESS NOTE ADULT - ASSESSMENT
Assessment & Plan:   63 y/o F with a h/o ESRD on HD (right chest wall permacath, immature LUE AVF), HTN, HLD, DM2, pericardial effusion, with:    # Acute hypoxemic respiratory failure  # Acute pulmonary edema  # Bilateral pleural effusions  # Hypertensive emergency    ====================== NEUROLOGY=====================  acetaminophen     Tablet .. 650 milliGRAM(s) Oral every 6 hours PRN Temp greater or equal to 38C (100.4F)    ==================== RESPIRATORY======================    -Stable on 3L nasal canula  -Pulmonary edema, on torsemide    ====================CARDIOVASCULAR==================  cloNIDine Patch 0.3 mG/24Hr(s) 1 patch Transdermal every 7 days  hydrALAZINE 100 milliGRAM(s) Oral every 8 hours  hydrALAZINE Injectable 10 milliGRAM(s) IV Push every 4 hours PRN SBP>160  isosorbide   mononitrate ER Tablet (IMDUR) 60 milliGRAM(s) Oral daily  labetalol 600 milliGRAM(s) Oral three times a day  nitroglycerin  Infusion 20 MICROgram(s)/Min (6 mL/Hr) IV Continuous <Continuous>  torsemide 20 milliGRAM(s) Oral daily    -HTN emergency started on Nitro gtt.   -PO antihypertensive regimen above to aid in weening off IV anti- hypertensive    ===================HEMATOLOGIC/ONC ===================  heparin   Injectable 5000 Unit(s) SubCutaneous every 8 hours for VTE prophylaxis    ===================== RENAL =========================  -Nephrology following  -S/p HD yesterday, plan for HD again today    ==================== GASTROINTESTINAL===================  calcium acetate 667 milliGRAM(s) Oral three times a day with meals  dextrose 5%. 1000 milliLiter(s) (50 mL/Hr) IV Continuous <Continuous>  dextrose 5%. 1000 milliLiter(s) (100 mL/Hr) IV Continuous <Continuous>  pantoprazole  Injectable 40 milliGRAM(s) IV Push daily    Diet: renal diet  =======================    ENDOCRINE  =====================  dextrose 50% Injectable 25 Gram(s) IV Push once  dextrose 50% Injectable 25 Gram(s) IV Push once  dextrose 50% Injectable 12.5 Gram(s) IV Push once  dextrose Oral Gel 15 Gram(s) Oral once PRN Blood Glucose LESS THAN 70 milliGRAM(s)/deciliter  glucagon  Injectable 1 milliGRAM(s) IntraMuscular once  insulin lispro (ADMELOG) corrective regimen sliding scale   SubCutaneous Before meals and at bedtime    Blood sugar goal: 100-200  ========================INFECTIOUS DISEASE================  -Stable           Assessment & Plan:   63 y/o F with a h/o ESRD on HD (right chest wall permacath, immature LUE AVF), HTN, HLD, DM2, pericardial effusion, with:    # Acute hypoxemic respiratory failure  # Acute pulmonary edema  # Bilateral pleural effusions  # Hypertensive emergency    ====================== NEUROLOGY=====================  acetaminophen     Tablet .. 650 milliGRAM(s) Oral every 6 hours PRN Temp greater or equal to 38C (100.4F)    ==================== RESPIRATORY======================    -Stable on 3L nasal canula  -Pulmonary edema, on torsemide    ====================CARDIOVASCULAR==================  cloNIDine Patch 0.3 mG/24Hr(s) 1 patch Transdermal every 7 days  hydrALAZINE 100 milliGRAM(s) Oral every 8 hours  hydrALAZINE Injectable 10 milliGRAM(s) IV Push every 4 hours PRN SBP>160  isosorbide   mononitrate ER Tablet (IMDUR) 60 milliGRAM(s) Oral daily  labetalol 600 milliGRAM(s) Oral three times a day  nitroglycerin  Infusion 20 MICROgram(s)/Min (6 mL/Hr) IV Continuous <Continuous>  torsemide 20 milliGRAM(s) Oral daily    -HTN emergency, started on Nitro gtt.   -PO antihypertensive regimen above to aid in weening off IV anti- hypertensive    ===================HEMATOLOGIC/ONC ===================  heparin   Injectable 5000 Unit(s) SubCutaneous every 8 hours for VTE prophylaxis    ===================== RENAL =========================  -Nephrology following  -S/p HD emergent yesterday, plan for HD again today    ==================== GASTROINTESTINAL===================  calcium acetate 667 milliGRAM(s) Oral three times a day with meals  dextrose 5%. 1000 milliLiter(s) (50 mL/Hr) IV Continuous <Continuous>  dextrose 5%. 1000 milliLiter(s) (100 mL/Hr) IV Continuous <Continuous>  pantoprazole  Injectable 40 milliGRAM(s) IV Push daily    Diet: renal diet  =======================    ENDOCRINE  =====================  dextrose 50% Injectable 25 Gram(s) IV Push once  dextrose 50% Injectable 25 Gram(s) IV Push once  dextrose 50% Injectable 12.5 Gram(s) IV Push once  dextrose Oral Gel 15 Gram(s) Oral once PRN Blood Glucose LESS THAN 70 milliGRAM(s)/deciliter  glucagon  Injectable 1 milliGRAM(s) IntraMuscular once  insulin lispro (ADMELOG) corrective regimen sliding scale   SubCutaneous Before meals and at bedtime    Blood sugar goal: 100-200  ========================INFECTIOUS DISEASE================  -Stable           Assessment & Plan:   63 y/o F with a h/o ESRD on HD (right chest wall permacath, immature LUE AVF), HTN, HLD, DM2, pericardial effusion, with:    # Acute hypoxemic respiratory failure  # Acute pulmonary edema  # Bilateral pleural effusions  # Hypertensive emergency    ====================== NEUROLOGY=====================  acetaminophen     Tablet .. 650 milliGRAM(s) Oral every 6 hours PRN Temp greater or equal to 38C (100.4F)    ==================== RESPIRATORY======================    -Stable on 3L nasal canula  -Pulmonary edema, on torsemide    ====================CARDIOVASCULAR==================  cloNIDine Patch 0.3 mG/24Hr(s) 1 patch Transdermal every 7 days  hydrALAZINE 100 milliGRAM(s) Oral every 8 hours  hydrALAZINE Injectable 10 milliGRAM(s) IV Push every 4 hours PRN SBP>160  isosorbide   mononitrate ER Tablet (IMDUR) 60 milliGRAM(s) Oral daily  labetalol 600 milliGRAM(s) Oral three times a day  nitroglycerin  Infusion 20 MICROgram(s)/Min (6 mL/Hr) IV Continuous <Continuous>  torsemide 20 milliGRAM(s) Oral daily    -HTN emergency, started on Nitro gtt.   -PO antihypertensive regimen above to aid in weening off IV anti- hypertensive    ===================HEMATOLOGIC/ONC ===================  heparin   Injectable 5000 Unit(s) SubCutaneous every 8 hours for VTE prophylaxis    ===================== RENAL =========================  -Nephrology following  -S/p HD emergent yesterday, plan for HD again today    ==================== GASTROINTESTINAL===================  calcium acetate 667 milliGRAM(s) Oral three times a day with meals  dextrose 5%. 1000 milliLiter(s) (50 mL/Hr) IV Continuous <Continuous>  dextrose 5%. 1000 milliLiter(s) (100 mL/Hr) IV Continuous <Continuous>  pantoprazole  Injectable 40 milliGRAM(s) IV Push daily    Diet: renal diet  =======================    ENDOCRINE  =====================  dextrose 50% Injectable 25 Gram(s) IV Push once  dextrose 50% Injectable 25 Gram(s) IV Push once  dextrose 50% Injectable 12.5 Gram(s) IV Push once  dextrose Oral Gel 15 Gram(s) Oral once PRN Blood Glucose LESS THAN 70 milliGRAM(s)/deciliter  glucagon  Injectable 1 milliGRAM(s) IntraMuscular once  insulin lispro (ADMELOG) corrective regimen sliding scale   SubCutaneous Before meals and at bedtime    -T2DM on januvia at home held here, ordered for [facility]  Blood sugar goal: 100-200  ========================INFECTIOUS DISEASE================  -Stable           Assessment & Plan:   63 y/o F with a h/o ESRD on HD (right chest wall permacath, immature LUE AVF), HTN, HLD, DM2, pericardial effusion, with:    # Acute hypoxemic respiratory failure  # Acute pulmonary edema  # Bilateral pleural effusions  # Hypertensive emergency    ====================== NEUROLOGY=====================  acetaminophen     Tablet .. 650 milliGRAM(s) Oral every 6 hours PRN Temp greater or equal to 38C (100.4F)    ==================== RESPIRATORY======================    -Stable on 3L nasal canula  -Pulmonary edema, on torsemide    ====================CARDIOVASCULAR==================  cloNIDine Patch 0.3 mG/24Hr(s) 1 patch Transdermal every 7 days  hydrALAZINE 100 milliGRAM(s) Oral every 8 hours  hydrALAZINE Injectable 10 milliGRAM(s) IV Push every 4 hours PRN SBP>160  isosorbide   mononitrate ER Tablet (IMDUR) 60 milliGRAM(s) Oral daily  labetalol 600 milliGRAM(s) Oral three times a day  nitroglycerin  Infusion 20 MICROgram(s)/Min (6 mL/Hr) IV Continuous <Continuous>  torsemide 20 milliGRAM(s) Oral daily    -HTN emergency, started on Nitro gtt, discontinued this AM  -PO antihypertensive regimen above to aid in weening off IV anti- hypertensive  -Pleural effusion, pericardial effusion, and cardiomegaly on CT--TTE ordered     ===================HEMATOLOGIC/ONC ===================  heparin   Injectable 5000 Unit(s) SubCutaneous every 8 hours for VTE prophylaxis    ===================== RENAL =========================  -Nephrology following  -S/p HD emergent yesterday, plan for HD again today    ==================== GASTROINTESTINAL===================  calcium acetate 667 milliGRAM(s) Oral three times a day with meals  dextrose 5%. 1000 milliLiter(s) (50 mL/Hr) IV Continuous <Continuous>  dextrose 5%. 1000 milliLiter(s) (100 mL/Hr) IV Continuous <Continuous>  pantoprazole  Injectable 40 milliGRAM(s) IV Push daily    Diet: renal diet  =======================    ENDOCRINE  =====================  dextrose 50% Injectable 25 Gram(s) IV Push once  dextrose 50% Injectable 25 Gram(s) IV Push once  dextrose 50% Injectable 12.5 Gram(s) IV Push once  dextrose Oral Gel 15 Gram(s) Oral once PRN Blood Glucose LESS THAN 70 milliGRAM(s)/deciliter  glucagon  Injectable 1 milliGRAM(s) IntraMuscular once  insulin lispro (ADMELOG) corrective regimen sliding scale   SubCutaneous Before meals and at bedtime    -T2DM on januvia at home held here, ordered for [facility]  Blood sugar goal: 100-200  ========================INFECTIOUS DISEASE================  -Stable

## 2024-11-11 NOTE — PROGRESS NOTE ADULT - SUBJECTIVE AND OBJECTIVE BOX
SUBJECTIVE:    Chief Complaint: Patient is a 64y old  Female who presents with a chief complaint of Acute hypoxemic respiratory failure (11 Nov 2024 08:01)    BRIEF HOSPITAL COURSE:  64F with PMHx of ESRD on HD (R permecath and immature LUE AVF), HTN, HLD, DM2, presented with chest and back discomfort and dyspnea. As per , pt is compliant with medications. CT chest noted pulmonary edema and B/L pleural effusions. Found to be hypoxemic with respiratory distress and started on BiPAP, now weaned off to 3L NC. Also found to be hypertensive emergency s/p nitro gtt. Now stable for downgrade to medicine.       MEDICATIONS  (STANDING):  calcium acetate 667 milliGRAM(s) Oral three times a day with meals  cloNIDine Patch 0.3 mG/24Hr(s) 1 patch Transdermal every 7 days  dextrose 5%. 1000 milliLiter(s) (50 mL/Hr) IV Continuous <Continuous>  dextrose 5%. 1000 milliLiter(s) (100 mL/Hr) IV Continuous <Continuous>  dextrose 50% Injectable 25 Gram(s) IV Push once  dextrose 50% Injectable 25 Gram(s) IV Push once  dextrose 50% Injectable 12.5 Gram(s) IV Push once  glucagon  Injectable 1 milliGRAM(s) IntraMuscular once  heparin   Injectable 5000 Unit(s) SubCutaneous every 8 hours  hydrALAZINE 100 milliGRAM(s) Oral every 8 hours  influenza   Vaccine 0.5 milliLiter(s) IntraMuscular once  insulin lispro (ADMELOG) corrective regimen sliding scale   SubCutaneous Before meals and at bedtime  isosorbide   mononitrate ER Tablet (IMDUR) 60 milliGRAM(s) Oral daily  labetalol 600 milliGRAM(s) Oral three times a day  pantoprazole  Injectable 40 milliGRAM(s) IV Push daily  torsemide 20 milliGRAM(s) Oral daily    MEDICATIONS  (PRN):  acetaminophen     Tablet .. 650 milliGRAM(s) Oral every 6 hours PRN Temp greater or equal to 38C (100.4F)  dextrose Oral Gel 15 Gram(s) Oral once PRN Blood Glucose LESS THAN 70 milliGRAM(s)/deciliter  hydrALAZINE Injectable 10 milliGRAM(s) IV Push every 4 hours PRN SBP>160      Allergies  No Known Allergies  Intolerances    REVIEW OF SYSTEMS:  CONSTITUTIONAL: No fever, weight loss, or fatigue  RESPIRATORY: No cough, wheezing, chills or hemoptysis; No shortness of breath  CARDIOVASCULAR: No chest pain, palpitations, dizziness, or leg swelling  GASTROINTESTINAL: No abdominal or epigastric pain. No nausea, vomiting, or hematemesis; No diarrhea or constipation. No melena or hematochezia.  NEUROLOGICAL: No headaches, loss of strength, numbness, or tremors  MUSCULOSKELETAL: No joint pain or swelling; No muscle, back, or extremity pain    OBJECTIVE:  Vital Signs Last 24 Hrs  T(C): 36.7 (11 Nov 2024 08:55), Max: 37.4 (10 Nov 2024 17:30)  T(F): 98 (11 Nov 2024 08:55), Max: 99.4 (10 Nov 2024 17:30)  HR: 93 (11 Nov 2024 11:30) (91 - 108)  BP: 138/74 (11 Nov 2024 11:30) (105/59 - 191/94)  BP(mean): 94 (11 Nov 2024 11:30) (73 - 124)  RR: 16 (11 Nov 2024 11:30) (13 - 32)  SpO2: 100% (11 Nov 2024 11:30) (94% - 100%)    Parameters below as of 11 Nov 2024 04:00  Patient On (Oxygen Delivery Method): nasal cannula  O2 Flow (L/min): 3    PHYSICAL EXAM:  Constitutional: Alert, interactive, comfortable, NAD  Head and Face: Atraumatic, head and face were normal in appearance  Eyes: Sclera and conjunctiva were normal, pupils were equal in size, round, eyelids normal  ENT: Ears and nose were normal in appearance  Neck: Appearance was normal, neck was supple, No JVD  Pulmonary: Clear to auscultation bilaterally, no rales/crackles, or wheezing  Heart: Regular rate and rhythm, no murmurs, gallops, or pericardial rubs  Abdominal: Soft, nontender, nondistended. No appreciable hepatosplenomegaly. Bowel sounds normal  Skin: Normal color and intact without appreciable rash or abnormal skin lesion  Extremities: Warm without edema. No clubbing.  Pulse: 2+ radial pulse  Neuro: Oriented to person, place, and time    Lab/ Imaging:    LABS:                        9.0    7.63  )-----------( 253      ( 11 Nov 2024 05:04 )             28.9     11-11    135  |  96  |  18.0  ----------------------------<  112[H]  4.4   |  25.0  |  4.78[H]    Ca    8.9      11 Nov 2024 05:04  Phos  4.3     11-11  Mg     2.0     11-11    TPro  5.6[L]  /  Alb  3.1[L]  /  TBili  0.2[L]  /  DBili  x   /  AST  19  /  ALT  13  /  AlkPhos  108  11-11      Urinalysis Basic - ( 11 Nov 2024 05:04 )    Color: x / Appearance: x / SG: x / pH: x  Gluc: 112 mg/dL / Ketone: x  / Bili: x / Urobili: x   Blood: x / Protein: x / Nitrite: x   Leuk Esterase: x / RBC: x / WBC x   Sq Epi: x / Non Sq Epi: x / Bacteria: x      CAPILLARY BLOOD GLUCOSE  POCT Blood Glucose.: 105 mg/dL (11 Nov 2024 07:58)  POCT Blood Glucose.: 130 mg/dL (10 Nov 2024 21:46)  POCT Blood Glucose.: 123 mg/dL (10 Nov 2024 16:40)   SUBJECTIVE:    Chief Complaint: Patient is a 64y old  Female who presents with a chief complaint of Acute hypoxemic respiratory failure (11 Nov 2024 08:01)    BRIEF HOSPITAL COURSE:  64F former smoker, with PMHx of ESRD on HD (R permecath and immature LUE AVF), HTN, HLD, DM2, GERD, presented with chest and back discomfort and dyspnea. As per , pt is compliant with medications. CT chest noted pulmonary edema and B/L pleural effusions. Found to be hypoxemic with respiratory distress and started on BiPAP, now weaned off to 3L NC. Also found to be hypertensive emergency s/p nitro gtt. Now stable for downgrade to medicine.       MEDICATIONS  (STANDING):  calcium acetate 667 milliGRAM(s) Oral three times a day with meals  cloNIDine Patch 0.3 mG/24Hr(s) 1 patch Transdermal every 7 days  dextrose 5%. 1000 milliLiter(s) (50 mL/Hr) IV Continuous <Continuous>  dextrose 5%. 1000 milliLiter(s) (100 mL/Hr) IV Continuous <Continuous>  dextrose 50% Injectable 25 Gram(s) IV Push once  dextrose 50% Injectable 25 Gram(s) IV Push once  dextrose 50% Injectable 12.5 Gram(s) IV Push once  glucagon  Injectable 1 milliGRAM(s) IntraMuscular once  heparin   Injectable 5000 Unit(s) SubCutaneous every 8 hours  hydrALAZINE 100 milliGRAM(s) Oral every 8 hours  influenza   Vaccine 0.5 milliLiter(s) IntraMuscular once  insulin lispro (ADMELOG) corrective regimen sliding scale   SubCutaneous Before meals and at bedtime  isosorbide   mononitrate ER Tablet (IMDUR) 60 milliGRAM(s) Oral daily  labetalol 600 milliGRAM(s) Oral three times a day  pantoprazole  Injectable 40 milliGRAM(s) IV Push daily  torsemide 20 milliGRAM(s) Oral daily    MEDICATIONS  (PRN):  acetaminophen     Tablet .. 650 milliGRAM(s) Oral every 6 hours PRN Temp greater or equal to 38C (100.4F)  dextrose Oral Gel 15 Gram(s) Oral once PRN Blood Glucose LESS THAN 70 milliGRAM(s)/deciliter  hydrALAZINE Injectable 10 milliGRAM(s) IV Push every 4 hours PRN SBP>160      Allergies  No Known Allergies  Intolerances    REVIEW OF SYSTEMS:  CONSTITUTIONAL: No fever, weight loss, or fatigue  RESPIRATORY: + SOB, No cough, wheezing, chills or hemoptysis  CARDIOVASCULAR: No chest pain, palpitations, dizziness, or leg swelling  GASTROINTESTINAL: No abdominal or epigastric pain. No nausea, vomiting, or hematemesis; No diarrhea or constipation. No melena or hematochezia.  NEUROLOGICAL: No headaches, loss of strength, numbness, or tremors  MUSCULOSKELETAL: + Lower back pain, No joint pain or swelling; No muscle, back, or extremity pain    OBJECTIVE:  Vital Signs Last 24 Hrs  T(C): 36.7 (11 Nov 2024 08:55), Max: 37.4 (10 Nov 2024 17:30)  T(F): 98 (11 Nov 2024 08:55), Max: 99.4 (10 Nov 2024 17:30)  HR: 93 (11 Nov 2024 11:30) (91 - 108)  BP: 138/74 (11 Nov 2024 11:30) (105/59 - 191/94)  BP(mean): 94 (11 Nov 2024 11:30) (73 - 124)  RR: 16 (11 Nov 2024 11:30) (13 - 32)  SpO2: 100% (11 Nov 2024 11:30) (94% - 100%)    Parameters below as of 11 Nov 2024 04:00  Patient On (Oxygen Delivery Method): nasal cannula  O2 Flow (L/min): 3    PHYSICAL EXAM:  Constitutional: Alert, interactive, comfortable, NAD  Head and Face: Atraumatic, head and face were normal in appearance  Eyes: Sclera and conjunctiva were normal, pupils were equal in size, round, eyelids normal  ENT: + RIJ tunneled HD catheter, Ears and nose were normal in appearance  Neck: Appearance was normal, neck was supple, No JVD  Pulmonary: + B/L lung base crackles  Heart: + Tachycardia, Regular rhythm, no murmurs, gallops, or pericardial rubs  Abdominal: + Abdominal tenderness, +closed PD site, soft, nondistended. Bowel sounds normal  Skin: + Normal color and intact without appreciable rash or abnormal skin lesion  Extremities: LUE AVF, Warm without edema.   Pulse: 2+ radial pulse  Neuro: Oriented to person, place, and time    Lab/ Imaging:    LABS:                        9.0    7.63  )-----------( 253      ( 11 Nov 2024 05:04 )             28.9     11-11    135  |  96  |  18.0  ----------------------------<  112[H]  4.4   |  25.0  |  4.78[H]    Ca    8.9      11 Nov 2024 05:04  Phos  4.3     11-11  Mg     2.0     11-11    TPro  5.6[L]  /  Alb  3.1[L]  /  TBili  0.2[L]  /  DBili  x   /  AST  19  /  ALT  13  /  AlkPhos  108  11-11      Urinalysis Basic - ( 11 Nov 2024 05:04 )    Color: x / Appearance: x / SG: x / pH: x  Gluc: 112 mg/dL / Ketone: x  / Bili: x / Urobili: x   Blood: x / Protein: x / Nitrite: x   Leuk Esterase: x / RBC: x / WBC x   Sq Epi: x / Non Sq Epi: x / Bacteria: x      CAPILLARY BLOOD GLUCOSE  POCT Blood Glucose.: 105 mg/dL (11 Nov 2024 07:58)  POCT Blood Glucose.: 130 mg/dL (10 Nov 2024 21:46)  POCT Blood Glucose.: 123 mg/dL (10 Nov 2024 16:40)   SUBJECTIVE:    Chief Complaint: Patient is a 64y old  Female who presents with a chief complaint of Acute hypoxemic respiratory failure (11 Nov 2024 08:01)    BRIEF HOSPITAL COURSE:  64F former smoker, with PMHx of ESRD on HD (R permecath and immature LUE AVF), failed PD due to peritonitis, HTN, HLD, DM2, GIB, GERD, recent admission 9/2024 to MICU for AHRF 2/2 to pulm edema, presented with chest and back discomfort and dyspnea. As per , pt is compliant with medications. CT chest noted pulmonary edema and B/L pleural effusions. Found to be hypoxemic with respiratory distress and started on BiPAP, now weaned off to 3L NC. Also found to be hypertensive emergency s/p nitro gtt. Now stable for downgrade to medicine.     MEDICATIONS  (STANDING):  calcium acetate 667 milliGRAM(s) Oral three times a day with meals  cloNIDine Patch 0.3 mG/24Hr(s) 1 patch Transdermal every 7 days  dextrose 5%. 1000 milliLiter(s) (50 mL/Hr) IV Continuous <Continuous>  dextrose 5%. 1000 milliLiter(s) (100 mL/Hr) IV Continuous <Continuous>  dextrose 50% Injectable 25 Gram(s) IV Push once  dextrose 50% Injectable 25 Gram(s) IV Push once  dextrose 50% Injectable 12.5 Gram(s) IV Push once  glucagon  Injectable 1 milliGRAM(s) IntraMuscular once  heparin   Injectable 5000 Unit(s) SubCutaneous every 8 hours  hydrALAZINE 100 milliGRAM(s) Oral every 8 hours  influenza   Vaccine 0.5 milliLiter(s) IntraMuscular once  insulin lispro (ADMELOG) corrective regimen sliding scale   SubCutaneous Before meals and at bedtime  isosorbide   mononitrate ER Tablet (IMDUR) 60 milliGRAM(s) Oral daily  labetalol 600 milliGRAM(s) Oral three times a day  pantoprazole  Injectable 40 milliGRAM(s) IV Push daily  torsemide 20 milliGRAM(s) Oral daily    MEDICATIONS  (PRN):  acetaminophen     Tablet .. 650 milliGRAM(s) Oral every 6 hours PRN Temp greater or equal to 38C (100.4F)  dextrose Oral Gel 15 Gram(s) Oral once PRN Blood Glucose LESS THAN 70 milliGRAM(s)/deciliter  hydrALAZINE Injectable 10 milliGRAM(s) IV Push every 4 hours PRN SBP>160      Allergies  No Known Allergies  Intolerances    REVIEW OF SYSTEMS:  CONSTITUTIONAL: No fever, weight loss, or fatigue  RESPIRATORY: + SOB, No cough, wheezing, chills or hemoptysis  CARDIOVASCULAR: No chest pain, palpitations, dizziness, or leg swelling  GASTROINTESTINAL: No abdominal or epigastric pain. No nausea, vomiting, or hematemesis; No diarrhea or constipation. No melena or hematochezia.  NEUROLOGICAL: No headaches, loss of strength, numbness, or tremors  MUSCULOSKELETAL: + Lower back pain, No joint pain or swelling; No muscle, back, or extremity pain    OBJECTIVE:  Vital Signs Last 24 Hrs  T(C): 36.7 (11 Nov 2024 08:55), Max: 37.4 (10 Nov 2024 17:30)  T(F): 98 (11 Nov 2024 08:55), Max: 99.4 (10 Nov 2024 17:30)  HR: 93 (11 Nov 2024 11:30) (91 - 108)  BP: 138/74 (11 Nov 2024 11:30) (105/59 - 191/94)  BP(mean): 94 (11 Nov 2024 11:30) (73 - 124)  RR: 16 (11 Nov 2024 11:30) (13 - 32)  SpO2: 100% (11 Nov 2024 11:30) (94% - 100%)    Parameters below as of 11 Nov 2024 04:00  Patient On (Oxygen Delivery Method): nasal cannula  O2 Flow (L/min): 3    PHYSICAL EXAM:  Constitutional: Alert, interactive, comfortable, NAD  Head and Face: Atraumatic, head and face were normal in appearance  Eyes: Sclera and conjunctiva were normal, pupils were equal in size, round, eyelids normal  ENT: + RIJ tunneled HD catheter, Ears and nose were normal in appearance  Neck: Appearance was normal, neck was supple, No JVD  Pulmonary: + B/L lung base crackles  Heart: + Tachycardia, Regular rhythm, no murmurs, gallops, or pericardial rubs  Abdominal: + Abdominal tenderness, +closed PD site, soft, nondistended. Bowel sounds normal  Skin: + Normal color and intact without appreciable rash or abnormal skin lesion  Extremities: LUE AVF, Warm without edema.   Pulse: 2+ radial pulse  Neuro: Oriented to person, place, and time    Lab/ Imaging:    LABS:                        9.0    7.63  )-----------( 253      ( 11 Nov 2024 05:04 )             28.9     11-11    135  |  96  |  18.0  ----------------------------<  112[H]  4.4   |  25.0  |  4.78[H]    Ca    8.9      11 Nov 2024 05:04  Phos  4.3     11-11  Mg     2.0     11-11    TPro  5.6[L]  /  Alb  3.1[L]  /  TBili  0.2[L]  /  DBili  x   /  AST  19  /  ALT  13  /  AlkPhos  108  11-11      Urinalysis Basic - ( 11 Nov 2024 05:04 )    Color: x / Appearance: x / SG: x / pH: x  Gluc: 112 mg/dL / Ketone: x  / Bili: x / Urobili: x   Blood: x / Protein: x / Nitrite: x   Leuk Esterase: x / RBC: x / WBC x   Sq Epi: x / Non Sq Epi: x / Bacteria: x      CAPILLARY BLOOD GLUCOSE  POCT Blood Glucose.: 105 mg/dL (11 Nov 2024 07:58)  POCT Blood Glucose.: 130 mg/dL (10 Nov 2024 21:46)  POCT Blood Glucose.: 123 mg/dL (10 Nov 2024 16:40)   SUBJECTIVE:    Chief Complaint: Patient is a 64y old  Female who presents with a chief complaint of Acute hypoxemic respiratory failure (11 Nov 2024 08:01)    BRIEF HOSPITAL COURSE:  64F former smoker, with PMHx of ESRD on HD (R permecath and immature LUE AVF), failed PD due to peritonitis, HTN, HLD, DM2, GIB, GERD, recent admission 9/2024 to MICU for AHRF 2/2 to pulm edema, presented with chest and back discomfort and dyspnea. As per , pt is compliant with medications. CT chest noted pulmonary edema and B/L pleural effusions. Found to be hypoxemic with respiratory distress and started on BiPAP, now weaned off to 3L NC. Also found to be hypertensive emergency s/p nitro gtt. Nephrology consulted for HD, received HD yesterday 11/10 and today 11/11. Now stable for downgrade to medicine.     MEDICATIONS  (STANDING):  calcium acetate 667 milliGRAM(s) Oral three times a day with meals  cloNIDine Patch 0.3 mG/24Hr(s) 1 patch Transdermal every 7 days  dextrose 5%. 1000 milliLiter(s) (50 mL/Hr) IV Continuous <Continuous>  dextrose 5%. 1000 milliLiter(s) (100 mL/Hr) IV Continuous <Continuous>  dextrose 50% Injectable 25 Gram(s) IV Push once  dextrose 50% Injectable 25 Gram(s) IV Push once  dextrose 50% Injectable 12.5 Gram(s) IV Push once  glucagon  Injectable 1 milliGRAM(s) IntraMuscular once  heparin   Injectable 5000 Unit(s) SubCutaneous every 8 hours  hydrALAZINE 100 milliGRAM(s) Oral every 8 hours  influenza   Vaccine 0.5 milliLiter(s) IntraMuscular once  insulin lispro (ADMELOG) corrective regimen sliding scale   SubCutaneous Before meals and at bedtime  isosorbide   mononitrate ER Tablet (IMDUR) 60 milliGRAM(s) Oral daily  labetalol 600 milliGRAM(s) Oral three times a day  pantoprazole  Injectable 40 milliGRAM(s) IV Push daily  torsemide 20 milliGRAM(s) Oral daily    MEDICATIONS  (PRN):  acetaminophen     Tablet .. 650 milliGRAM(s) Oral every 6 hours PRN Temp greater or equal to 38C (100.4F)  dextrose Oral Gel 15 Gram(s) Oral once PRN Blood Glucose LESS THAN 70 milliGRAM(s)/deciliter  hydrALAZINE Injectable 10 milliGRAM(s) IV Push every 4 hours PRN SBP>160      Allergies  No Known Allergies  Intolerances    REVIEW OF SYSTEMS:  CONSTITUTIONAL: No fever, weight loss, or fatigue  RESPIRATORY: + SOB, No cough, wheezing, chills or hemoptysis  CARDIOVASCULAR: No chest pain, palpitations, dizziness, or leg swelling  GASTROINTESTINAL: No abdominal or epigastric pain. No nausea, vomiting, or hematemesis; No diarrhea or constipation. No melena or hematochezia.  NEUROLOGICAL: No headaches, loss of strength, numbness, or tremors  MUSCULOSKELETAL: + Lower back pain, No joint pain or swelling; No muscle, back, or extremity pain    OBJECTIVE:  Vital Signs Last 24 Hrs  T(C): 36.7 (11 Nov 2024 08:55), Max: 37.4 (10 Nov 2024 17:30)  T(F): 98 (11 Nov 2024 08:55), Max: 99.4 (10 Nov 2024 17:30)  HR: 93 (11 Nov 2024 11:30) (91 - 108)  BP: 138/74 (11 Nov 2024 11:30) (105/59 - 191/94)  BP(mean): 94 (11 Nov 2024 11:30) (73 - 124)  RR: 16 (11 Nov 2024 11:30) (13 - 32)  SpO2: 100% (11 Nov 2024 11:30) (94% - 100%)    Parameters below as of 11 Nov 2024 04:00  Patient On (Oxygen Delivery Method): nasal cannula  O2 Flow (L/min): 3    PHYSICAL EXAM:  Constitutional: Alert, interactive, comfortable, NAD  Head and Face: Atraumatic, head and face were normal in appearance  Eyes: Sclera and conjunctiva were normal, pupils were equal in size, round, eyelids normal  ENT: + RIJ tunneled HD catheter, Ears and nose were normal in appearance  Neck: Appearance was normal, neck was supple, No JVD  Pulmonary: + B/L lung base crackles  Heart: + Tachycardia, Regular rhythm, no murmurs, gallops, or pericardial rubs  Abdominal: + Abdominal tenderness, +closed PD site, soft, nondistended. Bowel sounds normal  Skin: + Normal color and intact without appreciable rash or abnormal skin lesion  Extremities: LUE AVF, Warm without edema.   Pulse: 2+ radial pulse  Neuro: Oriented to person, place, and time    Lab/ Imaging:    LABS:                        9.0    7.63  )-----------( 253      ( 11 Nov 2024 05:04 )             28.9     11-11    135  |  96  |  18.0  ----------------------------<  112[H]  4.4   |  25.0  |  4.78[H]    Ca    8.9      11 Nov 2024 05:04  Phos  4.3     11-11  Mg     2.0     11-11    TPro  5.6[L]  /  Alb  3.1[L]  /  TBili  0.2[L]  /  DBili  x   /  AST  19  /  ALT  13  /  AlkPhos  108  11-11      Urinalysis Basic - ( 11 Nov 2024 05:04 )    Color: x / Appearance: x / SG: x / pH: x  Gluc: 112 mg/dL / Ketone: x  / Bili: x / Urobili: x   Blood: x / Protein: x / Nitrite: x   Leuk Esterase: x / RBC: x / WBC x   Sq Epi: x / Non Sq Epi: x / Bacteria: x      CAPILLARY BLOOD GLUCOSE  POCT Blood Glucose.: 105 mg/dL (11 Nov 2024 07:58)  POCT Blood Glucose.: 130 mg/dL (10 Nov 2024 21:46)  POCT Blood Glucose.: 123 mg/dL (10 Nov 2024 16:40)

## 2024-11-11 NOTE — PROGRESS NOTE ADULT - ASSESSMENT
65 y/o F with a h/o ESRD on HD (right chest wall permacath, immature LUE AVF), HTN, HLD, DM2, pericardial effusion, presents to the ED with complaints of chest/back discomfort and dyspnea     ESRD on HD typically MWF  Now in ICU w resp failure/ acute pulmonary edema/ B/L pleural effusions - better  Pt goes to [facility] in [place] was last there Fri she tells me- > required urgent HD on 11/10  had HD yest will arrange HD again today  Consent in chart    Was requiring  BiPAP for hypoxemia and respiratory distress--> now off Bipap but still has CESAR    Severe refractory HTN was on Nitro ggt--> some improvement will cont to adjust meds after UF removal w HD    Discussed w ICU team, will follow

## 2024-11-12 LAB
ANION GAP SERPL CALC-SCNC: 14 MMOL/L — SIGNIFICANT CHANGE UP (ref 5–17)
BASOPHILS # BLD AUTO: 0.07 K/UL — SIGNIFICANT CHANGE UP (ref 0–0.2)
BASOPHILS NFR BLD AUTO: 1 % — SIGNIFICANT CHANGE UP (ref 0–2)
BUN SERPL-MCNC: 19.8 MG/DL — SIGNIFICANT CHANGE UP (ref 8–20)
CALCIUM SERPL-MCNC: 9 MG/DL — SIGNIFICANT CHANGE UP (ref 8.4–10.5)
CHLORIDE SERPL-SCNC: 97 MMOL/L — SIGNIFICANT CHANGE UP (ref 96–108)
CO2 SERPL-SCNC: 26 MMOL/L — SIGNIFICANT CHANGE UP (ref 22–29)
CREAT SERPL-MCNC: 4.5 MG/DL — HIGH (ref 0.5–1.3)
EGFR: 10 ML/MIN/1.73M2 — LOW
EOSINOPHIL # BLD AUTO: 0.48 K/UL — SIGNIFICANT CHANGE UP (ref 0–0.5)
EOSINOPHIL NFR BLD AUTO: 7 % — HIGH (ref 0–6)
GLUCOSE BLDC GLUCOMTR-MCNC: 100 MG/DL — HIGH (ref 70–99)
GLUCOSE BLDC GLUCOMTR-MCNC: 106 MG/DL — HIGH (ref 70–99)
GLUCOSE BLDC GLUCOMTR-MCNC: 213 MG/DL — HIGH (ref 70–99)
GLUCOSE BLDC GLUCOMTR-MCNC: 83 MG/DL — SIGNIFICANT CHANGE UP (ref 70–99)
GLUCOSE SERPL-MCNC: 115 MG/DL — HIGH (ref 70–99)
HCT VFR BLD CALC: 32.5 % — LOW (ref 34.5–45)
HGB BLD-MCNC: 10.3 G/DL — LOW (ref 11.5–15.5)
IMM GRANULOCYTES NFR BLD AUTO: 0.4 % — SIGNIFICANT CHANGE UP (ref 0–0.9)
LYMPHOCYTES # BLD AUTO: 1.74 K/UL — SIGNIFICANT CHANGE UP (ref 1–3.3)
LYMPHOCYTES # BLD AUTO: 25.2 % — SIGNIFICANT CHANGE UP (ref 13–44)
MAGNESIUM SERPL-MCNC: 2.1 MG/DL — SIGNIFICANT CHANGE UP (ref 1.6–2.6)
MCHC RBC-ENTMCNC: 26.6 PG — LOW (ref 27–34)
MCHC RBC-ENTMCNC: 31.7 G/DL — LOW (ref 32–36)
MCV RBC AUTO: 84 FL — SIGNIFICANT CHANGE UP (ref 80–100)
MONOCYTES # BLD AUTO: 0.63 K/UL — SIGNIFICANT CHANGE UP (ref 0–0.9)
MONOCYTES NFR BLD AUTO: 9.1 % — SIGNIFICANT CHANGE UP (ref 2–14)
NEUTROPHILS # BLD AUTO: 3.95 K/UL — SIGNIFICANT CHANGE UP (ref 1.8–7.4)
NEUTROPHILS NFR BLD AUTO: 57.3 % — SIGNIFICANT CHANGE UP (ref 43–77)
PHOSPHATE SERPL-MCNC: 4.9 MG/DL — HIGH (ref 2.4–4.7)
PLATELET # BLD AUTO: 258 K/UL — SIGNIFICANT CHANGE UP (ref 150–400)
POTASSIUM SERPL-MCNC: 4.1 MMOL/L — SIGNIFICANT CHANGE UP (ref 3.5–5.3)
POTASSIUM SERPL-SCNC: 4.1 MMOL/L — SIGNIFICANT CHANGE UP (ref 3.5–5.3)
RBC # BLD: 3.87 M/UL — SIGNIFICANT CHANGE UP (ref 3.8–5.2)
RBC # FLD: 17.1 % — HIGH (ref 10.3–14.5)
SODIUM SERPL-SCNC: 136 MMOL/L — SIGNIFICANT CHANGE UP (ref 135–145)
WBC # BLD: 6.9 K/UL — SIGNIFICANT CHANGE UP (ref 3.8–10.5)
WBC # FLD AUTO: 6.9 K/UL — SIGNIFICANT CHANGE UP (ref 3.8–10.5)

## 2024-11-12 PROCEDURE — 99222 1ST HOSP IP/OBS MODERATE 55: CPT | Mod: GC

## 2024-11-12 PROCEDURE — 99232 SBSQ HOSP IP/OBS MODERATE 35: CPT

## 2024-11-12 RX ADMIN — PANTOPRAZOLE SODIUM 40 MILLIGRAM(S): 40 TABLET, DELAYED RELEASE ORAL at 07:37

## 2024-11-12 RX ADMIN — HYDRALAZINE HYDROCHLORIDE 100 MILLIGRAM(S): 50 TABLET, FILM COATED ORAL at 05:15

## 2024-11-12 RX ADMIN — CALCIUM ACETATE 667 MILLIGRAM(S): 667 CAPSULE ORAL at 07:37

## 2024-11-12 RX ADMIN — Medication 4: at 22:20

## 2024-11-12 RX ADMIN — TORSEMIDE 20 MILLIGRAM(S): 100 TABLET ORAL at 05:14

## 2024-11-12 RX ADMIN — CALCIUM ACETATE 667 MILLIGRAM(S): 667 CAPSULE ORAL at 12:56

## 2024-11-12 RX ADMIN — Medication 10 GRAM(S): at 17:39

## 2024-11-12 RX ADMIN — Medication 600 MILLIGRAM(S): at 22:22

## 2024-11-12 RX ADMIN — CALCIUM ACETATE 667 MILLIGRAM(S): 667 CAPSULE ORAL at 17:39

## 2024-11-12 RX ADMIN — HYDRALAZINE HYDROCHLORIDE 100 MILLIGRAM(S): 50 TABLET, FILM COATED ORAL at 22:22

## 2024-11-12 RX ADMIN — Medication 600 MILLIGRAM(S): at 05:15

## 2024-11-12 RX ADMIN — Medication 600 MILLIGRAM(S): at 13:00

## 2024-11-12 RX ADMIN — HYDRALAZINE HYDROCHLORIDE 100 MILLIGRAM(S): 50 TABLET, FILM COATED ORAL at 13:00

## 2024-11-12 RX ADMIN — ISOSORBIDE MONONITRATE 60 MILLIGRAM(S): 60 TABLET, EXTENDED RELEASE ORAL at 17:40

## 2024-11-12 RX ADMIN — CLONIDINE HYDROCHLORIDE 1 PATCH: 0.2 TABLET ORAL at 19:53

## 2024-11-12 NOTE — PROGRESS NOTE ADULT - SUBJECTIVE AND OBJECTIVE BOX
NEPHROLOGY INTERVAL HPI/OVERNIGHT EVENTS:    Examined earlier  Feeling better " Can I go home I feel better"  dyspnea resolved  Denies HA CP    MEDICATIONS  (STANDING):  calcium acetate 667 milliGRAM(s) Oral three times a day with meals  cloNIDine Patch 0.3 mG/24Hr(s) 1 patch Transdermal every 7 days  dextrose 5%. 1000 milliLiter(s) (100 mL/Hr) IV Continuous <Continuous>  dextrose 5%. 1000 milliLiter(s) (50 mL/Hr) IV Continuous <Continuous>  dextrose 50% Injectable 25 Gram(s) IV Push once  dextrose 50% Injectable 25 Gram(s) IV Push once  dextrose 50% Injectable 12.5 Gram(s) IV Push once  glucagon  Injectable 1 milliGRAM(s) IntraMuscular once  heparin   Injectable 5000 Unit(s) SubCutaneous every 8 hours  hydrALAZINE 100 milliGRAM(s) Oral every 8 hours  influenza   Vaccine 0.5 milliLiter(s) IntraMuscular once  insulin lispro (ADMELOG) corrective regimen sliding scale   SubCutaneous Before meals and at bedtime  isosorbide   mononitrate ER Tablet (IMDUR) 60 milliGRAM(s) Oral daily  labetalol 600 milliGRAM(s) Oral three times a day  lactulose Syrup 10 Gram(s) Oral daily  pantoprazole    Tablet 40 milliGRAM(s) Oral before breakfast  torsemide 20 milliGRAM(s) Oral daily    MEDICATIONS  (PRN):  acetaminophen     Tablet .. 650 milliGRAM(s) Oral every 6 hours PRN Temp greater or equal to 38C (100.4F)  dextrose Oral Gel 15 Gram(s) Oral once PRN Blood Glucose LESS THAN 70 milliGRAM(s)/deciliter  hydrALAZINE Injectable 10 milliGRAM(s) IV Push every 4 hours PRN SBP>160      Allergies    No Known Allergies    Intolerances        Vital Signs Last 24 Hrs  T(C): 36.9 (12 Nov 2024 11:27), Max: 37.2 (11 Nov 2024 15:46)  T(F): 98.5 (12 Nov 2024 11:27), Max: 99 (11 Nov 2024 15:46)  HR: 96 (12 Nov 2024 12:00) (88 - 103)  BP: 142/73 (12 Nov 2024 11:00) (106/64 - 155/78)  BP(mean): 94 (12 Nov 2024 11:00) (76 - 108)  RR: 14 (12 Nov 2024 12:00) (12 - 29)  SpO2: 100% (12 Nov 2024 12:00) (98% - 100%)    Parameters below as of 12 Nov 2024 08:00  Patient On (Oxygen Delivery Method): nasal cannula  O2 Flow (L/min): 3      PHYSICAL EXAM:  GENERAL: appears chronically ill, NAD  HEAD:  Atraumatic, Normocephalic  EYES: EOMI  NECK: Supple  NERVOUS SYSTEM:  Alert & Oriented X3  CHEST/LUNG: dec bs bilaterally  HEART: Regular rate and rhythm  ABDOMEN: Soft, Nontender, Nondistended; +BS  EXTREMITIES:  trace  edema, LUE AVF not ready for use maturing    LABS:                        10.3   6.90  )-----------( 258      ( 12 Nov 2024 04:25 )             32.5     11-12    136  |  97  |  19.8  ----------------------------<  115[H]  4.1   |  26.0  |  4.50[H]    Ca    9.0      12 Nov 2024 04:25  Phos  4.9     11-12  Mg     2.1     11-12    TPro  5.6[L]  /  Alb  3.1[L]  /  TBili  0.2[L]  /  DBili  x   /  AST  19  /  ALT  13  /  AlkPhos  108  11-11      Urinalysis Basic - ( 12 Nov 2024 04:25 )    Color: x / Appearance: x / SG: x / pH: x  Gluc: 115 mg/dL / Ketone: x  / Bili: x / Urobili: x   Blood: x / Protein: x / Nitrite: x   Leuk Esterase: x / RBC: x / WBC x   Sq Epi: x / Non Sq Epi: x / Bacteria: x      Magnesium: 2.1 mg/dL (11-12 @ 04:25)  Phosphorus: 4.9 mg/dL (11-12 @ 04:25)          RADIOLOGY & ADDITIONAL TESTS:

## 2024-11-12 NOTE — CONSULT NOTE ADULT - TIME BILLING
greater than 50% of time spent reviewing labs, notes, orders and radiographs, coordinating care  discussed with nursing, primary team, consultants

## 2024-11-12 NOTE — CONSULT NOTE ADULT - ATTENDING COMMENTS
63 y/o F with hx of ESRD (was previously on PD, failed due to peritonitis, transitioned to HD thru permacath), was in MICU for acute hypoxic respiratory failure 2/2 to pulmonary edema  s/p 2 urgent HD session with total of 4 L removal , clinically improved, off nitro drip, off bipap, on nasal canula, downgraded to medicine  Pulm consulted for pleural effusion  reviewed all previous CTs, pleural effusions are chronic , likely in the setting of long standing ESRD  Patient's respiratory symptoms improved with HD  no urgent need for drainage of the pleural effusion at this time  continue HD , continue diurectic  as needed

## 2024-11-12 NOTE — CONSULT NOTE ADULT - ASSESSMENT
64F former smoker, with PMHx of ESRD on HD (R permecath and immature LUE AVF), failed PD due to peritonitis, HTN, HLD, DM2, GIB, GERD, recent admission 9/2024 to MICU for AHRF 2/2 to pulm edema, presented with chest and back discomfort and dyspnea. CT chest noted pulmonary edema and B/L pleural effusions. Found to be hypoxemic with respiratory distress and started on BiPAP, now weaned off to 3L NC. 64F former smoker, with PMHx of ESRD on HD (R permecath and immature LUE AVF), failed PD due to peritonitis, HTN, HLD, DM2, GIB, GERD, recent admission 9/2024 to MICU for AHRF 2/2 to pulm edema, presented with chest and back discomfort and dyspnea. CT chest noted pulmonary edema and B/L pleural effusions. Found to be hypoxemic with respiratory distress and started on BiPAP, now weaned off to 3L NC.    Acute hypoxemic respiratory failure 2/2 to B/L pleural effusions and flash pulmonary edema, improving   - s/p BiPAP, now on 3L NC  - On 2L NC home supplemental O2 PRN since last admission due to unable to wean O2  - CT chest/A/P noted Mod pericardial effusion, small to mod B/L pleural effusions and pulm edema  - Monitor I/Os  - TTE with EF > 75%  - Reviewed previous CT scan and Echo from September 2024, with presence of B/L pleural effusions and pericardial effusion. Effusion only seems to have mildly increased since then therefore would hold on any thoracocentesis.  - Continue with diuresis with Torsemide 20 mg qd  - Continue with HD as scheduled    will follow, D/w attending Dr. Huerta

## 2024-11-12 NOTE — CONSULT NOTE ADULT - SUBJECTIVE AND OBJECTIVE BOX
PULMONARY CONSULT NOTE      ADALID CASE [id number]    Patient is a 64y old  Female who presents with a chief complaint of Acute hypoxemic respiratory failure (2024 15:26)      HISTORY OF PRESENT ILLNESS: 64F former smoker, with PMHx of ESRD on HD (R permecath and immature LUE AVF), failed PD due to peritonitis, HTN, HLD, DM2, GIB, GERD, recent admission 2024 to MICU for AHRF 2/2 to pulm edema, presented with chest and back discomfort and dyspnea. As per , pt is compliant with medications. CT chest noted pulmonary edema and B/L pleural effusions. Found to be hypoxemic with respiratory distress and started on BiPAP, now weaned off to 3L NC. Also found to be hypertensive emergency s/p nitro gtt. Nephrology consulted for HD, received HD yesterday 11/10 and today . Now stable for downgrade to medicine.     Patient seen and examined at bedside. Denies          MEDICATIONS  (STANDING):  calcium acetate 667 milliGRAM(s) Oral three times a day with meals  cloNIDine Patch 0.3 mG/24Hr(s) 1 patch Transdermal every 7 days  dextrose 5%. 1000 milliLiter(s) (50 mL/Hr) IV Continuous <Continuous>  dextrose 5%. 1000 milliLiter(s) (100 mL/Hr) IV Continuous <Continuous>  dextrose 50% Injectable 25 Gram(s) IV Push once  dextrose 50% Injectable 25 Gram(s) IV Push once  dextrose 50% Injectable 12.5 Gram(s) IV Push once  glucagon  Injectable 1 milliGRAM(s) IntraMuscular once  heparin   Injectable 5000 Unit(s) SubCutaneous every 8 hours  hydrALAZINE 100 milliGRAM(s) Oral every 8 hours  influenza   Vaccine 0.5 milliLiter(s) IntraMuscular once  insulin lispro (ADMELOG) corrective regimen sliding scale   SubCutaneous Before meals and at bedtime  isosorbide   mononitrate ER Tablet (IMDUR) 60 milliGRAM(s) Oral daily  labetalol 600 milliGRAM(s) Oral three times a day  lactulose Syrup 10 Gram(s) Oral daily  pantoprazole    Tablet 40 milliGRAM(s) Oral before breakfast  torsemide 20 milliGRAM(s) Oral daily      MEDICATIONS  (PRN):  acetaminophen     Tablet .. 650 milliGRAM(s) Oral every 6 hours PRN Temp greater or equal to 38C (100.4F)  dextrose Oral Gel 15 Gram(s) Oral once PRN Blood Glucose LESS THAN 70 milliGRAM(s)/deciliter  hydrALAZINE Injectable 10 milliGRAM(s) IV Push every 4 hours PRN SBP>160      Allergies    No Known Allergies    Intolerances        PAST MEDICAL & SURGICAL HISTORY:  HTN (hypertension)      DM (diabetes mellitus)      HLD (hyperlipidemia)      Overactive thyroid gland      ESRD on dialysis  dialyssis M/W/F      S/P  section      S/P arteriovenous (AV) fistula creation  fistula is still maturing (fistula not available for dialysis use yet)          FAMILY HISTORY:  Family history of breast cancer in female (Aunt)    Family history of prostate cancer (Uncle)    Family history of hypertension (Father, Mother)        SOCIAL HISTORY  Smoking History:     REVIEW OF SYSTEMS:    Vital Signs Last 24 Hrs  T(C): 36.9 (2024 11:27), Max: 37.2 (2024 15:46)  T(F): 98.5 (2024 11:27), Max: 99 (2024 15:46)  HR: 88 (2024 11:00) (88 - 103)  BP: 142/73 (2024 11:00) (106/64 - 155/78)  BP(mean): 94 (2024 11:00) (76 - 108)  RR: 18 (2024 11:00) (12 - 29)  SpO2: 98% (2024 11:00) (98% - 100%)    Parameters below as of 2024 08:00  Patient On (Oxygen Delivery Method): nasal cannula  O2 Flow (L/min): 3      PHYSICAL EXAMINATION:  GENERAL: no acute distress, comfortably in bed, alert, interactive  HEAD: NC/AT  EYES: PERRLA, EOMI, Non-icteric  ENT: Mucous membranes moist, neck supple, Tunneled RIJ HD catheter, no erythema, redness, skin breakdown at insertion site.  NEURO: No focal deficits, moving all extremities spontaneously, A&Ox3, no dysarthria, CN II-XII grossly intact  PSYCH: Normal affect, calm, appropriate insight and judgment, fluent speech  RESP: Dec sounds b/l bases, 2LNC,   CVS: RRR, no murmur appreciated  ABD: Soft, non-tender, non-distended, no organomegaly, no appreciable masses, +bs all 4 quadrants, +closed PD site  EXTREMITIES: Nontender, no clubbing, cyanosis, or edema, LUE AVF w/ palpable thrill  SKIN: No rashes or lesions      LABS:                        10.3   6.90  )-----------( 258      ( 2024 04:25 )             32.5     11-12    136  |  97  |  19.8  ----------------------------<  115[H]  4.1   |  26.0  |  4.50[H]    Ca    9.0      2024 04:25  Phos  4.9     -12  Mg     2.1     -12    TPro  5.6[L]  /  Alb  3.1[L]  /  TBili  0.2[L]  /  DBili  x   /  AST  19  /  ALT  13  /  AlkPhos  108  11-11      Urinalysis Basic - ( 2024 04:25 )    Color: x / Appearance: x / SG: x / pH: x  Gluc: 115 mg/dL / Ketone: x  / Bili: x / Urobili: x   Blood: x / Protein: x / Nitrite: x   Leuk Esterase: x / RBC: x / WBC x   Sq Epi: x / Non Sq Epi: x / Bacteria: x                      MICROBIOLOGY:    RADIOLOGY & ADDITIONAL STUDIES: PULMONARY CONSULT NOTE      ADALID CASE [id number]    Patient is a 64y old  Female who presents with a chief complaint of Acute hypoxemic respiratory failure (2024 15:26)      HISTORY OF PRESENT ILLNESS: 64F former smoker, with PMHx of ESRD on HD (R permecath and immature LUE AVF), failed PD due to peritonitis, HTN, HLD, DM2, GIB, GERD, recent admission 2024 to MICU for AHRF 2/2 to pulm edema, presented with chest and back discomfort and dyspnea. As per , pt is compliant with medications. CT chest noted pulmonary edema and B/L pleural effusions. Found to be hypoxemic with respiratory distress and started on BiPAP, now weaned off to 3L NC. Also found to be hypertensive emergency s/p nitro gtt. Nephrology consulted for HD, received HD yesterday 11/10 and today . Now stable for downgrade to medicine.     Patient seen and examined at bedside. Feels better, wants to go home. Reports dyspnea has resolved. No chest pain, cough. Got 4L removed in the past 2 days, with next one scheduled tomorrow. Currently on 2L O2 via NC. Reports she uses O2 PRN at home.      MEDICATIONS  (STANDING):  calcium acetate 667 milliGRAM(s) Oral three times a day with meals  cloNIDine Patch 0.3 mG/24Hr(s) 1 patch Transdermal every 7 days  dextrose 5%. 1000 milliLiter(s) (50 mL/Hr) IV Continuous <Continuous>  dextrose 5%. 1000 milliLiter(s) (100 mL/Hr) IV Continuous <Continuous>  dextrose 50% Injectable 25 Gram(s) IV Push once  dextrose 50% Injectable 25 Gram(s) IV Push once  dextrose 50% Injectable 12.5 Gram(s) IV Push once  glucagon  Injectable 1 milliGRAM(s) IntraMuscular once  heparin   Injectable 5000 Unit(s) SubCutaneous every 8 hours  hydrALAZINE 100 milliGRAM(s) Oral every 8 hours  influenza   Vaccine 0.5 milliLiter(s) IntraMuscular once  insulin lispro (ADMELOG) corrective regimen sliding scale   SubCutaneous Before meals and at bedtime  isosorbide   mononitrate ER Tablet (IMDUR) 60 milliGRAM(s) Oral daily  labetalol 600 milliGRAM(s) Oral three times a day  lactulose Syrup 10 Gram(s) Oral daily  pantoprazole    Tablet 40 milliGRAM(s) Oral before breakfast  torsemide 20 milliGRAM(s) Oral daily      MEDICATIONS  (PRN):  acetaminophen     Tablet .. 650 milliGRAM(s) Oral every 6 hours PRN Temp greater or equal to 38C (100.4F)  dextrose Oral Gel 15 Gram(s) Oral once PRN Blood Glucose LESS THAN 70 milliGRAM(s)/deciliter  hydrALAZINE Injectable 10 milliGRAM(s) IV Push every 4 hours PRN SBP>160      Allergies    No Known Allergies    Intolerances        PAST MEDICAL & SURGICAL HISTORY:  HTN (hypertension)      DM (diabetes mellitus)      HLD (hyperlipidemia)      Overactive thyroid gland      ESRD on dialysis  dialyssis M/W/F      S/P  section      S/P arteriovenous (AV) fistula creation  fistula is still maturing (fistula not available for dialysis use yet)          FAMILY HISTORY:  Family history of breast cancer in female (Aunt)    Family history of prostate cancer (Uncle)    Family history of hypertension (Father, Mother)        SOCIAL HISTORY  Smoking History:     REVIEW OF SYSTEMS: Denies Chest pain, abdominal pain, shortness of breath, fevers, chills, nausea, vomiting, diarrhea, dysuria, headache, dizziness.    Vital Signs Last 24 Hrs  T(C): 36.9 (2024 11:27), Max: 37.2 (2024 15:46)  T(F): 98.5 (2024 11:27), Max: 99 (2024 15:46)  HR: 88 (2024 11:00) (88 - 103)  BP: 142/73 (2024 11:00) (106/64 - 155/78)  BP(mean): 94 (2024 11:00) (76 - 108)  RR: 18 (2024 11:00) (12 - 29)  SpO2: 98% (2024 11:00) (98% - 100%)    Parameters below as of 2024 08:00  Patient On (Oxygen Delivery Method): nasal cannula  O2 Flow (L/min): 3      PHYSICAL EXAMINATION:  GENERAL: no acute distress, comfortably in bed, alert, interactive  HEAD: NC/AT  EYES: PERRLA, EOMI, Non-icteric  ENT: Mucous membranes moist, neck supple, Tunneled RIJ HD catheter, no erythema, redness, skin breakdown at insertion site.  NEURO: No focal deficits, moving all extremities spontaneously, A&Ox3, no dysarthria, CN II-XII grossly intact  PSYCH: Normal affect, calm, appropriate insight and judgment, fluent speech  RESP: Dec sounds b/l bases, some crackles, 2LNC,   CVS: RRR, no murmur appreciated  ABD: Soft, non-tender, non-distended, no organomegaly, no appreciable masses, +bs all 4 quadrants, +closed PD site  EXTREMITIES: Nontender, no clubbing, cyanosis, or edema, LUE AVF w/ palpable thrill  SKIN: No rashes or lesions      LABS:                        10.3   6.90  )-----------( 258      ( 2024 04:25 )             32.5     11-12    136  |  97  |  19.8  ----------------------------<  115[H]  4.1   |  26.0  |  4.50[H]    Ca    9.0      2024 04:25  Phos  4.9     11-12  Mg     2.1     11-12    TPro  5.6[L]  /  Alb  3.1[L]  /  TBili  0.2[L]  /  DBili  x   /  AST  19  /  ALT  13  /  AlkPhos  108  11-11      Urinalysis Basic - ( 2024 04:25 )    Color: x / Appearance: x / SG: x / pH: x  Gluc: 115 mg/dL / Ketone: x  / Bili: x / Urobili: x   Blood: x / Protein: x / Nitrite: x   Leuk Esterase: x / RBC: x / WBC x   Sq Epi: x / Non Sq Epi: x / Bacteria: x                      MICROBIOLOGY:    RADIOLOGY & ADDITIONAL STUDIES:

## 2024-11-12 NOTE — PROGRESS NOTE ADULT - ASSESSMENT
64F former smoker, with PMHx of ESRD on HD (R permecath and immature LUE AVF), failed PD due to peritonitis, HTN, HLD, DM2, GIB, GERD, recent admission 9/2024 to MICU for AHRF 2/2 to pulm edema, presented with chest and back discomfort and dyspnea. CT chest noted pulmonary edema and B/L pleural effusions. Found to be hypoxemic with respiratory distress and started on BiPAP, now weaned off to 3L NC. Also found to be hypertensive emergency s/p nitro gtt. Nephrology consulted for HD, received HD yesterday 11/10 and today 11/11. Now stable for downgrade to medicine.     PLAN  Acute hypoxemic respiratory failure 2/2 to B/L pleural effusions and flash pulmonary edema, improving   - s/p BiPAP, now on 3L NC  - On 2L NC home supplemental O2 PRN since last admission due to unable to wean O2  - CT chest/A/P noted Mod pericardial effusion, small to mod B/L pleural effusions and pulm edema  - c/w Torsemide 20mg QD  - Monitor I/Os  - RVP, COVID-19 PCR negative  - TTE reviewed  - Appreciate pulm recs    Pericardial effusion, stable   - CT as above, similar to prior     Hypertensive emergency/HTN  - s/p Nitro gtt  - c/w Clonidine patch 0.3  - c/w Hydralazine 100mg po TID w/ 10mg IVP Q4 PRN  - c/w Imdur 60mg QD  - c/w Labetalol 600mg TID  - Renal diet    ESRD on HD  - failed PD due to peritonitis  - Access R permacath and immature LUE AVF  - Outpatient schedule: [facility]  - HD center: [facility]   - Last HD Yesterday and plan for HD today (11/11/24)  - c/w Phoslo 667mg TID  - Nephrology following     Anemia of chronic disease  - Baseline Hbg 10-11  - H/H stable  - No signs of active bleed  - Monitor CBC    HLD  - pt is on Atorvastatin but unsure of dose. Tried calling emergency w/o success     DM2  - A1C 11/11/24: 4.9  - Held Januvia 25mg QD while inpatient  - ISS + Accu-check    GERD  - c/w protonix po 40mg QD    Constipation  - CT with Large rectal stool burden  - Pt had BM yesterday and today  - Resumed home lactulose     DVT ppx: Heparin SQ  Diet: Renal diet  Dispo: Likely tomorrow after HD. PT eval pending

## 2024-11-12 NOTE — PROGRESS NOTE ADULT - SUBJECTIVE AND OBJECTIVE BOX
Patient is a 64y old  Female who presents with a chief complaint of Acute hypoxemic respiratory failure (12 Nov 2024 12:51)    INTERVAL HPI/OVERNIGHT EVENTS: No acute events overnight. HD stable.    MEDICATIONS  (STANDING):  calcium acetate 667 milliGRAM(s) Oral three times a day with meals  cloNIDine Patch 0.3 mG/24Hr(s) 1 patch Transdermal every 7 days  dextrose 5%. 1000 milliLiter(s) (100 mL/Hr) IV Continuous <Continuous>  dextrose 5%. 1000 milliLiter(s) (50 mL/Hr) IV Continuous <Continuous>  dextrose 50% Injectable 25 Gram(s) IV Push once  dextrose 50% Injectable 12.5 Gram(s) IV Push once  dextrose 50% Injectable 25 Gram(s) IV Push once  glucagon  Injectable 1 milliGRAM(s) IntraMuscular once  heparin   Injectable 5000 Unit(s) SubCutaneous every 8 hours  hydrALAZINE 100 milliGRAM(s) Oral every 8 hours  influenza   Vaccine 0.5 milliLiter(s) IntraMuscular once  insulin lispro (ADMELOG) corrective regimen sliding scale   SubCutaneous Before meals and at bedtime  isosorbide   mononitrate ER Tablet (IMDUR) 60 milliGRAM(s) Oral daily  labetalol 600 milliGRAM(s) Oral three times a day  lactulose Syrup 10 Gram(s) Oral daily  pantoprazole    Tablet 40 milliGRAM(s) Oral before breakfast  torsemide 20 milliGRAM(s) Oral daily    MEDICATIONS  (PRN):  acetaminophen     Tablet .. 650 milliGRAM(s) Oral every 6 hours PRN Temp greater or equal to 38C (100.4F)  dextrose Oral Gel 15 Gram(s) Oral once PRN Blood Glucose LESS THAN 70 milliGRAM(s)/deciliter  hydrALAZINE Injectable 10 milliGRAM(s) IV Push every 4 hours PRN SBP>160      Allergies    No Known Allergies    Intolerances        REVIEW OF SYSTEMS: all negative with exception of above    Vital Signs Last 24 Hrs  T(C): 36.9 (12 Nov 2024 11:27), Max: 37.2 (11 Nov 2024 15:46)  T(F): 98.5 (12 Nov 2024 11:27), Max: 99 (11 Nov 2024 15:46)  HR: 99 (12 Nov 2024 14:00) (88 - 103)  BP: 151/91 (12 Nov 2024 14:00) (106/64 - 155/78)  BP(mean): 108 (12 Nov 2024 14:00) (76 - 108)  RR: 21 (12 Nov 2024 14:00) (12 - 25)  SpO2: 98% (12 Nov 2024 14:00) (98% - 100%)    Parameters below as of 12 Nov 2024 12:20  Patient On (Oxygen Delivery Method): nasal cannula  O2 Flow (L/min): 3      PHYSICAL EXAM:  Constitutional: Alert, interactive, comfortable, NAD  Head and Face: Atraumatic, head and face were normal in appearance  Eyes: Sclera and conjunctiva were normal, pupils were equal in size, round, eyelids normal  ENT: + RIJ tunneled HD catheter, Ears and nose were normal in appearance  Neck: Appearance was normal, neck was supple, No JVD  Pulmonary: + B/L lung base crackles  Heart: + Tachycardia, Regular rhythm, no murmurs, gallops, or pericardial rubs  Abdominal: + Abdominal tenderness, +closed PD site, soft, nondistended. Bowel sounds normal  Skin: + Normal color and intact without appreciable rash or abnormal skin lesion  Extremities: LUE AVF, Warm without edema.   Pulse: 2+ radial pulse  Neuro: Oriented to person, place, and time    LABS:                        10.3   6.90  )-----------( 258      ( 12 Nov 2024 04:25 )             32.5     11-12    136  |  97  |  19.8  ----------------------------<  115[H]  4.1   |  26.0  |  4.50[H]    Ca    9.0      12 Nov 2024 04:25  Phos  4.9     11-12  Mg     2.1     11-12    TPro  5.6[L]  /  Alb  3.1[L]  /  TBili  0.2[L]  /  DBili  x   /  AST  19  /  ALT  13  /  AlkPhos  108  11-11      Urinalysis Basic - ( 12 Nov 2024 04:25 )    Color: x / Appearance: x / SG: x / pH: x  Gluc: 115 mg/dL / Ketone: x  / Bili: x / Urobili: x   Blood: x / Protein: x / Nitrite: x   Leuk Esterase: x / RBC: x / WBC x   Sq Epi: x / Non Sq Epi: x / Bacteria: x      CAPILLARY BLOOD GLUCOSE      POCT Blood Glucose.: 83 mg/dL (12 Nov 2024 11:23)  POCT Blood Glucose.: 106 mg/dL (12 Nov 2024 07:35)  POCT Blood Glucose.: 116 mg/dL (11 Nov 2024 21:51)      RADIOLOGY & ADDITIONAL TESTS:    Imaging Personally Reviewed:  [ ] YES  [ ] NO    Consultant(s) Notes Reviewed:  [ ] YES  [ ] NO    Care Discussed with Consultants/Other Providers [ ] YES  [ ] NO

## 2024-11-12 NOTE — PROGRESS NOTE ADULT - ASSESSMENT
65 y/o F with a h/o ESRD on HD (right chest wall permacath, immature LUE AVF), HTN, HLD, DM2, pericardial effusion, presents to the ED with complaints of chest/back discomfort and dyspnea     ESRD on HD typically MWF  Now in ICU w resp failure/ acute pulmonary edema/ B/L pleural effusions - better/ resoled  Pt goes to [facility] in [place] was last there Fri she tells me- > required urgent HD on 11/10 ha HD again yest  If still admitted will arrange HD in AM and cont on MWF schedule  Consent in chart    Was requiring  BiPAP for hypoxemia and respiratory distress--> now off Bipap dyspnea resolved    Severe refractory HTN was on Nitro ggt at admit--> some improvement will cont to adjust meds    Discussed w ICU team, will follow

## 2024-11-13 ENCOUNTER — TRANSCRIPTION ENCOUNTER (OUTPATIENT)
Age: 64
End: 2024-11-13

## 2024-11-13 VITALS
TEMPERATURE: 98 F | HEART RATE: 88 BPM | RESPIRATION RATE: 18 BRPM | OXYGEN SATURATION: 99 % | SYSTOLIC BLOOD PRESSURE: 145 MMHG | DIASTOLIC BLOOD PRESSURE: 72 MMHG

## 2024-11-13 LAB
ALBUMIN SERPL ELPH-MCNC: 2.9 G/DL — LOW (ref 3.3–5.2)
ALP SERPL-CCNC: 118 U/L — SIGNIFICANT CHANGE UP (ref 40–120)
ALT FLD-CCNC: 10 U/L — SIGNIFICANT CHANGE UP
ANION GAP SERPL CALC-SCNC: 17 MMOL/L — SIGNIFICANT CHANGE UP (ref 5–17)
AST SERPL-CCNC: 14 U/L — SIGNIFICANT CHANGE UP
BILIRUB SERPL-MCNC: 0.2 MG/DL — LOW (ref 0.4–2)
BUN SERPL-MCNC: 36.8 MG/DL — HIGH (ref 8–20)
CALCIUM SERPL-MCNC: 9.4 MG/DL — SIGNIFICANT CHANGE UP (ref 8.4–10.5)
CHLORIDE SERPL-SCNC: 91 MMOL/L — LOW (ref 96–108)
CO2 SERPL-SCNC: 23 MMOL/L — SIGNIFICANT CHANGE UP (ref 22–29)
CREAT SERPL-MCNC: 6.66 MG/DL — HIGH (ref 0.5–1.3)
EGFR: 7 ML/MIN/1.73M2 — LOW
GLUCOSE BLDC GLUCOMTR-MCNC: 111 MG/DL — HIGH (ref 70–99)
GLUCOSE BLDC GLUCOMTR-MCNC: 129 MG/DL — HIGH (ref 70–99)
GLUCOSE BLDC GLUCOMTR-MCNC: 205 MG/DL — HIGH (ref 70–99)
GLUCOSE SERPL-MCNC: 87 MG/DL — SIGNIFICANT CHANGE UP (ref 70–99)
HCT VFR BLD CALC: 32.2 % — LOW (ref 34.5–45)
HGB BLD-MCNC: 10.1 G/DL — LOW (ref 11.5–15.5)
MCHC RBC-ENTMCNC: 26.3 PG — LOW (ref 27–34)
MCHC RBC-ENTMCNC: 31.4 G/DL — LOW (ref 32–36)
MCV RBC AUTO: 83.9 FL — SIGNIFICANT CHANGE UP (ref 80–100)
PLATELET # BLD AUTO: 268 K/UL — SIGNIFICANT CHANGE UP (ref 150–400)
POTASSIUM SERPL-MCNC: 3.9 MMOL/L — SIGNIFICANT CHANGE UP (ref 3.5–5.3)
POTASSIUM SERPL-SCNC: 3.9 MMOL/L — SIGNIFICANT CHANGE UP (ref 3.5–5.3)
PROT SERPL-MCNC: 5.7 G/DL — LOW (ref 6.6–8.7)
RBC # BLD: 3.84 M/UL — SIGNIFICANT CHANGE UP (ref 3.8–5.2)
RBC # FLD: 17.1 % — HIGH (ref 10.3–14.5)
SODIUM SERPL-SCNC: 131 MMOL/L — LOW (ref 135–145)
WBC # BLD: 7.28 K/UL — SIGNIFICANT CHANGE UP (ref 3.8–10.5)
WBC # FLD AUTO: 7.28 K/UL — SIGNIFICANT CHANGE UP (ref 3.8–10.5)

## 2024-11-13 PROCEDURE — 83605 ASSAY OF LACTIC ACID: CPT

## 2024-11-13 PROCEDURE — 84484 ASSAY OF TROPONIN QUANT: CPT

## 2024-11-13 PROCEDURE — 74174 CTA ABD&PLVS W/CONTRAST: CPT | Mod: MC

## 2024-11-13 PROCEDURE — 82550 ASSAY OF CK (CPK): CPT

## 2024-11-13 PROCEDURE — 99261: CPT

## 2024-11-13 PROCEDURE — 80053 COMPREHEN METABOLIC PANEL: CPT

## 2024-11-13 PROCEDURE — 82803 BLOOD GASES ANY COMBINATION: CPT

## 2024-11-13 PROCEDURE — 94660 CPAP INITIATION&MGMT: CPT

## 2024-11-13 PROCEDURE — 99232 SBSQ HOSP IP/OBS MODERATE 35: CPT | Mod: GC

## 2024-11-13 PROCEDURE — 84132 ASSAY OF SERUM POTASSIUM: CPT

## 2024-11-13 PROCEDURE — 82330 ASSAY OF CALCIUM: CPT

## 2024-11-13 PROCEDURE — 83036 HEMOGLOBIN GLYCOSYLATED A1C: CPT

## 2024-11-13 PROCEDURE — 85025 COMPLETE CBC W/AUTO DIFF WBC: CPT

## 2024-11-13 PROCEDURE — 94760 N-INVAS EAR/PLS OXIMETRY 1: CPT

## 2024-11-13 PROCEDURE — 85027 COMPLETE CBC AUTOMATED: CPT

## 2024-11-13 PROCEDURE — 85014 HEMATOCRIT: CPT

## 2024-11-13 PROCEDURE — 0225U NFCT DS DNA&RNA 21 SARSCOV2: CPT

## 2024-11-13 PROCEDURE — 82962 GLUCOSE BLOOD TEST: CPT

## 2024-11-13 PROCEDURE — 99291 CRITICAL CARE FIRST HOUR: CPT | Mod: 25

## 2024-11-13 PROCEDURE — 93306 TTE W/DOPPLER COMPLETE: CPT

## 2024-11-13 PROCEDURE — 87641 MR-STAPH DNA AMP PROBE: CPT

## 2024-11-13 PROCEDURE — 87640 STAPH A DNA AMP PROBE: CPT

## 2024-11-13 PROCEDURE — 80048 BASIC METABOLIC PNL TOTAL CA: CPT

## 2024-11-13 PROCEDURE — 71275 CT ANGIOGRAPHY CHEST: CPT | Mod: MC

## 2024-11-13 PROCEDURE — 99239 HOSP IP/OBS DSCHRG MGMT >30: CPT

## 2024-11-13 PROCEDURE — 83735 ASSAY OF MAGNESIUM: CPT

## 2024-11-13 PROCEDURE — 71045 X-RAY EXAM CHEST 1 VIEW: CPT

## 2024-11-13 PROCEDURE — 82947 ASSAY GLUCOSE BLOOD QUANT: CPT

## 2024-11-13 PROCEDURE — 93005 ELECTROCARDIOGRAM TRACING: CPT

## 2024-11-13 PROCEDURE — 85018 HEMOGLOBIN: CPT

## 2024-11-13 PROCEDURE — 84100 ASSAY OF PHOSPHORUS: CPT

## 2024-11-13 PROCEDURE — 82435 ASSAY OF BLOOD CHLORIDE: CPT

## 2024-11-13 PROCEDURE — 36600 WITHDRAWAL OF ARTERIAL BLOOD: CPT

## 2024-11-13 PROCEDURE — 36415 COLL VENOUS BLD VENIPUNCTURE: CPT

## 2024-11-13 PROCEDURE — 83690 ASSAY OF LIPASE: CPT

## 2024-11-13 PROCEDURE — 96374 THER/PROPH/DIAG INJ IV PUSH: CPT

## 2024-11-13 PROCEDURE — 84295 ASSAY OF SERUM SODIUM: CPT

## 2024-11-13 RX ORDER — CHLORHEXIDINE GLUCONATE 40 MG/ML
1 SOLUTION TOPICAL DAILY
Refills: 0 | Status: DISCONTINUED | OUTPATIENT
Start: 2024-11-13 | End: 2024-11-13

## 2024-11-13 RX ORDER — CALCIUM ACETATE 667 MG/1
1 CAPSULE ORAL
Qty: 90 | Refills: 0
Start: 2024-11-13 | End: 2024-12-12

## 2024-11-13 RX ORDER — PANTOPRAZOLE SODIUM 40 MG/1
1 TABLET, DELAYED RELEASE ORAL
Qty: 30 | Refills: 0
Start: 2024-11-13 | End: 2024-12-12

## 2024-11-13 RX ORDER — ISOSORBIDE MONONITRATE 60 MG/1
1 TABLET, EXTENDED RELEASE ORAL
Qty: 30 | Refills: 0
Start: 2024-11-13 | End: 2024-12-12

## 2024-11-13 RX ORDER — TORSEMIDE 100 MG/1
1 TABLET ORAL
Qty: 30 | Refills: 0
Start: 2024-11-13 | End: 2024-12-12

## 2024-11-13 RX ORDER — SUCRALFATE 1 G/10ML
1 SUSPENSION ORAL
Refills: 0 | DISCHARGE

## 2024-11-13 RX ORDER — PANTOPRAZOLE SODIUM 40 MG/1
0 TABLET, DELAYED RELEASE ORAL
Refills: 0 | DISCHARGE

## 2024-11-13 RX ORDER — SUCRALFATE 1 G/10ML
1 SUSPENSION ORAL
Qty: 60 | Refills: 0
Start: 2024-11-13 | End: 2024-12-12

## 2024-11-13 RX ORDER — LACTULOSE 10 G/15 ML
15 SOLUTION, ORAL ORAL
Qty: 0 | Refills: 0 | DISCHARGE
Start: 2024-11-13

## 2024-11-13 RX ADMIN — HYDRALAZINE HYDROCHLORIDE 100 MILLIGRAM(S): 50 TABLET, FILM COATED ORAL at 06:07

## 2024-11-13 RX ADMIN — CALCIUM ACETATE 667 MILLIGRAM(S): 667 CAPSULE ORAL at 18:31

## 2024-11-13 RX ADMIN — CALCIUM ACETATE 667 MILLIGRAM(S): 667 CAPSULE ORAL at 10:07

## 2024-11-13 RX ADMIN — HYDRALAZINE HYDROCHLORIDE 100 MILLIGRAM(S): 50 TABLET, FILM COATED ORAL at 18:31

## 2024-11-13 RX ADMIN — CLONIDINE HYDROCHLORIDE 1 PATCH: 0.2 TABLET ORAL at 07:00

## 2024-11-13 RX ADMIN — PANTOPRAZOLE SODIUM 40 MILLIGRAM(S): 40 TABLET, DELAYED RELEASE ORAL at 06:08

## 2024-11-13 RX ADMIN — Medication 4: at 18:32

## 2024-11-13 RX ADMIN — CHLORHEXIDINE GLUCONATE 1 APPLICATION(S): 40 SOLUTION TOPICAL at 18:48

## 2024-11-13 RX ADMIN — Medication 600 MILLIGRAM(S): at 06:08

## 2024-11-13 RX ADMIN — TORSEMIDE 20 MILLIGRAM(S): 100 TABLET ORAL at 06:08

## 2024-11-13 RX ADMIN — Medication 600 MILLIGRAM(S): at 18:31

## 2024-11-13 NOTE — PROGRESS NOTE ADULT - ATTENDING COMMENTS
63 y/o F with hx of ESRD (was previously on PD, failed due to peritonitis, transitioned to HD thru permacath), was in MICU for acute hypoxic respiratory failure 2/2 to pulmonary edema  s/p 2 urgent HD session with total of 4 L removal , clinically improved, off nitro drip, off bipap, on nasal canula, downgraded to medicine  Pulm consulted for pleural effusion  reviewed all previous CTs, pleural effusions are chronic , likely in the setting of long standing ESRD  Patient's respiratory symptoms improved with HD  no urgent need for drainage of the pleural effusion at this time  continue HD , continue diurectic  as needed .   pulmonary will sign off  outpatient pulmonary follow up for pleural effusion
I have personally seen and examined the patient. I fully participated in the care of this patient. I have made amendments to the documentation where necessary, and agree with the history, physical exam, and plan as documented by the Resident.    64 F w/ ESRD on HD (via R chest wall permacath, immature LUE AVF), HTN, DMT2, known pericardial effusion (last TTE 9/2024 showed small-moderate pericardial effusion posterior to LV apex, w/out tamponade), presented with SOB, chest discomfort, last HD 11/8/24, admitted to the MICU for SCAPE requiring NIV and nitroglycerin infusion, now titrated off both this AM. He received emergent HD on 11/10 and 11/11 this AM. SOB improving. Restarted home meds. BP at goal. Can be downgraded to telemetry floor.
I have personally seen, examined and participated in the care of this patient. I have reviewed all pertinent clinical information, including history, physical exam, plan and agree with the above.       64y/oF PMH ESRD on HD (previously failed PD w/peritonitis), HTN, HLD, DM, pericardial effusion with prior admissions to MICU with acute hypoxemic respiratory failure 2/2 pulmonary edema and hypertensive urgency, admitted to MICU 11/10 again with acute on chronic hypoxemic respiratory failure 2/2 pulmonary edema and HTN emergency. s/p Bipap, on 3LNC (2L PRN baseline since last discharge in 9/2024). s/p nitro gtt. home antihypertensive regimen resumed. had additional HD yesterday and today. Nephro following.   Pending repeat TTE for eval of prior pericardial effusion.   Stable for transfer to floor 11/11.     case d/w Resident, RN, pt, pt's  at bedside

## 2024-11-13 NOTE — PROGRESS NOTE ADULT - SUBJECTIVE AND OBJECTIVE BOX
PULMONARY PROGRESS NOTE      [id number]    Patient is a 64y old  Female who presents with a chief complaint of Acute hypoxemic respiratory failure (2024 14:44)      INTERVAL HPI/OVERNIGHT EVENTS: Seen and examined bedside. Feels well. Ambulating without difficulty on room air. Denies Chest pain, abdominal pain, shortness of breath, fevers, chills, nausea, vomiting, diarrhea, dysuria, headache, dizziness.    MEDICATIONS  (STANDING):  calcium acetate 667 milliGRAM(s) Oral three times a day with meals  chlorhexidine 2% Cloths 1 Application(s) Topical daily  cloNIDine Patch 0.3 mG/24Hr(s) 1 patch Transdermal every 7 days  dextrose 5%. 1000 milliLiter(s) (50 mL/Hr) IV Continuous <Continuous>  dextrose 5%. 1000 milliLiter(s) (100 mL/Hr) IV Continuous <Continuous>  dextrose 50% Injectable 25 Gram(s) IV Push once  dextrose 50% Injectable 12.5 Gram(s) IV Push once  dextrose 50% Injectable 25 Gram(s) IV Push once  glucagon  Injectable 1 milliGRAM(s) IntraMuscular once  heparin   Injectable 5000 Unit(s) SubCutaneous every 8 hours  hydrALAZINE 100 milliGRAM(s) Oral every 8 hours  influenza   Vaccine 0.5 milliLiter(s) IntraMuscular once  insulin lispro (ADMELOG) corrective regimen sliding scale   SubCutaneous Before meals and at bedtime  isosorbide   mononitrate ER Tablet (IMDUR) 60 milliGRAM(s) Oral daily  labetalol 600 milliGRAM(s) Oral three times a day  lactulose Syrup 10 Gram(s) Oral daily  pantoprazole    Tablet 40 milliGRAM(s) Oral before breakfast  torsemide 20 milliGRAM(s) Oral daily      MEDICATIONS  (PRN):  acetaminophen     Tablet .. 650 milliGRAM(s) Oral every 6 hours PRN Temp greater or equal to 38C (100.4F)  dextrose Oral Gel 15 Gram(s) Oral once PRN Blood Glucose LESS THAN 70 milliGRAM(s)/deciliter  hydrALAZINE Injectable 10 milliGRAM(s) IV Push every 4 hours PRN SBP>160      Allergies    No Known Allergies    Intolerances        PAST MEDICAL & SURGICAL HISTORY:  HTN (hypertension)      DM (diabetes mellitus)      HLD (hyperlipidemia)      Overactive thyroid gland      ESRD on dialysis  dialyssis M/W/F      S/P  section      S/P arteriovenous (AV) fistula creation  fistula is still maturing (fistula not available for dialysis use yet)          SOCIAL HISTORY  Smoking History:       REVIEW OF SYSTEMS: Review of systems is otherwise negative, except as mentioned in HPI above.    PHYSICAL EXAM:  GENERAL: no acute distress, comfortably in bed, alert, interactive  HEAD: NC/AT  EYES: PERRLA, EOMI, Non-icteric  ENT: Mucous membranes moist, neck supple, Tunneled RIJ HD catheter, no erythema, redness, skin breakdown at insertion site.  NEURO: No focal deficits, moving all extremities spontaneously, A&Ox3, no dysarthria, CN II-XII grossly intact  PSYCH: Normal affect, calm, appropriate insight and judgment, fluent speech  RESP: Dec sounds b/l bases, some crackles, 2LNC,   CVS: RRR, no murmur appreciated  ABD: Soft, non-tender, non-distended, no organomegaly, no appreciable masses, +bs all 4 quadrants, +closed PD site  EXTREMITIES: Nontender, no clubbing, cyanosis, or edema, LUE AVF w/ palpable thrill  SKIN: No rashes or lesions      LABS:                        10.1   7.28  )-----------( 268      ( 2024 05:30 )             32.2     11-13    131[L]  |  91[L]  |  36.8[H]  ----------------------------<  87  3.9   |  23.0  |  6.66[H]    Ca    9.4      2024 05:30  Phos  4.9     11-12  Mg     2.1     11-12    TPro  5.7[L]  /  Alb  2.9[L]  /  TBili  0.2[L]  /  DBili  x   /  AST  14  /  ALT  10  /  AlkPhos  118  11-13      Urinalysis Basic - ( 2024 05:30 )    Color: x / Appearance: x / SG: x / pH: x  Gluc: 87 mg/dL / Ketone: x  / Bili: x / Urobili: x   Blood: x / Protein: x / Nitrite: x   Leuk Esterase: x / RBC: x / WBC x   Sq Epi: x / Non Sq Epi: x / Bacteria: x                      MICROBIOLOGY:    RADIOLOGY & ADDITIONAL STUDIES:

## 2024-11-13 NOTE — DISCHARGE NOTE NURSING/CASE MANAGEMENT/SOCIAL WORK - NSDCPEFALRISK_GEN_ALL_CORE
For information on Fall & Injury Prevention, visit: [email or web address] OR  [email or web address] OR  https://www.cdc.gov/steadi/patient.html

## 2024-11-13 NOTE — DISCHARGE NOTE PROVIDER - CARE PROVIDER_API CALL
Primary Care Doctor,   Phone: (   )    -  Fax: (   )    -  Follow Up Time:    Primary Care Doctor,   Phone: (   )    -  Fax: (   )    -  Follow Up Time:     Mark Foster  Pulmonary Disease  [address] [facility], [address] [address]  Phone: [phone]  Fax: [phone]  Follow Up Time:

## 2024-11-13 NOTE — DISCHARGE NOTE PROVIDER - PROVIDER TOKENS
FREE:[LAST:[Primary Care Doctor],PHONE:[(   )    -],FAX:[(   )    -]] FREE:[LAST:[Primary Care Doctor],PHONE:[(   )    -],FAX:[(   )    -]],PROVIDER:[TOKEN:[[id number]]]

## 2024-11-13 NOTE — DISCHARGE NOTE NURSING/CASE MANAGEMENT/SOCIAL WORK - NSDCFUADDAPPT_GEN_ALL_CORE_FT
APPTS ARE READY TO BE MADE: [X] YES    Best Family or Patient Contact (if needed):    Additional Information about above appointments (if needed):    1: Primary Care Doctor  2: Nephrology  3: Pulmonology ( Dr Mark Foster)    Other comments or requests:

## 2024-11-13 NOTE — DISCHARGE NOTE PROVIDER - NSDCMRMEDTOKEN_GEN_ALL_CORE_FT
calcium acetate 667 mg oral tablet: 1 tab(s) orally 3 times a day (with meals)  cloNIDine 0.3 mg/24 hr transdermal film, extended release: 1 patch transdermal every 7 days  hydrALAZINE 100 mg oral tablet: 1 tab(s) orally 3 times a day  isosorbide mononitrate 60 mg oral tablet, extended release: 1 tab(s) orally once a day  Januvia 25 mg oral tablet: 1 tab(s) orally once a day  labetalol 300 mg oral tablet: 2 tab(s) orally 3 times a day  lactulose 10 g/15 mL oral syrup: 15 milliliter(s) orally once a day  Nephro-Elizabeth oral tablet: 1 tab(s) orally once a day  pantoprazole 40 mg oral delayed release tablet: 1 tab(s) orally once a day (before a meal)  sucralfate 1 g oral tablet: 1 tab(s) orally 2 times a day  torsemide 20 mg oral tablet: 1 tab(s) orally once a day

## 2024-11-13 NOTE — DISCHARGE NOTE PROVIDER - HOSPITAL COURSE
64/F PMHx former smoker, ESRD on HD(R permecath and immature LUE AVF), failed PD due to peritonitis, HTN, HLD, DM2, GIB, GERD, recent admission 9/2024 to MICU for AHRF 2/2 to pulm edema, presented with chest and back discomfort and dyspnea. CT chest noted pulmonary edema and B/L pleural effusions. Found to be hypoxemic with respiratory distress and started on BiPAP, later weaned off to 3 L nasal cannula.  Also found to be in hypertensive emergency requiring nitro drip.  Patient was immediately dialyzed, with improvement in respiratory status.  Patient is currently on room air, tolerating well.     Currently patient is stable for discharge with plans to follow-up with nephrology.  Patient has been advised to follow strict adherence with fluid restriction & dialysis schedule. 64/F PMHx former smoker, ESRD on HD(R permcath and immature LUE AVF), failed PD due to peritonitis, HTN, HLD, DM2, GIB, GERD, recent admission 9/2024 to MICU for AHRF 2/2 to pulm edema, presented with chest and back discomfort and dyspnea. CT chest noted pulmonary edema and B/L pleural effusions. Found to be hypoxemic with respiratory distress and started on BiPAP, later weaned off to 3 L nasal cannula.  Also found to be in hypertensive emergency requiring nitro drip.  Patient was immediately dialyzed, with improvement in respiratory status.  Patient is currently on room air, tolerating well.     Currently patient is stable for discharge with plans to follow-up with nephrology.  Patient has been advised to follow strict adherence with fluid restriction & dialysis schedule.

## 2024-11-13 NOTE — DISCHARGE NOTE NURSING/CASE MANAGEMENT/SOCIAL WORK - PATIENT PORTAL LINK FT
You can access the FollowMyHealth Patient Portal offered by [facility] by registering at the following website: [email or web address]. By joining [employer]’s FollowMyHealth portal, you will also be able to view your health information using other applications (apps) compatible with our system.

## 2024-11-13 NOTE — DISCHARGE NOTE PROVIDER - NSDCCPCAREPLAN_GEN_ALL_CORE_FT
PRINCIPAL DISCHARGE DIAGNOSIS  Diagnosis: Acute hypoxic respiratory failure  Assessment and Plan of Treatment: –Initially started on BiPAP, then transition to 3 to nasal cannula, now on room air.  – CT chest showed moderate pericardial effusion (stable,), moderate bilateral pleural effusion & pulm edema.  – Emergently dialyzed on admission.  – Continue with torsemide 20 mg daily  – Follow-up with nephrology  - Needs strict adherence with fluid restriction & hemodialysis schedule.      SECONDARY DISCHARGE DIAGNOSES  Diagnosis: Pleural effusion  Assessment and Plan of Treatment: Continue with Torsemide    Diagnosis: Hypertensive emergency  Assessment and Plan of Treatment: Treated with nitroglycerin drip in the ICU  – Continue with clonidine patch, hydralazine, Imdur, labetalol as instructed  – Follow-up with cardiology      Diagnosis: ESRD on hemodialysis  Assessment and Plan of Treatment: - Access R permacath and immature LUE AVF  - Outpatient schedule: [facility]  - HD center: [facility]   -Continue with PhosLo 667mg 3 times a day    Diagnosis: Pericardial effusion  Assessment and Plan of Treatment: -Stable  – Follow-up with cardiology as scheduled    Diagnosis: Anemia of chronic disease  Assessment and Plan of Treatment: Stable  – Repeat CBC in 1 to 2 weeks     PRINCIPAL DISCHARGE DIAGNOSIS  Diagnosis: Acute hypoxic respiratory failure  Assessment and Plan of Treatment: –Initially started on BiPAP, then transition to 3 to nasal cannula, now on room air.  – CT chest showed moderate pericardial effusion (stable,), moderate bilateral pleural effusion & pulm edema.  – Emergently dialyzed on admission.  – Continue with torsemide 20 mg daily  – Follow-up with nephrology  - Needs strict adherence with fluid restriction & hemodialysis schedule.      SECONDARY DISCHARGE DIAGNOSES  Diagnosis: Pleural effusion  Assessment and Plan of Treatment: Continue with Torsemide  Follow up with Pulmonology    Diagnosis: Hypertensive emergency  Assessment and Plan of Treatment: Treated with nitroglycerin drip in the ICU  – Continue with clonidine patch, hydralazine, Imdur, labetalol as instructed  – Follow-up with cardiology      Diagnosis: ESRD on hemodialysis  Assessment and Plan of Treatment: - Access R permacath and immature LUE AVF  - Outpatient schedule: [facility]  - HD center: [facility]   -Continue with PhosLo 667mg 3 times a day    Diagnosis: Pericardial effusion  Assessment and Plan of Treatment: -Stable  – Follow-up with cardiology as scheduled    Diagnosis: Anemia of chronic disease  Assessment and Plan of Treatment: Stable  – Repeat CBC in 1 to 2 weeks

## 2024-11-13 NOTE — DISCHARGE NOTE PROVIDER - NSDCFUSCHEDAPPT_GEN_ALL_CORE_FT
Mark Foster  [facility]  [facility] [address]  Scheduled Appointment: 12/30/2024

## 2024-11-13 NOTE — PROGRESS NOTE ADULT - ASSESSMENT
63 y/o F with a h/o ESRD on HD (right chest wall permacath, immature LUE AVF), HTN, HLD, DM2, pericardial effusion, presents to the ED with complaints of chest/back discomfort and dyspnea     ESRD on HD typically MWF- HD today  Was in ICU w resp failure/ acute pulmonary edema/ B/L pleural effusions - better/ resoled  Pt goes to [facility] in [place] was last there Fri she tells me- > required urgent HD on 11/10   Consent in chart    Was requiring  BiPAP for hypoxemia and respiratory distress--> now off Bipap dyspnea resolved    Severe refractory HTN was on Nitro ggt at admit--> some improvement will cont to adjust meds- BP better    Will follow

## 2024-11-13 NOTE — PHYSICAL THERAPY INITIAL EVALUATION ADULT - PERTINENT HX OF CURRENT PROBLEM, REHAB EVAL
as per medical chart: recent admission 9/2024 to MICU for AHRF 2/2 to pulm edema, presented with chest and back discomfort and dyspnea. CT chest noted pulmonary edema and B/L pleural effusions.

## 2024-11-13 NOTE — PROGRESS NOTE ADULT - SUBJECTIVE AND OBJECTIVE BOX
NEPHROLOGY INTERVAL HPI/OVERNIGHT EVENTS:    Examined earlier  Feeling better " Can I go home I feel better"  dyspnea resolved  Denies HA CP    MEDICATIONS  (STANDING):  calcium acetate 667 milliGRAM(s) Oral three times a day with meals  chlorhexidine 2% Cloths 1 Application(s) Topical daily  cloNIDine Patch 0.3 mG/24Hr(s) 1 patch Transdermal every 7 days  dextrose 5%. 1000 milliLiter(s) (50 mL/Hr) IV Continuous <Continuous>  dextrose 5%. 1000 milliLiter(s) (100 mL/Hr) IV Continuous <Continuous>  dextrose 50% Injectable 25 Gram(s) IV Push once  dextrose 50% Injectable 12.5 Gram(s) IV Push once  dextrose 50% Injectable 25 Gram(s) IV Push once  glucagon  Injectable 1 milliGRAM(s) IntraMuscular once  heparin   Injectable 5000 Unit(s) SubCutaneous every 8 hours  hydrALAZINE 100 milliGRAM(s) Oral every 8 hours  influenza   Vaccine 0.5 milliLiter(s) IntraMuscular once  insulin lispro (ADMELOG) corrective regimen sliding scale   SubCutaneous Before meals and at bedtime  isosorbide   mononitrate ER Tablet (IMDUR) 60 milliGRAM(s) Oral daily  labetalol 600 milliGRAM(s) Oral three times a day  lactulose Syrup 10 Gram(s) Oral daily  pantoprazole    Tablet 40 milliGRAM(s) Oral before breakfast  torsemide 20 milliGRAM(s) Oral daily    MEDICATIONS  (PRN):  acetaminophen     Tablet .. 650 milliGRAM(s) Oral every 6 hours PRN Temp greater or equal to 38C (100.4F)  dextrose Oral Gel 15 Gram(s) Oral once PRN Blood Glucose LESS THAN 70 milliGRAM(s)/deciliter  hydrALAZINE Injectable 10 milliGRAM(s) IV Push every 4 hours PRN SBP>160      Allergies    No Known Allergies    Intolerances        Vital Signs Last 24 Hrs  T(C): 36.7 (2024 11:16), Max: 37.1 (2024 19:50)  T(F): 98 (2024 11:16), Max: 98.7 (2024 19:50)  HR: 86 (2024 11:16) (80 - 99)  BP: 124/64 (2024 11:16) (124/61 - 151/91)  BP(mean): 94 (2024 14:45) (94 - 108)  RR: 18 (2024 11:16) (18 - 21)  SpO2: 100% (2024 11:16) (94% - 100%)    Parameters below as of 2024 11:16  Patient On (Oxygen Delivery Method): room air      Daily     Daily Weight in k.4 (2024 11:16)    PHYSICAL EXAM:  GENERAL: appears chronically ill, NAD  HEAD:  Atraumatic, Normocephalic  EYES: EOMI  NECK: Supple  NERVOUS SYSTEM:  Alert & Oriented X3  CHEST/LUNG: dec bs bilaterally  HEART: Regular rate and rhythm  ABDOMEN: Soft, Nontender, Nondistended; +BS  EXTREMITIES:  trace  edema, LUE AVF not ready for use maturing      LABS:                        10.1   7.28  )-----------( 268      ( 2024 05:30 )             32.2     11-13    131[L]  |  91[L]  |  36.8[H]  ----------------------------<  87  3.9   |  23.0  |  6.66[H]    Ca    9.4      2024 05:30  Phos  4.9     11-12  Mg     2.1     11-12    TPro  5.7[L]  /  Alb  2.9[L]  /  TBili  0.2[L]  /  DBili  x   /  AST  14  /  ALT  10  /  AlkPhos  118  11-13      Urinalysis Basic - ( 2024 05:30 )    Color: x / Appearance: x / SG: x / pH: x  Gluc: 87 mg/dL / Ketone: x  / Bili: x / Urobili: x   Blood: x / Protein: x / Nitrite: x   Leuk Esterase: x / RBC: x / WBC x   Sq Epi: x / Non Sq Epi: x / Bacteria: x              RADIOLOGY & ADDITIONAL TESTS:

## 2024-11-13 NOTE — DISCHARGE NOTE PROVIDER - NSDCFUADDAPPT_GEN_ALL_CORE_FT
APPTS ARE READY TO BE MADE: [X] YES    Best Family or Patient Contact (if needed):    Additional Information about above appointments (if needed):    1: Primary Care Doctor  2: Nephrology  3:     Other comments or requests:    APPTS ARE READY TO BE MADE: [X] YES    Best Family or Patient Contact (if needed):    Additional Information about above appointments (if needed):    1: Primary Care Doctor  2: Nephrology  3: Pulmonology ( Dr Mark Foster)    Other comments or requests:    APPTS ARE READY TO BE MADE: [X] YES    Best Family or Patient Contact (if needed):    Additional Information about above appointments (if needed):    1: Primary Care Doctor  2: Nephrology  3: Pulmonology ( Dr Mark Foster)    Other comments or requests:   Provided patient with provider referral information, however patient prefers to schedule the appointments on their own.  PCP, Nephro and Pulm

## 2024-11-13 NOTE — DISCHARGE NOTE PROVIDER - ATTENDING DISCHARGE PHYSICAL EXAMINATION:
VITALS:   T(C): 36.6 (11-13-24 @ 04:00), Max: 37.1 (11-12-24 @ 19:50)  HR: 80 (11-13-24 @ 05:00) (80 - 99)  BP: 130/55 (11-13-24 @ 04:00) (124/61 - 151/91)  RR: 18 (11-13-24 @ 04:00) (14 - 21)  SpO2: 97% (11-13-24 @ 04:00) (94% - 100%)    GENERAL: NAD, lying in bed comfortably  HEAD:  Atraumatic, Normocephalic  EYES: EOMI, PERRLA, conjunctiva and sclera clear  ENT: Moist mucous membranes  NECK: Supple  CHEST/LUNG: Clear to auscultation bilaterally; No rales, rhonchi, wheezing, or rubs. Unlabored respirations on RA. Right chest wall permcath (+)  HEART: Regular rate and rhythm; No obvious murmurs  ABDOMEN: BSx4; Soft, nontender, nondistended  EXTREMITIES:  2+ Peripheral Pulses, brisk capillary refill. No pedal edema. LUE AVF good thrill.   NERVOUS SYSTEM:  A&Ox3, no focal deficits

## 2024-11-13 NOTE — DISCHARGE NOTE PROVIDER - CARE PROVIDERS DIRECT ADDRESSES
,DirectAddress_Unknown ,DirectAddress_Unknown,[email or web address]

## 2024-11-13 NOTE — PROGRESS NOTE ADULT - TIME BILLING
greater than 50% of time spent reviewing labs, notes, orders and radiographs, coordinating care  discussed with nursing, primary team, consultants
reviewing labs, notes, orders, radiographic studies, as well as counseling and coordinating care with the relevant multidisciplinary team, including with the primary and consulting providers.

## 2024-11-13 NOTE — PHYSICAL THERAPY INITIAL EVALUATION ADULT - ADDITIONAL COMMENTS
as per pt: she lives at home with  with 2STE and 0 inside. Pt was independent PTA without use of DME.

## 2024-11-13 NOTE — PROGRESS NOTE ADULT - ASSESSMENT
64F former smoker, with PMHx of ESRD on HD (R permecath and immature LUE AVF), failed PD due to peritonitis, HTN, HLD, DM2, GIB, GERD, recent admission 9/2024 to MICU for AHRF 2/2 to pulm edema, presented with chest and back discomfort and dyspnea. CT chest noted pulmonary edema and B/L pleural effusions. Found to be hypoxemic with respiratory distress and started on BiPAP, now weaned off to 3L NC.    Acute hypoxemic respiratory failure 2/2 to B/L pleural effusions and flash pulmonary edema, improving   - s/p BiPAP, now on 3L NC  - On 2L NC home supplemental O2 PRN since last admission due to unable to wean O2  - CT chest/A/P noted Mod pericardial effusion, small to mod B/L pleural effusions and pulm edema  - Monitor I/Os  - TTE with EF > 75%  - Reviewed previous CT scan and Echo from September 2024, with presence of B/L pleural effusions and pericardial effusion. Effusion only seems to have mildly increased since then therefore would hold on any thoracocentesis.  - Continue with diuresis with Torsemide 20 mg qd  - Continue with HD as scheduled    will sign off, D/w Dr. Huerta

## 2024-11-13 NOTE — DISCHARGE NOTE NURSING/CASE MANAGEMENT/SOCIAL WORK - FINANCIAL ASSISTANCE
[facility] provides services at a reduced cost to those who are determined to be eligible through [facility]’s financial assistance program. Information regarding [facility]’s financial assistance program can be found by going to [email or web address] or by calling [phone].

## 2024-11-23 ENCOUNTER — OFFICE (OUTPATIENT)
Dept: URBAN - METROPOLITAN AREA CLINIC 94 | Facility: CLINIC | Age: 64
Setting detail: OPHTHALMOLOGY
End: 2024-11-23
Payer: COMMERCIAL

## 2024-11-23 DIAGNOSIS — E11.3293: ICD-10-CM

## 2024-11-23 DIAGNOSIS — H35.412: ICD-10-CM

## 2024-11-23 DIAGNOSIS — H43.393: ICD-10-CM

## 2024-11-23 PROCEDURE — 99213 OFFICE O/P EST LOW 20 MIN: CPT | Performed by: OPHTHALMOLOGY

## 2024-11-23 PROCEDURE — 92134 CPTRZ OPH DX IMG PST SGM RTA: CPT | Performed by: OPHTHALMOLOGY

## 2024-11-23 ASSESSMENT — CONFRONTATIONAL VISUAL FIELD TEST (CVF)
OS_FINDINGS: FULL
OD_FINDINGS: FULL

## 2024-11-23 ASSESSMENT — SUPERFICIAL PUNCTATE KERATITIS (SPK)
OS_SPK: T
OD_SPK: T

## 2024-11-23 ASSESSMENT — KERATOMETRY
METHOD_AUTO_MANUAL: AUTO
OD_AXISANGLE_DEGREES: 078
OD_K2POWER_DIOPTERS: 47.50
OS_K1POWER_DIOPTERS: 46.00
OS_AXISANGLE_DEGREES: 002
OS_K2POWER_DIOPTERS: 46.75
OD_K1POWER_DIOPTERS: 46.50

## 2024-11-23 ASSESSMENT — REFRACTION_AUTOREFRACTION
OS_CYLINDER: -1.25
OS_SPHERE: 0.00
OD_SPHERE: -1.50
OD_CYLINDER: -0.25
OD_AXIS: 159
OS_AXIS: 092

## 2024-11-23 ASSESSMENT — VISUAL ACUITY
OD_BCVA: 20/25
OS_BCVA: 20/25-1

## 2024-11-23 ASSESSMENT — LID EXAM ASSESSMENTS
OS_COMMENTS: INSPISSATED GLANDS
OD_COMMENTS: INSPISSATED GLANDS
OS_BLEPHARITIS: LLL 1+
OD_BLEPHARITIS: RLL 1+

## 2024-11-23 ASSESSMENT — TONOMETRY
OD_IOP_MMHG: 14
OS_IOP_MMHG: 14

## 2024-12-30 ENCOUNTER — APPOINTMENT (OUTPATIENT)
Dept: PULMONOLOGY | Facility: CLINIC | Age: 64
End: 2024-12-30
Payer: COMMERCIAL

## 2024-12-30 VITALS — HEART RATE: 78 BPM | OXYGEN SATURATION: 98 % | RESPIRATION RATE: 16 BRPM

## 2024-12-30 VITALS
BODY MASS INDEX: 20.35 KG/M2 | SYSTOLIC BLOOD PRESSURE: 116 MMHG | WEIGHT: 105 LBS | HEIGHT: 60.25 IN | DIASTOLIC BLOOD PRESSURE: 62 MMHG

## 2024-12-30 DIAGNOSIS — R06.02 SHORTNESS OF BREATH: ICD-10-CM

## 2024-12-30 PROCEDURE — 99203 OFFICE O/P NEW LOW 30 MIN: CPT

## 2024-12-30 PROCEDURE — 99213 OFFICE O/P EST LOW 20 MIN: CPT

## 2024-12-30 PROCEDURE — G2211 COMPLEX E/M VISIT ADD ON: CPT | Mod: NC

## 2024-12-30 RX ORDER — FOLIC ACID/VIT B COMPLEX AND C 0.8 MG
TABLET ORAL
Refills: 0 | Status: ACTIVE | COMMUNITY

## 2024-12-30 RX ORDER — ASPIRIN 81 MG
81 TABLET, DELAYED RELEASE (ENTERIC COATED) ORAL
Refills: 0 | Status: ACTIVE | COMMUNITY

## 2025-01-27 ENCOUNTER — APPOINTMENT (OUTPATIENT)
Dept: PULMONOLOGY | Facility: CLINIC | Age: 65
End: 2025-01-27

## 2025-02-04 ENCOUNTER — INPATIENT (INPATIENT)
Facility: HOSPITAL | Age: 65
LOS: 0 days | Discharge: HOME | DRG: 699 | End: 2025-02-05
Attending: HOSPITALIST | Admitting: HOSPITALIST
Payer: COMMERCIAL

## 2025-02-04 VITALS
HEART RATE: 101 BPM | WEIGHT: 114.64 LBS | TEMPERATURE: 98 F | OXYGEN SATURATION: 98 % | DIASTOLIC BLOOD PRESSURE: 98 MMHG | SYSTOLIC BLOOD PRESSURE: 207 MMHG | HEIGHT: 62 IN | RESPIRATION RATE: 20 BRPM

## 2025-02-04 DIAGNOSIS — N18.6 END STAGE RENAL DISEASE: ICD-10-CM

## 2025-02-04 DIAGNOSIS — Z98.891 HISTORY OF UTERINE SCAR FROM PREVIOUS SURGERY: Chronic | ICD-10-CM

## 2025-02-04 DIAGNOSIS — Z98.890 OTHER SPECIFIED POSTPROCEDURAL STATES: Chronic | ICD-10-CM

## 2025-02-04 LAB
ALBUMIN SERPL ELPH-MCNC: 4.3 G/DL — SIGNIFICANT CHANGE UP (ref 3.3–5.2)
ALP SERPL-CCNC: 111 U/L — SIGNIFICANT CHANGE UP (ref 40–120)
ALT FLD-CCNC: 16 U/L — SIGNIFICANT CHANGE UP
ANION GAP SERPL CALC-SCNC: 24 MMOL/L — HIGH (ref 5–17)
APTT BLD: 33 SEC — SIGNIFICANT CHANGE UP (ref 24.5–35.6)
AST SERPL-CCNC: 21 U/L — SIGNIFICANT CHANGE UP
BASOPHILS # BLD AUTO: 0.02 K/UL — SIGNIFICANT CHANGE UP (ref 0–0.2)
BASOPHILS NFR BLD AUTO: 0.2 % — SIGNIFICANT CHANGE UP (ref 0–2)
BILIRUB SERPL-MCNC: 0.5 MG/DL — SIGNIFICANT CHANGE UP (ref 0.4–2)
BUN SERPL-MCNC: 96.6 MG/DL — HIGH (ref 8–20)
CALCIUM SERPL-MCNC: 10.6 MG/DL — HIGH (ref 8.4–10.5)
CHLORIDE SERPL-SCNC: 94 MMOL/L — LOW (ref 96–108)
CO2 SERPL-SCNC: 21 MMOL/L — LOW (ref 22–29)
CREAT SERPL-MCNC: 14.57 MG/DL — HIGH (ref 0.5–1.3)
EGFR: 3 ML/MIN/1.73M2 — LOW
EOSINOPHIL # BLD AUTO: 0 K/UL — SIGNIFICANT CHANGE UP (ref 0–0.5)
EOSINOPHIL NFR BLD AUTO: 0 % — SIGNIFICANT CHANGE UP (ref 0–6)
FLUAV AG NPH QL: SIGNIFICANT CHANGE UP
FLUBV AG NPH QL: SIGNIFICANT CHANGE UP
GAS PNL BLDV: SIGNIFICANT CHANGE UP
GLUCOSE BLDC GLUCOMTR-MCNC: 119 MG/DL — HIGH (ref 70–99)
GLUCOSE BLDC GLUCOMTR-MCNC: 147 MG/DL — HIGH (ref 70–99)
GLUCOSE BLDC GLUCOMTR-MCNC: 163 MG/DL — HIGH (ref 70–99)
GLUCOSE SERPL-MCNC: 206 MG/DL — HIGH (ref 70–99)
HCT VFR BLD CALC: 32.5 % — LOW (ref 34.5–45)
HGB BLD-MCNC: 10.7 G/DL — LOW (ref 11.5–15.5)
IMM GRANULOCYTES NFR BLD AUTO: 0.6 % — SIGNIFICANT CHANGE UP (ref 0–0.9)
INR BLD: 0.99 RATIO — SIGNIFICANT CHANGE UP (ref 0.85–1.16)
LYMPHOCYTES # BLD AUTO: 0.88 K/UL — LOW (ref 1–3.3)
LYMPHOCYTES # BLD AUTO: 8.5 % — LOW (ref 13–44)
MCHC RBC-ENTMCNC: 25.4 PG — LOW (ref 27–34)
MCHC RBC-ENTMCNC: 32.9 G/DL — SIGNIFICANT CHANGE UP (ref 32–36)
MCV RBC AUTO: 77.2 FL — LOW (ref 80–100)
MONOCYTES # BLD AUTO: 0.24 K/UL — SIGNIFICANT CHANGE UP (ref 0–0.9)
MONOCYTES NFR BLD AUTO: 2.3 % — SIGNIFICANT CHANGE UP (ref 2–14)
NEUTROPHILS # BLD AUTO: 9.17 K/UL — HIGH (ref 1.8–7.4)
NEUTROPHILS NFR BLD AUTO: 88.4 % — HIGH (ref 43–77)
PLATELET # BLD AUTO: 193 K/UL — SIGNIFICANT CHANGE UP (ref 150–400)
POTASSIUM SERPL-MCNC: 6.2 MMOL/L — CRITICAL HIGH (ref 3.5–5.3)
POTASSIUM SERPL-SCNC: 6.2 MMOL/L — CRITICAL HIGH (ref 3.5–5.3)
PROT SERPL-MCNC: 8.1 G/DL — SIGNIFICANT CHANGE UP (ref 6.6–8.7)
PROTHROM AB SERPL-ACNC: 11.2 SEC — SIGNIFICANT CHANGE UP (ref 9.9–13.4)
RBC # BLD: 4.21 M/UL — SIGNIFICANT CHANGE UP (ref 3.8–5.2)
RBC # FLD: 16.8 % — HIGH (ref 10.3–14.5)
RSV RNA NPH QL NAA+NON-PROBE: SIGNIFICANT CHANGE UP
SARS-COV-2 RNA SPEC QL NAA+PROBE: SIGNIFICANT CHANGE UP
SODIUM SERPL-SCNC: 139 MMOL/L — SIGNIFICANT CHANGE UP (ref 135–145)
TROPONIN T, HIGH SENSITIVITY RESULT: 103 NG/L — HIGH (ref 0–51)
WBC # BLD: 10.37 K/UL — SIGNIFICANT CHANGE UP (ref 3.8–10.5)
WBC # FLD AUTO: 10.37 K/UL — SIGNIFICANT CHANGE UP (ref 3.8–10.5)

## 2025-02-04 PROCEDURE — 99223 1ST HOSP IP/OBS HIGH 75: CPT

## 2025-02-04 PROCEDURE — 99291 CRITICAL CARE FIRST HOUR: CPT

## 2025-02-04 PROCEDURE — 71045 X-RAY EXAM CHEST 1 VIEW: CPT | Mod: 26

## 2025-02-04 RX ORDER — ROSUVASTATIN CALCIUM 10 MG/1
1 TABLET, FILM COATED ORAL
Refills: 0 | DISCHARGE

## 2025-02-04 RX ORDER — PANTOPRAZOLE 20 MG/1
1 TABLET, DELAYED RELEASE ORAL
Refills: 0 | DISCHARGE

## 2025-02-04 RX ORDER — SODIUM CHLORIDE 9 G/ML
1000 INJECTION, SOLUTION INTRAVENOUS
Refills: 0 | Status: DISCONTINUED | OUTPATIENT
Start: 2025-02-04 | End: 2025-02-05

## 2025-02-04 RX ORDER — SUCRALFATE 1 G/10ML
1 SUSPENSION ORAL
Refills: 0 | Status: DISCONTINUED | OUTPATIENT
Start: 2025-02-04 | End: 2025-02-04

## 2025-02-04 RX ORDER — AMLODIPINE BESYLATE 5 MG
5 TABLET ORAL DAILY
Refills: 0 | Status: DISCONTINUED | OUTPATIENT
Start: 2025-02-04 | End: 2025-02-04

## 2025-02-04 RX ORDER — MECOBAL/LEVOMEFOLAT CA/B6 PHOS 2-3-35 MG
1 TABLET ORAL
Refills: 0 | DISCHARGE

## 2025-02-04 RX ORDER — DILTIAZEM HYDROCHLORIDE 60 MG/1
240 TABLET ORAL DAILY
Refills: 0 | Status: DISCONTINUED | OUTPATIENT
Start: 2025-02-04 | End: 2025-02-04

## 2025-02-04 RX ORDER — HYDRALAZINE HCL 100 MG
100 TABLET ORAL ONCE
Refills: 0 | Status: COMPLETED | OUTPATIENT
Start: 2025-02-04 | End: 2025-02-04

## 2025-02-04 RX ORDER — LABETALOL HYDROCHLORIDE 300 MG/1
600 TABLET, FILM COATED ORAL ONCE
Refills: 0 | Status: DISCONTINUED | OUTPATIENT
Start: 2025-02-04 | End: 2025-02-04

## 2025-02-04 RX ORDER — INSULIN LISPRO 100/ML
VIAL (ML) SUBCUTANEOUS
Refills: 0 | Status: DISCONTINUED | OUTPATIENT
Start: 2025-02-04 | End: 2025-02-05

## 2025-02-04 RX ORDER — DILTIAZEM HYDROCHLORIDE 60 MG/1
30 TABLET ORAL EVERY 6 HOURS
Refills: 0 | Status: DISCONTINUED | OUTPATIENT
Start: 2025-02-04 | End: 2025-02-05

## 2025-02-04 RX ORDER — PANTOPRAZOLE 20 MG/1
40 TABLET, DELAYED RELEASE ORAL
Refills: 0 | Status: DISCONTINUED | OUTPATIENT
Start: 2025-02-04 | End: 2025-02-05

## 2025-02-04 RX ORDER — CALCIUM ACETATE 667 MG/1
667 CAPSULE ORAL
Refills: 0 | DISCHARGE

## 2025-02-04 RX ORDER — DILTIAZEM HYDROCHLORIDE 60 MG/1
1 TABLET ORAL
Refills: 0 | DISCHARGE

## 2025-02-04 RX ORDER — CALCIUM ACETATE 667 MG/1
667 CAPSULE ORAL
Refills: 0 | Status: DISCONTINUED | OUTPATIENT
Start: 2025-02-04 | End: 2025-02-05

## 2025-02-04 RX ORDER — ONDANSETRON 4 MG/1
4 TABLET, ORALLY DISINTEGRATING ORAL ONCE
Refills: 0 | Status: COMPLETED | OUTPATIENT
Start: 2025-02-04 | End: 2025-02-04

## 2025-02-04 RX ORDER — ROSUVASTATIN CALCIUM 10 MG/1
20 TABLET, FILM COATED ORAL AT BEDTIME
Refills: 0 | Status: DISCONTINUED | OUTPATIENT
Start: 2025-02-04 | End: 2025-02-05

## 2025-02-04 RX ORDER — LABETALOL HYDROCHLORIDE 300 MG/1
200 TABLET, FILM COATED ORAL ONCE
Refills: 0 | Status: COMPLETED | OUTPATIENT
Start: 2025-02-04 | End: 2025-02-04

## 2025-02-04 RX ORDER — DM/PSEUDOEPHED/ACETAMINOPHEN 10-30-250
50 CAPSULE ORAL ONCE
Refills: 0 | Status: COMPLETED | OUTPATIENT
Start: 2025-02-04 | End: 2025-02-04

## 2025-02-04 RX ORDER — PANTOPRAZOLE 20 MG/1
40 TABLET, DELAYED RELEASE ORAL ONCE
Refills: 0 | Status: COMPLETED | OUTPATIENT
Start: 2025-02-04 | End: 2025-02-04

## 2025-02-04 RX ORDER — TORSEMIDE 20 MG/1
20 TABLET ORAL DAILY
Refills: 0 | Status: DISCONTINUED | OUTPATIENT
Start: 2025-02-04 | End: 2025-02-05

## 2025-02-04 RX ORDER — TORSEMIDE 20 MG/1
1 TABLET ORAL
Refills: 0 | DISCHARGE

## 2025-02-04 RX ORDER — CLONIDINE HYDROCHLORIDE 0.2 MG/1
0.1 TABLET ORAL ONCE
Refills: 0 | Status: COMPLETED | OUTPATIENT
Start: 2025-02-04 | End: 2025-02-04

## 2025-02-04 RX ORDER — HEPARIN SODIUM,PORCINE 10000/ML
5000 VIAL (ML) INJECTION EVERY 12 HOURS
Refills: 0 | Status: DISCONTINUED | OUTPATIENT
Start: 2025-02-04 | End: 2025-02-05

## 2025-02-04 RX ORDER — GLUCAGON 3 MG/1
1 POWDER NASAL ONCE
Refills: 0 | Status: DISCONTINUED | OUTPATIENT
Start: 2025-02-04 | End: 2025-02-05

## 2025-02-04 RX ORDER — DM/PSEUDOEPHED/ACETAMINOPHEN 10-30-250
12.5 CAPSULE ORAL ONCE
Refills: 0 | Status: DISCONTINUED | OUTPATIENT
Start: 2025-02-04 | End: 2025-02-05

## 2025-02-04 RX ORDER — ASPIRIN 81 MG/1
81 TABLET, COATED ORAL DAILY
Refills: 0 | Status: DISCONTINUED | OUTPATIENT
Start: 2025-02-04 | End: 2025-02-05

## 2025-02-04 RX ORDER — DM/PSEUDOEPHED/ACETAMINOPHEN 10-30-250
25 CAPSULE ORAL ONCE
Refills: 0 | Status: DISCONTINUED | OUTPATIENT
Start: 2025-02-04 | End: 2025-02-05

## 2025-02-04 RX ORDER — ASPIRIN 81 MG/1
1 TABLET, COATED ORAL
Refills: 0 | DISCHARGE

## 2025-02-04 RX ORDER — LABETALOL HYDROCHLORIDE 300 MG/1
300 TABLET, FILM COATED ORAL THREE TIMES A DAY
Refills: 0 | Status: DISCONTINUED | OUTPATIENT
Start: 2025-02-04 | End: 2025-02-04

## 2025-02-04 RX ORDER — SODIUM BICARBONATE 42 MG/ML
650 INJECTION, SOLUTION INTRAVENOUS THREE TIMES A DAY
Refills: 0 | Status: DISCONTINUED | OUTPATIENT
Start: 2025-02-04 | End: 2025-02-05

## 2025-02-04 RX ORDER — DM/PSEUDOEPHED/ACETAMINOPHEN 10-30-250
15 CAPSULE ORAL ONCE
Refills: 0 | Status: DISCONTINUED | OUTPATIENT
Start: 2025-02-04 | End: 2025-02-05

## 2025-02-04 RX ORDER — HYDRALAZINE HCL 100 MG
50 TABLET ORAL THREE TIMES A DAY
Refills: 0 | Status: DISCONTINUED | OUTPATIENT
Start: 2025-02-04 | End: 2025-02-05

## 2025-02-04 RX ORDER — CLONIDINE HYDROCHLORIDE 0.2 MG/1
1 TABLET ORAL
Refills: 0 | DISCHARGE

## 2025-02-04 RX ORDER — HYDRALAZINE HCL 100 MG
50 TABLET ORAL ONCE
Refills: 0 | Status: DISCONTINUED | OUTPATIENT
Start: 2025-02-04 | End: 2025-02-04

## 2025-02-04 RX ORDER — MECOBAL/LEVOMEFOLAT CA/B6 PHOS 2-3-35 MG
1 TABLET ORAL DAILY
Refills: 0 | Status: DISCONTINUED | OUTPATIENT
Start: 2025-02-04 | End: 2025-02-05

## 2025-02-04 RX ADMIN — Medication 50 MILLIGRAM(S): at 21:55

## 2025-02-04 RX ADMIN — Medication 5000 UNIT(S): at 18:38

## 2025-02-04 RX ADMIN — Medication 1 TABLET(S): at 18:35

## 2025-02-04 RX ADMIN — Medication 50 MILLIGRAM(S): at 18:37

## 2025-02-04 RX ADMIN — ONDANSETRON 4 MILLIGRAM(S): 4 TABLET, ORALLY DISINTEGRATING ORAL at 09:10

## 2025-02-04 RX ADMIN — DILTIAZEM HYDROCHLORIDE 30 MILLIGRAM(S): 60 TABLET ORAL at 18:36

## 2025-02-04 RX ADMIN — SODIUM BICARBONATE 650 MILLIGRAM(S): 42 INJECTION, SOLUTION INTRAVENOUS at 21:55

## 2025-02-04 RX ADMIN — LABETALOL HYDROCHLORIDE 200 MILLIGRAM(S): 300 TABLET, FILM COATED ORAL at 11:12

## 2025-02-04 RX ADMIN — SODIUM BICARBONATE 650 MILLIGRAM(S): 42 INJECTION, SOLUTION INTRAVENOUS at 18:36

## 2025-02-04 RX ADMIN — Medication 50 MILLILITER(S): at 10:04

## 2025-02-04 RX ADMIN — CALCIUM ACETATE 667 MILLIGRAM(S): 667 CAPSULE ORAL at 18:38

## 2025-02-04 RX ADMIN — Medication 100 MILLIGRAM(S): at 10:03

## 2025-02-04 RX ADMIN — PANTOPRAZOLE 40 MILLIGRAM(S): 20 TABLET, DELAYED RELEASE ORAL at 10:04

## 2025-02-04 RX ADMIN — Medication 5 UNIT(S): at 10:03

## 2025-02-04 RX ADMIN — Medication 100 GRAM(S): at 10:04

## 2025-02-04 RX ADMIN — CLONIDINE HYDROCHLORIDE 0.1 MILLIGRAM(S): 0.2 TABLET ORAL at 11:12

## 2025-02-04 RX ADMIN — ROSUVASTATIN CALCIUM 20 MILLIGRAM(S): 10 TABLET, FILM COATED ORAL at 21:55

## 2025-02-04 NOTE — H&P ADULT - HISTORY OF PRESENT ILLNESS
Patient is a 64yoF PMHx former smoker, ESRD on HD(R chest permcath and  LUE AVF), failed PD due to peritonitis, HTN, HLD, DM2, GIB, GERD, recent admission 11/2024 for.  Patient is a 64yoF PMHx former smoker, ESRD on HD(R chest permacath and  LUE AVF), failed PD due to peritonitis, HTN, HLD, DM2, GIB, GERD, recent admission 11/2024 for resp failure with effusions now returns to [facility] for missed HD. Patient states for the past few days she has been having diarrhea, vomiting with retching and feeling weak with sob. Patient missed her HD session yesterday due to not feeling well and became more sob and presented to the ed. Of note patient is due for fistula revision this friday at [facility] and wants to leave the latest thursday. Patient denies sick contacts, exotic foods, fever but her son has been having similar symptoms.     patient worked up in er and had potassium - 6.2  elevated anion gap.     Patient seen by nephro and is for urgent HD today.   trop mildly elevated,  no acute ekg chanegs

## 2025-02-04 NOTE — H&P ADULT - NSHPREVIEWOFSYSTEMS_GEN_ALL_CORE
REVIEW OF SYSTEMS:    CONSTITUTIONAL: fatigue  EYES: No eye pain, visual disturbances, or discharge  ENMT:  No difficulty hearing, tinnitus, vertigo; No sinus or throat pain  NECK: No pain or stiffness  BREASTS: No pain, masses, or nipple discharge  RESPIRATORY: No cough, wheezing, chills or hemoptysis; No shortness of breath  CARDIOVASCULAR: No chest pain, palpitations, dizziness, or leg swelling  GASTROINTESTINAL: Nausea, retching, diarrhea  GENITOURINARY: No dysuria, frequency, hematuria, or incontinence  NEUROLOGICAL: No headaches, memory loss, loss of strength, numbness, or tremors  SKIN: No itching, burning, rashes, or lesions   LYMPH NODES: No enlarged glands  ENDOCRINE: No heat or cold intolerance; No hair loss  MUSCULOSKELETAL: No joint pain or swelling; No muscle, back, or extremity pain  PSYCHIATRIC: No depression, anxiety, mood swings, or difficulty sleeping  HEME/LYMPH: No easy bruising, or bleeding gums  ALLERY AND IMMUNOLOGIC: No hives or eczema

## 2025-02-04 NOTE — CONSULT NOTE ADULT - SUBJECTIVE AND OBJECTIVE BOX
Patient is a 64y old  Female who presents with a chief complaint of GI  symptoms..    HPI:   Pt  ill  with N&V as well as  diarrhea and GERD, denies  fever. Son at home  with  similar  GI symptoms. pt has  CRF and is on HD  but missed HD  yesterday due  to gi  symptoms. Pt has  not  bed  taking  her Bp meds due to GI  issue. Home  Bp she  said  was high [facility]  Hx  as  below.    PAST MEDICAL & SURGICAL HISTORY:  HTN (hypertension)      DM (diabetes mellitus)      HLD (hyperlipidemia)      Overactive thyroid gland      ESRD on dialysis  dialyssis M/W/F      S/P  section      S/P arteriovenous (AV) fistula creation  fistula is still maturing (fistula not available for dialysis use yet)    Bipolar    FAMILY HISTORY:  Family history of breast cancer in female (Aunt)    Family history of prostate cancer (Uncle)    Family history of hypertension (Father, Mother)    NC    Social History:Non smoker    MEDICATIONS  (STANDING):  amLODIPine   Tablet 5 milliGRAM(s) Oral daily  cloNIDine 0.1 milliGRAM(s) Oral Once  labetalol 200 milliGRAM(s) Oral Once    MEDICATIONS  (PRN):   Meds reviewed    Allergies    No Known Allergies        REVIEW OF SYSTEMS:    CONSTITUTIONAL:  As above  EYES: No eye pain, visual disturbances, or discharge  ENMT:  No difficulty hearing, tinnitus, vertigo; No sinus or throat pain  NECK: No pain or stiffness  BREASTS: No pain, masses, or nipple discharge  RESPIRATORY: Neg  CARDIOVASCULAR: No chest pain, palpitations, dizziness,   GASTROINTESTINAL: N & V, Reflux  GENITOURINARY: Neg  NEUROLOGICAL: No headaches, memory loss, loss of strength, numbness, or tremors  SKIN: Pruritis   LYMPH NODES: No enlarged glands  ENDOCRINE: No heat or cold intolerance; No hair loss  MUSCULOSKELETAL: neg  PSYCHIATRIC: no new  issues  HEME/LYMPH: No easy bruising, or bleeding gums  ALLERY AND IMMUNOLOGIC: No hives or eczema      Vital Signs Last 24 Hrs  T(C): 36.7 (2025 10:29), Max: 36.7 (2025 07:44)  T(F): 98 (2025 10:29), Max: 98 (2025 07:44)  HR: 100 (2025 10:29) (98 - 101)  BP: 177/87 (2025 10:29) (160/78 - 207/98)  BP(mean): --  RR: 19 (2025 10:29) (19 - 20)  SpO2: 95% (2025 10:29) (84% - 98%)    Parameters below as of 2025 10:29  Patient On (Oxygen Delivery Method): nasal cannula  O2 Flow (L/min): 4    Daily Height in cm: 157.48 (2025 07:44)    Daily     PHYSICAL EXAM:    GENERAL: appears chronically ill, oriented.  HEAD:  Atraumatic, Normocephalic  EYES: EOMI, PERRLA, conjunctiva and sclera clear  ENMT: Wnl  NECK: Supple, neck  veins wnl; right permacath  site noted, dry.  NERVOUS SYSTEM:  Alert & Oriented X3, Good concentration; Motor Strength wnl upper and lower extremities;  CHEST/LUNG: Clear to percussion bilaterally; No rales, rhonchi, wheezing, or rubs  HEART: Regular rate and rhythm; No  rubs, or gallops  ABDOMEN: Soft, Nontender, Nondistended; Bowel sounds present  EXTREMITIES:  Left arm  access noted  LYMPH: No lymphadenopathy noted  SKIN: No rashes or lesions, pale      LABS:                        10.7   10.37 )-----------( 193      ( 2025 09:05 )             32.5     02-04    139  |  94[L]  |  96.6[H]  ----------------------------<  206[H]  6.2[HH]   |  21.0[L]  |  14.57[H]    Ca    10.6[H]      2025 09:05    TPro  8.1  /  Alb  4.3  /  TBili  0.5  /  DBili  x   /  AST  21  /  ALT  16  /  AlkPhos  111  02-04    PT/INR - ( 2025 09:05 )   PT: 11.2 sec;   INR: 0.99 ratio         PTT - ( 2025 09:05 )  PTT:33.0 sec  Urinalysis Basic - ( 2025 09:05 )    Color: x / Appearance: x / SG: x / pH: x  Gluc: 206 mg/dL / Ketone: x  / Bili: x / Urobili: x   Blood: x / Protein: x / Nitrite: x   Leuk Esterase: x / RBC: x / WBC x   Sq Epi: x / Non Sq Epi: x / Bacteria: x              RADIOLOGY & ADDITIONAL TESTS:

## 2025-02-04 NOTE — ED PROVIDER NOTE - PROGRESS NOTE DETAILS
lab work with hyperkalemia. calcium gluconate and insulin/dextrose ordered. CXR showing pulmonary edema. patient now requiring 4L NC to maintain O2sat. nephrology consulted, will admit for dialysis today. lab work with hyperkalemia. calcium gluconate and insulin/dextrose ordered. no peaked T waves on ekg.

## 2025-02-04 NOTE — ED ADULT NURSE REASSESSMENT NOTE - NS ED NURSE REASSESS COMMENT FT1
critical troponin result 103 reported by lab. MD Gabriel Bazzi made aware. Pt taken for dialysis , off monitor orders confirmed with MD secondary to trop result.

## 2025-02-04 NOTE — ED PROVIDER NOTE - CLINICAL SUMMARY MEDICAL DECISION MAKING FREE TEXT BOX
H&P as stated. patient in mild respiratory distress on arrival, requiring 4L NC to maintain O2sat. blood pressure elevated. patient otherwise well appearing, nontoxic, afebrile, benign abdomen. concern for volume overload in setting of missed dialysis. blood pressure improved on repeat. EKG unchanged. labs and cxr ordered. will monitor on tele for escalating O2 requirement. patient with admission 4 months ago 2/2 to flash pulmonary edema requiring bipap, nitro ggt, and ICU. H&P as stated. patient in mild respiratory distress on arrival, requiring 4L NC to maintain O2sat. blood pressure elevated. patient otherwise well appearing, nontoxic, afebrile, benign abdomen. concern for volume overload in setting of missed dialysis. blood pressure improved on repeat. EKG unchanged. labs and cxr ordered. will monitor on tele for escalating O2 requirement. patient with admission 4 months ago 2/2 to flash pulmonary edema requiring bipap, nitro ggt, and ICU. likely admission today for missed dialysis. H&P as stated. patient in mild respiratory distress on arrival, requiring 4L NC to maintain O2sat. blood pressure elevated. patient otherwise well appearing, nontoxic, afebrile, benign abdomen. concern for volume overload in setting of missed dialysis. blood pressure improved on repeat. EKG unchanged. labs and cxr ordered. will monitor on tele for escalating O2 requirement. patient with admission 4 months ago 2/2 to flash pulmonary edema requiring bipap, nitro ggt, and ICU. likely admission today for missed dialysis.  ---------  Karon Fong MD, Attending  63 yo F hx of ESRD on HD via R IV temp access (MWF), HTN, HLD, T2DM, pericardial effusion and acid reflux pt sob in the context of missed HD yesterday because she was wasn't feeling well from 1 day of GI symptoms (son is sick contact at home also with GI symptoms). No blood in vomit or stool, denies melena. Pt noted to be tachypenic, mild hypoxic and hypertensive.   Gen: mild distress  Head: NC/AT  Neck: trachea midline  Resp:  + tachypneic, b/l crackles, no wheeze, no rales   card: hypertensive, tachycardic, regular rhythm,   abd: non tender, non distended, soft.   Ext: no deformities  Neuro:  A&O appears non focal  Skin:  Warm and dry as visualized  Psych:  Normal affect and mood  ddx includes, but is not limited to the following: pulm edema, electrolyte derangement, hyperkalemia, fluid overload  plan: screening labs and EKG, given reported vomiting, will hold off on bipap at this time as pt not in resp distress. observe closely for need for BIPap and nitro infusion. Pt will require admission for HD given sings of increased wob.   update: see progress notes

## 2025-02-04 NOTE — ED PROVIDER NOTE - OBJECTIVE STATEMENT
65 yo F with a PMH of ESRD on HD, HTN, HLD, T2DM, pericardial effusion and acid reflux presets for sob, high bp, diarrhea, and worsening reflux x1 day. Pt missed yesterdays HD since she was not feeling well. Sob began last night worse when laying down. Reports vomiting, diarrhea, and, dry cough starting yesterday. Son is sick as home with similar sxs. Pt is still having diarrhea and vomiting today. Tried taking nyquil around 1 am and immediate threw it up otherwise denies taking any medication for symptomatic relief. Managed on 81mg asa however, did not take any at home meds today. C/o tactile fever, chills, night sweats, fatigue, and body aches. 63 yo F with a PMH of ESRD on HD, HTN, HLD, T2DM, pericardial effusion and acid reflux presets for sob, high bp, diarrhea, and worsening reflux x1 day. Pt missed yesterdays HD since she was not feeling well. Sob began last night worse when laying down. Reports vomiting, diarrhea, and, dry cough starting yesterday. Son is sick as home with similar sxs. Pt is still having diarrhea and vomiting today. Tolerating food and drink. Tried taking nyquil around 1 am and immediate threw it up otherwise denies taking any medication for symptomatic relief. Managed on 81mg asa however, did not take any at home meds today. C/o tactile fever, chills, night sweats, fatigue, and body aches. States legs are swollen due to her socks being too tight. 1 month ago had catheter for dialysis changed in R shoulder and has plans for fistula procedure for dialysis this Friday. Denies recent travel, congestion, sore throat, cp, palpitations, hemoptysis, back pain, abd pain, hematochezia, melena, dysuria, hematuria, numbness/tingling of extremities b/l. No allergies to medications.

## 2025-02-04 NOTE — H&P ADULT - NSHPLABSRESULTS_GEN_ALL_CORE
10.7   10.37  )----------(  193       ( 04 Feb 2025 09:05 )               32.5      139    |  94     |  96.6   ----------------------------<  206        ( 04 Feb 2025 09:05 )  6.2     |  21.0   |  14.57    Ca    10.6       ( 04 Feb 2025 09:05 )    TPro  8.1    /  Alb  4.3    /  TBili  0.5    /  DBili  x      /  AST  21     /  ALT  16     /  AlkPhos  111    ( 04 Feb 2025 09:05 )    LIVER FUNCTIONS - ( 04 Feb 2025 09:05 )  Alb: 4.3 g/dL / Pro: 8.1 g/dL / ALK PHOS: 111 U/L / ALT: 16 U/L / AST: 21 U/L / GGT: x           PT/INR -  11.2 sec / 0.99 ratio   ( 04 Feb 2025 09:05 )       PTT -  33.0 sec   ( 04 Feb 2025 09:05 )  CAPILLARY BLOOD GLUCOSE        Urinalysis Basic - ( 04 Feb 2025 09:05 )    Color: x / Appearance: x / SG: x / pH: x  Gluc: 206 mg/dL / Ketone: x  / Bili: x / Urobili: x   Blood: x / Protein: x / Nitrite: x   Leuk Esterase: x / RBC: x / WBC x   Sq Epi: x / Non Sq Epi: x / Bacteria: x

## 2025-02-04 NOTE — ED PROVIDER NOTE - ATTENDING CONTRIBUTION TO CARE
Patient was critically ill with a high probability of imminent or life threatening deterioration.  I have performed direct patient care (not related to procedure), additional history taking, interpretation of diagnostic studies, documentation, consultation with other physicians, telephone consultation with the patient's family.   I have personally and independently provided the amount of critical care time documented below excluding time spent on separate procedures.  critical care time 35 mins  Dr. Fong: I personally saw the patient with the resident and performed a substantive portion of the visit. I performed a face to face bedside interview with patient regarding history of present illness, review of symptoms and past medical history. I completed an independent physical exam and all aspects of medical decision making. I have discussed patient's plan of care with resident. I agree with note as stated above, having amended the EMR as needed to reflect my findings. This includes HISTORY OF PRESENT ILLNESS, HIV, PAST MEDICAL/SURGICAL/FAMILY/SOCIAL HISTORY, ALLERGIES AND HOME MEDICATIONS, ROS, PHYSICAL EXAM, MEDICAL DECISION MAKING and any PROGRESS NOTES during the time I functioned as the attending physician for this patient.  SEE MDM  Dr. Fong: I personally saw the patient with the student (medical/PA/NP) and performed a substantive portion of the visit. I performed a face to face bedside interview with patient regarding history of present illness, review of symptoms and past medical history. I completed an independent physical exam and all aspects of medical decision making. I have discussed patient's plan of care with student  (medical/PA/NP). I agree with note as stated above, having amended the EMR as needed to reflect my findings. This includes HISTORY OF PRESENT ILLNESS, HIV, PAST MEDICAL/SURGICAL/FAMILY/SOCIAL HISTORY, ALLERGIES AND HOME MEDICATIONS, ROS, PHYSICAL EXAM, MEDICAL DECISION MAKING and any PROGRESS NOTES during the time I functioned as the attending physician for this patient.  SEE MDM

## 2025-02-04 NOTE — PATIENT PROFILE ADULT - NSPROHMDIABETMGMTSTRAT_GEN_A_NUR
Discharged home into care of daughter with plan to follow up with PCP as indicated. Alert and interactive. Hemodynamically stable.  Ambulates to exit utilizing home walker with steady gait blood glucose testing

## 2025-02-04 NOTE — PATIENT PROFILE ADULT - FALL HARM RISK - RISK INTERVENTIONS

## 2025-02-04 NOTE — ED ADULT NURSE NOTE - NSFALLUNIVINTERV_ED_ALL_ED
Bed/Stretcher in lowest position, wheels locked, appropriate side rails in place/Call bell, personal items and telephone in reach/Instruct patient to call for assistance before getting out of bed/chair/stretcher/Non-slip footwear applied when patient is off stretcher/[place] to call system/Physically safe environment - no spills, clutter or unnecessary equipment/Purposeful proactive rounding/Room/bathroom lighting operational, light cord in reach

## 2025-02-04 NOTE — PHARMACOTHERAPY INTERVENTION NOTE - COMMENTS
Referenced outpatient fill record and spoke with patient at bedside.    Outpatient Medication Review updated.    HOME MEDICATIONS:  aspirin 81 mg oral delayed release tablet: 1 tab(s) orally once a day (04 Feb 2025 13:38)  calcium acetate 667 mg oral tablet: 1 tab(s) orally 3 times a day (with meals) (04 Feb 2025 13:39)  cloNIDine 0.1 mg oral tablet: 1 tab(s) orally 2 times a day (04 Feb 2025 13:37)  dilTIAZem 240 mg/24 hours oral capsule, extended release: 1 cap(s) orally once a day (04 Feb 2025 13:37)  hydrALAZINE 100 mg oral tablet: 1 tab(s) orally 2 times a day (04 Feb 2025 13:37)  Januvia 25 mg oral tablet: 1 tab(s) orally once a day (04 Feb 2025 13:39)  Metoprolol Tartrate 100 mg oral tablet: 1 tab(s) orally once a day (04 Feb 2025 13:38)--usually dosed bid, recommend re-assessment of home regimen or possible transition to succinate for once daily dosing.  Nephro-Elizabeth oral tablet: 1 tab(s) orally once a day (04 Feb 2025 13:39)  pantoprazole 40 mg oral granule, delayed release: 1 packet(s) orally 2 times a day (04 Feb 2025 13:37)  rosuvastatin 20 mg oral tablet: 1 tab(s) orally once a day (04 Feb 2025 13:37)  torsemide 20 mg oral tablet: 1 tab(s) orally once a day (04 Feb 2025 13:38)

## 2025-02-04 NOTE — PATIENT PROFILE ADULT - FUNCTIONAL ASSESSMENT - BASIC MOBILITY SECTION LABEL

## 2025-02-04 NOTE — ED ADULT NURSE NOTE - OBJECTIVE STATEMENT
Pt c/o sob, n/v/d, fevers starting yesterday. States she missed dialysis yesterday due to not feeling well. Typically goes MWF. Access in L arm. NSR on mon satting 94% on 2L nc.

## 2025-02-04 NOTE — ED ADULT TRIAGE NOTE - CHIEF COMPLAINT QUOTE
Pt arrives to ED c/o SOB / diarrhea / epigastric pain , pt states she missed HD yesterday due to not feeling well. Son has diarrhea too

## 2025-02-04 NOTE — H&P ADULT - NSHPPHYSICALEXAM_GEN_ALL_CORE
PHYSICAL EXAM:    GENERAL: NAD, thin, pleasant  HEAD:  Atraumatic, Normocephalic  EYES: EOMI, PERRLA, conjunctiva and sclera clear  ENMT: No tonsillar erythema, exudates, or enlargement; Moist mucous membranes, Good dentition, No lesions  NECK: Supple, No JVD, Normal thyroid  NERVOUS SYSTEM:  Alert & Oriented X3, Good concentration; Motor Strength 5/5 B/L upper and lower extremities; DTRs 2+ intact and symmetric  CHEST/LUNG: right chest permacath   HEART: Regular rate and rhythm; No murmurs, rubs, or gallops  ABDOMEN: Soft, non tender  EXTREMITIES: LUE fistula  LYMPH: No lymphadenopathy noted  SKIN: No rashes or lesions

## 2025-02-04 NOTE — ED PROVIDER NOTE - PHYSICAL EXAMINATION
Gen: AAOx3, NAD, well nourished  HEENT: Normocephalic atraumatic. EOMI. No scleral icterus. Moist mucus membranes.  CV: RRR. Audible S1 and S2. No murmurs. 2+ radial and PT pulses   Pulm: fine crackles b/l. Satting low 90s on 2L NC. Fast heavy breathing when asked to sit up. No wheezes or rhonchi. No accessory muscle use or respiratory distress.  Abdomen: + ttp in all 4 quadrants, soft, normoactive BS, non distended, no rebound, no guarding  Skin: No rashes or lesions. Warm.  Psych: Appropriate mood and affect. Cooperative Gen: AAOx3, NAD, well nourished  HEENT: Normocephalic atraumatic. EOMI. No scleral icterus. Moist mucus membranes.  CV: RRR. Audible S1 and S2. No murmurs. 2+ radial and PT pulses   Pulm: fine crackles b/l. Satting low 90s on 2L NC. Fast heavy breathing when asked to sit up. No wheezes or rhonchi. No accessory muscle use or respiratory distress.  Abdomen: soft, nontender, normoactive BS, non distended, no rebound, no guarding  Skin: No rashes or lesions. Warm.  Psych: Appropriate mood and affect. Cooperative Gen: AAOx3, NAD, well nourished  HEENT: Normocephalic atraumatic. EOMI. No scleral icterus. Moist mucus membranes.  CV: RRR. Audible S1 and S2. No murmurs. 2+ radial and PT pulses   Pulm: fine crackles b/l. Satting low 90s on 2L NC. + tachypenic No wheezes or rhonchi.   Abdomen: soft, nontender, normoactive BS, non distended, no rebound, no guarding  Skin: No rashes or lesions. Warm.  Psych: Appropriate mood and affect. Cooperative

## 2025-02-04 NOTE — H&P ADULT - ASSESSMENT
Patient is a 64yoF PMHx former smoker, ESRD on HD(R chest permacath and  LUE AVF), failed PD due to peritonitis, HTN, HLD, DM2, GIB, GERD, recent admission 11/2024 for resp failure with effusions now returns to [facility] for missed HD. Patient states for the past few days she has been having diarrhea, vomiting with retching and feeling weak with sob. Patient missed her HD session yesterday due to not feeling well and became more sob and presented to the ed. Of note patient is due for fistula revision this friday at [facility] and wants to leave the latest thursday.    #esrd - missed HD  - renal consult appreciated  - will start sodium bicarb   - c.w calcium acetate  - renal diet    #trop elevation - no ekg changes  - no chest pain   - likley from dec clearence from esr    #hyperkalemia - calcium gluconate  - admit to tele    #diarrhea - send stool stuides  - c dif     #dm2 - ssi     #htn - resume half home dose cardizem 30mg po q6  - hold clonidine  - hold metoprolol     #dvt prop hep sub q    dispo admit to hospital needs dialysis and diarrhea work up

## 2025-02-04 NOTE — CONSULT NOTE ADULT - ASSESSMENT
A) Viral Gastroenteritis, DM 2, CRF, hypertension.    P) Viral  studies, Bp meds, HD  today pt  consented.

## 2025-02-04 NOTE — PATIENT PROFILE ADULT - FUNCTIONAL SCREEN CURRENT LEVEL: COMMUNICATION, MLM
Follow-up Pulm Progress Note    Rash noted on arms and chest - denies pruritis   No c/o dyspnea, CP  Sats 99% RA    Medications:  MEDICATIONS  (STANDING):  artificial  tears Solution 1 Drop(s) Both EYES two times a day  chlorhexidine 2% Cloths 1 Application(s) Topical daily  cycloSPORINE (RESTASIS) 0.05% Emulsion 1 Drop(s) Both EYES two times a day  dextrose 5%. 1000 milliLiter(s) (50 mL/Hr) IV Continuous <Continuous>  dextrose 50% Injectable 12.5 Gram(s) IV Push once  dextrose 50% Injectable 25 Gram(s) IV Push once  dextrose 50% Injectable 25 Gram(s) IV Push once  fondaparinux Injectable 2.5 milliGRAM(s) SubCutaneous daily  insulin lispro (HumaLOG) corrective regimen sliding scale   SubCutaneous three times a day before meals  insulin lispro (HumaLOG) corrective regimen sliding scale   SubCutaneous at bedtime  levothyroxine Injectable 75 MICROGram(s) IV Push at bedtime  meropenem  IVPB 2000 milliGRAM(s) IV Intermittent every 8 hours  metoprolol succinate ER 25 milliGRAM(s) Oral daily  polyethylene glycol 3350 17 Gram(s) Oral daily  prednisoLONE acetate 1% Suspension 1 Drop(s) Both EYES two times a day  predniSONE   Tablet 5 milliGRAM(s) Oral daily  senna 2 Tablet(s) Oral at bedtime    MEDICATIONS  (PRN):  acetaminophen   Tablet .. 650 milliGRAM(s) Oral every 6 hours PRN Mild Pain (1 - 3)  dextrose 40% Gel 15 Gram(s) Oral once PRN Blood Glucose LESS THAN 70 milliGRAM(s)/deciliter  glucagon  Injectable 1 milliGRAM(s) IntraMuscular once PRN Glucose LESS THAN 70 milligrams/deciliter  hydrALAZINE Injectable 5 milliGRAM(s) IV Push every 6 hours PRN SBP >170  simethicone 80 milliGRAM(s) Chew three times a day PRN Gas  traMADol 50 milliGRAM(s) Oral two times a day PRN Moderate Pain (4 - 6)/Moderate Pain (4 - 6)          Vital Signs Last 24 Hrs  T(C): 36.9 (20 Oct 2020 04:53), Max: 36.9 (19 Oct 2020 11:59)  T(F): 98.4 (20 Oct 2020 04:53), Max: 98.5 (19 Oct 2020 11:59)  HR: 93 (20 Oct 2020 04:53) (78 - 96)  BP: 106/78 (20 Oct 2020 04:53) (95/58 - 127/55)  BP(mean): --  RR: 18 (20 Oct 2020 04:53) (18 - 18)  SpO2: 99% (20 Oct 2020 04:53) (98% - 99%)          10-19 @ 07:01  -  10-20 @ 07:00  --------------------------------------------------------  IN: 1110 mL / OUT: 500 mL / NET: 610 mL          LABS:                        8.3    5.29  )-----------( 125      ( 19 Oct 2020 05:05 )             27.9     10-19    127<L>  |  97  |  8   ----------------------------<  77  3.6   |  22  |  <0.30<L>    Ca    8.1<L>      19 Oct 2020 05:05            CAPILLARY BLOOD GLUCOSE      POCT Blood Glucose.: 147 mg/dL (20 Oct 2020 09:26)            CULTURES:     Culture - Blood (collected 10-17-20 @ 18:24)  Source: .Blood Blood-Peripheral  Preliminary Report (10-18-20 @ 19:02):    No growth to date.    Culture - Blood (collected 10-17-20 @ 18:24)  Source: .Blood Blood-Peripheral  Preliminary Report (10-18-20 @ 19:02):    No growth to date.    Culture - Blood (collected 10-14-20 @ 17:44)  Source: .Blood Blood  Final Report (10-19-20 @ 18:00):    No Growth Final    Culture - Blood (collected 10-14-20 @ 17:44)  Source: .Blood Blood  Final Report (10-19-20 @ 18:00):    No Growth Final        Culture - Urine (collected 10-14-20 @ 18:50)  Source: .Urine Catheterized  Final Report (10-15-20 @ 17:01):    No growth            Physical Examination:  PULM: Clear to auscultation bilaterally, no significant sputum production  CVS: RRR    RADIOLOGY REVIEWED  CT chest: < from: CT Angio Chest w/ IV Cont (10.14.20 @ 21:30) >  FINDINGS:    LUNGS AND AIRWAYS: Low lung volumes. Elevated diaphragm. Interval improvement in bilateral posterior and dependent consolidations previously seen on chest CT from 10/7/2020.  PLEURA: Bilateral small left and trace right pleural effusions.  MEDIASTINUM AND ROSEMARY: No lymphadenopathy.  VESSELS: The main pulmonary artery measures 2.1 cm. There is no main, central, lobar, or segmental pulmonary embolus. Right upper extremity PICC with tip terminating in the right atrium. Calcified and noncalcified ulcerative plaque involving the aorta.  HEART: Heart size is normal. No pericardial effusion. Coronary arterial calcifications. No signs of right heart strain.  CHEST WALL AND LOWER NECK: Within normal limits.  VISUALIZED UPPER ABDOMEN: Embolization coil mass in the gastrohepatic space.  BONES: Degenerative changes.    IMPRESSION:    No main, lobar, or segmental pulmonary embolism. Limited evaluation the subsegmental pulmonary arteries due to respiratory motion.    Improvement in bilateral atelectasis seen on chest CT from 10/7/2020. Small left and trace right pleural effusions.      < end of copied text >       0 = understands/communicates without difficulty

## 2025-02-05 ENCOUNTER — TRANSCRIPTION ENCOUNTER (OUTPATIENT)
Age: 65
End: 2025-02-05

## 2025-02-05 VITALS
RESPIRATION RATE: 18 BRPM | WEIGHT: 109.79 LBS | HEART RATE: 102 BPM | OXYGEN SATURATION: 100 % | SYSTOLIC BLOOD PRESSURE: 160 MMHG | TEMPERATURE: 99 F | DIASTOLIC BLOOD PRESSURE: 78 MMHG

## 2025-02-05 LAB
A1C WITH ESTIMATED AVERAGE GLUCOSE RESULT: 6.6 % — HIGH (ref 4–5.6)
ALBUMIN SERPL ELPH-MCNC: 3.3 G/DL — SIGNIFICANT CHANGE UP (ref 3.3–5.2)
ALP SERPL-CCNC: 84 U/L — SIGNIFICANT CHANGE UP (ref 40–120)
ALT FLD-CCNC: 13 U/L — SIGNIFICANT CHANGE UP
ANION GAP SERPL CALC-SCNC: 16 MMOL/L — SIGNIFICANT CHANGE UP (ref 5–17)
AST SERPL-CCNC: 20 U/L — SIGNIFICANT CHANGE UP
BASOPHILS # BLD AUTO: 0.03 K/UL — SIGNIFICANT CHANGE UP (ref 0–0.2)
BASOPHILS NFR BLD AUTO: 0.4 % — SIGNIFICANT CHANGE UP (ref 0–2)
BILIRUB SERPL-MCNC: 0.3 MG/DL — LOW (ref 0.4–2)
BUN SERPL-MCNC: 56.7 MG/DL — HIGH (ref 8–20)
CALCIUM SERPL-MCNC: 9.2 MG/DL — SIGNIFICANT CHANGE UP (ref 8.4–10.5)
CHLORIDE SERPL-SCNC: 92 MMOL/L — LOW (ref 96–108)
CO2 SERPL-SCNC: 29 MMOL/L — SIGNIFICANT CHANGE UP (ref 22–29)
CREAT SERPL-MCNC: 9.31 MG/DL — HIGH (ref 0.5–1.3)
EGFR: 4 ML/MIN/1.73M2 — LOW
EOSINOPHIL # BLD AUTO: 0.37 K/UL — SIGNIFICANT CHANGE UP (ref 0–0.5)
EOSINOPHIL NFR BLD AUTO: 5 % — SIGNIFICANT CHANGE UP (ref 0–6)
ESTIMATED AVERAGE GLUCOSE: 143 MG/DL — HIGH (ref 68–114)
GLUCOSE BLDC GLUCOMTR-MCNC: 110 MG/DL — HIGH (ref 70–99)
GLUCOSE BLDC GLUCOMTR-MCNC: 128 MG/DL — HIGH (ref 70–99)
GLUCOSE BLDC GLUCOMTR-MCNC: 151 MG/DL — HIGH (ref 70–99)
GLUCOSE SERPL-MCNC: 169 MG/DL — HIGH (ref 70–99)
HBV SURFACE AB SER-ACNC: 75 MIU/ML — SIGNIFICANT CHANGE UP
HBV SURFACE AG SER-ACNC: SIGNIFICANT CHANGE UP
HCT VFR BLD CALC: 27.6 % — LOW (ref 34.5–45)
HGB BLD-MCNC: 9.1 G/DL — LOW (ref 11.5–15.5)
IMM GRANULOCYTES NFR BLD AUTO: 0.3 % — SIGNIFICANT CHANGE UP (ref 0–0.9)
LYMPHOCYTES # BLD AUTO: 1.49 K/UL — SIGNIFICANT CHANGE UP (ref 1–3.3)
LYMPHOCYTES # BLD AUTO: 20.3 % — SIGNIFICANT CHANGE UP (ref 13–44)
MAGNESIUM SERPL-MCNC: 2.4 MG/DL — SIGNIFICANT CHANGE UP (ref 1.6–2.6)
MCHC RBC-ENTMCNC: 25.9 PG — LOW (ref 27–34)
MCHC RBC-ENTMCNC: 33 G/DL — SIGNIFICANT CHANGE UP (ref 32–36)
MCV RBC AUTO: 78.6 FL — LOW (ref 80–100)
MONOCYTES # BLD AUTO: 0.66 K/UL — SIGNIFICANT CHANGE UP (ref 0–0.9)
MONOCYTES NFR BLD AUTO: 9 % — SIGNIFICANT CHANGE UP (ref 2–14)
MRSA PCR RESULT.: SIGNIFICANT CHANGE UP
NEUTROPHILS # BLD AUTO: 4.76 K/UL — SIGNIFICANT CHANGE UP (ref 1.8–7.4)
NEUTROPHILS NFR BLD AUTO: 65 % — SIGNIFICANT CHANGE UP (ref 43–77)
PHOSPHATE SERPL-MCNC: 6.5 MG/DL — HIGH (ref 2.4–4.7)
PLATELET # BLD AUTO: 188 K/UL — SIGNIFICANT CHANGE UP (ref 150–400)
POTASSIUM SERPL-MCNC: 5 MMOL/L — SIGNIFICANT CHANGE UP (ref 3.5–5.3)
POTASSIUM SERPL-SCNC: 5 MMOL/L — SIGNIFICANT CHANGE UP (ref 3.5–5.3)
PROT SERPL-MCNC: 6.3 G/DL — LOW (ref 6.6–8.7)
RBC # BLD: 3.51 M/UL — LOW (ref 3.8–5.2)
RBC # FLD: 16.6 % — HIGH (ref 10.3–14.5)
S AUREUS DNA NOSE QL NAA+PROBE: SIGNIFICANT CHANGE UP
SODIUM SERPL-SCNC: 137 MMOL/L — SIGNIFICANT CHANGE UP (ref 135–145)
WBC # BLD: 7.33 K/UL — SIGNIFICANT CHANGE UP (ref 3.8–10.5)
WBC # FLD AUTO: 7.33 K/UL — SIGNIFICANT CHANGE UP (ref 3.8–10.5)

## 2025-02-05 PROCEDURE — 36415 COLL VENOUS BLD VENIPUNCTURE: CPT

## 2025-02-05 PROCEDURE — 84132 ASSAY OF SERUM POTASSIUM: CPT

## 2025-02-05 PROCEDURE — 80053 COMPREHEN METABOLIC PANEL: CPT

## 2025-02-05 PROCEDURE — 85730 THROMBOPLASTIN TIME PARTIAL: CPT

## 2025-02-05 PROCEDURE — 83605 ASSAY OF LACTIC ACID: CPT

## 2025-02-05 PROCEDURE — 96375 TX/PRO/DX INJ NEW DRUG ADDON: CPT

## 2025-02-05 PROCEDURE — 85014 HEMATOCRIT: CPT

## 2025-02-05 PROCEDURE — 83735 ASSAY OF MAGNESIUM: CPT

## 2025-02-05 PROCEDURE — 82947 ASSAY GLUCOSE BLOOD QUANT: CPT

## 2025-02-05 PROCEDURE — 85018 HEMOGLOBIN: CPT

## 2025-02-05 PROCEDURE — 82330 ASSAY OF CALCIUM: CPT

## 2025-02-05 PROCEDURE — 85610 PROTHROMBIN TIME: CPT

## 2025-02-05 PROCEDURE — 71045 X-RAY EXAM CHEST 1 VIEW: CPT

## 2025-02-05 PROCEDURE — 99239 HOSP IP/OBS DSCHRG MGMT >30: CPT

## 2025-02-05 PROCEDURE — 82962 GLUCOSE BLOOD TEST: CPT

## 2025-02-05 PROCEDURE — 82435 ASSAY OF BLOOD CHLORIDE: CPT

## 2025-02-05 PROCEDURE — 84484 ASSAY OF TROPONIN QUANT: CPT

## 2025-02-05 PROCEDURE — 86706 HEP B SURFACE ANTIBODY: CPT

## 2025-02-05 PROCEDURE — 87640 STAPH A DNA AMP PROBE: CPT

## 2025-02-05 PROCEDURE — 87641 MR-STAPH DNA AMP PROBE: CPT

## 2025-02-05 PROCEDURE — 93005 ELECTROCARDIOGRAM TRACING: CPT

## 2025-02-05 PROCEDURE — 99291 CRITICAL CARE FIRST HOUR: CPT

## 2025-02-05 PROCEDURE — 82803 BLOOD GASES ANY COMBINATION: CPT

## 2025-02-05 PROCEDURE — 99261: CPT

## 2025-02-05 PROCEDURE — 85025 COMPLETE CBC W/AUTO DIFF WBC: CPT

## 2025-02-05 PROCEDURE — 84295 ASSAY OF SERUM SODIUM: CPT

## 2025-02-05 PROCEDURE — 87637 SARSCOV2&INF A&B&RSV AMP PRB: CPT

## 2025-02-05 PROCEDURE — 84100 ASSAY OF PHOSPHORUS: CPT

## 2025-02-05 PROCEDURE — 96374 THER/PROPH/DIAG INJ IV PUSH: CPT

## 2025-02-05 PROCEDURE — 83036 HEMOGLOBIN GLYCOSYLATED A1C: CPT

## 2025-02-05 PROCEDURE — 87340 HEPATITIS B SURFACE AG IA: CPT

## 2025-02-05 RX ORDER — HYDRALAZINE HCL 100 MG
1 TABLET ORAL
Qty: 0 | Refills: 0 | DISCHARGE
Start: 2025-02-05

## 2025-02-05 RX ORDER — HYDRALAZINE HCL 100 MG
1 TABLET ORAL
Refills: 0 | DISCHARGE

## 2025-02-05 RX ORDER — HYDRALAZINE HCL 100 MG
10 TABLET ORAL EVERY 4 HOURS
Refills: 0 | Status: DISCONTINUED | OUTPATIENT
Start: 2025-02-05 | End: 2025-02-05

## 2025-02-05 RX ORDER — METOPROLOL SUCCINATE 25 MG
1 TABLET, EXTENDED RELEASE 24 HR ORAL
Refills: 0 | DISCHARGE

## 2025-02-05 RX ORDER — METOPROLOL SUCCINATE 25 MG
1 TABLET, EXTENDED RELEASE 24 HR ORAL
Qty: 30 | Refills: 0
Start: 2025-02-05 | End: 2025-03-06

## 2025-02-05 RX ORDER — ANTISEPTIC SURGICAL SCRUB 0.04 MG/ML
1 SOLUTION TOPICAL
Refills: 0 | Status: DISCONTINUED | OUTPATIENT
Start: 2025-02-05 | End: 2025-02-05

## 2025-02-05 RX ORDER — SODIUM BICARBONATE 42 MG/ML
1 INJECTION, SOLUTION INTRAVENOUS
Qty: 90 | Refills: 0
Start: 2025-02-05 | End: 2025-03-06

## 2025-02-05 RX ORDER — HYDRALAZINE HCL 100 MG
100 TABLET ORAL EVERY 8 HOURS
Refills: 0 | Status: DISCONTINUED | OUTPATIENT
Start: 2025-02-05 | End: 2025-02-05

## 2025-02-05 RX ADMIN — DILTIAZEM HYDROCHLORIDE 30 MILLIGRAM(S): 60 TABLET ORAL at 05:37

## 2025-02-05 RX ADMIN — SODIUM BICARBONATE 650 MILLIGRAM(S): 42 INJECTION, SOLUTION INTRAVENOUS at 13:13

## 2025-02-05 RX ADMIN — Medication 5000 UNIT(S): at 05:37

## 2025-02-05 RX ADMIN — ASPIRIN 81 MILLIGRAM(S): 81 TABLET, COATED ORAL at 12:12

## 2025-02-05 RX ADMIN — ANTISEPTIC SURGICAL SCRUB 1 APPLICATION(S): 0.04 SOLUTION TOPICAL at 12:13

## 2025-02-05 RX ADMIN — CALCIUM ACETATE 667 MILLIGRAM(S): 667 CAPSULE ORAL at 12:11

## 2025-02-05 RX ADMIN — TORSEMIDE 20 MILLIGRAM(S): 20 TABLET ORAL at 06:10

## 2025-02-05 RX ADMIN — CALCIUM ACETATE 667 MILLIGRAM(S): 667 CAPSULE ORAL at 09:01

## 2025-02-05 RX ADMIN — PANTOPRAZOLE 40 MILLIGRAM(S): 20 TABLET, DELAYED RELEASE ORAL at 06:16

## 2025-02-05 RX ADMIN — Medication 10 MILLIGRAM(S): at 16:56

## 2025-02-05 RX ADMIN — Medication 1 TABLET(S): at 12:11

## 2025-02-05 RX ADMIN — DILTIAZEM HYDROCHLORIDE 30 MILLIGRAM(S): 60 TABLET ORAL at 17:38

## 2025-02-05 RX ADMIN — Medication 50 MILLIGRAM(S): at 05:37

## 2025-02-05 RX ADMIN — DILTIAZEM HYDROCHLORIDE 30 MILLIGRAM(S): 60 TABLET ORAL at 12:12

## 2025-02-05 RX ADMIN — Medication 100 MILLIGRAM(S): at 15:43

## 2025-02-05 RX ADMIN — DILTIAZEM HYDROCHLORIDE 30 MILLIGRAM(S): 60 TABLET ORAL at 00:16

## 2025-02-05 RX ADMIN — Medication 5000 UNIT(S): at 17:39

## 2025-02-05 RX ADMIN — Medication 2: at 12:12

## 2025-02-05 RX ADMIN — SODIUM BICARBONATE 650 MILLIGRAM(S): 42 INJECTION, SOLUTION INTRAVENOUS at 05:37

## 2025-02-05 RX ADMIN — CALCIUM ACETATE 667 MILLIGRAM(S): 667 CAPSULE ORAL at 17:39

## 2025-02-05 NOTE — PROGRESS NOTE ADULT - ASSESSMENT
64yoF PMHx former smoker, ESRD on HD(R chest permacath and  LUE AVF), failed PD due to peritonitis, HTN, HLD, DM2, GIB, GERD, recent admission 11/2024 for resp failure with effusions now returns to [facility] for missed HD. Patient states for the past few days she has been having diarrhea, vomiting with retching and feeling weak with sob. Patient missed her HD session yesterday due to not feeling well and became more sob and presented to the ed. Of note patient is due for fistula revision this friday at [facility] and wants to leave the latest thursday.  Missed HD Monday    ESRD - HD again today to get back on schedule  Check pre HD K

## 2025-02-05 NOTE — DISCHARGE NOTE PROVIDER - NSDCCPCAREPLAN_GEN_ALL_CORE_FT
PRINCIPAL DISCHARGE DIAGNOSIS  Diagnosis: Encounter for hemodialysis for ESRD  Assessment and Plan of Treatment:       SECONDARY DISCHARGE DIAGNOSES  Diagnosis: Hyperkalemia  Assessment and Plan of Treatment:     Diagnosis: Diarrhea  Assessment and Plan of Treatment:     Diagnosis: Hypertensive disorder  Assessment and Plan of Treatment:

## 2025-02-05 NOTE — DISCHARGE NOTE NURSING/CASE MANAGEMENT/SOCIAL WORK - FINANCIAL ASSISTANCE
[facility] provides services at a reduced cost to those who are determined to be eligible through [facility]’s financial assistance program. Information regarding [facility]’s financial assistance program can be found by going to [email or web address] or by calling [phone].

## 2025-02-05 NOTE — DISCHARGE NOTE PROVIDER - HOSPITAL COURSE
Patient is a 64yoF PMHx former smoker, ESRD on HD(R chest permacath and  LUE AVF), failed PD due to peritonitis, HTN, HLD, DM2, GIB, GERD, recent admission 11/2024 for resp failure with effusions now returns to [facility] for missed HD. Patient states for the past few days she has been having diarrhea, vomiting with retching and feeling weak with sob. Patient missed her HD session due to not feeling well and became more sob and presented to the ed. Of note patient is due for fistula revision this friday at [facility]    #esrd - missed HD  - renal consult appreciated  - will start sodium bicarb   - c.w calcium acetate  - had hd on 2.4    #trop elevation - no ekg changes  - no chest pain   - likley from dec clearence from esrd    #hyperkalemia - follow up repeat     #diarrhea - resolved    #dm2 - ssi     #htn - home meds    mediclalt stable for dc. patients diarrhea resolved.

## 2025-02-05 NOTE — DISCHARGE NOTE PROVIDER - NSDCMRMEDTOKEN_GEN_ALL_CORE_FT
aspirin 81 mg oral delayed release tablet: 1 tab(s) orally once a day  calcium acetate 667 mg oral tablet: 1 tab(s) orally 3 times a day (with meals)  cloNIDine 0.1 mg oral tablet: 1 tab(s) orally 2 times a day  dilTIAZem 240 mg/24 hours oral capsule, extended release: 1 cap(s) orally once a day  hydrALAZINE 100 mg oral tablet: 1 tab(s) orally every 8 hours  Januvia 25 mg oral tablet: 1 tab(s) orally once a day  Nephro-Elizabeth oral tablet: 1 tab(s) orally once a day  pantoprazole 40 mg oral granule, delayed release: 1 packet(s) orally 2 times a day  rosuvastatin 20 mg oral tablet: 1 tab(s) orally once a day  sodium bicarbonate 650 mg oral tablet: 1 tab(s) orally 3 times a day  Toprol- mg oral tablet, extended release: 1 tab(s) orally once a day  torsemide 20 mg oral tablet: 1 tab(s) orally once a day

## 2025-02-05 NOTE — PROGRESS NOTE ADULT - SUBJECTIVE AND OBJECTIVE BOX
Patient seen and examined    Feels fine  no c/o CP SOB NVD or Abd pain  No swelling feet    Vital Signs Last 24 Hrs  T(C): 37.1 (05 Feb 2025 11:43), Max: 37.7 (04 Feb 2025 19:38)  T(F): 98.7 (05 Feb 2025 11:43), Max: 99.8 (04 Feb 2025 19:38)  HR: 98 (05 Feb 2025 11:43) (95 - 114)  BP: 174/77 (05 Feb 2025 11:43) (120/60 - 174/77)  BP(mean): --  RR: 18 (05 Feb 2025 11:43) (16 - 20)  SpO2: 94% (05 Feb 2025 11:43) (94% - 100%)    Parameters below as of 05 Feb 2025 11:43  Patient On (Oxygen Delivery Method): room air        PHYSICAL EXAM    GENERAL: NAD,   EYES:  conjunctiva and sclera clear  NECK: Supple, No JVD/Bruit  NERVOUS SYSTEM:  A/O x3,   CHEST:  No rales, No rhonchi  HEART:  RRR, No murmur  ABDOMEN: Soft, NT/ND BS+  EXTREMITIES:  No Edema;  SKIN: No rashes    04 Feb 2025 09:05    139    |  94     |  96.6   ----------------------------<  206    6.2     |  21.0   |  14.57    Ca    10.6       04 Feb 2025 09:05    TPro  8.1    /  Alb  4.3    /  TBili  0.5    /  DBili  x      /  AST  21     /  ALT  16     /  AlkPhos  111    04 Feb 2025 09:05                          10.7   10.37 )-----------( 193      ( 04 Feb 2025 09:05 )             32.5

## 2025-02-05 NOTE — DISCHARGE NOTE PROVIDER - ATTENDING DISCHARGE PHYSICAL EXAMINATION:
GENERAL: NAD, thin, pleasant  HEAD:  Atraumatic, Normocephalic  EYES: EOMI, PERRLA, conjunctiva and sclera clear  ENMT: No tonsillar erythema, exudates, or enlargement; Moist mucous membranes, Good dentition, No lesions  NECK: Supple, No JVD, Normal thyroid  NERVOUS SYSTEM:  Alert & Oriented X3, Good concentration; Motor Strength 5/5 B/L upper and lower extremities; DTRs 2+ intact and symmetric  CHEST/LUNG: right chest permacath   HEART: Regular rate and rhythm; No murmurs, rubs, or gallops  ABDOMEN: Soft, non tender  EXTREMITIES: LUE fistula  LYMPH: No lymphadenopathy noted  SKIN: No rashes or lesions

## 2025-02-05 NOTE — DISCHARGE NOTE NURSING/CASE MANAGEMENT/SOCIAL WORK - PATIENT PORTAL LINK FT
You can access the FollowMyHealth Patient Portal offered by [facility] by registering at the following website: [email or web address]. By joining [employer]’s FollowMyHealth portal, you will also be able to view your health information using other applications (apps) compatible with our system.

## 2025-06-27 ENCOUNTER — NON-APPOINTMENT (OUTPATIENT)
Age: 65
End: 2025-06-27

## 2025-07-07 ENCOUNTER — INPATIENT (INPATIENT)
Facility: HOSPITAL | Age: 65
LOS: 1 days | Discharge: HOME | DRG: 189 | End: 2025-07-09
Attending: STUDENT IN AN ORGANIZED HEALTH CARE EDUCATION/TRAINING PROGRAM | Admitting: INTERNAL MEDICINE
Payer: COMMERCIAL

## 2025-07-07 ENCOUNTER — RESULT REVIEW (OUTPATIENT)
Age: 65
End: 2025-07-07

## 2025-07-07 VITALS — OXYGEN SATURATION: 98 % | HEART RATE: 117 BPM

## 2025-07-07 DIAGNOSIS — Z98.890 OTHER SPECIFIED POSTPROCEDURAL STATES: Chronic | ICD-10-CM

## 2025-07-07 DIAGNOSIS — J96.01 ACUTE RESPIRATORY FAILURE WITH HYPOXIA: ICD-10-CM

## 2025-07-07 DIAGNOSIS — Z98.891 HISTORY OF UTERINE SCAR FROM PREVIOUS SURGERY: Chronic | ICD-10-CM

## 2025-07-07 LAB
ACETONE SERPL-MCNC: NEGATIVE — SIGNIFICANT CHANGE UP
ALBUMIN SERPL ELPH-MCNC: 3.8 G/DL — SIGNIFICANT CHANGE UP (ref 3.3–5.2)
ALBUMIN SERPL ELPH-MCNC: 4.2 G/DL — SIGNIFICANT CHANGE UP (ref 3.3–5.2)
ALP SERPL-CCNC: 124 U/L — HIGH (ref 40–120)
ALP SERPL-CCNC: 147 U/L — HIGH (ref 40–120)
ALT FLD-CCNC: 23 U/L — SIGNIFICANT CHANGE UP
ALT FLD-CCNC: 23 U/L — SIGNIFICANT CHANGE UP
ANION GAP SERPL CALC-SCNC: 20 MMOL/L — HIGH (ref 5–17)
ANION GAP SERPL CALC-SCNC: 24 MMOL/L — HIGH (ref 5–17)
APTT BLD: 29.5 SEC — SIGNIFICANT CHANGE UP (ref 26.1–36.8)
AST SERPL-CCNC: 29 U/L — SIGNIFICANT CHANGE UP
AST SERPL-CCNC: 36 U/L — HIGH
BASOPHILS # BLD AUTO: 0.2 K/UL — SIGNIFICANT CHANGE UP (ref 0–0.2)
BASOPHILS NFR BLD AUTO: 1.1 % — SIGNIFICANT CHANGE UP (ref 0–2)
BILIRUB SERPL-MCNC: 0.4 MG/DL — SIGNIFICANT CHANGE UP (ref 0.4–2)
BILIRUB SERPL-MCNC: 0.7 MG/DL — SIGNIFICANT CHANGE UP (ref 0.4–2)
BUN SERPL-MCNC: 29.7 MG/DL — HIGH (ref 8–20)
BUN SERPL-MCNC: 69.2 MG/DL — HIGH (ref 8–20)
CALCIUM SERPL-MCNC: 10.2 MG/DL — SIGNIFICANT CHANGE UP (ref 8.4–10.5)
CALCIUM SERPL-MCNC: 9.7 MG/DL — SIGNIFICANT CHANGE UP (ref 8.4–10.5)
CHLORIDE SERPL-SCNC: 87 MMOL/L — LOW (ref 96–108)
CHLORIDE SERPL-SCNC: 92 MMOL/L — LOW (ref 96–108)
CO2 SERPL-SCNC: 22 MMOL/L — SIGNIFICANT CHANGE UP (ref 22–29)
CO2 SERPL-SCNC: 23 MMOL/L — SIGNIFICANT CHANGE UP (ref 22–29)
CREAT SERPL-MCNC: 12.37 MG/DL — HIGH (ref 0.5–1.3)
CREAT SERPL-MCNC: 7.03 MG/DL — HIGH (ref 0.5–1.3)
EGFR: 3 ML/MIN/1.73M2 — LOW
EGFR: 3 ML/MIN/1.73M2 — LOW
EGFR: 6 ML/MIN/1.73M2 — LOW
EGFR: 6 ML/MIN/1.73M2 — LOW
EOSINOPHIL # BLD AUTO: 0.76 K/UL — HIGH (ref 0–0.5)
EOSINOPHIL NFR BLD AUTO: 4.1 % — SIGNIFICANT CHANGE UP (ref 0–6)
GAS PNL BLDA: SIGNIFICANT CHANGE UP
GAS PNL BLDV: SIGNIFICANT CHANGE UP
GLUCOSE BLDC GLUCOMTR-MCNC: 222 MG/DL — HIGH (ref 70–99)
GLUCOSE BLDC GLUCOMTR-MCNC: 241 MG/DL — HIGH (ref 70–99)
GLUCOSE BLDC GLUCOMTR-MCNC: 263 MG/DL — HIGH (ref 70–99)
GLUCOSE SERPL-MCNC: 167 MG/DL — HIGH (ref 70–99)
GLUCOSE SERPL-MCNC: 247 MG/DL — HIGH (ref 70–99)
HCT VFR BLD CALC: 33.2 % — LOW (ref 34.5–45)
HCT VFR BLD CALC: 37.7 % — SIGNIFICANT CHANGE UP (ref 34.5–45)
HGB BLD-MCNC: 10.5 G/DL — LOW (ref 11.5–15.5)
HGB BLD-MCNC: 11.6 G/DL — SIGNIFICANT CHANGE UP (ref 11.5–15.5)
IMM GRANULOCYTES # BLD AUTO: 0.06 K/UL — SIGNIFICANT CHANGE UP (ref 0–0.07)
IMM GRANULOCYTES NFR BLD AUTO: 0.3 % — SIGNIFICANT CHANGE UP (ref 0–0.9)
INR BLD: 0.91 RATIO — SIGNIFICANT CHANGE UP (ref 0.85–1.16)
LACTATE SERPL-SCNC: 1.1 MMOL/L — SIGNIFICANT CHANGE UP (ref 0.5–2)
LYMPHOCYTES # BLD AUTO: 4.33 K/UL — HIGH (ref 1–3.3)
LYMPHOCYTES NFR BLD AUTO: 23.5 % — SIGNIFICANT CHANGE UP (ref 13–44)
MAGNESIUM SERPL-MCNC: 2.1 MG/DL — SIGNIFICANT CHANGE UP (ref 1.6–2.6)
MAGNESIUM SERPL-MCNC: 2.8 MG/DL — HIGH (ref 1.6–2.6)
MCHC RBC-ENTMCNC: 25 PG — LOW (ref 27–34)
MCHC RBC-ENTMCNC: 25.2 PG — LOW (ref 27–34)
MCHC RBC-ENTMCNC: 30.8 G/DL — LOW (ref 32–36)
MCHC RBC-ENTMCNC: 31.6 G/DL — LOW (ref 32–36)
MCV RBC AUTO: 79.6 FL — LOW (ref 80–100)
MCV RBC AUTO: 81.3 FL — SIGNIFICANT CHANGE UP (ref 80–100)
MONOCYTES # BLD AUTO: 0.85 K/UL — SIGNIFICANT CHANGE UP (ref 0–0.9)
MONOCYTES NFR BLD AUTO: 4.6 % — SIGNIFICANT CHANGE UP (ref 2–14)
MRSA PCR RESULT.: SIGNIFICANT CHANGE UP
NEUTROPHILS # BLD AUTO: 12.26 K/UL — HIGH (ref 1.8–7.4)
NEUTROPHILS NFR BLD AUTO: 66.4 % — SIGNIFICANT CHANGE UP (ref 43–77)
NRBC # BLD AUTO: 0 K/UL — SIGNIFICANT CHANGE UP (ref 0–0)
NRBC # BLD AUTO: 0 K/UL — SIGNIFICANT CHANGE UP (ref 0–0)
NRBC # FLD: 0 K/UL — SIGNIFICANT CHANGE UP (ref 0–0)
NRBC # FLD: 0 K/UL — SIGNIFICANT CHANGE UP (ref 0–0)
NRBC BLD AUTO-RTO: 0 /100 WBCS — SIGNIFICANT CHANGE UP (ref 0–0)
NRBC BLD AUTO-RTO: 0 /100 WBCS — SIGNIFICANT CHANGE UP (ref 0–0)
OSMOLALITY SERPL: 311 MOSMOL/KG — HIGH (ref 280–301)
PHOSPHATE SERPL-MCNC: 4.9 MG/DL — HIGH (ref 2.4–4.7)
PHOSPHATE SERPL-MCNC: 7.6 MG/DL — HIGH (ref 2.4–4.7)
PLATELET # BLD AUTO: 282 K/UL — SIGNIFICANT CHANGE UP (ref 150–400)
PLATELET # BLD AUTO: 434 K/UL — HIGH (ref 150–400)
PMV BLD: 10 FL — SIGNIFICANT CHANGE UP (ref 7–13)
PMV BLD: 9.4 FL — SIGNIFICANT CHANGE UP (ref 7–13)
POTASSIUM SERPL-MCNC: 4.3 MMOL/L — SIGNIFICANT CHANGE UP (ref 3.5–5.3)
POTASSIUM SERPL-MCNC: 4.6 MMOL/L — SIGNIFICANT CHANGE UP (ref 3.5–5.3)
POTASSIUM SERPL-SCNC: 4.3 MMOL/L — SIGNIFICANT CHANGE UP (ref 3.5–5.3)
POTASSIUM SERPL-SCNC: 4.6 MMOL/L — SIGNIFICANT CHANGE UP (ref 3.5–5.3)
PROCALCITONIN SERPL-MCNC: 1.74 NG/ML — HIGH (ref 0.02–0.1)
PROT SERPL-MCNC: 7.6 G/DL — SIGNIFICANT CHANGE UP (ref 6.6–8.7)
PROT SERPL-MCNC: 7.7 G/DL — SIGNIFICANT CHANGE UP (ref 6.6–8.7)
PROTHROM AB SERPL-ACNC: 10.6 SEC — SIGNIFICANT CHANGE UP (ref 9.9–13.4)
RAPID RVP RESULT: SIGNIFICANT CHANGE UP
RBC # BLD: 4.17 M/UL — SIGNIFICANT CHANGE UP (ref 3.8–5.2)
RBC # BLD: 4.64 M/UL — SIGNIFICANT CHANGE UP (ref 3.8–5.2)
RBC # FLD: 18.6 % — HIGH (ref 10.3–14.5)
RBC # FLD: 19.4 % — HIGH (ref 10.3–14.5)
S AUREUS DNA NOSE QL NAA+PROBE: SIGNIFICANT CHANGE UP
SARS-COV-2 RNA SPEC QL NAA+PROBE: SIGNIFICANT CHANGE UP
SODIUM SERPL-SCNC: 133 MMOL/L — LOW (ref 135–145)
SODIUM SERPL-SCNC: 134 MMOL/L — LOW (ref 135–145)
TROPONIN T, HIGH SENSITIVITY RESULT: 184 NG/L — HIGH (ref 0–51)
TROPONIN T, HIGH SENSITIVITY RESULT: 187 NG/L — HIGH (ref 0–51)
TROPONIN T, HIGH SENSITIVITY RESULT: 188 NG/L — HIGH (ref 0–51)
TROPONIN T, HIGH SENSITIVITY RESULT: 82 NG/L — HIGH (ref 0–51)
WBC # BLD: 18.46 K/UL — HIGH (ref 3.8–10.5)
WBC # BLD: 6.65 K/UL — SIGNIFICANT CHANGE UP (ref 3.8–10.5)
WBC # FLD AUTO: 18.46 K/UL — HIGH (ref 3.8–10.5)
WBC # FLD AUTO: 6.65 K/UL — SIGNIFICANT CHANGE UP (ref 3.8–10.5)

## 2025-07-07 PROCEDURE — 93010 ELECTROCARDIOGRAM REPORT: CPT

## 2025-07-07 PROCEDURE — 71045 X-RAY EXAM CHEST 1 VIEW: CPT | Mod: 26

## 2025-07-07 PROCEDURE — 99233 SBSQ HOSP IP/OBS HIGH 50: CPT | Mod: GC

## 2025-07-07 PROCEDURE — 99223 1ST HOSP IP/OBS HIGH 75: CPT

## 2025-07-07 PROCEDURE — 99291 CRITICAL CARE FIRST HOUR: CPT

## 2025-07-07 PROCEDURE — 93306 TTE W/DOPPLER COMPLETE: CPT | Mod: 26

## 2025-07-07 RX ORDER — HEPARIN SODIUM 1000 [USP'U]/ML
5000 INJECTION INTRAVENOUS; SUBCUTANEOUS EVERY 8 HOURS
Refills: 0 | Status: DISCONTINUED | OUTPATIENT
Start: 2025-07-07 | End: 2025-07-09

## 2025-07-07 RX ORDER — DILTIAZEM HYDROCHLORIDE 240 MG/1
240 TABLET, EXTENDED RELEASE ORAL DAILY
Refills: 0 | Status: DISCONTINUED | OUTPATIENT
Start: 2025-07-07 | End: 2025-07-09

## 2025-07-07 RX ORDER — INSULIN LISPRO 100 U/ML
INJECTION, SOLUTION INTRAVENOUS; SUBCUTANEOUS EVERY 6 HOURS
Refills: 0 | Status: DISCONTINUED | OUTPATIENT
Start: 2025-07-07 | End: 2025-07-09

## 2025-07-07 RX ORDER — BUMETANIDE 1 MG/1
4 TABLET ORAL ONCE
Refills: 0 | Status: COMPLETED | OUTPATIENT
Start: 2025-07-07 | End: 2025-07-07

## 2025-07-07 RX ORDER — TORSEMIDE 10 MG
20 TABLET ORAL DAILY
Refills: 0 | Status: DISCONTINUED | OUTPATIENT
Start: 2025-07-07 | End: 2025-07-09

## 2025-07-07 RX ORDER — SEVELAMER HYDROCHLORIDE 800 MG/1
1600 TABLET ORAL
Refills: 0 | Status: DISCONTINUED | OUTPATIENT
Start: 2025-07-07 | End: 2025-07-09

## 2025-07-07 RX ORDER — ACETAMINOPHEN 500 MG/5ML
1000 LIQUID (ML) ORAL ONCE
Refills: 0 | Status: COMPLETED | OUTPATIENT
Start: 2025-07-07 | End: 2025-07-07

## 2025-07-07 RX ORDER — IPRATROPIUM BROMIDE AND ALBUTEROL SULFATE .5; 2.5 MG/3ML; MG/3ML
3 SOLUTION RESPIRATORY (INHALATION) EVERY 6 HOURS
Refills: 0 | Status: DISCONTINUED | OUTPATIENT
Start: 2025-07-07 | End: 2025-07-09

## 2025-07-07 RX ORDER — NITROGLYCERIN 20 MG/G
2 OINTMENT TOPICAL ONCE
Refills: 0 | Status: COMPLETED | OUTPATIENT
Start: 2025-07-07 | End: 2025-07-07

## 2025-07-07 RX ORDER — ROSUVASTATIN CALCIUM 20 MG/1
20 TABLET, FILM COATED ORAL AT BEDTIME
Refills: 0 | Status: DISCONTINUED | OUTPATIENT
Start: 2025-07-07 | End: 2025-07-09

## 2025-07-07 RX ORDER — ASPIRIN 325 MG
324 TABLET ORAL ONCE
Refills: 0 | Status: DISCONTINUED | OUTPATIENT
Start: 2025-07-07 | End: 2025-07-07

## 2025-07-07 RX ORDER — AZITHROMYCIN 250 MG
500 CAPSULE ORAL ONCE
Refills: 0 | Status: COMPLETED | OUTPATIENT
Start: 2025-07-07 | End: 2025-07-07

## 2025-07-07 RX ORDER — ONDANSETRON HCL/PF 4 MG/2 ML
4 VIAL (ML) INJECTION ONCE
Refills: 0 | Status: COMPLETED | OUTPATIENT
Start: 2025-07-07 | End: 2025-07-07

## 2025-07-07 RX ORDER — METHYLPREDNISOLONE ACETATE 80 MG/ML
125 INJECTION, SUSPENSION INTRA-ARTICULAR; INTRALESIONAL; INTRAMUSCULAR; SOFT TISSUE ONCE
Refills: 0 | Status: COMPLETED | OUTPATIENT
Start: 2025-07-07 | End: 2025-07-07

## 2025-07-07 RX ORDER — METHYLPREDNISOLONE ACETATE 80 MG/ML
40 INJECTION, SUSPENSION INTRA-ARTICULAR; INTRALESIONAL; INTRAMUSCULAR; SOFT TISSUE EVERY 6 HOURS
Refills: 0 | Status: DISCONTINUED | OUTPATIENT
Start: 2025-07-07 | End: 2025-07-07

## 2025-07-07 RX ORDER — PIPERACILLIN-TAZO-DEXTROSE,ISO 3.375G/5
3.38 IV SOLUTION, PIGGYBACK PREMIX FROZEN(ML) INTRAVENOUS ONCE
Refills: 0 | Status: COMPLETED | OUTPATIENT
Start: 2025-07-07 | End: 2025-07-07

## 2025-07-07 RX ORDER — METOPROLOL SUCCINATE 50 MG/1
100 TABLET, EXTENDED RELEASE ORAL DAILY
Refills: 0 | Status: DISCONTINUED | OUTPATIENT
Start: 2025-07-07 | End: 2025-07-09

## 2025-07-07 RX ADMIN — METHYLPREDNISOLONE ACETATE 40 MILLIGRAM(S): 80 INJECTION, SUSPENSION INTRA-ARTICULAR; INTRALESIONAL; INTRAMUSCULAR; SOFT TISSUE at 11:09

## 2025-07-07 RX ADMIN — METHYLPREDNISOLONE ACETATE 125 MILLIGRAM(S): 80 INJECTION, SUSPENSION INTRA-ARTICULAR; INTRALESIONAL; INTRAMUSCULAR; SOFT TISSUE at 00:36

## 2025-07-07 RX ADMIN — Medication 1000 MILLIGRAM(S): at 10:59

## 2025-07-07 RX ADMIN — Medication 100 MILLIGRAM(S): at 17:25

## 2025-07-07 RX ADMIN — Medication 250 MILLIGRAM(S): at 01:11

## 2025-07-07 RX ADMIN — Medication 10 MILLIGRAM(S): at 00:36

## 2025-07-07 RX ADMIN — DILTIAZEM HYDROCHLORIDE 240 MILLIGRAM(S): 240 TABLET, EXTENDED RELEASE ORAL at 11:40

## 2025-07-07 RX ADMIN — NITROGLYCERIN 2 INCH(S): 20 OINTMENT TOPICAL at 02:22

## 2025-07-07 RX ADMIN — METHYLPREDNISOLONE ACETATE 40 MILLIGRAM(S): 80 INJECTION, SUSPENSION INTRA-ARTICULAR; INTRALESIONAL; INTRAMUSCULAR; SOFT TISSUE at 02:23

## 2025-07-07 RX ADMIN — INSULIN LISPRO 4: 100 INJECTION, SOLUTION INTRAVENOUS; SUBCUTANEOUS at 23:04

## 2025-07-07 RX ADMIN — HEPARIN SODIUM 5000 UNIT(S): 1000 INJECTION INTRAVENOUS; SUBCUTANEOUS at 02:24

## 2025-07-07 RX ADMIN — NITROGLYCERIN 2 INCH(S): 20 OINTMENT TOPICAL at 14:53

## 2025-07-07 RX ADMIN — IPRATROPIUM BROMIDE AND ALBUTEROL SULFATE 3 MILLILITER(S): .5; 2.5 SOLUTION RESPIRATORY (INHALATION) at 02:56

## 2025-07-07 RX ADMIN — Medication 0.1 MILLIGRAM(S): at 17:25

## 2025-07-07 RX ADMIN — IPRATROPIUM BROMIDE AND ALBUTEROL SULFATE 3 MILLILITER(S): .5; 2.5 SOLUTION RESPIRATORY (INHALATION) at 08:52

## 2025-07-07 RX ADMIN — Medication 4 MILLIGRAM(S): at 01:11

## 2025-07-07 RX ADMIN — ROSUVASTATIN CALCIUM 20 MILLIGRAM(S): 20 TABLET, FILM COATED ORAL at 21:19

## 2025-07-07 RX ADMIN — INSULIN LISPRO 6: 100 INJECTION, SOLUTION INTRAVENOUS; SUBCUTANEOUS at 18:19

## 2025-07-07 RX ADMIN — Medication 400 MILLIGRAM(S): at 09:59

## 2025-07-07 RX ADMIN — METHYLPREDNISOLONE ACETATE 40 MILLIGRAM(S): 80 INJECTION, SUSPENSION INTRA-ARTICULAR; INTRALESIONAL; INTRAMUSCULAR; SOFT TISSUE at 05:32

## 2025-07-07 RX ADMIN — SEVELAMER HYDROCHLORIDE 1600 MILLIGRAM(S): 800 TABLET ORAL at 11:40

## 2025-07-07 RX ADMIN — Medication 0.1 MILLIGRAM(S): at 11:09

## 2025-07-07 RX ADMIN — Medication 1 APPLICATION(S): at 05:32

## 2025-07-07 RX ADMIN — IPRATROPIUM BROMIDE AND ALBUTEROL SULFATE 3 MILLILITER(S): .5; 2.5 SOLUTION RESPIRATORY (INHALATION) at 14:56

## 2025-07-07 RX ADMIN — Medication 200 GRAM(S): at 01:11

## 2025-07-07 RX ADMIN — SEVELAMER HYDROCHLORIDE 1600 MILLIGRAM(S): 800 TABLET ORAL at 17:26

## 2025-07-07 RX ADMIN — INSULIN LISPRO 4: 100 INJECTION, SOLUTION INTRAVENOUS; SUBCUTANEOUS at 12:48

## 2025-07-07 RX ADMIN — BUMETANIDE 132 MILLIGRAM(S): 1 TABLET ORAL at 02:24

## 2025-07-07 RX ADMIN — Medication 20 MILLIGRAM(S): at 11:09

## 2025-07-07 NOTE — ED PROCEDURE NOTE - PROCEDURE ADDITIONAL DETAILS
Emergency Department Focused Ultrasound performed at patient's bedside for educational purposes. The study will have a follow up study performed or was performed in the direct supervision of an ultrasound trained attending.    RA/RV dilation, grossly normal LV EF, no pericardial effusion noted Banner Transposition Flap Text: The defect edges were debeveled with a #15 scalpel blade.  Given the location of the defect and the proximity to free margins a Banner transposition flap was deemed most appropriate.  Using a sterile surgical marker, an appropriate flap drawn around the defect. The area thus outlined was incised deep to adipose tissue with a #15 scalpel blade.  The skin margins were undermined to an appropriate distance in all directions utilizing iris scissors.

## 2025-07-07 NOTE — ED PROVIDER NOTE - ATTENDING CONTRIBUTION TO CARE
66 yo F (Yue Dugan) ESRD on HD (Rt chest permacath and  LUE AVF), failed PD due to peritonitis, HTN, HLD, DM2, GIB, GERD, presenting with acute shortness of breath and placed on bipap by EMS and given nitroglycerin x3 due to significant htn. Pt had increasing cough over last few days, this evening at midnight, suddenly became clammy and diaphoretic. Per EMS pt was hypoxic to 67, SBP in 200s,Pt last underwent HD Friday and completed full session. She denies fevers, chills, v, abdominal pain, le swelling.    CONSTITUTIONAL: Patient is ill appearing and obvious respiratory distress. Bipap in place  HEENMT: Airway patent,   EYES: Pupils equal, round, Extra-ocular movement intact, eyes are clear b/l  CARDIAC: Regular rate and rhythm, Heart sounds S1 S2 present  RESPIRATORY: severe respiratory distress. No stridor, Lungs sounds clear with good aeration bilaterally.   GASTROINTESTINAL: Abdomen soft, non-tender and non-distended, no rebound, no guarding and no masses.   MUSCULOSKELETAL: Spine appears normal, movement of extremities grossly intact.  NEUROLOGICAL: Alert and interactive, no focal deficits, tone is normal, moving all extremities well,  SKIN: No cyanosis, no pallor, no jaundice, no rash    IJv, personally saw the patient with the resident, and completed the key components of the history and physical exam. I then discussed the management plan with the resident.    Due to the a high probability of clinically significant, life threatening deterioration, the patient required high level of preparedness to intervene emergently. I personally spent critical care time directly and personally managing the patient. This included (but not limited too reviewing  vitals, managing orders, and determining and adjusting plan depending on response to interventions.

## 2025-07-07 NOTE — H&P ADULT - HISTORY OF PRESENT ILLNESS
65 year old Female (Yue Marin) with PMH ESRD on HD (R chest permacath and  LUE AVF, loast HD friday 7/4), HTN, HLD, DM2, GIB, GERD, prior admissions for hypertensive emergency and hypoxic respiratory failure presenting with acute hypoxic respiratory failure. Pt states friday at HD she experienced vomiting however was able to complete full session with approximately 1.8L fluid removed. Pt also states she has been experiencing cough over the past 2-3 days. This evening at midnight, suddenly became clamy, flush, SOB and diaphoretic. Per EMS pt was hypoxic to 67% with SBP in 200s. Pt received 5 nitro in the field. At this time pt c/o chest tightness although improved with neb treatment, SOB, cough. Admits to chills, N/V, cough, chest tightness. Denies abdominal pain, hematemesis, diarrhea, sputum production, recent travel. Pt states she has been taking all of her home meds as prescribed.

## 2025-07-07 NOTE — H&P ADULT - NSHPPHYSICALEXAM_GEN_ALL_CORE
General: resting in bed, on HFNC, appears In no acute distress  Neuro: no focal neuro deficits, A&Ox4  CV: Regular rhythm, tachycardic to low 100s   Pulm: no accessory muscle use, increased WOB, normal respiratory rate on HFNC 40/100%, equal and bilateral chest rise, breath sounds throughout, Crackles auscultated in BL lower lung fields  GI: soft, NT, ND General: resting in bed, on HFNC, appears In no acute distress  Head: normocephalic, atraumatic   Neuro: no focal neuro deficits, A&Ox4  CV: Regular rhythm, tachycardic to low 100s   Pulm: no accessory muscle use, increased WOB, normal respiratory rate on HFNC 40/100%, equal and bilateral chest rise, breath sounds throughout, Crackles auscultated in BL lower lung fields  GI: soft, NT, ND, no rebound TTP, no guarding   : voiding  Skin: warm, dry  Extremities: no pitting edema

## 2025-07-07 NOTE — PATIENT PROFILE ADULT - FALL HARM RISK - UNIVERSAL INTERVENTIONS
Bed in lowest position, wheels locked, appropriate side rails in place/Call bell, personal items and telephone in reach/Instruct patient to call for assistance before getting out of bed or chair/Non-slip footwear when patient is out of bed/[place] to call system/Physically safe environment - no spills, clutter or unnecessary equipment/Purposeful Proactive Rounding/Room/bathroom lighting operational, light cord in reach

## 2025-07-07 NOTE — ED PROVIDER NOTE - CLINICAL SUMMARY MEDICAL DECISION MAKING FREE TEXT BOX
66 yo F (Yue Marin) ESRD on HD (R chest permacath and  LUE AVF), failed PD due to peritonitis, HTN, HLD, DM2, GIB, GERD, presenting with acute hypoxic respiratory failure.  On exam pt in acute distress, tachypneic, diaphoretic, tachycardic hypertensive. Bedside pocus with blines diffusely in b/l lung fields, small effusion in LL base. Ordered hydralazine and solumedrol. Pt placed on bipap initially, became nauseous, switched to NRB but was desaturating to high 80s. Pt given zofran and switched to hiflo. MICU consulted.

## 2025-07-07 NOTE — CONSULT NOTE ADULT - SUBJECTIVE AND OBJECTIVE BOX
Patient is a 65y old  Female who presents with a chief complaint of cough and worsening SOB, not relieved by inhaler, found in Hypoxic respiratory failure (07 Jul 2025 01:43)      HPI:  65 year old Female (Yue Marin) with PMH ESRD on HD (R chest permacath and  LUE AVF, loast HD friday 7/4), HTN, HLD, DM2, GIB, GERD, prior admissions for hypertensive emergency and hypoxic respiratory failure presenting with acute hypoxic respiratory failure. Pt states friday at HD she experienced vomiting however was able to complete full session with approximately 1.8L fluid removed. Pt also states she has been experiencing cough over the past 2-3 days. This evening at midnight, suddenly became clamy, flush, SOB and diaphoretic. Per EMS pt was hypoxic to 67% with SBP in 200s. Pt received 5 nitro in the field. At this time pt c/o chest tightness although improved with neb treatment, SOB, cough. Admits to chills, N/V, cough, chest tightness. Denies abdominal pain, hematemesis sputum production, recent travel. Pt states she has been taking all of her home meds as prescribed.  (07 Jul 2025 01:43). Had some loose stools last 2 days  Started on urgent HD for fluid removal    Htn > 10 yrs    DM2 3 yrs    ESRD - on RRT for 1 yr ; was on PD, stopped and PD cath removed 2/2 infection  Now on HD via PC and AVF    HLD    GERD/ hx GIB      FAMILY HISTORY: Htn      REVIEW OF SYSTEMS:  CONSTITUTIONAL: No fever, weight loss, or fatigue  EYES: No eye pain, No visual disturbances,   RESPIRATORY: + cough, No expect/ hemoptysis; ++ SOB  CARDIOVASCULAR: No chest pain, No palpitations,   -leg swelling  GASTROINTESTINAL: No abdominal , NV or GIB  GENITOURINARY: No UTI sx/hematuria  NEUROLOGICAL: No HA/wk/numbness  MUSCULOSKELETAL: No joint pain, No swelling      Vital Signs Last 24 Hrs  T(C): 36.6 (07 Jul 2025 09:00), Max: 37 (07 Jul 2025 05:00)  T(F): 97.8 (07 Jul 2025 09:00), Max: 98.6 (07 Jul 2025 05:00)  HR: 99 (07 Jul 2025 09:00) (90 - 134)  BP: 173/85 (07 Jul 2025 09:00) (134/82 - 190/110)  BP(mean): 112 (07 Jul 2025 09:00) (97 - 115)  RR: 18 (07 Jul 2025 09:00) (14 - 37)  SpO2: 100% (07 Jul 2025 09:00) (67% - 100%)    Parameters below as of 07 Jul 2025 09:00  Patient On (Oxygen Delivery Method): nasal cannula, high flow        PHYSICAL EXAM:    GENERAL: NAD, frail, on NC O2  EYES:  conjunctiva and sclera clear  NECK: Supple, No JVD/Carotid Bruit -ve   NERVOUS SYSTEM:  A/O x3,   Lungs : No rales, No rhonchi,   HEART:  No murmur,  No rub, No gallops  ABDOMEN: Soft, NT/ND BS+  EXTREMITIES:  + Peripheral Pulses, - edema  SKIN: No rashes or lesions      LABS:                        11.6   18.46 )-----------( 434      ( 07 Jul 2025 00:28 )             37.7     07-07    134[L]  |  87[L]  |  69.2[H]  ----------------------------<  247[H]  4.6   |  23.0  |  12.37[H]    Ca    10.2      07 Jul 2025 00:28  Phos  7.6     07-07  Mg     2.8     07-07    TPro  7.7  /  Alb  4.2  /  TBili  0.4  /  DBili  x   /  AST  36[H]  /  ALT  23  /  AlkPhos  147[H]  07-07    PT/INR - ( 07 Jul 2025 00:28 )   PT: 10.6 sec;   INR: 0.91 ratio         PTT - ( 07 Jul 2025 00:28 )  PTT:29.5 sec  Urinalysis Basic - ( 07 Jul 2025 00:28 )    Color: x / Appearance: x / SG: x / pH: x  Gluc: 247 mg/dL / Ketone: x  / Bili: x / Urobili: x   Blood: x / Protein: x / Nitrite: x   Leuk Esterase: x / RBC: x / WBC x   Sq Epi: x / Non Sq Epi: x / Bacteria: x      Magnesium: 2.8 mg/dL (07-07 @ 00:28)  Phosphorus: 7.6 mg/dL (07-07 @ 00:28)      RADIOLOGY & ADDITIONAL TESTS: CXR sugg of fluid overload    MEDICATIONS  (STANDING):  albuterol/ipratropium for Nebulization  chlorhexidine 2% Cloths  cloNIDine  diltiazem   CD  heparin   Injectable  insulin lispro (ADMELOG) corrective regimen sliding scale.  methylPREDNISolone sodium succinate Injectable  rosuvastatin  sevelamer carbonate  torsemide

## 2025-07-07 NOTE — ED ADULT NURSE NOTE - OBJECTIVE STATEMENT
Pt. BIBEMS with acute onset SOB, on CPAP, SpO2 in 50s on arrival, pt. lethargic but arousable, unable to answer questions due to SOB. Brought to CC area, MD at bedside. Pt. is on HD, last session Friday, due for HD this AM.

## 2025-07-07 NOTE — CONSULT NOTE ADULT - ASSESSMENT
65 year old Female (Yue Marin) with PMH ESRD on HD (R chest permacath and  LUE AVF, loast HD friday 7/4), HTN, HLD, DM2, GIB, GERD, prior admissions for hypertensive emergency and hypoxic respiratory failure presenting with acute hypoxic respiratory failure  Had severe Htn - controlled  HD sone but had severe cramps and was stopped little early    Rpt cxr am  shall follow  HD MWF or am as needed    d/w Dr. Hudson
 65-year-old female with past medical history of ESRD on hemodialysis (via right chest permacath and LUE AVF), HTN, HLD, DM2, reported GIB, and GERD presented with acute hypoxic respiratory failure. Patient arrived with oxygen saturation of 67% and systolic blood pressure in 200s, requiring nitro administration by EMS. She was initially placed on HFNC 40L/100% and admitted to MICU. CXR showed bilateral pulmonary edema. Patient received methylprednisolone, nebulizer treatments, and underwent urgent hemodialysis which was stopped early due to severe cramping. Previous cardiac workup from October 2023 showed normal nuclear stress test with EF of 77%. RVP and MRSA swabs were negative. Patient's oxygen requirements improved to HFNC 40L/70% and now deemed stable for transfer to medical floors. Cardiology consultation requested for evaluation of elevated troponin levels and pulmonary edema.    Troponin elevation pattern consistent with demand ischemia in the setting of uncontrolled HTN and ESRD  Echo with normal biventricular systolic function   Vol management with RRT    Plan   continue with current CV meds   no additional cardiac testing indicated at present time  BP goal 130/80  up-titration of antihypertensives PRN

## 2025-07-07 NOTE — ED ADULT TRIAGE NOTE - CHIEF COMPLAINT QUOTE
pt comes in, in respiratory distress oxygen saturation 67% on bipap, pt has a history of kidney failure and asthma, pt to critical care for treatment

## 2025-07-07 NOTE — PROGRESS NOTE ADULT - SUBJECTIVE AND OBJECTIVE BOX
Patient is a 64y old  Female who presents with a chief complaint of Hypoxic respiratory failure (07 Jul 2025 11:24)    SUBJECTIVE:  BRIEF HOSPITAL COURSE: 65-year-old female with past medical history of ESRD on hemodialysis (via right chest permacath and LUE AVF), HTN, HLD, DM2, GIB, and GERD presented with acute hypoxic respiratory failure. Patient arrived with oxygen saturation of 67% and systolic blood pressure in 200s, requiring nitro administration by EMS. She was initially placed on HFNC 40L/100% and admitted to MICU. CXR showed bilateral pulmonary edema. Patient received methylprednisolone, nebulizer treatments, and underwent urgent hemodialysis which was stopped early due to severe cramping. Previous cardiac workup from October 2023 showed normal nuclear stress test with EF of 77%. RVP and MRSA swabs were negative. Patient's oxygen requirements improved to HFNC 40L/70% and now deemed stable for transfer to medical floors.    OVERNIGHT EVENTS/INTERVAL HPI: Today is hospital day. Patient seen and examined at bedside.    MEDICATIONS  (STANDING):  albuterol/ipratropium for Nebulization 3 milliLiter(s) Nebulizer every 6 hours  chlorhexidine 2% Cloths 1 Application(s) Topical <User Schedule>  cloNIDine 0.1 milliGRAM(s) Oral every 12 hours  diltiazem    milliGRAM(s) Oral daily  heparin   Injectable 5000 Unit(s) SubCutaneous every 8 hours  insulin lispro (ADMELOG) corrective regimen sliding scale.   SubCutaneous every 6 hours  methylPREDNISolone sodium succinate Injectable 40 milliGRAM(s) IV Push every 6 hours  rosuvastatin 20 milliGRAM(s) Oral at bedtime  sevelamer carbonate 1600 milliGRAM(s) Oral three times a day with meals  torsemide 20 milliGRAM(s) Oral daily    MEDICATIONS  (PRN):      Allergies    No Known Allergies    Intolerances        REVIEW OF SYSTEMS:  OBJECTIVE:  Vital Signs Last 24 Hrs  T(C): 36.9 (07 Jul 2025 11:28), Max: 37 (07 Jul 2025 05:00)  T(F): 98.4 (07 Jul 2025 11:28), Max: 98.6 (07 Jul 2025 05:00)  HR: 98 (07 Jul 2025 11:36) (90 - 134)  BP: 159/79 (07 Jul 2025 11:28) (134/82 - 190/110)  BP(mean): 109 (07 Jul 2025 10:00) (97 - 115)  RR: 18 (07 Jul 2025 11:28) (14 - 37)  SpO2: 95% (07 Jul 2025 11:36) (67% - 100%)    Parameters below as of 07 Jul 2025 11:28  Patient On (Oxygen Delivery Method): nasal cannula, high flow        PHYSICAL EXAM:  GENERAL: no acute distress, comfortably in bed  HEENT: Atraumatic, normocephalic, non-icteric, no JVD  NEURO: No focal deficits, moving all extremities spontaneously, A&Ox3, no dysarthria, CN II-XII grossly intact  PSYCH: Normal affect, calm, appropriate insight and judgment, fluent speech  LUNGS: CTAB, no wrr, non-labored breathing  HEART: RRR, no murmur appreciated  ABD: Soft, non-tender, non-distended, no organomegaly, no appreciable masses, +bs all 4 quadrants  EXTREMITIES: Nontender, no clubbing, cyanosis, or edema  SKIN: No rashes or lesions    LABS:                        10.5   6.65  )-----------( 282      ( 07 Jul 2025 10:28 )             33.2     07-07    134[L]  |  87[L]  |  69.2[H]  ----------------------------<  247[H]  4.6   |  23.0  |  12.37[H]    Ca    10.2      07 Jul 2025 00:28  Phos  7.6     07-07  Mg     2.8     07-07    TPro  7.7  /  Alb  4.2  /  TBili  0.4  /  DBili  x   /  AST  36[H]  /  ALT  23  /  AlkPhos  147[H]  07-07    PT/INR - ( 07 Jul 2025 00:28 )   PT: 10.6 sec;   INR: 0.91 ratio         PTT - ( 07 Jul 2025 00:28 )  PTT:29.5 sec  Urinalysis Basic - ( 07 Jul 2025 00:28 )    Color: x / Appearance: x / SG: x / pH: x  Gluc: 247 mg/dL / Ketone: x  / Bili: x / Urobili: x   Blood: x / Protein: x / Nitrite: x   Leuk Esterase: x / RBC: x / WBC x   Sq Epi: x / Non Sq Epi: x / Bacteria: x      CAPILLARY BLOOD GLUCOSE          RADIOLOGY & ADDITIONAL TESTS:     Patient is a 64y old  Female who presents with a chief complaint of Hypoxic respiratory failure (07 Jul 2025 11:24)    SUBJECTIVE:  BRIEF HOSPITAL COURSE: 65-year-old female with past medical history of ESRD on hemodialysis (via right chest permacath and LUE AVF), HTN, HLD, DM2, GIB, and GERD presented with acute hypoxic respiratory failure. Patient arrived with oxygen saturation of 67% and systolic blood pressure in 200s, requiring nitro administration by EMS. She was initially placed on HFNC 40L/100% and admitted to MICU. CXR showed bilateral pulmonary edema. Patient received methylprednisolone, nebulizer treatments, and underwent urgent hemodialysis which was stopped early due to severe cramping. Previous cardiac workup from October 2023 showed normal nuclear stress test with EF of 77%. RVP and MRSA swabs were negative. Patient's oxygen requirements improved to HFNC 40L/70% and now deemed stable for transfer to medical floors.    OVERNIGHT EVENTS/INTERVAL HPI: Today is hospital day 1. Patient seen and examined at bedside. States she feels better now. Earlier reported throat pain and cough that she had on admission is getting better and so is the diarrhea that she reports having a few days prior to admission. States breathing is much better. Reports that she is making "a lot" lot of urine. Also reports HD was done via permacath as her LUE fistula was not working Denies Chest pain, abdominal pain, shortness of breath, fevers, chills, nausea, vomiting, dysuria, headache, dizziness.    MEDICATIONS  (STANDING):  albuterol/ipratropium for Nebulization 3 milliLiter(s) Nebulizer every 6 hours  chlorhexidine 2% Cloths 1 Application(s) Topical <User Schedule>  cloNIDine 0.1 milliGRAM(s) Oral every 12 hours  diltiazem    milliGRAM(s) Oral daily  heparin   Injectable 5000 Unit(s) SubCutaneous every 8 hours  insulin lispro (ADMELOG) corrective regimen sliding scale.   SubCutaneous every 6 hours  methylPREDNISolone sodium succinate Injectable 40 milliGRAM(s) IV Push every 6 hours  rosuvastatin 20 milliGRAM(s) Oral at bedtime  sevelamer carbonate 1600 milliGRAM(s) Oral three times a day with meals  torsemide 20 milliGRAM(s) Oral daily    MEDICATIONS  (PRN):      Allergies    No Known Allergies    Intolerances        REVIEW OF SYSTEMS: Review of systems is otherwise negative, except as mentioned in HPI above.    OBJECTIVE:  Vital Signs Last 24 Hrs  T(C): 36.9 (07 Jul 2025 11:28), Max: 37 (07 Jul 2025 05:00)  T(F): 98.4 (07 Jul 2025 11:28), Max: 98.6 (07 Jul 2025 05:00)  HR: 98 (07 Jul 2025 11:36) (90 - 134)  BP: 159/79 (07 Jul 2025 11:28) (134/82 - 190/110)  BP(mean): 109 (07 Jul 2025 10:00) (97 - 115)  RR: 18 (07 Jul 2025 11:28) (14 - 37)  SpO2: 95% (07 Jul 2025 11:36) (67% - 100%)    Parameters below as of 07 Jul 2025 11:28  Patient On (Oxygen Delivery Method): nasal cannula, high flow        PHYSICAL EXAM:  GENERAL: no acute distress, comfortably in bed, HFNC 40L, 65%  HEENT: Atraumatic, normocephalic, non-icteric, no JVD  NEURO: No focal deficits, moving all extremities spontaneously, A&Ox3, no dysarthria, CN II-XII grossly intact  PSYCH: Normal affect, calm, appropriate insight and judgment, fluent speech  LUNGS: CTAB, B/L crackles, on HFNC, decreased B/L bases  HEART: RRR, no murmur appreciated  ABD: Soft, non-tender, non-distended, no organomegaly, no appreciable masses, +bs all 4 quadrants  EXTREMITIES: Nontender, no clubbing, cyanosis, or edema, LUE AVF with thrill  SKIN: No rashes or lesions    LABS:                        10.5   6.65  )-----------( 282      ( 07 Jul 2025 10:28 )             33.2     07-07    134[L]  |  87[L]  |  69.2[H]  ----------------------------<  247[H]  4.6   |  23.0  |  12.37[H]    Ca    10.2      07 Jul 2025 00:28  Phos  7.6     07-07  Mg     2.8     07-07    TPro  7.7  /  Alb  4.2  /  TBili  0.4  /  DBili  x   /  AST  36[H]  /  ALT  23  /  AlkPhos  147[H]  07-07    PT/INR - ( 07 Jul 2025 00:28 )   PT: 10.6 sec;   INR: 0.91 ratio         PTT - ( 07 Jul 2025 00:28 )  PTT:29.5 sec  Urinalysis Basic - ( 07 Jul 2025 00:28 )    Color: x / Appearance: x / SG: x / pH: x  Gluc: 247 mg/dL / Ketone: x  / Bili: x / Urobili: x   Blood: x / Protein: x / Nitrite: x   Leuk Esterase: x / RBC: x / WBC x   Sq Epi: x / Non Sq Epi: x / Bacteria: x      CAPILLARY BLOOD GLUCOSE          RADIOLOGY & ADDITIONAL TESTS:     Patient is a 64y old  Female who presents with a chief complaint of Hypoxic respiratory failure (07 Jul 2025 11:24)    SUBJECTIVE:  BRIEF HOSPITAL COURSE: 65-year-old female with past medical history of ESRD on hemodialysis (via right chest permacath and LUE AVF), HTN, HLD, DM2, GIB, and GERD presented with acute hypoxic respiratory failure. Patient arrived with oxygen saturation of 67% and systolic blood pressure in 200s, requiring nitro administration by EMS. She was initially placed on HFNC 40L/100% and admitted to MICU. CXR showed bilateral pulmonary edema. Patient received methylprednisolone, nebulizer treatments, and underwent urgent hemodialysis which was stopped early due to severe cramping. Previous cardiac workup from October 2023 showed normal nuclear stress test with EF of 77%. RVP and MRSA swabs were negative. Patient's oxygen requirements improved to HFNC 40L/70% and now deemed stable for transfer to medical floors.    OVERNIGHT EVENTS/INTERVAL HPI: Today is hospital day 1. Patient seen and examined at bedside. States she feels better now. Earlier reported throat pain and cough that she had on admission is getting better and so is the diarrhea that she reports having a few days prior to admission. States breathing is much better. Reports that she is making "a lot" lot of urine. Also reports HD was done via permacath as her LUE fistula was not working Denies Chest pain, abdominal pain, shortness of breath, fevers, chills, nausea, vomiting, dysuria, headache, dizziness.    MEDICATIONS  (STANDING):  albuterol/ipratropium for Nebulization 3 milliLiter(s) Nebulizer every 6 hours  chlorhexidine 2% Cloths 1 Application(s) Topical <User Schedule>  cloNIDine 0.1 milliGRAM(s) Oral every 12 hours  diltiazem    milliGRAM(s) Oral daily  heparin   Injectable 5000 Unit(s) SubCutaneous every 8 hours  insulin lispro (ADMELOG) corrective regimen sliding scale.   SubCutaneous every 6 hours  methylPREDNISolone sodium succinate Injectable 40 milliGRAM(s) IV Push every 6 hours  rosuvastatin 20 milliGRAM(s) Oral at bedtime  sevelamer carbonate 1600 milliGRAM(s) Oral three times a day with meals  torsemide 20 milliGRAM(s) Oral daily    MEDICATIONS  (PRN):      Allergies    No Known Allergies    Intolerances    REVIEW OF SYSTEMS: Review of systems is otherwise negative, except as mentioned in HPI above.    OBJECTIVE:  Vital Signs Last 24 Hrs  T(C): 36.9 (07 Jul 2025 11:28), Max: 37 (07 Jul 2025 05:00)  T(F): 98.4 (07 Jul 2025 11:28), Max: 98.6 (07 Jul 2025 05:00)  HR: 98 (07 Jul 2025 11:36) (90 - 134)  BP: 159/79 (07 Jul 2025 11:28) (134/82 - 190/110)  BP(mean): 109 (07 Jul 2025 10:00) (97 - 115)  RR: 18 (07 Jul 2025 11:28) (14 - 37)  SpO2: 95% (07 Jul 2025 11:36) (67% - 100%)    Parameters below as of 07 Jul 2025 11:28  Patient On (Oxygen Delivery Method): nasal cannula, high flow    PHYSICAL EXAM:  GENERAL: no acute distress, comfortably in bed, HFNC 40L, 65%  HEENT: Atraumatic, normocephalic, non-icteric, no JVD  NEURO: No focal deficits, moving all extremities spontaneously, A&Ox3, no dysarthria, CN II-XII grossly intact  PSYCH: Normal affect, calm, appropriate insight and judgment, fluent speech  LUNGS: CTAB, B/L crackles, on HFNC, decreased B/L bases  HEART: RRR, no murmur appreciated  ABD: Soft, non-tender, non-distended, no organomegaly, no appreciable masses, +bs all 4 quadrants  EXTREMITIES: Nontender, no clubbing, cyanosis, or edema, LUE AVF with thrill  SKIN: No rashes or lesions    LABS:                        10.5   6.65  )-----------( 282      ( 07 Jul 2025 10:28 )             33.2     07-07    134[L]  |  87[L]  |  69.2[H]  ----------------------------<  247[H]  4.6   |  23.0  |  12.37[H]    Ca    10.2      07 Jul 2025 00:28  Phos  7.6     07-07  Mg     2.8     07-07    TPro  7.7  /  Alb  4.2  /  TBili  0.4  /  DBili  x   /  AST  36[H]  /  ALT  23  /  AlkPhos  147[H]  07-07    PT/INR - ( 07 Jul 2025 00:28 )   PT: 10.6 sec;   INR: 0.91 ratio         PTT - ( 07 Jul 2025 00:28 )  PTT:29.5 sec  Urinalysis Basic - ( 07 Jul 2025 00:28 )    Color: x / Appearance: x / SG: x / pH: x  Gluc: 247 mg/dL / Ketone: x  / Bili: x / Urobili: x   Blood: x / Protein: x / Nitrite: x   Leuk Esterase: x / RBC: x / WBC x   Sq Epi: x / Non Sq Epi: x / Bacteria: x      CAPILLARY BLOOD GLUCOSE          RADIOLOGY & ADDITIONAL TESTS:     Patient is a 64y old  Female who presents with a chief complaint of Hypoxic respiratory failure (07 Jul 2025 11:24)    SUBJECTIVE:  BRIEF HOSPITAL COURSE: 65-year-old female with past medical history of ESRD on hemodialysis (via right chest permacath and LUE AVF), HTN, HLD, DM2, reported GIB, and GERD presented with acute hypoxic respiratory failure. Patient arrived with oxygen saturation of 67% and systolic blood pressure in 200s, requiring nitro administration by EMS. She was initially placed on HFNC 40L/100% and admitted to MICU. CXR showed bilateral pulmonary edema. Patient received methylprednisolone, nebulizer treatments, and underwent urgent hemodialysis which was stopped early due to severe cramping. Previous cardiac workup from October 2023 showed normal nuclear stress test with EF of 77%. RVP and MRSA swabs were negative. Patient's oxygen requirements improved to HFNC 40L/70% and now deemed stable for transfer to medical floors.    OVERNIGHT EVENTS/INTERVAL HPI: Today is hospital day 1. Patient seen and examined at bedside. States she feels better now. Earlier reported throat pain and cough that she had on admission is getting better and so is the diarrhea that she reports having a few days prior to admission. States breathing is much better. Reports that she is making "a lot" lot of urine. Also reports HD was done via permacath as her LUE fistula was not working Denies Chest pain, abdominal pain, shortness of breath, fevers, chills, nausea, vomiting, dysuria, headache, dizziness.    MEDICATIONS  (STANDING):  albuterol/ipratropium for Nebulization 3 milliLiter(s) Nebulizer every 6 hours  chlorhexidine 2% Cloths 1 Application(s) Topical <User Schedule>  cloNIDine 0.1 milliGRAM(s) Oral every 12 hours  diltiazem    milliGRAM(s) Oral daily  heparin   Injectable 5000 Unit(s) SubCutaneous every 8 hours  insulin lispro (ADMELOG) corrective regimen sliding scale.   SubCutaneous every 6 hours  methylPREDNISolone sodium succinate Injectable 40 milliGRAM(s) IV Push every 6 hours  rosuvastatin 20 milliGRAM(s) Oral at bedtime  sevelamer carbonate 1600 milliGRAM(s) Oral three times a day with meals  torsemide 20 milliGRAM(s) Oral daily    MEDICATIONS  (PRN):      Allergies    No Known Allergies    Intolerances    REVIEW OF SYSTEMS: Review of systems is otherwise negative, except as mentioned in HPI above.    OBJECTIVE:  Vital Signs Last 24 Hrs  T(C): 36.9 (07 Jul 2025 11:28), Max: 37 (07 Jul 2025 05:00)  T(F): 98.4 (07 Jul 2025 11:28), Max: 98.6 (07 Jul 2025 05:00)  HR: 98 (07 Jul 2025 11:36) (90 - 134)  BP: 159/79 (07 Jul 2025 11:28) (134/82 - 190/110)  BP(mean): 109 (07 Jul 2025 10:00) (97 - 115)  RR: 18 (07 Jul 2025 11:28) (14 - 37)  SpO2: 95% (07 Jul 2025 11:36) (67% - 100%)    Parameters below as of 07 Jul 2025 11:28  Patient On (Oxygen Delivery Method): nasal cannula, high flow    PHYSICAL EXAM:  GENERAL: no acute distress, comfortably in bed, HFNC 40L, 65%  HEENT: Atraumatic, normocephalic, non-icteric, no JVD  NEURO: No focal deficits, moving all extremities spontaneously, A&Ox3, no dysarthria, CN II-XII grossly intact  PSYCH: Normal affect, calm, appropriate insight and judgment, fluent speech  LUNGS: CTAB, B/L crackles, on HFNC, decreased B/L bases  HEART: RRR, no murmur appreciated  ABD: Soft, non-tender, non-distended, no organomegaly, no appreciable masses, +bs all 4 quadrants, prior PD scar   EXTREMITIES: Nontender, no clubbing, cyanosis, or edema, LUE AVF with thrill  SKIN: No rashes or lesions    LABS:                        10.5   6.65  )-----------( 282      ( 07 Jul 2025 10:28 )             33.2     07-07    134[L]  |  87[L]  |  69.2[H]  ----------------------------<  247[H]  4.6   |  23.0  |  12.37[H]    Ca    10.2      07 Jul 2025 00:28  Phos  7.6     07-07  Mg     2.8     07-07    TPro  7.7  /  Alb  4.2  /  TBili  0.4  /  DBili  x   /  AST  36[H]  /  ALT  23  /  AlkPhos  147[H]  07-07    PT/INR - ( 07 Jul 2025 00:28 )   PT: 10.6 sec;   INR: 0.91 ratio         PTT - ( 07 Jul 2025 00:28 )  PTT:29.5 sec  Urinalysis Basic - ( 07 Jul 2025 00:28 )    Color: x / Appearance: x / SG: x / pH: x  Gluc: 247 mg/dL / Ketone: x  / Bili: x / Urobili: x   Blood: x / Protein: x / Nitrite: x   Leuk Esterase: x / RBC: x / WBC x   Sq Epi: x / Non Sq Epi: x / Bacteria: x      CAPILLARY BLOOD GLUCOSE          RADIOLOGY & ADDITIONAL TESTS:

## 2025-07-07 NOTE — ED PROVIDER NOTE - OBJECTIVE STATEMENT
66 yo F (Yue Marin) ESRD on HD (R chest permacath and  LUE AVF), failed PD due to peritonitis, HTN, HLD, DM2, GIB, GERD, presenting with acute hypoxic respiratory failure. Pt had increasing cough over last few days, this evening at midnight, suddenly became clamy and diaphoretic. Per EMS pt was hypoxic to 67, SBP in 200s, she received 5 nitro. Pt last underwent HD Friday and completed full session. She denies fevers, chills, v, abdominal pain, le swelling.

## 2025-07-07 NOTE — H&P ADULT - NS ATTEND AMEND GEN_ALL_CORE FT
Pt seen and examined with MICU PA     64F former smoker with hx of ESRD on HD w/ PermCath and AVF s/p prior revisions, prior failed PD due to peritonitis, asthma, HTN, HLD, DM, GERD presented to the ED with respiratory distress with hypoxia to the 60s. Pt reports she has been having ongoing cough over the last few days. Denies fevers/chills/sick contacts. Endorses wheezing. Reports she did not miss any HD sessions with last one on Friday and was reportedly completed. Pt endorses episode of emesis prior but otherwise denies N/V/abd pain. She reports compliance with her medications. Reports minimal residual urine output.   In the ED, pt was afebrile, hypertensive to SBP 200s and hypoxic with increased work of breathing and was trialed on BiPAP but due to nausea, pt was subsequently switched to high flow nasal cannula 100% 40L. CXR showed bilateral extensive infiltrates. Given azithro/zosyn in the ED. Started on nitropaste per nephro request with improvement in SBP to 140s. Discussed with nephrology and emergent HD arranged. Will need to gradually reintroduce home antihypertensives. Trop troponin/EKG. F/u Echo. check RVP. c/w standing nebs and empiric steroids for now but can likely wean quickly if improves with dialysis. f/u cultures/urinary Ags. Wean supplemental O2 as tolerated for goal sat >90-92%. If infiltrates persist post HD, would obtain CT chest for further eval.

## 2025-07-07 NOTE — ED PROVIDER NOTE - CARE PLAN
Principal Discharge DX:	Acute respiratory failure with hypoxia  Secondary Diagnosis:	RLL pneumonia  Secondary Diagnosis:	Acute pulmonary edema   1

## 2025-07-07 NOTE — BRIEF OPERATIVE NOTE - NSICDXBRIEFPROCEDURE_GEN_ALL_CORE_FT
Detail Level: Detailed
Add 92041 Cpt? (Important Note: In 2017 The Use Of 69334 Is Being Tracked By Cms To Determine Future Global Period Reimbursement For Global Periods): no
PROCEDURES:  Small bowel resection 30-Aug-2024 16:29:01  Andrea Whitney

## 2025-07-07 NOTE — ED PROCEDURE NOTE - ATTENDING CONTRIBUTION TO CARE
I, Jv Betancourt, personally saw the patient with the resident, and completed the key components of the history and physical exam. I then discussed the management plan with the resident.

## 2025-07-07 NOTE — H&P ADULT - ASSESSMENT
65 year old female with PMH of ESRD on HD, DM2, HTN, prior smoker, with 3d of N/V, cough and acute shortness of breath. Pt with BL pulmonary edema on chest x-ray and hypoxic respiratory failure.    Plan:  - admit to MICU    Neuro: pain control PRN  CV: tele monitoring, EKG without ST changes, hypertensive on arrival s/p nitro by EMS and hydralazine by ED. nitro paste x1 as per nephrology, trend troponin   Pulm: on HFNC 40L/100%, s/p methylprednisolone 125 mg IVP x1, continue methylprednisolone 40mg IVP Q6, wean as tolerated to maintains SpO2 >92%, Rpt CXR after diuresis and IHD, standing duonebs   GI: NPO, holding   : nephrology called for urgent HD this AM, Bumex 4mg IVP ordered, F/U UO, daily weights   ID: azithro / zosyn administered x1 in ED, monitor for fevers and leukocytosis   Heme: SQH for DVT ppx

## 2025-07-07 NOTE — ED ADULT TRIAGE NOTE - HEART RATE (BEATS/MIN)
Called to follow-up on symptoms.  Also reviewed discharge summary.  She has a longtime cough and when I last saw her advised that she resume Protonix which is listed on her medications.  She was treated with steroid and bronchodilators and improved.  She is to call back   134

## 2025-07-07 NOTE — PROGRESS NOTE ADULT - ATTENDING COMMENTS
I attest that I have completed more than 50% of the documentation, physical exam and orders      63 y/o F w/ PMH of ESRD (s/p former PD but now on HD m/w/f doreen LUE AVF but has rt chest wall permacath), HTN, HLD, DM2, remote hx of GIB (09/2024), has prn home O2 (2L prn) presented to hospital c/o acute onset SOB and substernal nonradiating chest pressure.  pt was reported to have a SBP in 200's and was found to be have O2 sat of 67% ORA in the field.  Pt was given nitro in rout by EMS.  On arrival pt was placed on short course of bipap but did not tolerate and transitioned to 100% HFNC.  CXR showed b/l pulm edema.  Pt was admitted to MICU 7/7 for acute hypoxic respiratory failure 2/2 flash pulm edema due to hypertensive emergency.  Pt was urgently dialyzed w/ improved BP but still requiring HFNC although now at 65%.       Acute hypoxic respiratory failure 2/2 flash pulm edema due to HTN emergency  HTN emergency, resolved  - Still has high O2 requirement but tolerating slow wean since HD this morning   - po torsemide resumed today but if needed will transition to IV diuretic if unable to continue to wean off O2  - SBP 200s in the field requiring nitro   - BP singificantly improved since arrival   - s/p HD and will c/w HD as per nephro   - Resumed clonidine, cardizem, hydralazine, metoprolol   - TTE pending   - Monitor on tele/       ESRD on HD   - Has HD via LUE AVF but also uses Rt chest wall port bc states sometimes AVF doesnt work well   - s/p HD today but stopped early bc of severe cramping   - c/w renvela   - Monitor vol status   - HD as per nephro       Type II MI   - Could be demand 2/2 HTN emergency and acute hypoxic respiratory failure in setting of poor renal clearance however suspect ACS given notable rise in trop   - Trop 82-->184  - Repeat trop ordered and will continue to trend until plateaus   - EKG w/ no acute ischemic changes but will repeat to assess for dynamic changes   - TTE ordered to assess for WMAs, valvulopathies and EF   - Starting on ASA in the meantime but will hold off on heparin gtt pending TTE results per cardio   - If ACS ruled out then would d/c ASA given hx of GIB       Anemia of chronic dx 2/2 ESRD  - H/H stable and hemodynamically stable   - Given hx of GIB and starting on ASA for possible ACS will also place on PPI   - Monitor CBC and transfuse for Hb<8      AGMA likely multifactorial  - initially suspected to be primarily due to uremia but s/p HD BUN improved but AG did not improved to degree would have expected   - Pt persistently tachycardic with up trending trop and still requiring O2 therefore will r/o lactic acidosis   - Has DM and BG mildly elevated therefore DKA considered but less likely however will r/o    - Also poor po intake and could have degree of starvation ketosis   - Is still uremic which is contributing but significantly improved   - ABG ordered to better assess acid base disturbance   - Repeat CMP in AM to reassess

## 2025-07-07 NOTE — CONSULT NOTE ADULT - SUBJECTIVE AND OBJECTIVE BOX
CHIEF COMPLAINT:    HPI:    PAST MEDICAL & SURGICAL HISTORY:      Allergies    No Known Allergies    Intolerances        MEDICATIONS  (STANDING):  albuterol/ipratropium for Nebulization 3 milliLiter(s) Nebulizer every 6 hours  chlorhexidine 2% Cloths 1 Application(s) Topical <User Schedule>  cloNIDine 0.1 milliGRAM(s) Oral every 12 hours  diltiazem    milliGRAM(s) Oral daily  heparin   Injectable 5000 Unit(s) SubCutaneous every 8 hours  insulin lispro (ADMELOG) corrective regimen sliding scale.   SubCutaneous every 6 hours  methylPREDNISolone sodium succinate Injectable 40 milliGRAM(s) IV Push every 6 hours  rosuvastatin 20 milliGRAM(s) Oral at bedtime  sevelamer carbonate 1600 milliGRAM(s) Oral three times a day with meals  torsemide 20 milliGRAM(s) Oral daily    MEDICATIONS  (PRN):      FAMILY HISTORY:      ***No family history of premature coronary artery disease or sudden cardiac death    SOCIAL HISTORY:  Smoking-  Alcohol-  Illicit Drug use-    REVIEW OF SYSTEMS:  Constitutional: [ ] fever, [ ]weight loss,  [ ]fatigue  Eyes: [ ] visual changes  Respiratory: [ ]shortness of breath;  [ ] cough, [ ]wheezing, [ ]chills, [ ]hemoptysis  Cardiovascular: [ ] chest pain, [ ]palpitations, [ ]dizziness,  [ ]leg swelling [ ]syncope  Gastrointestinal: [ ] abdominal pain, [ ]nausea, [ ]vomiting,  [ ]diarrhea   Genitourinary: [ ] dysuria, [ ] hematuria  Neurologic: [ ] headaches [ ] tremors  [ ] weakness [ ] lightheadedness  Skin: [ ] itching, [ ]burning, [ ] rashes  Endocrine: [ ] heat or cold intolerance  Musculoskeletal: [ ] joint pain or swelling; [ ] muscle, back, or extremity pain  Psychiatric: [ ] depression, [ ]anxiety, [ ]mood swings, or [ ]difficulty sleeping  Hematologic: [ ] easy bruising, [ ] bleeding gums     [ x] All others negative	  [ ] Unable to obtain    Vital Signs Last 24 Hrs  T(C): 36.6 (07 Jul 2025 09:00), Max: 37 (07 Jul 2025 05:00)  T(F): 97.8 (07 Jul 2025 09:00), Max: 98.6 (07 Jul 2025 05:00)  HR: 98 (07 Jul 2025 10:00) (90 - 134)  BP: 165/85 (07 Jul 2025 10:00) (134/82 - 190/110)  BP(mean): 109 (07 Jul 2025 10:00) (97 - 115)  RR: 18 (07 Jul 2025 09:00) (14 - 37)  SpO2: 100% (07 Jul 2025 10:00) (67% - 100%)    Parameters below as of 07 Jul 2025 10:00  Patient On (Oxygen Delivery Method): nasal cannula, high flow  O2 Flow (L/min): 40  O2 Concentration (%): 70  I&O's Summary      PHYSICAL EXAM:  General: No acute distress  HEENT: EOMI  Neck:  No JVD  Lungs: Clear to auscultation bilaterally; No rales or wheezing  Heart: Regular rate and rhythm; No murmurs, rubs, or gallops  Abdomen: soft, non tender, non distended   Extremities: warm, no edema   Nervous system:  Alert & Oriented X3  Psychiatric: Normal affect  Skin: No rashes or lesions    LABS:  07-07    134[L]  |  87[L]  |  69.2[H]  ----------------------------<  247[H]  4.6   |  23.0  |  12.37[H]    Ca    10.2      07 Jul 2025 00:28  Phos  7.6     07-07  Mg     2.8     07-07    TPro  7.7  /  Alb  4.2  /  TBili  0.4  /  DBili  x   /  AST  36[H]  /  ALT  23  /  AlkPhos  147[H]  07-07    Creatinine Trend: 12.37<--                        11.6   18.46 )-----------( 434      ( 07 Jul 2025 00:28 )             37.7     PT/INR - ( 07 Jul 2025 00:28 )   PT: 10.6 sec;   INR: 0.91 ratio         PTT - ( 07 Jul 2025 00:28 )  PTT:29.5 sec    Lipid Panel:   Cardiac Enzymes:           RADIOLOGY:    ECG [my interpretation]:    TELEMETRY:    Nuclear stress test 10/23/23; no evidence of ischemia or antecedent infarction, ejection fraction 77%.   Echocardiogram 10/23/23; preserved ejection fraction, no clinically significant valvular heart disease, small pericardial effusion.       CATHETERIZATION: CHIEF COMPLAINT:    HPI: 65-year-old female with past medical history of ESRD on hemodialysis (via right chest permacath and LUE AVF), HTN, HLD, DM2, reported GIB, and GERD presented with acute hypoxic respiratory failure. Patient arrived with oxygen saturation of 67% and systolic blood pressure in 200s, requiring nitro administration by EMS. She was initially placed on HFNC 40L/100% and admitted to MICU. CXR showed bilateral pulmonary edema. Patient received methylprednisolone, nebulizer treatments, and underwent urgent hemodialysis which was stopped early due to severe cramping. Previous cardiac workup from October 2023 showed normal nuclear stress test with EF of 77%. RVP and MRSA swabs were negative. Patient's oxygen requirements improved to HFNC 40L/70% and now deemed stable for transfer to medical floors. Cardiology consultation requested for evaluation of elevated troponin levels and pulmonary edema.       PAST MEDICAL & SURGICAL HISTORY:      Allergies    No Known Allergies    Intolerances        MEDICATIONS  (STANDING):  albuterol/ipratropium for Nebulization 3 milliLiter(s) Nebulizer every 6 hours  chlorhexidine 2% Cloths 1 Application(s) Topical <User Schedule>  cloNIDine 0.1 milliGRAM(s) Oral every 12 hours  diltiazem    milliGRAM(s) Oral daily  heparin   Injectable 5000 Unit(s) SubCutaneous every 8 hours  insulin lispro (ADMELOG) corrective regimen sliding scale.   SubCutaneous every 6 hours  methylPREDNISolone sodium succinate Injectable 40 milliGRAM(s) IV Push every 6 hours  rosuvastatin 20 milliGRAM(s) Oral at bedtime  sevelamer carbonate 1600 milliGRAM(s) Oral three times a day with meals  torsemide 20 milliGRAM(s) Oral daily    MEDICATIONS  (PRN):      FAMILY HISTORY:      ***No family history of premature coronary artery disease or sudden cardiac death    SOCIAL HISTORY:  Smoking-  Alcohol-  Illicit Drug use-    REVIEW OF SYSTEMS:  Constitutional: [ ] fever, [ ]weight loss,  [ ]fatigue  Eyes: [ ] visual changes  Respiratory: [x ]shortness of breath;  [ ] cough, [ ]wheezing, [ ]chills, [ ]hemoptysis  Cardiovascular: [ ] chest pain, [ ]palpitations, [ ]dizziness,  [ ]leg swelling [ ]syncope  Gastrointestinal: [ ] abdominal pain, [ ]nausea, [ ]vomiting,  [ ]diarrhea   Genitourinary: [ ] dysuria, [ ] hematuria  Neurologic: [ ] headaches [ ] tremors  [ ] weakness [ ] lightheadedness  Skin: [ ] itching, [ ]burning, [ ] rashes  Endocrine: [ ] heat or cold intolerance  Musculoskeletal: [ ] joint pain or swelling; [ ] muscle, back, or extremity pain  Psychiatric: [ ] depression, [ ]anxiety, [ ]mood swings, or [ ]difficulty sleeping  Hematologic: [ ] easy bruising, [ ] bleeding gums     [ x] All others negative	  [ ] Unable to obtain    Vital Signs Last 24 Hrs  T(C): 36.6 (07 Jul 2025 09:00), Max: 37 (07 Jul 2025 05:00)  T(F): 97.8 (07 Jul 2025 09:00), Max: 98.6 (07 Jul 2025 05:00)  HR: 98 (07 Jul 2025 10:00) (90 - 134)  BP: 165/85 (07 Jul 2025 10:00) (134/82 - 190/110)  BP(mean): 109 (07 Jul 2025 10:00) (97 - 115)  RR: 18 (07 Jul 2025 09:00) (14 - 37)  SpO2: 100% (07 Jul 2025 10:00) (67% - 100%)    Parameters below as of 07 Jul 2025 10:00  Patient On (Oxygen Delivery Method): nasal cannula, high flow  O2 Flow (L/min): 40  O2 Concentration (%): 70  I&O's Summary      PHYSICAL EXAM:  General: No acute distress  HEENT: EOMI  Neck:  No JVD  Lungs: Clear to auscultation bilaterally; No rales or wheezing  Heart: Regular rate and rhythm; No murmurs, rubs, or gallops  Abdomen: soft, non tender, non distended   Extremities: warm, no edema   Nervous system:  Alert & Oriented X3  Psychiatric: Normal affect  Skin: No rashes or lesions    LABS:  07-07    134[L]  |  87[L]  |  69.2[H]  ----------------------------<  247[H]  4.6   |  23.0  |  12.37[H]    Ca    10.2      07 Jul 2025 00:28  Phos  7.6     07-07  Mg     2.8     07-07    TPro  7.7  /  Alb  4.2  /  TBili  0.4  /  DBili  x   /  AST  36[H]  /  ALT  23  /  AlkPhos  147[H]  07-07    Creatinine Trend: 12.37<--                        11.6   18.46 )-----------( 434      ( 07 Jul 2025 00:28 )             37.7     PT/INR - ( 07 Jul 2025 00:28 )   PT: 10.6 sec;   INR: 0.91 ratio         PTT - ( 07 Jul 2025 00:28 )  PTT:29.5 sec    Lipid Panel:   Cardiac Enzymes:           RADIOLOGY:    < from: 12 Lead ECG (07.07.25 @ 12:07) >  Sinus tachycardia  Left atrial enlargement  Left ventricular hypertrophy ( Lovejoy product )  T wave abnormality, consider anterolateral ischemia  Prolonged QT  Abnormal ECG      < from: 12 Lead ECG (07.07.25 @ 15:01) >  Normal sinus rhythm  Left atrial enlargement  Left ventricular hypertrophy ( Lovejoy product )  ST elevation, consider early repolarization, pericarditis, or injury  T wave abnormality, consider anterolateral ischemia  Prolonged QT  Abnormal ECG          TELEMETRY: SR    Nuclear stress test 10/23/23; no evidence of ischemia or antecedent infarction, ejection fraction 77%.   Echocardiogram 10/23/23; preserved ejection fraction, no clinically significant valvular heart disease, small pericardial effusion.     < from: TTE W or WO Ultrasound Enhancing Agent (07.07.25 @ 13:44) >   1. Left ventricular systolic function is normal with an ejection fraction of 66 % by Alfredo's method of disks.   2. Left atrium is moderately dilated.   3. Estimated pulmonary artery systolic pressure is 41 mmHg.   4. Mild mitral valve leaflet calcification.        CATHETERIZATION:

## 2025-07-07 NOTE — PROGRESS NOTE ADULT - ASSESSMENT
65-year-old female with ESRD on HD, HTN, HLD, DM2 admitted with hypoxic respiratory failure and pulmonary edema requiring HFNC. Patient received methylprednisolone, diuretics, and urgent hemodialysis with subsequent improvement in respiratory status. Initial leukocytosis of 18.46 normalized to 6 after treatment, and patient was transferred from MICU to telemetry with decreased oxygen requirements and plans for continued scheduled hemodialysis.    Acute hypoxemic respiratory failure 2/2 to B/L flash pulmonary edema, (improving)   - s/p BiPAP, now on HFNC, wean as tolerated  - On 2L NC home  - CXR consistent with pulmonary edema  - Received 4 mg Bumex IV with good response  - c/w Torsemide 20mg QD  - Monitor I/Os  - RVP negative  - TTE pending  - Repeat CXR tomorrow AM, consider CT scan if infiltrates persist    Hypertensive emergency/HTN  - S/p Nitro in the field   - C/w Clonidine 0.1 mg q12  - C/w Diltiazem 240 mg qd   - Monitor BP q4h  - Renal diet    ESRD on HD  - Access R permacath and LUE AVF  - Outpatient schedule: MWF  - Last HD earlier today, was not able to complete due to severe cramps, reports has had cramps before with HD   - C/w Sevelamer 1600mg TID  - Renal diet education and fluid/fluid-salt restrictions reinforce  - Nephrology following     Elevated troponins  - EKG without Ischemic changes; continuous telemetry, stat ECG for new symptoms  - More consistent with ESRD-related baseline elevation and demand ischemia from severe hypertension, hypoxia, and volume shifts; type 1 ACS unlikely.  - TTE to assess wall motion; noted prior 10/2023 nuclear stress test and last echo in 2024 normal (EF >75 %)    Anemia of chronic disease  - Baseline Hbg 10-11  - H/H stable  - No signs of active bleed  - Monitor CBC    DM2   - Hold home medications Januvia 25 mg QD while inpatient  - Accuchecks  - ISS  - Last known A1c 2/5/25: 6.6  - A1c in AM  - Consistent carb diet    HLD  - Continue home dose Crestor 20 mg qHS    Hx of pericardial effusion, pleural effusions  - Small pleural effusions noted on CXR  - TTE pending, last noted stable    DVT ppx: Heparin SQ, OOB as tolerated  Diet: Renal diet/CC/DASH  Dispo: Acute, transferred to medicine, wean down to baseline O2.   65-year-old female with ESRD on HD, HTN, HLD, DM2 admitted with hypoxic respiratory failure and pulmonary edema requiring HFNC. Patient received methylprednisolone, diuretics, and urgent hemodialysis with subsequent improvement in respiratory status. Initial leukocytosis of 18.46 normalized to 6 after treatment, and patient was transferred from MICU to telemetry with decreased oxygen requirements and plans for continued scheduled hemodialysis.    Acute hypoxemic respiratory failure 2/2 to B/L flash pulmonary edema, (improving)   - s/p BiPAP, now on HFNC, wean as tolerated  - On 2L NC home  - CXR consistent with pulmonary edema  - Received 4 mg Bumex IV with good response  - c/w Torsemide 20mg QD  - Monitor I/Os  - RVP negative  - TTE pending  - Repeat CXR tomorrow AM, consider CT scan if infiltrates persist    Hypertensive emergency/HTN  - S/p Nitro in the field   - C/w Clonidine 0.1 mg q12  - C/w Diltiazem 240 mg qd   - Monitor BP q4h  - Renal diet    ESRD on HD  - Access R permacath and LUE AVF  - Outpatient schedule: MWF  - Last HD earlier today, was not able to complete due to severe cramps, reports has had cramps before with HD   - C/w Sevelamer 1600mg TID  - Renal diet education and fluid/fluid-salt restrictions reinforce  - Nephrology following   - HD per Nephro    Elevated troponins  - EKG without Ischemic changes; continuous telemetry, stat EKG for new symptoms  - More consistent with ESRD-related baseline elevation and demand ischemia from severe hypertension, hypoxia, and volume shifts; type 1 ACS unlikely.  - TTE to assess wall motion; noted prior 10/2023 nuclear stress test and last echo in 2024 normal (EF >75 %)  - Trend troponin 8    Anemia of chronic disease  - Baseline Hbg 10-11  - H/H stable  - No signs of active bleed  - Monitor CBC    DM2   - Hold home medications Januvia 25 mg QD while inpatient  - Accuchecks  - ISS  - Last known A1c 2/5/25: 6.6  - A1c in AM  - Consistent carb diet    HLD  - Continue home dose Crestor 20 mg qHS    Hx of pericardial effusion, pleural effusions  - Small pleural effusions noted on CXR  - TTE pending, last noted stable    DVT ppx: Heparin SQ, OOB as tolerated  Diet: Renal diet/CC/DASH  Dispo: Acute, transferred to medicine, wean down to baseline O2.   65-year-old female with ESRD on HD, HTN, HLD, DM2 admitted with hypoxic respiratory failure and pulmonary edema requiring HFNC. Patient received methylprednisolone, diuretics, and urgent hemodialysis with subsequent improvement in respiratory status. Initial leukocytosis of 18.46 normalized to 6 after treatment, and patient was transferred from MICU to telemetry with decreased oxygen requirements and plans for continued scheduled hemodialysis.    Acute hypoxemic respiratory failure 2/2 to B/L flash pulmonary edema, (improving)   - s/p BiPAP, now on HFNC, wean as tolerated  - On 2L NC home  - CXR consistent with pulmonary edema  - Received 4 mg Bumex IV with good response  - c/w Torsemide 20mg QD  - Monitor I/Os  - Incentive spirometer  - RVP negative  - TTE pending  - Repeat CXR tomorrow AM, consider CT scan if infiltrates persist    Type II ACS/NSTEMI  - EKG without Ischemic changes; continuous telemetry, stat EKG for new symptoms  - More consistent with ESRD-related baseline elevation and demand ischemia from severe hypertension, hypoxia, and volume shifts; type 1 ACS unlikely.  - TTE to assess wall motion; noted prior 10/2023 nuclear stress test and last echo in 2024 normal (EF >75 %)  - Trend troponins  - Repeat EKG  - ASA load  - Consider Heparin gtt, will discuss with cardiology  - Cardiology on board    Hypertensive emergency/HTN  - S/p Nitro in the field   - C/w Clonidine 0.1 mg q12  - C/w Diltiazem 240 mg qd   - C/w Hydralazin  - Monitor BP q4h  - Renal diet    Elevated Anion Gap  - Check UA, Acetone, Ketone  - Check Serum and Urine Osm    ESRD on HD  - Access R permacath and LUE AVF  - Outpatient schedule: MWF  - Last HD earlier today, was not able to complete due to severe cramps, reports has had cramps before with HD   - C/w Sevelamer 1600mg TID  - Renal diet education and fluid/fluid-salt restrictions reinforce  - Nephrology following   - HD per Nephro    Anemia of chronic disease  - Baseline Hbg 10-11  - H/H stable  - No signs of active bleed  - Monitor CBC    DM2   - Hold home medications Januvia 25 mg QD while inpatient  - Accuchecks  - ISS  - Last known A1c 2/5/25: 6.6  - A1c in AM  - Consistent carb diet    HLD  - Continue home dose Crestor 20 mg qHS    Hx of pericardial effusion, pleural effusions  - Small pleural effusions noted on CXR  - TTE pending, last noted stable    DVT ppx: Heparin SQ, OOB as tolerated  Diet: Renal diet/CC/DASH  Dispo: Acute, transferred to medicine, wean down to baseline O2.   65-year-old female with ESRD on HD, HTN, HLD, DM2 admitted with hypoxic respiratory failure and pulmonary edema requiring HFNC. Patient received methylprednisolone, diuretics, and urgent hemodialysis with subsequent improvement in respiratory status. Initial leukocytosis of 18.46 normalized to 6 after treatment, and patient was transferred from MICU to telemetry with decreased oxygen requirements and plans for continued scheduled hemodialysis.    Acute hypoxemic respiratory failure 2/2 to B/L flash pulmonary edema, (improving)   - s/p BiPAP, now on HFNC, wean as tolerated  - On 2L NC home  - CXR consistent with pulmonary edema  - Received 4 mg Bumex IV with good response  - c/w Torsemide 20mg QD  - Monitor I/Os  - Incentive spirometer  - RVP negative  - TTE pending  - Repeat CXR tomorrow AM, consider CT scan if infiltrates persist    Type II ACS/NSTEMI  - EKG without Ischemic changes; continuous telemetry, stat EKG for new symptoms  - More consistent with ESRD-related baseline elevation and demand ischemia from severe hypertension, hypoxia, and volume shifts; type 1 ACS unlikely.  - TTE to assess wall motion; noted prior 10/2023 nuclear stress test and last echo in 2024 normal (EF >75 %)  - Trend troponins  - Repeat EKG  -  mg load, Protonix 40 mg IV daily due to prior hx of GIB  - Restart home Metoprolol Succinate 100 mg QD  - Hold off heparin gtt till TTE results, d/w Cardiology  - Cardiology following    Hypertensive emergency/HTN  - S/p Nitro in the field   - C/w Clonidine 0.1 mg q12  - C/w Diltiazem 240 mg qd   - C/w Hydralazine  - Monitor BP q4h  - Renal diet    Elevated Anion Gap  - Check UA, Acetone, Ketone  - Check Serum and Urine Osm    ESRD on HD  - Access R permacath and LUE AVF  - Outpatient schedule: MWF  - Last HD earlier today, was not able to complete due to severe cramps, reports has had cramps before with HD   - C/w Sevelamer 1600mg TID  - Renal diet education and fluid/fluid-salt restrictions reinforce  - Nephrology following   - HD per Nephro    Anemia of chronic disease  - Baseline Hbg 10-11  - H/H stable  - No signs of active bleed  - Monitor CBC    DM2   - Hold home medications Januvia 25 mg QD while inpatient  - Accuchecks  - ISS  - Last known A1c 2/5/25: 6.6  - A1c in AM  - Consistent carb diet    HLD  - Continue home dose Crestor 20 mg qHS    Hx of pericardial effusion, pleural effusions  - Small pleural effusions noted on CXR  - TTE pending, last noted stable    DVT ppx: Heparin SQ, OOB as tolerated  Diet: Renal diet/CC/DASH  Dispo: Acute, transferred to medicine, wean down to baseline O2.   65-year-old female with ESRD on HD, HTN, HLD, DM2 admitted with hypoxic respiratory failure and pulmonary edema requiring HFNC. Patient received methylprednisolone, diuretics, and urgent hemodialysis with subsequent improvement in respiratory status. Initial leukocytosis of 18.46 normalized to 6 after treatment, and patient was transferred from MICU to telemetry with decreased oxygen requirements and plans for continued scheduled hemodialysis.    Acute hypoxemic respiratory failure 2/2 to B/L flash pulmonary edema, (improving)   - S/p BiPAP, now on HFNC, wean as tolerated  - On 2L NC home  - CXR consistent with pulmonary edema  - Received 4 mg Bumex IV with good response  - c/w Torsemide 20mg QD  - Monitor I/Os  - Will DC Steroids  - Incentive spirometer  - RVP negative  - TTE pending  - Repeat CXR tomorrow AM, consider CT scan if infiltrates persist    Type II ACS/NSTEMI  - EKG without Ischemic changes; continuous telemetry, stat EKG for new symptoms  - More consistent with ESRD-related baseline elevation and demand ischemia from severe hypertension, hypoxia, and volume shifts; type 1 ACS unlikely.  - TTE to assess wall motion; noted prior 10/2023 nuclear stress test and last echo in 2024 normal (EF >75 %)  - Trend troponins  - Repeat EKG  - Restart home Metoprolol Succinate 100 mg QD  - Hold off heparin gtt till TTE results, d/w Cardiology  - Cardiology following    Hypertensive emergency/HTN  - S/p Nitro in the field   - C/w Clonidine 0.1 mg q12  - C/w Diltiazem 240 mg qd   - C/w Hydralazine  - Monitor BP q4h  - Renal diet    Elevated Anion Gap  - Elevated at 24, mildly improved to 20 following HD  - Check Lactate  - Check UA, Acetone, Ketone  - Check Serum and Urine Osm    ESRD on HD  - Access R permacath and LUE AVF  - Outpatient schedule: MWF  - Last HD earlier today, was not able to complete due to severe cramps, reports has had cramps before with HD   - C/w Sevelamer 1600mg TID  - Renal diet education reinforce  - Nephrology following   - HD per Nephro    Anemia of chronic disease  - Baseline Hbg 10-11  - H/H stable  - No signs of active bleed  - Monitor CBC    DM2   - Hold home medications Januvia 25 mg QD while inpatient  - Accuchecks  - ISS  - Last known A1c 2/5/25: 6.6  - A1c in AM  - Consistent carb diet    Hyponatremia  - Likely dilutional given volume overload, ESRD  - Order urine studies  - Monitor electrolytes    HLD  - Continue home dose Crestor 20 mg qHS    Hx of pericardial effusion, pleural effusions  - Small pleural effusions noted on CXR  - TTE pending, last noted stable    DVT ppx: Heparin SQ, OOB as tolerated  Diet: Renal diet/CC/DASH  Dispo: Medically active, anticipate improvement in 24-48 hrs.

## 2025-07-07 NOTE — H&P ADULT - TIME BILLING
review of chart notes, labs, imaging, bedside assessment, discussion with ED/Nephrology and documentation

## 2025-07-08 LAB
A1C WITH ESTIMATED AVERAGE GLUCOSE RESULT: 6.4 % — HIGH (ref 4–5.6)
ALBUMIN SERPL ELPH-MCNC: 3.4 G/DL — SIGNIFICANT CHANGE UP (ref 3.3–5.2)
ALP SERPL-CCNC: 107 U/L — SIGNIFICANT CHANGE UP (ref 40–120)
ALT FLD-CCNC: 17 U/L — SIGNIFICANT CHANGE UP
ANION GAP SERPL CALC-SCNC: 21 MMOL/L — HIGH (ref 5–17)
AST SERPL-CCNC: 19 U/L — SIGNIFICANT CHANGE UP
B-OH-BUTYR SERPL-SCNC: 0.2 MMOL/L — SIGNIFICANT CHANGE UP
BASOPHILS # BLD AUTO: 0.02 K/UL — SIGNIFICANT CHANGE UP (ref 0–0.2)
BASOPHILS NFR BLD AUTO: 0.2 % — SIGNIFICANT CHANGE UP (ref 0–2)
BILIRUB SERPL-MCNC: 0.3 MG/DL — LOW (ref 0.4–2)
BUN SERPL-MCNC: 64.5 MG/DL — HIGH (ref 8–20)
CALCIUM SERPL-MCNC: 9.8 MG/DL — SIGNIFICANT CHANGE UP (ref 8.4–10.5)
CHLORIDE SERPL-SCNC: 93 MMOL/L — LOW (ref 96–108)
CO2 SERPL-SCNC: 22 MMOL/L — SIGNIFICANT CHANGE UP (ref 22–29)
CREAT SERPL-MCNC: 9.56 MG/DL — HIGH (ref 0.5–1.3)
EGFR: 4 ML/MIN/1.73M2 — LOW
EGFR: 4 ML/MIN/1.73M2 — LOW
EOSINOPHIL # BLD AUTO: 0 K/UL — SIGNIFICANT CHANGE UP (ref 0–0.5)
EOSINOPHIL NFR BLD AUTO: 0 % — SIGNIFICANT CHANGE UP (ref 0–6)
ESTIMATED AVERAGE GLUCOSE: 137 MG/DL — HIGH (ref 68–114)
GLUCOSE BLDC GLUCOMTR-MCNC: 151 MG/DL — HIGH (ref 70–99)
GLUCOSE BLDC GLUCOMTR-MCNC: 182 MG/DL — HIGH (ref 70–99)
GLUCOSE BLDC GLUCOMTR-MCNC: 189 MG/DL — HIGH (ref 70–99)
GLUCOSE BLDC GLUCOMTR-MCNC: 191 MG/DL — HIGH (ref 70–99)
GLUCOSE SERPL-MCNC: 197 MG/DL — HIGH (ref 70–99)
HCT VFR BLD CALC: 30.8 % — LOW (ref 34.5–45)
HGB BLD-MCNC: 9.6 G/DL — LOW (ref 11.5–15.5)
IMM GRANULOCYTES # BLD AUTO: 0.06 K/UL — SIGNIFICANT CHANGE UP (ref 0–0.07)
IMM GRANULOCYTES NFR BLD AUTO: 0.5 % — SIGNIFICANT CHANGE UP (ref 0–0.9)
LYMPHOCYTES # BLD AUTO: 0.66 K/UL — LOW (ref 1–3.3)
LYMPHOCYTES NFR BLD AUTO: 5.4 % — LOW (ref 13–44)
MAGNESIUM SERPL-MCNC: 2.5 MG/DL — SIGNIFICANT CHANGE UP (ref 1.6–2.6)
MCHC RBC-ENTMCNC: 25.5 PG — LOW (ref 27–34)
MCHC RBC-ENTMCNC: 31.2 G/DL — LOW (ref 32–36)
MCV RBC AUTO: 81.9 FL — SIGNIFICANT CHANGE UP (ref 80–100)
MONOCYTES # BLD AUTO: 0.58 K/UL — SIGNIFICANT CHANGE UP (ref 0–0.9)
MONOCYTES NFR BLD AUTO: 4.7 % — SIGNIFICANT CHANGE UP (ref 2–14)
NEUTROPHILS # BLD AUTO: 11.01 K/UL — HIGH (ref 1.8–7.4)
NEUTROPHILS NFR BLD AUTO: 89.2 % — HIGH (ref 43–77)
NRBC # BLD AUTO: 0 K/UL — SIGNIFICANT CHANGE UP (ref 0–0)
NRBC # FLD: 0 K/UL — SIGNIFICANT CHANGE UP (ref 0–0)
NRBC BLD AUTO-RTO: 0 /100 WBCS — SIGNIFICANT CHANGE UP (ref 0–0)
PHOSPHATE SERPL-MCNC: 7.3 MG/DL — HIGH (ref 2.4–4.7)
PLATELET # BLD AUTO: 257 K/UL — SIGNIFICANT CHANGE UP (ref 150–400)
PMV BLD: 10.2 FL — SIGNIFICANT CHANGE UP (ref 7–13)
POTASSIUM SERPL-MCNC: 5 MMOL/L — SIGNIFICANT CHANGE UP (ref 3.5–5.3)
POTASSIUM SERPL-SCNC: 5 MMOL/L — SIGNIFICANT CHANGE UP (ref 3.5–5.3)
PROT SERPL-MCNC: 6.5 G/DL — LOW (ref 6.6–8.7)
RBC # BLD: 3.76 M/UL — LOW (ref 3.8–5.2)
RBC # FLD: 18.7 % — HIGH (ref 10.3–14.5)
SODIUM SERPL-SCNC: 135 MMOL/L — SIGNIFICANT CHANGE UP (ref 135–145)
WBC # BLD: 12.33 K/UL — HIGH (ref 3.8–10.5)
WBC # FLD AUTO: 12.33 K/UL — HIGH (ref 3.8–10.5)

## 2025-07-08 PROCEDURE — 99233 SBSQ HOSP IP/OBS HIGH 50: CPT

## 2025-07-08 PROCEDURE — 71045 X-RAY EXAM CHEST 1 VIEW: CPT | Mod: 26,76

## 2025-07-08 PROCEDURE — 99232 SBSQ HOSP IP/OBS MODERATE 35: CPT

## 2025-07-08 RX ADMIN — SEVELAMER HYDROCHLORIDE 1600 MILLIGRAM(S): 800 TABLET ORAL at 17:14

## 2025-07-08 RX ADMIN — METOPROLOL SUCCINATE 100 MILLIGRAM(S): 50 TABLET, EXTENDED RELEASE ORAL at 05:14

## 2025-07-08 RX ADMIN — INSULIN LISPRO 2: 100 INJECTION, SOLUTION INTRAVENOUS; SUBCUTANEOUS at 17:14

## 2025-07-08 RX ADMIN — Medication 100 MILLIGRAM(S): at 17:13

## 2025-07-08 RX ADMIN — IPRATROPIUM BROMIDE AND ALBUTEROL SULFATE 3 MILLILITER(S): .5; 2.5 SOLUTION RESPIRATORY (INHALATION) at 14:16

## 2025-07-08 RX ADMIN — SEVELAMER HYDROCHLORIDE 1600 MILLIGRAM(S): 800 TABLET ORAL at 08:29

## 2025-07-08 RX ADMIN — IPRATROPIUM BROMIDE AND ALBUTEROL SULFATE 3 MILLILITER(S): .5; 2.5 SOLUTION RESPIRATORY (INHALATION) at 08:28

## 2025-07-08 RX ADMIN — INSULIN LISPRO 2: 100 INJECTION, SOLUTION INTRAVENOUS; SUBCUTANEOUS at 08:29

## 2025-07-08 RX ADMIN — Medication 1 APPLICATION(S): at 05:17

## 2025-07-08 RX ADMIN — Medication 100 MILLIGRAM(S): at 05:13

## 2025-07-08 RX ADMIN — ROSUVASTATIN CALCIUM 20 MILLIGRAM(S): 20 TABLET, FILM COATED ORAL at 23:00

## 2025-07-08 RX ADMIN — INSULIN LISPRO 2: 100 INJECTION, SOLUTION INTRAVENOUS; SUBCUTANEOUS at 23:00

## 2025-07-08 RX ADMIN — SEVELAMER HYDROCHLORIDE 1600 MILLIGRAM(S): 800 TABLET ORAL at 12:20

## 2025-07-08 RX ADMIN — IPRATROPIUM BROMIDE AND ALBUTEROL SULFATE 3 MILLILITER(S): .5; 2.5 SOLUTION RESPIRATORY (INHALATION) at 20:57

## 2025-07-08 RX ADMIN — Medication 0.1 MILLIGRAM(S): at 05:14

## 2025-07-08 RX ADMIN — DILTIAZEM HYDROCHLORIDE 240 MILLIGRAM(S): 240 TABLET, EXTENDED RELEASE ORAL at 05:13

## 2025-07-08 RX ADMIN — Medication 20 MILLIGRAM(S): at 05:13

## 2025-07-08 RX ADMIN — Medication 0.1 MILLIGRAM(S): at 17:14

## 2025-07-08 NOTE — PROGRESS NOTE ADULT - SUBJECTIVE AND OBJECTIVE BOX
Patient is a 64y old  Female who presents with a chief complaint of Hypoxic respiratory failure (07 Jul 2025 11:52)      SUBJECTIVE / OVERNIGHT EVENTS: Seen and examined. Feeling much better. Now 5L NC, will attempt to wean further. Denies chest pain, abd pain, N/V, fever, chills.    MEDICATIONS  (STANDING):  albuterol/ipratropium for Nebulization 3 milliLiter(s) Nebulizer every 6 hours  chlorhexidine 2% Cloths 1 Application(s) Topical <User Schedule>  cloNIDine 0.1 milliGRAM(s) Oral every 12 hours  diltiazem    milliGRAM(s) Oral daily  heparin   Injectable 5000 Unit(s) SubCutaneous every 8 hours  hydrALAZINE 100 milliGRAM(s) Oral two times a day  insulin lispro (ADMELOG) corrective regimen sliding scale.   SubCutaneous every 6 hours  metoprolol succinate  milliGRAM(s) Oral daily  rosuvastatin 20 milliGRAM(s) Oral at bedtime  sevelamer carbonate 1600 milliGRAM(s) Oral three times a day with meals  torsemide 20 milliGRAM(s) Oral daily    MEDICATIONS  (PRN):        I&O's Summary    07 Jul 2025 07:01  -  08 Jul 2025 07:00  --------------------------------------------------------  IN: 100 mL / OUT: 0 mL / NET: 100 mL        PHYSICAL EXAM:  Vital Signs Last 24 Hrs  T(C): 36.8 (08 Jul 2025 11:15), Max: 37 (08 Jul 2025 05:05)  T(F): 98.3 (08 Jul 2025 11:15), Max: 98.6 (08 Jul 2025 05:05)  HR: 76 (08 Jul 2025 11:15) (74 - 104)  BP: 112/63 (08 Jul 2025 11:15) (112/63 - 178/87)  BP(mean): 102 (07 Jul 2025 14:00) (102 - 114)  RR: 18 (08 Jul 2025 11:15) (14 - 18)  SpO2: 98% (08 Jul 2025 11:15) (91% - 100%)    Parameters below as of 08 Jul 2025 08:30  Patient On (Oxygen Delivery Method): nasal cannula, 5L          PHYSICAL EXAM:  GENERAL: no acute distress, comfortably in bed  HEENT: Atraumatic, normocephalic, non-icteric, no JVD  PSYCH: Normal affect, calm, appropriate insight and judgment, fluent speech  LUNGS: CTAB, B/L reduced breath sounds  HEART: RRR, no murmur appreciated  ABD: Soft, non-tender, non-distended, no organomegaly, no appreciable masses, +bs all 4 quadrants, prior PD scar   EXTREMITIES: Nontender, no clubbing, cyanosis, or edema, LUE AVF with thrill  SKIN: No rashes or lesions  NEURO: No focal deficits, moving all extremities spontaneously, A&Ox3, no dysarthria, CN II-XII grossly intact    LABS:                        9.6    12.33 )-----------( 257      ( 08 Jul 2025 05:40 )             30.8     07-08    135  |  93[L]  |  64.5[H]  ----------------------------<  197[H]  5.0   |  22.0  |  9.56[H]    Ca    9.8      08 Jul 2025 05:40  Phos  7.3     07-08  Mg     2.5     07-08    TPro  6.5[L]  /  Alb  3.4  /  TBili  0.3[L]  /  DBili  x   /  AST  19  /  ALT  17  /  AlkPhos  107  07-08    PT/INR - ( 07 Jul 2025 00:28 )   PT: 10.6 sec;   INR: 0.91 ratio         PTT - ( 07 Jul 2025 00:28 )  PTT:29.5 sec      Urinalysis Basic - ( 08 Jul 2025 05:40 )    Color: x / Appearance: x / SG: x / pH: x  Gluc: 197 mg/dL / Ketone: x  / Bili: x / Urobili: x   Blood: x / Protein: x / Nitrite: x   Leuk Esterase: x / RBC: x / WBC x   Sq Epi: x / Non Sq Epi: x / Bacteria: x        Culture - Blood (collected 07 Jul 2025 00:28)  Source: Blood Blood-Peripheral  Preliminary Report (08 Jul 2025 07:01):    No growth at 24 hours    Culture - Blood (collected 07 Jul 2025 00:28)  Source: Blood Blood-Peripheral  Preliminary Report (08 Jul 2025 07:01):    No growth at 24 hours      CAPILLARY BLOOD GLUCOSE      POCT Blood Glucose.: 151 mg/dL (08 Jul 2025 11:00)  POCT Blood Glucose.: 191 mg/dL (08 Jul 2025 08:08)  POCT Blood Glucose.: 222 mg/dL (07 Jul 2025 23:01)  POCT Blood Glucose.: 263 mg/dL (07 Jul 2025 17:40)  POCT Blood Glucose.: 241 mg/dL (07 Jul 2025 12:43)        RADIOLOGY & ADDITIONAL TESTS:  Results Reviewed:   Imaging Personally Reviewed:  Electrocardiogram Personally Reviewed:

## 2025-07-08 NOTE — PROGRESS NOTE ADULT - ASSESSMENT
65-year-old female with ESRD on HD, HTN, HLD, DM2 admitted with hypoxic respiratory failure and pulmonary edema requiring HFNC. Patient received methylprednisolone, diuretics, and urgent hemodialysis with subsequent improvement in respiratory status. Initial leukocytosis of 18.46 normalized to 6 after treatment, and patient was transferred from MICU to telemetry with decreased oxygen requirements and plans for continued scheduled hemodialysis.    Acute hypoxemic respiratory failure 2/2 to B/L flash pulmonary edema, (improving)   - S/p BiPAP, now on HFNC, wean as tolerated--> NC 5L  - On 2L NC home  - CXR consistent with pulmonary edema  - Received 4 mg Bumex IV with good response  - c/w Torsemide 20mg QD  - Monitor I/Os  - Incentive spirometer  - RVP negative  - TTE- EF 66%, PASP 41mmhg  - Repeat CXR pending today    Type II ACS/NSTEMI  - EKG without Ischemic changes; continuous telemetry, stat EKG for new symptoms  - More consistent with ESRD-related baseline elevation and demand ischemia from severe hypertension, hypoxia, and volume shifts; type 1 ACS unlikely.  - TTE to assess wall motion; noted prior 10/2023 nuclear stress test and last echo in 2024 normal (EF >75 %)  - Restart home Metoprolol Succinate 100 mg QD  - Hold off heparin gtt till TTE results, d/w Cardiology  - Cardiology following    Hypertensive emergency/HTN  - S/p Nitro in the field   - C/w Clonidine 0.1 mg q12  - C/w Diltiazem 240 mg qd   - C/w Hydralazine  - Monitor BP q4h  - Renal diet    Elevated Anion Gap (Improving)  - Elevated at 24, mildly improved to 20 following HD    ESRD on HD  - Access R permacath and LUE AVF  - Outpatient schedule: MWF  - Last HD earlier today, was not able to complete due to severe cramps, reports has had cramps before with HD   - C/w Sevelamer 1600mg TID  - Renal diet education reinforce  - Nephrology following   - HD per Nephro    Anemia of chronic disease  - Baseline Hbg 10-11  - H/H stable  - No signs of active bleed  - Monitor CBC    DM2   - Hold home medications Januvia 25 mg QD while inpatient  - Accuchecks  - ISS  - Last known A1c 2/5/25: 6.6  - A1c in AM  - Consistent carb diet    Hyponatremia  - Likely dilutional given volume overload, ESRD  - Monitor electrolytes    HLD  - Continue home dose Crestor 20 mg qHS    Hx of pericardial effusion, pleural effusions  - Small pleural effusions noted on CXR    DVT ppx: Heparin SQ, OOB as tolerated  Diet: Renal diet/CC/DASH  Dispo: Medically active, anticipate improvement in 24-48 hrs. Pending improvement in resp status.

## 2025-07-08 NOTE — PROGRESS NOTE ADULT - SUBJECTIVE AND OBJECTIVE BOX
Patient seen and examined    I&O's Summary    07 Jul 2025 07:01  -  08 Jul 2025 07:00  --------------------------------------------------------  IN: 100 mL / OUT: 0 mL / NET: 100 mL        REVIEW OF SYSTEMS: much better, on NC now    CONSTITUTIONAL: No F/C, stronger ,oob/ch  RESPIRATORY: No cough,  No SOB  CARDIOVASCULAR: No CP/palpitations,    GASTROINTESTINAL: No abdominal pain  or NVD   GENITOURINARY: No UTI sx  NEUROLOGICAL: No headaches, NO wk/numbness  MUSCULOSKELETAL:   EXTREMITIES : no swelling,    Vital Signs Last 24 Hrs  T(C): 36.8 (08 Jul 2025 11:15), Max: 37 (08 Jul 2025 05:05)  T(F): 98.3 (08 Jul 2025 11:15), Max: 98.6 (08 Jul 2025 05:05)  HR: 76 (08 Jul 2025 11:15) (74 - 102)  BP: 112/63 (08 Jul 2025 11:15) (112/63 - 172/75)  BP(mean): 102 (07 Jul 2025 14:00) (102 - 102)  RR: 18 (08 Jul 2025 11:15) (14 - 18)  SpO2: 98% (08 Jul 2025 11:15) (91% - 100%)    Parameters below as of 08 Jul 2025 08:30  Patient On (Oxygen Delivery Method): nasal cannula, 5L        PHYSICAL EXAM:    GENERAL: NAD,   EYES:  conjunctiva and sclera clear  NECK: Supple, No JVD/Bruit  NERVOUS SYSTEM:  A/O x3,   CHEST:  No rales occ rhonchi  HEART:  RRR, gr 2 murmur  ABDOMEN: Soft, NT/ND BS+  EXTREMITIES:  No Edema;  SKIN: No rashes    LABS:                          9.6    12.33 )-----------( 257      ( 08 Jul 2025 05:40 )             30.8     07-08    135  |  93[L]  |  64.5[H]  ----------------------------<  197[H]  5.0   |  22.0  |  9.56[H]    Ca    9.8      08 Jul 2025 05:40  Phos  7.3     07-08  Mg     2.5     07-08    TPro  6.5[L]  /  Alb  3.4  /  TBili  0.3[L]  /  DBili  x   /  AST  19  /  ALT  17  /  AlkPhos  107  07-08      MEDICATIONS  (STANDING):  albuterol/ipratropium for Nebulization  chlorhexidine 2% Cloths  cloNIDine  diltiazem   CD  heparin   Injectable  hydrALAZINE  insulin lispro (ADMELOG) corrective regimen sliding scale.  metoprolol succinate ER  rosuvastatin  sevelamer carbonate  torsemide

## 2025-07-08 NOTE — PROGRESS NOTE ADULT - SUBJECTIVE AND OBJECTIVE BOX
[facility]  [id number]      Chief Complaint:  Follow up of + troponin, Chest pain/ ESRD/ HTN     Interval History:  Feeling much better    Tele:  SR      albuterol/ipratropium for Nebulization 3 milliLiter(s) Nebulizer every 6 hours  chlorhexidine 2% Cloths 1 Application(s) Topical <User Schedule>  cloNIDine 0.1 milliGRAM(s) Oral every 12 hours  diltiazem    milliGRAM(s) Oral daily  heparin   Injectable 5000 Unit(s) SubCutaneous every 8 hours  hydrALAZINE 100 milliGRAM(s) Oral two times a day  insulin lispro (ADMELOG) corrective regimen sliding scale.   SubCutaneous every 6 hours  metoprolol succinate  milliGRAM(s) Oral daily  rosuvastatin 20 milliGRAM(s) Oral at bedtime  sevelamer carbonate 1600 milliGRAM(s) Oral three times a day with meals  torsemide 20 milliGRAM(s) Oral daily          Physical Exam:  T(C): 36.8 (07-08-25 @ 19:57), Max: 37 (07-08-25 @ 05:05)  HR: 85 (07-08-25 @ 19:57) (73 - 90)  BP: 147/74 (07-08-25 @ 19:57) (112/63 - 172/75)  RR: 18 (07-08-25 @ 19:57) (18 - 18)  SpO2: 93% (07-08-25 @ 19:57) (93% - 99%)  Wt(kg): --  General: Comfortable in NAD  Neck: No JVD  CVS: nl s1s2, no s3  Pulm: CTA b/l  Abd: soft, non-tender  Ext: No c/c/e  Neuro A&O x3  Psych: Normal affect      Labs:   08 Jul 2025 05:40    135    |  93     |  64.5   ----------------------------<  197    5.0     |  22.0   |  9.56     Ca    9.8        08 Jul 2025 05:40  Phos  7.3       08 Jul 2025 05:40  Mg     2.5       08 Jul 2025 05:40    TPro  6.5    /  Alb  3.4    /  TBili  0.3    /  DBili  x      /  AST  19     /  ALT  17     /  AlkPhos  107    08 Jul 2025 05:40                          9.6    12.33 )-----------( 257      ( 08 Jul 2025 05:40 )             30.8     PT/INR - ( 07 Jul 2025 00:28 )   PT: 10.6 sec;   INR: 0.91 ratio         PTT - ( 07 Jul 2025 00:28 )  PTT:29.5 sec        < from: 12 Lead ECG (07.07.25 @ 12:07) >  Sinus tachycardia  Left atrial enlargement  Left ventricular hypertrophy ( [place] product )  T wave abnormality, consider anterolateral ischemia  Prolonged QT  Abnormal ECG      < from: 12 Lead ECG (07.07.25 @ 15:01) >  Normal sinus rhythm  Left atrial enlargement  Left ventricular hypertrophy ( [place] product )  ST elevation, consider early repolarization, pericarditis, or injury  T wave abnormality, consider anterolateral ischemia  Prolonged QT  Abnormal ECG          TELEMETRY: SR    Nuclear stress test 10/23/23; no evidence of ischemia or antecedent infarction, ejection fraction 77%.   Echocardiogram 10/23/23; preserved ejection fraction, no clinically significant valvular heart disease, small pericardial effusion.     < from: TTE W or WO Ultrasound Enhancing Agent (07.07.25 @ 13:44) >   1. Left ventricular systolic function is normal with an ejection fraction of 66 % by Alfredo's method of disks.   2. Left atrium is moderately dilated.   3. Estimated pulmonary artery systolic pressure is 41 mmHg.   4. Mild mitral valve leaflet calcification.        CATHETERIZATION:    Assessment	   65-year-old female with past medical history of ESRD on hemodialysis (via right chest permacath and LUE AVF), HTN, HLD, DM2, reported GIB, and GERD presented with acute hypoxic respiratory failure. Patient arrived with oxygen saturation of 67% and systolic blood pressure in 200s, requiring nitro administration by EMS. She was initially placed on HFNC 40L/100% and admitted to MICU. CXR showed bilateral pulmonary edema. Patient received methylprednisolone, nebulizer treatments, and underwent urgent hemodialysis which was stopped early due to severe cramping. Previous cardiac workup from October 2023 showed normal nuclear stress test with EF of 77%. RVP and MRSA swabs were negative. Patient's oxygen requirements improved to HFNC 40L/70% and now deemed stable for transfer to medical floors. Cardiology consultation requested for evaluation of elevated troponin levels and pulmonary edema.    Troponin elevation pattern consistent with demand ischemia in the setting of uncontrolled HTN and ESRD  Echo with normal biventricular systolic function   Vol management with RRT    Plan   continue with current CV meds   no additional cardiac testing indicated at present time  BP goal 130/80  up-titration of antihypertensives PRN  Stable from CV perspective. Please call us back with questions or concerns.

## 2025-07-08 NOTE — PROGRESS NOTE ADULT - ASSESSMENT
65 year old Female wiith [facility] ESRD on HD (R chest permacath and  LUE AVF, last outpt  HD friday 7/4), HTN, HLD, DM2, GIB, GERD, prior admissions for hypertensive emergency and hypoxic respiratory failure presenting with acute hypoxic respiratory failure  Had severe Htn - controlled  HD done -with improvement in SOB but had cramps and had to be cut short, now MWF    Htn - adj BP meds  DM2   Anemia   PEE  Hyponatremia better

## 2025-07-09 ENCOUNTER — TRANSCRIPTION ENCOUNTER (OUTPATIENT)
Age: 65
End: 2025-07-09

## 2025-07-09 VITALS
HEART RATE: 92 BPM | TEMPERATURE: 98 F | RESPIRATION RATE: 14 BRPM | DIASTOLIC BLOOD PRESSURE: 73 MMHG | OXYGEN SATURATION: 100 % | SYSTOLIC BLOOD PRESSURE: 163 MMHG

## 2025-07-09 LAB
ALBUMIN SERPL ELPH-MCNC: 3.5 G/DL — SIGNIFICANT CHANGE UP (ref 3.3–5.2)
ALP SERPL-CCNC: 122 U/L — HIGH (ref 40–120)
ALT FLD-CCNC: 20 U/L — SIGNIFICANT CHANGE UP
ANION GAP SERPL CALC-SCNC: 22 MMOL/L — HIGH (ref 5–17)
AST SERPL-CCNC: 17 U/L — SIGNIFICANT CHANGE UP
BILIRUB SERPL-MCNC: 0.2 MG/DL — LOW (ref 0.4–2)
BUN SERPL-MCNC: 98.4 MG/DL — HIGH (ref 8–20)
CALCIUM SERPL-MCNC: 9.7 MG/DL — SIGNIFICANT CHANGE UP (ref 8.4–10.5)
CHLORIDE SERPL-SCNC: 90 MMOL/L — LOW (ref 96–108)
CO2 SERPL-SCNC: 22 MMOL/L — SIGNIFICANT CHANGE UP (ref 22–29)
CREAT SERPL-MCNC: 12.24 MG/DL — HIGH (ref 0.5–1.3)
EGFR: 3 ML/MIN/1.73M2 — LOW
EGFR: 3 ML/MIN/1.73M2 — LOW
GLUCOSE BLDC GLUCOMTR-MCNC: 127 MG/DL — HIGH (ref 70–99)
GLUCOSE BLDC GLUCOMTR-MCNC: 132 MG/DL — HIGH (ref 70–99)
GLUCOSE BLDC GLUCOMTR-MCNC: 166 MG/DL — HIGH (ref 70–99)
GLUCOSE BLDC GLUCOMTR-MCNC: 176 MG/DL — HIGH (ref 70–99)
GLUCOSE SERPL-MCNC: 191 MG/DL — HIGH (ref 70–99)
HCT VFR BLD CALC: 31.9 % — LOW (ref 34.5–45)
HGB BLD-MCNC: 10.1 G/DL — LOW (ref 11.5–15.5)
MCHC RBC-ENTMCNC: 25.4 PG — LOW (ref 27–34)
MCHC RBC-ENTMCNC: 31.7 G/DL — LOW (ref 32–36)
MCV RBC AUTO: 80.2 FL — SIGNIFICANT CHANGE UP (ref 80–100)
NRBC # BLD AUTO: 0 K/UL — SIGNIFICANT CHANGE UP (ref 0–0)
NRBC # FLD: 0 K/UL — SIGNIFICANT CHANGE UP (ref 0–0)
NRBC BLD AUTO-RTO: 0 /100 WBCS — SIGNIFICANT CHANGE UP (ref 0–0)
PLATELET # BLD AUTO: 290 K/UL — SIGNIFICANT CHANGE UP (ref 150–400)
PMV BLD: 10.3 FL — SIGNIFICANT CHANGE UP (ref 7–13)
POTASSIUM SERPL-MCNC: 5.2 MMOL/L — SIGNIFICANT CHANGE UP (ref 3.5–5.3)
POTASSIUM SERPL-SCNC: 5.2 MMOL/L — SIGNIFICANT CHANGE UP (ref 3.5–5.3)
PROT SERPL-MCNC: 6.5 G/DL — LOW (ref 6.6–8.7)
RBC # BLD: 3.98 M/UL — SIGNIFICANT CHANGE UP (ref 3.8–5.2)
RBC # FLD: 18.6 % — HIGH (ref 10.3–14.5)
SODIUM SERPL-SCNC: 134 MMOL/L — LOW (ref 135–145)
WBC # BLD: 11.11 K/UL — HIGH (ref 3.8–10.5)
WBC # FLD AUTO: 11.11 K/UL — HIGH (ref 3.8–10.5)

## 2025-07-09 PROCEDURE — 99239 HOSP IP/OBS DSCHRG MGMT >30: CPT

## 2025-07-09 RX ORDER — METOPROLOL SUCCINATE 50 MG/1
1 TABLET, EXTENDED RELEASE ORAL
Qty: 0 | Refills: 0 | DISCHARGE
Start: 2025-07-09

## 2025-07-09 RX ADMIN — Medication 20 MILLIGRAM(S): at 05:58

## 2025-07-09 RX ADMIN — DILTIAZEM HYDROCHLORIDE 240 MILLIGRAM(S): 240 TABLET, EXTENDED RELEASE ORAL at 05:58

## 2025-07-09 RX ADMIN — INSULIN LISPRO 2: 100 INJECTION, SOLUTION INTRAVENOUS; SUBCUTANEOUS at 05:57

## 2025-07-09 RX ADMIN — Medication 0.1 MILLIGRAM(S): at 05:58

## 2025-07-09 RX ADMIN — SEVELAMER HYDROCHLORIDE 1600 MILLIGRAM(S): 800 TABLET ORAL at 17:38

## 2025-07-09 RX ADMIN — METOPROLOL SUCCINATE 100 MILLIGRAM(S): 50 TABLET, EXTENDED RELEASE ORAL at 05:58

## 2025-07-09 RX ADMIN — Medication 100 MILLIGRAM(S): at 16:48

## 2025-07-09 RX ADMIN — SEVELAMER HYDROCHLORIDE 1600 MILLIGRAM(S): 800 TABLET ORAL at 13:11

## 2025-07-09 RX ADMIN — Medication 100 MILLIGRAM(S): at 05:58

## 2025-07-09 RX ADMIN — IPRATROPIUM BROMIDE AND ALBUTEROL SULFATE 3 MILLILITER(S): .5; 2.5 SOLUTION RESPIRATORY (INHALATION) at 15:19

## 2025-07-09 RX ADMIN — Medication 1 APPLICATION(S): at 05:59

## 2025-07-09 RX ADMIN — IPRATROPIUM BROMIDE AND ALBUTEROL SULFATE 3 MILLILITER(S): .5; 2.5 SOLUTION RESPIRATORY (INHALATION) at 04:15

## 2025-07-09 RX ADMIN — Medication 0.1 MILLIGRAM(S): at 17:38

## 2025-07-09 NOTE — DISCHARGE NOTE PROVIDER - ATTENDING DISCHARGE PHYSICAL EXAMINATION:
GENERAL: no acute distress, comfortably in bed  HEENT: Atraumatic, normocephalic, non-icteric, no JVD  PSYCH: Normal affect, calm, appropriate insight and judgment, fluent speech  LUNGS: CTAB, B/L improved breath sounds  HEART: RRR, no murmur appreciated  ABD: Soft, non-tender, non-distended, no organomegaly, no appreciable masses, +bs all 4 quadrants, prior PD scar   EXTREMITIES: Nontender, no clubbing, cyanosis, or edema, LUE AVF with thrill  SKIN: No rashes or lesions  NEURO: No focal deficits, moving all extremities spontaneously, A&Ox3, no dysarthria, CN II-XII grossly intact

## 2025-07-09 NOTE — DISCHARGE NOTE NURSING/CASE MANAGEMENT/SOCIAL WORK - PATIENT PORTAL LINK FT
You can access the FollowMyHealth Patient Portal offered by [facility] by registering at the following website: [email or web address]. By joining [facility] [employer]’s FollowMyHealth portal, you will also be able to view your health information using other applications (apps) compatible with our system.

## 2025-07-09 NOTE — DISCHARGE NOTE PROVIDER - CARE PROVIDER_API CALL
Rudy Chen  Cardiovascular Disease  [address] [place] [address]  Phone: [phone]  Fax: [phone]  Follow Up Time:

## 2025-07-09 NOTE — DISCHARGE NOTE PROVIDER - HOSPITAL COURSE
65-year-old female with ESRD on HD, HTN, HLD, DM2 admitted with hypoxic respiratory failure and pulmonary edema requiring HFNC. Patient received methylprednisolone, diuretics, and urgent hemodialysis with subsequent improvement in respiratory status. Initial leukocytosis of 18.46 normalized to 6 after treatment, and patient was transferred from MICU to telemetry with decreased oxygen requirements and plans for continued scheduled hemodialysis. Patient respiratory status improved and she was on room air. She is clinically stable for discharge.

## 2025-07-09 NOTE — DISCHARGE NOTE PROVIDER - NSDCMRMEDTOKEN_GEN_ALL_CORE_FT
Cardizem  mg/24 hours oral capsule, extended release: 1 cap(s) orally once a day  cloNIDine 0.1 mg oral tablet: 1 tab(s) orally 2 times a day  gabapentin 100 mg oral capsule: 1 cap(s) orally once a day  hydrALAZINE 100 mg oral tablet: 1 tab(s) orally 2 times a day  levalbuterol 45 mcg/inh inhalation aerosol: 2 puff(s) inhaled every 4 hours as needed for  shortness of breath and/or wheezing  lidocaine-prilocaine 2.5%-2.5% topical cream: Apply topically to affected area once a day  metoprolol succinate 100 mg oral tablet, extended release: 1 tab(s) orally once a day  rosuvastatin 20 mg oral tablet: 1 tab(s) orally once a day (at bedtime)  SITagliptin (as base) 25 mg oral tablet: 1 tab(s) orally once a day  torsemide 20 mg oral tablet: 1 tab(s) orally once a day  Velphoro 500 mg oral tablet, chewable: 1 tab(s) chewed 3 times a day

## 2025-07-09 NOTE — DISCHARGE NOTE NURSING/CASE MANAGEMENT/SOCIAL WORK - FINANCIAL ASSISTANCE
[facility] provides services at a reduced cost to those who are determined to be eligible through [facility]’s financial assistance program. Information regarding [facility]’s financial assistance program can be found by going to [email or web address] or by calling [phone].

## 2025-07-09 NOTE — DISCHARGE NOTE PROVIDER - NSDCCPCAREPLAN_GEN_ALL_CORE_FT
PRINCIPAL DISCHARGE DIAGNOSIS  Diagnosis: Acute respiratory failure with hypoxia  Assessment and Plan of Treatment: Improved with HD. on room air. Follow up with PCP and nephrology outpatient.      SECONDARY DISCHARGE DIAGNOSES  Diagnosis: Acute pulmonary edema  Assessment and Plan of Treatment: Improved with HD. on room air. Follow up with PCP and nephrology outpatient.    Diagnosis: Demand ischemia  Assessment and Plan of Treatment: Seen by cardiology. Indicated demand ischemia in setting of fluid overload. Follow up with cardiology outpatient.    Diagnosis: Hypertensive emergency  Assessment and Plan of Treatment: S/p nitroglycerin in field. Improved. follow up with cardiology outpatient.

## 2025-07-09 NOTE — DISCHARGE NOTE PROVIDER - NSDCFUADDAPPT_GEN_ALL_CORE_FT
APPTS ARE READY TO BE MADE: [] YES    Best Family or Patient Contact (if needed):    Additional Information about above appointments (if needed):    1: PCP  2: Nephrology  3: Cardiology    Other comments or requests:

## 2025-07-09 NOTE — PROGRESS NOTE ADULT - SUBJECTIVE AND OBJECTIVE BOX
Patient seen and examined    No new events.    Vital Signs Last 24 Hrs  T(C): 36.8 (08 Jul 2025 11:15), Max: 37 (08 Jul 2025 05:05)  T(F): 98.3 (08 Jul 2025 11:15), Max: 98.6 (08 Jul 2025 05:05)  HR: 76 (08 Jul 2025 11:15) (74 - 102)  BP: 112/63 (08 Jul 2025 11:15) (112/63 - 172/75)  BP(mean): 102 (07 Jul 2025 14:00) (102 - 102)  RR: 18 (08 Jul 2025 11:15) (14 - 18)  SpO2: 98% (08 Jul 2025 11:15) (91% - 100%)    Parameters below as of 08 Jul 2025 08:30  Patient On (Oxygen Delivery Method): nasal cannula, 5L        PHYSICAL EXAM:    GENERAL: Alert, comfortable in  bed  EYES: open  NECK: Supple, No JVD/Bruit; right  permacath site clean  NERVOUS SYSTEM:  oriented   CHEST:  No rales occ rhonchi  HEART:  RRR, gr 2 murmur  ABDOMEN: Soft, nt  EXTREMITIES:  No Edema ;left upper arm access with bruit  SKIN: No rashes    LABS:    07-09    134[L]  |  90[L]  |  98.4[H]  ----------------------------<  191[H]  5.2   |  22.0  |  12.24[H]    Ca    9.7      09 Jul 2025 04:46  Phos  7.3     07-08  Mg     2.5     07-08    TPro  6.5[L]  /  Alb  3.5  /  TBili  0.2[L]  /  DBili  x   /  AST  17  /  ALT  20  /  AlkPhos  122[H]  07-09                            9.6    12.33 )-----------( 257      ( 08 Jul 2025 05:40 )             30.8     07-08    135  |  93[L]  |  64.5[H]  ----------------------------<  197[H]  5.0   |  22.0  |  9.56[H]    Ca    9.8      08 Jul 2025 05:40  Phos  7.3     07-08  Mg     2.5     07-08    TPro  6.5[L]  /  Alb  3.4  /  TBili  0.3[L]  /  DBili  x   /  AST  19  /  ALT  17  /  AlkPhos  107  07-08      MEDICATIONS  (STANDING):  albuterol/ipratropium for Nebulization  chlorhexidine 2% Cloths  cloNIDine  diltiazem   CD  heparin   Injectable  hydrALAZINE  insulin lispro (ADMELOG) corrective regimen sliding scale.  metoprolol succinate ER  rosuvastatin  sevelamer carbonate  torsemide

## 2025-07-10 NOTE — CDI QUERY NOTE - NSCDIOTHERTXTBX_GEN_ALL_CORE_HH
Clinical documentation and/or evidence of the patient’s presentation, evaluation, and medical management, as evidenced below, may support a diagnosis that is not documented in the medical record.  In order to ensure accurate coding and accuracy of the clinical record, the documentation in this patient’s medical record requires additional clarification.      If you think the supporting documentation and/or clinical evidence supports a more specific diagnosis, please include more specific documentation of a diagnosis associated with these findings in your progress note and/or discharge summary.      Both NSTEMI type 2 and demand ischemia were documented, please clarify the etiology of elevated troponin  -	NSTEMI type 2 with demand ischemia  -	Demand ischemia only  -	NSTEMI type 2 only  -	Other, please specify      Supporting documentation and/or clinical evidence:      Troponin T, High Sensitivity Result:  Troponin T, High Sensitivity Result: 187ng/L (07.07.25 @ 16:45)   Troponin T, High Sensitivity Result: 188 ng/L (07.07.25 @ 14:11)   Troponin T, High Sensitivity Result: 184 ng/L (07.07.25 @ 10:28)   Troponin T, High Sensitivity Result: 82 ng/L (07.07.25 @ 00:28)         Progress Note Adult-Internal Medicine Attending [Charted Location: [place] [address]] [Authored: 08-Jul-2025 11:46]  Type II ACS/NSTEMI  - EKG without Ischemic changes; continuous telemetry, stat EKG for new symptoms  - More consistent with ESRD-related baseline elevation and demand ischemia from severe hypertension, hypoxia, and volume shifts; type 1 ACS unlikely.  - TTE to assess wall motion; noted prior 10/2023 nuclear stress test and last echo in 2024 normal (EF >75 %)  - Restart home Metoprolol Succinate 100 mg QD  - Hold off heparin gtt till TTE results, d/w Cardiology  - Cardiology following        Progress Note Adult-Cardiology Attending [Charted Location: [place] [address]] [Authored: 08-Jul-2025 22:30]  Troponin elevation pattern consistent with demand ischemia in the setting of uncontrolled HTN and ESRD  Echo with normal biventricular systolic function   Vol management with RRT        Discharge Note Provider [Charted Location: [place] [address]] [Authored: 09-Jul-2025 10:11]  Diagnosis: Demand ischemia  Assessment and Plan of Treatment: Seen by cardiology. Indicated demand ischemia in setting of fluid overload. Follow up with cardiology outpatient.

## 2025-07-10 NOTE — CHART NOTE - NSCHARTNOTEFT_GEN_A_CORE
patient came in with respiratory distress. placed on bipap. patient was not tolerating bipap and taken off and placed on 100% HF NC. patient is now more comfortable. bipap at bedside
Brief Note     Aware pt downgraded from ICU. Chart reviewed; RD to follow up with full nutrition assessment per medical floor protocol.
Demand ischemia only
Pt refused Nocturnal BIPAP rather stay on HFNC 50%/40L. pt is tolerating well.
Pt remained on HFNC throughout the entire night - Pt refused BIPAP for nocturnal use.  Pt O2 was titrated down on HFNC to 35 lpm at 40%.  No distress noted.  BIPAP remains at bedside if needed.  No increased WOB or increased accessory muscle use.  RR is 16-18 and pulse ox remains % at this time.  Pt was encouraged to call if she would like her nebulizer treatment and was educated on risks versus benefits.
s/p HD stopped due to cramps but fluid removed   Improved O2 requirement on HFNC; wean as tolerated   Will resume diet  Transfer to Tele  Resume home antiHTN and other home meds   IV Tylenol  for cramps  RVP neg   MRSA swab neg  Trend Trop; EKG on admission with ac STT changes; TTE   OOB as tolerated   Further HD as per Nephro    Home Medications:  Cardizem  mg/24 hours oral capsule, extended release: 1 cap(s) orally once a day (07 Jul 2025 09:07)  cloNIDine 0.1 mg oral tablet: 1 tab(s) orally 2 times a day (07 Jul 2025 09:07)  gabapentin 100 mg oral capsule: 1 cap(s) orally once a day (07 Jul 2025 09:10)  hydrALAZINE 100 mg oral tablet: 1 tab(s) orally 2 times a day (07 Jul 2025 09:11)  levalbuterol 45 mcg/inh inhalation aerosol: 2 puff(s) inhaled every 4 hours as needed for  shortness of breath and/or wheezing (07 Jul 2025 09:07)  lidocaine-prilocaine 2.5%-2.5% topical cream: Apply topically to affected area once a day (07 Jul 2025 09:09)  rosuvastatin 20 mg oral tablet: 1 tab(s) orally once a day (at bedtime) (07 Jul 2025 09:08)  SITagliptin (as base) 25 mg oral tablet: 1 tab(s) orally once a day (07 Jul 2025 09:11)  torsemide 20 mg oral tablet: 1 tab(s) orally once a day (07 Jul 2025 09:11)  Velphoro 500 mg oral tablet, chewable: 1 tab(s) chewed 3 times a day (07 Jul 2025 09:11)    MEDICATIONS  (STANDING):  acetaminophen   IVPB .. 1000 milliGRAM(s) IV Intermittent once  albuterol/ipratropium for Nebulization 3 milliLiter(s) Nebulizer every 6 hours  chlorhexidine 2% Cloths 1 Application(s) Topical <User Schedule>  heparin   Injectable 5000 Unit(s) SubCutaneous every 8 hours  insulin lispro (ADMELOG) corrective regimen sliding scale.   SubCutaneous every 6 hours  methylPREDNISolone sodium succinate Injectable 40 milliGRAM(s) IV Push every 6 hours    Plan d/w ICU team; plan d/w Pt

## 2025-07-12 LAB
CULTURE RESULTS: SIGNIFICANT CHANGE UP
CULTURE RESULTS: SIGNIFICANT CHANGE UP
SPECIMEN SOURCE: SIGNIFICANT CHANGE UP
SPECIMEN SOURCE: SIGNIFICANT CHANGE UP

## 2025-07-23 RX ORDER — ROSUVASTATIN CALCIUM 20 MG/1
1 TABLET, FILM COATED ORAL
Refills: 0 | DISCHARGE

## 2025-07-23 RX ORDER — TORSEMIDE 10 MG
1 TABLET ORAL
Refills: 0 | DISCHARGE

## 2025-07-23 RX ORDER — SITAGLIPTIN 100 MG/1
1 TABLET, FILM COATED ORAL
Refills: 0 | DISCHARGE

## 2025-07-23 RX ORDER — LIDOCAINE AND PRILOCAINE 25; 25 MG/G; MG/G
1 CREAM TOPICAL
Refills: 0 | DISCHARGE

## 2025-07-23 RX ORDER — DILTIAZEM HYDROCHLORIDE 240 MG/1
1 TABLET, EXTENDED RELEASE ORAL
Refills: 0 | DISCHARGE

## 2025-07-23 RX ORDER — GABAPENTIN 400 MG/1
1 CAPSULE ORAL
Refills: 0 | DISCHARGE

## 2025-07-23 RX ORDER — LEVALBUTEROL HYDROCHLORIDE 1.25 MG/3ML
2 SOLUTION RESPIRATORY (INHALATION)
Refills: 0 | DISCHARGE

## 2025-07-23 RX ORDER — SUCROFERRIC OXYHYDROXIDE 500 MG/1
1 TABLET, CHEWABLE ORAL
Refills: 0 | DISCHARGE

## 2025-07-26 ENCOUNTER — NON-APPOINTMENT (OUTPATIENT)
Age: 65
End: 2025-07-26

## 2025-07-28 ENCOUNTER — APPOINTMENT (OUTPATIENT)
Dept: CARDIOLOGY | Facility: CLINIC | Age: 65
End: 2025-07-28
Payer: COMMERCIAL

## 2025-07-28 VITALS — DIASTOLIC BLOOD PRESSURE: 84 MMHG | SYSTOLIC BLOOD PRESSURE: 202 MMHG

## 2025-07-28 VITALS
DIASTOLIC BLOOD PRESSURE: 98 MMHG | RESPIRATION RATE: 16 BRPM | HEART RATE: 94 BPM | SYSTOLIC BLOOD PRESSURE: 219 MMHG | WEIGHT: 113 LBS | HEIGHT: 60.25 IN | BODY MASS INDEX: 21.9 KG/M2

## 2025-07-28 DIAGNOSIS — I10 ESSENTIAL (PRIMARY) HYPERTENSION: ICD-10-CM

## 2025-07-28 DIAGNOSIS — E78.00 PURE HYPERCHOLESTEROLEMIA, UNSPECIFIED: ICD-10-CM

## 2025-07-28 DIAGNOSIS — Z72.3 LACK OF PHYSICAL EXERCISE: ICD-10-CM

## 2025-07-28 DIAGNOSIS — E11.9 TYPE 2 DIABETES MELLITUS W/OUT COMPLICATIONS: ICD-10-CM

## 2025-07-28 DIAGNOSIS — Z86.39 PERSONAL HISTORY OF OTHER ENDOCRINE, NUTRITIONAL AND METABOLIC DISEASE: ICD-10-CM

## 2025-07-28 DIAGNOSIS — N28.9 DISORDER OF KIDNEY AND URETER, UNSPECIFIED: ICD-10-CM

## 2025-07-28 DIAGNOSIS — E78.5 HYPERLIPIDEMIA, UNSPECIFIED: ICD-10-CM

## 2025-07-28 DIAGNOSIS — I50.30 UNSPECIFIED DIASTOLIC (CONGESTIVE) HEART FAILURE: ICD-10-CM

## 2025-07-28 DIAGNOSIS — R07.9 CHEST PAIN, UNSPECIFIED: ICD-10-CM

## 2025-07-28 DIAGNOSIS — Z78.9 OTHER SPECIFIED HEALTH STATUS: ICD-10-CM

## 2025-07-28 PROCEDURE — 93000 ELECTROCARDIOGRAM COMPLETE: CPT

## 2025-07-28 PROCEDURE — 99214 OFFICE O/P EST MOD 30 MIN: CPT

## 2025-07-28 RX ORDER — SITAGLIPTIN 25 MG/1
25 TABLET, FILM COATED ORAL
Refills: 0 | Status: ACTIVE | COMMUNITY

## 2025-07-28 RX ORDER — ROSUVASTATIN CALCIUM 20 MG/1
20 TABLET, FILM COATED ORAL
Qty: 90 | Refills: 0 | Status: ACTIVE | COMMUNITY

## 2025-07-28 RX ORDER — DILTIAZEM HYDROCHLORIDE 240 MG/1
240 CAPSULE, COATED, EXTENDED RELEASE ORAL
Refills: 0 | Status: ACTIVE | COMMUNITY

## 2025-07-28 RX ORDER — METOPROLOL SUCCINATE 100 MG/1
100 TABLET, EXTENDED RELEASE ORAL DAILY
Refills: 0 | Status: ACTIVE | COMMUNITY

## 2025-07-28 RX ORDER — HYDRALAZINE HYDROCHLORIDE 100 MG/1
100 TABLET ORAL TWICE DAILY
Refills: 0 | Status: ACTIVE | COMMUNITY

## 2025-07-28 RX ORDER — TORSEMIDE 20 MG/1
20 TABLET ORAL DAILY
Refills: 0 | Status: ACTIVE | COMMUNITY

## 2025-07-28 RX ORDER — GABAPENTIN 100 MG
100 TABLET ORAL
Refills: 0 | Status: ACTIVE | COMMUNITY

## 2025-07-28 RX ORDER — CLONIDINE HYDROCHLORIDE 0.1 MG/1
0.1 TABLET ORAL TWICE DAILY
Refills: 0 | Status: ACTIVE | COMMUNITY

## (undated) DEVICE — STAPLER SKIN PROXIMATE

## (undated) DEVICE — WARMING BLANKET UPPER ADULT

## (undated) DEVICE — BLADE SURGICAL #15 CARBON

## (undated) DEVICE — ELCTR GROUNDING PAD ADULT COVIDIEN

## (undated) DEVICE — ELCTR ROCKER SWITCH PENCIL BLUE 10FT

## (undated) DEVICE — SOL IRR POUR H2O 1000ML

## (undated) DEVICE — POOLE SUCTION TIP

## (undated) DEVICE — DRAPE FLUID WARMER 44 X 66"

## (undated) DEVICE — BLADE SURGICAL #10 STAINLESS

## (undated) DEVICE — SUT VICRYL 2-0 27" CT-1 UNDYED

## (undated) DEVICE — SUT MONOCRYL 4-0 27" PS-2 UNDYED

## (undated) DEVICE — SOL IRR POUR NS 0.9% 1000ML

## (undated) DEVICE — ELCTR BOVIE BLADE 3/4" EXTENDED LENGTH 6"

## (undated) DEVICE — VENODYNE/SCD SLEEVE CALF MEDIUM

## (undated) DEVICE — Device

## (undated) DEVICE — DRAPE 1/2 SHEET 40X57"

## (undated) DEVICE — RETAINER VICERA FISH LG

## (undated) DEVICE — DRAPE TOWEL BLUE STICKY

## (undated) DEVICE — LIGASURE IMPACT

## (undated) DEVICE — GLV 7.5 PROTEXIS (WHITE)

## (undated) DEVICE — SUT PDS II 1 48" TP-1

## (undated) DEVICE — PACK MAJOR ABDOMINAL WITH LAP

## (undated) DEVICE — FOLEY TRAY 16FR 5CC LF UMETER CLOSED